# Patient Record
Sex: FEMALE | Race: WHITE | Employment: OTHER | URBAN - METROPOLITAN AREA
[De-identification: names, ages, dates, MRNs, and addresses within clinical notes are randomized per-mention and may not be internally consistent; named-entity substitution may affect disease eponyms.]

---

## 2017-01-04 ENCOUNTER — OFFICE VISIT (OUTPATIENT)
Dept: FAMILY MEDICINE CLINIC | Age: 62
End: 2017-01-04

## 2017-01-04 VITALS
SYSTOLIC BLOOD PRESSURE: 122 MMHG | HEIGHT: 61 IN | HEART RATE: 82 BPM | TEMPERATURE: 98 F | WEIGHT: 242 LBS | DIASTOLIC BLOOD PRESSURE: 82 MMHG | OXYGEN SATURATION: 97 % | BODY MASS INDEX: 45.69 KG/M2

## 2017-01-04 DIAGNOSIS — J20.8 ACUTE BRONCHITIS DUE TO OTHER SPECIFIED ORGANISMS: Primary | ICD-10-CM

## 2017-01-04 DIAGNOSIS — M89.8X6 PAIN IN TIBIA: ICD-10-CM

## 2017-01-04 DIAGNOSIS — T30.0 BURN: ICD-10-CM

## 2017-01-04 DIAGNOSIS — H92.02 EAR PAIN, LEFT: ICD-10-CM

## 2017-01-04 PROCEDURE — 69210 REMOVE IMPACTED EAR WAX UNI: CPT | Performed by: FAMILY MEDICINE

## 2017-01-04 PROCEDURE — 99214 OFFICE O/P EST MOD 30 MIN: CPT | Performed by: FAMILY MEDICINE

## 2017-01-04 RX ORDER — METHYLPREDNISOLONE 4 MG/1
TABLET ORAL
Qty: 1 KIT | Refills: 0 | Status: SHIPPED | OUTPATIENT
Start: 2017-01-04 | End: 2017-01-10

## 2017-01-04 RX ORDER — AZITHROMYCIN 250 MG/1
TABLET, FILM COATED ORAL
Qty: 1 PACKET | Refills: 0 | Status: SHIPPED | OUTPATIENT
Start: 2017-01-04 | End: 2017-01-14

## 2017-01-04 RX ORDER — BENZONATATE 100 MG/1
100 CAPSULE ORAL 3 TIMES DAILY PRN
Qty: 20 CAPSULE | Refills: 0 | Status: SHIPPED | OUTPATIENT
Start: 2017-01-04 | End: 2017-01-11

## 2017-01-04 ASSESSMENT — ENCOUNTER SYMPTOMS
SORE THROAT: 0
BACK PAIN: 0
VOMITING: 0
SINUS PRESSURE: 0
BLOOD IN STOOL: 0
EYE DISCHARGE: 0
SHORTNESS OF BREATH: 0
NAUSEA: 0
DIARRHEA: 0
WHEEZING: 0
BURN: 1
ABDOMINAL PAIN: 0
COUGH: 1

## 2017-01-04 ASSESSMENT — PATIENT HEALTH QUESTIONNAIRE - PHQ9
2. FEELING DOWN, DEPRESSED OR HOPELESS: 0
SUM OF ALL RESPONSES TO PHQ9 QUESTIONS 1 & 2: 0
SUM OF ALL RESPONSES TO PHQ QUESTIONS 1-9: 0
1. LITTLE INTEREST OR PLEASURE IN DOING THINGS: 0

## 2017-04-25 ENCOUNTER — OFFICE VISIT (OUTPATIENT)
Dept: FAMILY MEDICINE CLINIC | Age: 62
End: 2017-04-25

## 2017-04-25 VITALS
HEART RATE: 74 BPM | OXYGEN SATURATION: 97 % | DIASTOLIC BLOOD PRESSURE: 72 MMHG | SYSTOLIC BLOOD PRESSURE: 118 MMHG | WEIGHT: 247.8 LBS | BODY MASS INDEX: 46.78 KG/M2 | HEIGHT: 61 IN

## 2017-04-25 DIAGNOSIS — R05.9 COUGH: ICD-10-CM

## 2017-04-25 DIAGNOSIS — F33.0 MAJOR DEPRESSIVE DISORDER, RECURRENT EPISODE, MILD (HCC): ICD-10-CM

## 2017-04-25 DIAGNOSIS — J44.9 COPD, MODERATE (HCC): Primary | Chronic | ICD-10-CM

## 2017-04-25 DIAGNOSIS — Z77.110 EXACERBATION OF COPD ASSOCIATED WITH VOLCANIC SMOG EXPOSURE (HCC): ICD-10-CM

## 2017-04-25 DIAGNOSIS — Z12.31 ENCOUNTER FOR SCREENING MAMMOGRAM FOR MALIGNANT NEOPLASM OF BREAST: ICD-10-CM

## 2017-04-25 DIAGNOSIS — Z91.09 ENVIRONMENTAL ALLERGIES: ICD-10-CM

## 2017-04-25 DIAGNOSIS — R60.0 PEDAL EDEMA: ICD-10-CM

## 2017-04-25 DIAGNOSIS — Z13.220 SCREENING CHOLESTEROL LEVEL: ICD-10-CM

## 2017-04-25 DIAGNOSIS — R91.1 LUNG NODULE: ICD-10-CM

## 2017-04-25 DIAGNOSIS — J45.40 MODERATE PERSISTENT ASTHMA WITHOUT COMPLICATION: ICD-10-CM

## 2017-04-25 DIAGNOSIS — J44.1 EXACERBATION OF COPD ASSOCIATED WITH VOLCANIC SMOG EXPOSURE (HCC): ICD-10-CM

## 2017-04-25 DIAGNOSIS — E03.9 HYPOTHYROIDISM, UNSPECIFIED TYPE: ICD-10-CM

## 2017-04-25 LAB
BUN BLDV-MCNC: 22 MG/DL (ref 7–20)
CHOLESTEROL, TOTAL: 187 MG/DL (ref 0–199)
CREAT SERPL-MCNC: 0.7 MG/DL (ref 0.6–1.2)
GFR AFRICAN AMERICAN: >60
GFR NON-AFRICAN AMERICAN: >60
HDLC SERPL-MCNC: 50 MG/DL (ref 40–60)
LDL CHOLESTEROL CALCULATED: 105 MG/DL
TRIGL SERPL-MCNC: 159 MG/DL (ref 0–150)
TSH SERPL DL<=0.05 MIU/L-ACNC: 2.43 UIU/ML (ref 0.27–4.2)
VLDLC SERPL CALC-MCNC: 32 MG/DL

## 2017-04-25 PROCEDURE — 99214 OFFICE O/P EST MOD 30 MIN: CPT | Performed by: FAMILY MEDICINE

## 2017-04-25 PROCEDURE — 36415 COLL VENOUS BLD VENIPUNCTURE: CPT | Performed by: FAMILY MEDICINE

## 2017-04-25 RX ORDER — BUPROPION HYDROCHLORIDE 300 MG/1
300 TABLET ORAL DAILY
Qty: 30 TABLET | Refills: 5 | Status: SHIPPED | OUTPATIENT
Start: 2017-04-25 | End: 2017-12-19 | Stop reason: SDUPTHER

## 2017-04-25 RX ORDER — MONTELUKAST SODIUM 10 MG/1
10 TABLET ORAL DAILY
Qty: 30 TABLET | Refills: 5 | Status: SHIPPED | OUTPATIENT
Start: 2017-04-25 | End: 2017-12-19 | Stop reason: SDUPTHER

## 2017-04-25 RX ORDER — BENZONATATE 100 MG/1
200 CAPSULE ORAL 3 TIMES DAILY PRN
Qty: 25 CAPSULE | Refills: 0 | Status: SHIPPED | OUTPATIENT
Start: 2017-04-25 | End: 2017-07-08

## 2017-04-25 RX ORDER — METHYLPREDNISOLONE 4 MG/1
TABLET ORAL
Qty: 1 KIT | Refills: 0 | Status: SHIPPED | OUTPATIENT
Start: 2017-04-25 | End: 2017-05-01

## 2017-04-25 RX ORDER — BUSPIRONE HYDROCHLORIDE 7.5 MG/1
7.5 TABLET ORAL 2 TIMES DAILY
Qty: 60 TABLET | Refills: 5 | Status: SHIPPED | OUTPATIENT
Start: 2017-04-25 | End: 2017-12-19 | Stop reason: SDUPTHER

## 2017-04-25 RX ORDER — FUROSEMIDE 20 MG/1
20 TABLET ORAL DAILY
Qty: 60 TABLET | Refills: 5 | Status: SHIPPED | OUTPATIENT
Start: 2017-04-25 | End: 2017-12-19 | Stop reason: SDUPTHER

## 2017-04-25 RX ORDER — LORATADINE 10 MG/1
10 TABLET ORAL DAILY
Qty: 30 TABLET | Refills: 5 | Status: SHIPPED | OUTPATIENT
Start: 2017-04-25 | End: 2017-12-19 | Stop reason: SDUPTHER

## 2017-04-25 RX ORDER — LEVOTHYROXINE SODIUM 0.12 MG/1
125 TABLET ORAL DAILY
Qty: 30 TABLET | Refills: 5 | Status: SHIPPED | OUTPATIENT
Start: 2017-04-25 | End: 2017-12-19 | Stop reason: SDUPTHER

## 2017-04-25 RX ORDER — ALBUTEROL SULFATE 90 UG/1
2 AEROSOL, METERED RESPIRATORY (INHALATION) EVERY 6 HOURS PRN
Qty: 1 INHALER | Refills: 5 | Status: SHIPPED | OUTPATIENT
Start: 2017-04-25 | End: 2017-12-19 | Stop reason: SDUPTHER

## 2017-04-25 RX ORDER — THEOPHYLLINE 300 MG/1
300 TABLET, EXTENDED RELEASE ORAL 2 TIMES DAILY
Qty: 60 TABLET | Refills: 5 | Status: SHIPPED | OUTPATIENT
Start: 2017-04-25 | End: 2017-12-19 | Stop reason: SDUPTHER

## 2017-04-25 RX ORDER — DIAZEPAM 5 MG/1
5 TABLET ORAL EVERY 8 HOURS PRN
Qty: 1 TABLET | Refills: 0 | Status: SHIPPED | OUTPATIENT
Start: 2017-04-25 | End: 2017-04-26

## 2017-04-25 ASSESSMENT — ENCOUNTER SYMPTOMS: COUGH: 1

## 2017-04-25 ASSESSMENT — COPD QUESTIONNAIRES: COPD: 1

## 2017-05-08 ENCOUNTER — HOSPITAL ENCOUNTER (OUTPATIENT)
Dept: WOMENS IMAGING | Age: 62
Discharge: HOME | End: 2017-05-08
Attending: FAMILY MEDICINE | Admitting: FAMILY MEDICINE

## 2017-05-08 DIAGNOSIS — Z12.31 ENCOUNTER FOR SCREENING MAMMOGRAM FOR MALIGNANT NEOPLASM OF BREAST: ICD-10-CM

## 2017-07-12 ENCOUNTER — OFFICE VISIT (OUTPATIENT)
Dept: FAMILY MEDICINE CLINIC | Age: 62
End: 2017-07-12

## 2017-07-12 VITALS
TEMPERATURE: 98.4 F | SYSTOLIC BLOOD PRESSURE: 124 MMHG | WEIGHT: 241.8 LBS | HEIGHT: 62 IN | DIASTOLIC BLOOD PRESSURE: 74 MMHG | BODY MASS INDEX: 44.5 KG/M2 | HEART RATE: 71 BPM

## 2017-07-12 DIAGNOSIS — H92.03 OTALGIA, BILATERAL: ICD-10-CM

## 2017-07-12 DIAGNOSIS — H61.23 IMPACTED CERUMEN OF BOTH EARS: Primary | ICD-10-CM

## 2017-07-12 DIAGNOSIS — R07.9 CHEST PAIN, UNSPECIFIED TYPE: ICD-10-CM

## 2017-07-12 DIAGNOSIS — J06.9 VIRAL URI: ICD-10-CM

## 2017-07-12 PROCEDURE — 69210 REMOVE IMPACTED EAR WAX UNI: CPT | Performed by: FAMILY MEDICINE

## 2017-07-12 PROCEDURE — 99214 OFFICE O/P EST MOD 30 MIN: CPT | Performed by: FAMILY MEDICINE

## 2017-07-12 PROCEDURE — 93000 ELECTROCARDIOGRAM COMPLETE: CPT | Performed by: FAMILY MEDICINE

## 2017-07-12 ASSESSMENT — ENCOUNTER SYMPTOMS: COUGH: 1

## 2017-07-20 ENCOUNTER — TELEPHONE (OUTPATIENT)
Dept: FAMILY MEDICINE CLINIC | Age: 62
End: 2017-07-20

## 2017-09-13 ENCOUNTER — OFFICE VISIT (OUTPATIENT)
Dept: FAMILY MEDICINE CLINIC | Age: 62
End: 2017-09-13

## 2017-09-13 VITALS
DIASTOLIC BLOOD PRESSURE: 64 MMHG | SYSTOLIC BLOOD PRESSURE: 124 MMHG | BODY MASS INDEX: 44.53 KG/M2 | TEMPERATURE: 97.9 F | HEIGHT: 62 IN | WEIGHT: 242 LBS | HEART RATE: 80 BPM

## 2017-09-13 DIAGNOSIS — T17.308A CHOKING, INITIAL ENCOUNTER: ICD-10-CM

## 2017-09-13 DIAGNOSIS — R11.2 NAUSEA AND VOMITING, INTRACTABILITY OF VOMITING NOT SPECIFIED, UNSPECIFIED VOMITING TYPE: Primary | ICD-10-CM

## 2017-09-13 DIAGNOSIS — M79.671 PAIN IN RIGHT FOOT: ICD-10-CM

## 2017-09-13 PROCEDURE — 99214 OFFICE O/P EST MOD 30 MIN: CPT | Performed by: FAMILY MEDICINE

## 2017-09-13 RX ORDER — RANITIDINE 150 MG/1
150 TABLET ORAL 2 TIMES DAILY
Qty: 60 TABLET | Refills: 0 | Status: SHIPPED | OUTPATIENT
Start: 2017-09-13 | End: 2017-10-13 | Stop reason: SDUPTHER

## 2017-09-13 RX ORDER — FLUTICASONE PROPIONATE 50 MCG
SPRAY, SUSPENSION (ML) NASAL
Refills: 0 | COMMUNITY
Start: 2017-07-21 | End: 2017-12-21

## 2017-09-14 ASSESSMENT — ENCOUNTER SYMPTOMS
NAUSEA: 1
DIARRHEA: 1
VOMITING: 1

## 2017-09-26 ENCOUNTER — OFFICE VISIT (OUTPATIENT)
Dept: FAMILY MEDICINE CLINIC | Age: 62
End: 2017-09-26

## 2017-09-26 VITALS
SYSTOLIC BLOOD PRESSURE: 118 MMHG | WEIGHT: 243.2 LBS | DIASTOLIC BLOOD PRESSURE: 70 MMHG | RESPIRATION RATE: 17 BRPM | HEART RATE: 71 BPM | BODY MASS INDEX: 45.21 KG/M2

## 2017-09-26 DIAGNOSIS — J44.9 COPD, MODERATE (HCC): Chronic | ICD-10-CM

## 2017-09-26 DIAGNOSIS — K21.9 GASTROESOPHAGEAL REFLUX DISEASE WITHOUT ESOPHAGITIS: Primary | ICD-10-CM

## 2017-09-26 DIAGNOSIS — R19.5 LOOSE STOOLS: ICD-10-CM

## 2017-09-26 DIAGNOSIS — R25.2 FOOT SPASMS: ICD-10-CM

## 2017-09-26 PROCEDURE — 99214 OFFICE O/P EST MOD 30 MIN: CPT | Performed by: FAMILY MEDICINE

## 2017-09-26 RX ORDER — DICYCLOMINE HCL 20 MG
20 TABLET ORAL
Qty: 60 TABLET | Refills: 0 | Status: SHIPPED | OUTPATIENT
Start: 2017-09-26 | End: 2017-12-19

## 2017-09-26 RX ORDER — ALBUTEROL SULFATE 2.5 MG/3ML
2.5 SOLUTION RESPIRATORY (INHALATION) 2 TIMES DAILY PRN
Qty: 60 EACH | Refills: 0 | Status: SHIPPED | OUTPATIENT
Start: 2017-09-26 | End: 2017-12-19 | Stop reason: SDUPTHER

## 2017-09-26 RX ORDER — OMEPRAZOLE 40 MG/1
40 CAPSULE, DELAYED RELEASE ORAL DAILY
Qty: 30 CAPSULE | Refills: 0 | Status: SHIPPED | OUTPATIENT
Start: 2017-09-26 | End: 2017-12-19

## 2017-09-26 ASSESSMENT — ENCOUNTER SYMPTOMS
NAUSEA: 1
HEARTBURN: 1
COUGH: 1
CHOKING: 1
GLOBUS SENSATION: 1
VOMITING: 1
DIARRHEA: 1

## 2017-09-27 ASSESSMENT — COPD QUESTIONNAIRES: COPD: 1

## 2017-12-19 ENCOUNTER — OFFICE VISIT (OUTPATIENT)
Dept: FAMILY MEDICINE CLINIC | Age: 62
End: 2017-12-19

## 2017-12-19 VITALS
WEIGHT: 241.2 LBS | HEIGHT: 62 IN | OXYGEN SATURATION: 94 % | TEMPERATURE: 97.9 F | HEART RATE: 104 BPM | SYSTOLIC BLOOD PRESSURE: 130 MMHG | BODY MASS INDEX: 44.39 KG/M2 | DIASTOLIC BLOOD PRESSURE: 62 MMHG | RESPIRATION RATE: 20 BRPM

## 2017-12-19 DIAGNOSIS — F33.0 MAJOR DEPRESSIVE DISORDER, RECURRENT EPISODE, MILD (HCC): ICD-10-CM

## 2017-12-19 DIAGNOSIS — J44.9 COPD, MODERATE (HCC): Chronic | ICD-10-CM

## 2017-12-19 DIAGNOSIS — R60.0 PEDAL EDEMA: ICD-10-CM

## 2017-12-19 DIAGNOSIS — Z91.09 ENVIRONMENTAL ALLERGIES: ICD-10-CM

## 2017-12-19 DIAGNOSIS — R07.9 CHEST PAIN, UNSPECIFIED TYPE: ICD-10-CM

## 2017-12-19 DIAGNOSIS — R73.02 IMPAIRED GLUCOSE TOLERANCE: ICD-10-CM

## 2017-12-19 DIAGNOSIS — E66.01 OBESITY, CLASS III, BMI 40-49.9 (MORBID OBESITY) (HCC): ICD-10-CM

## 2017-12-19 DIAGNOSIS — R05.9 COUGH: Primary | ICD-10-CM

## 2017-12-19 DIAGNOSIS — E03.9 HYPOTHYROIDISM, UNSPECIFIED TYPE: ICD-10-CM

## 2017-12-19 PROCEDURE — 3023F SPIROM DOC REV: CPT | Performed by: FAMILY MEDICINE

## 2017-12-19 PROCEDURE — 3014F SCREEN MAMMO DOC REV: CPT | Performed by: FAMILY MEDICINE

## 2017-12-19 PROCEDURE — G8484 FLU IMMUNIZE NO ADMIN: HCPCS | Performed by: FAMILY MEDICINE

## 2017-12-19 PROCEDURE — 93000 ELECTROCARDIOGRAM COMPLETE: CPT | Performed by: FAMILY MEDICINE

## 2017-12-19 PROCEDURE — G8417 CALC BMI ABV UP PARAM F/U: HCPCS | Performed by: FAMILY MEDICINE

## 2017-12-19 PROCEDURE — 99214 OFFICE O/P EST MOD 30 MIN: CPT | Performed by: FAMILY MEDICINE

## 2017-12-19 PROCEDURE — G8427 DOCREV CUR MEDS BY ELIG CLIN: HCPCS | Performed by: FAMILY MEDICINE

## 2017-12-19 PROCEDURE — G8926 SPIRO NO PERF OR DOC: HCPCS | Performed by: FAMILY MEDICINE

## 2017-12-19 PROCEDURE — G8599 NO ASA/ANTIPLAT THER USE RNG: HCPCS | Performed by: FAMILY MEDICINE

## 2017-12-19 PROCEDURE — 3017F COLORECTAL CA SCREEN DOC REV: CPT | Performed by: FAMILY MEDICINE

## 2017-12-19 PROCEDURE — 1036F TOBACCO NON-USER: CPT | Performed by: FAMILY MEDICINE

## 2017-12-19 RX ORDER — BUSPIRONE HYDROCHLORIDE 7.5 MG/1
7.5 TABLET ORAL 2 TIMES DAILY
Qty: 60 TABLET | Refills: 5 | Status: SHIPPED | OUTPATIENT
Start: 2017-12-19 | End: 2018-06-20 | Stop reason: SDUPTHER

## 2017-12-19 RX ORDER — THEOPHYLLINE 300 MG/1
300 TABLET, EXTENDED RELEASE ORAL 2 TIMES DAILY
Qty: 60 TABLET | Refills: 5 | Status: SHIPPED | OUTPATIENT
Start: 2017-12-19 | End: 2018-06-20 | Stop reason: SDUPTHER

## 2017-12-19 RX ORDER — FUROSEMIDE 20 MG/1
20 TABLET ORAL DAILY
Qty: 60 TABLET | Refills: 5 | Status: SHIPPED | OUTPATIENT
Start: 2017-12-19 | End: 2018-06-20 | Stop reason: SDUPTHER

## 2017-12-19 RX ORDER — ALBUTEROL SULFATE 2.5 MG/3ML
2.5 SOLUTION RESPIRATORY (INHALATION) 2 TIMES DAILY PRN
Qty: 60 EACH | Refills: 0 | Status: SHIPPED | OUTPATIENT
Start: 2017-12-19 | End: 2018-06-20 | Stop reason: SDUPTHER

## 2017-12-19 RX ORDER — METHYLPREDNISOLONE 4 MG/1
TABLET ORAL
Qty: 1 KIT | Refills: 0 | Status: SHIPPED | OUTPATIENT
Start: 2017-12-19 | End: 2017-12-25

## 2017-12-19 RX ORDER — LEVOTHYROXINE SODIUM 0.12 MG/1
125 TABLET ORAL DAILY
Qty: 30 TABLET | Refills: 5 | Status: SHIPPED | OUTPATIENT
Start: 2017-12-19 | End: 2018-06-20 | Stop reason: SDUPTHER

## 2017-12-19 RX ORDER — MONTELUKAST SODIUM 10 MG/1
10 TABLET ORAL DAILY
Qty: 30 TABLET | Refills: 5 | Status: SHIPPED | OUTPATIENT
Start: 2017-12-19 | End: 2018-06-20 | Stop reason: SDUPTHER

## 2017-12-19 RX ORDER — ALBUTEROL SULFATE 90 UG/1
2 AEROSOL, METERED RESPIRATORY (INHALATION) EVERY 6 HOURS PRN
Qty: 1 INHALER | Refills: 5 | Status: SHIPPED | OUTPATIENT
Start: 2017-12-19 | End: 2018-06-20 | Stop reason: SDUPTHER

## 2017-12-19 RX ORDER — BENZONATATE 200 MG/1
200 CAPSULE ORAL 3 TIMES DAILY PRN
Qty: 30 CAPSULE | Refills: 0 | Status: SHIPPED | OUTPATIENT
Start: 2017-12-19 | End: 2018-03-13 | Stop reason: ALTCHOICE

## 2017-12-19 RX ORDER — LORATADINE 10 MG/1
10 TABLET ORAL DAILY
Qty: 30 TABLET | Refills: 5 | Status: SHIPPED | OUTPATIENT
Start: 2017-12-19 | End: 2018-06-20 | Stop reason: SDUPTHER

## 2017-12-19 RX ORDER — BUPROPION HYDROCHLORIDE 300 MG/1
300 TABLET ORAL DAILY
Qty: 30 TABLET | Refills: 5 | Status: SHIPPED | OUTPATIENT
Start: 2017-12-19 | End: 2018-06-20 | Stop reason: SDUPTHER

## 2017-12-19 ASSESSMENT — ENCOUNTER SYMPTOMS: COUGH: 1

## 2017-12-19 ASSESSMENT — COPD QUESTIONNAIRES: COPD: 1

## 2017-12-19 NOTE — PROGRESS NOTES
no submandibular and no posterior auricular adenopathy present. Right cervical: No superficial cervical, no deep cervical and no posterior cervical adenopathy present. Left cervical: No superficial cervical, no deep cervical and no posterior cervical adenopathy present. Psychiatric: She has a normal mood and affect. Her behavior is normal.     Vitals:    12/19/17 1354   BP: 130/62   Site: Right Arm   Position: Sitting   Cuff Size: Large Adult   Pulse: 104   Resp: 20   Temp: 97.9 °F (36.6 °C)   TempSrc: Oral   SpO2: 94%   Weight: 241 lb 3.2 oz (109.4 kg)   Height: 5' 1.5\" (1.562 m)     Body mass index is 44.84 kg/m². Wt Readings from Last 3 Encounters:   12/19/17 241 lb 3.2 oz (109.4 kg)   09/26/17 243 lb 3.2 oz (110.3 kg)   09/13/17 242 lb (109.8 kg)     BP Readings from Last 3 Encounters:   12/19/17 130/62   09/26/17 118/70   09/13/17 124/64          No results found for this visit on 12/19/17.   The 10-year ASCVD risk score (Alexis Reagan, et al., 2013) is: 4.2%    Values used to calculate the score:      Age: 58 years      Sex: Female      Is Non- : No      Diabetic: No      Tobacco smoker: No      Systolic Blood Pressure: 933 mmHg      Is BP treated: No      HDL Cholesterol: 50 mg/dL      Total Cholesterol: 187 mg/dL  Lab Review   Admission on 09/12/2017, Discharged on 09/12/2017   Component Date Value    WBC 09/12/2017 15.8*    RBC 09/12/2017 4.87     Hemoglobin 09/12/2017 15.2     Hematocrit 09/12/2017 45.0     MCV 09/12/2017 92.4     MCH 09/12/2017 31.2*    MCHC 09/12/2017 33.8     RDW 09/12/2017 13.2     Platelets 39/43/2197 248     MPV 09/12/2017 10.0     Differential Type 09/12/2017 AUTOMATED DIFFERENTIAL     Segs Relative 09/12/2017 73.9*    Lymphocytes % 09/12/2017 17.6*    Monocytes % 09/12/2017 6.0*    Eosinophils % 09/12/2017 1.4     Basophils % 09/12/2017 0.6     Segs Absolute 09/12/2017 11.6     Lymphocytes # 09/12/2017 2.8     Monocytes # Calcium 07/08/2017 9.5     Alb 07/08/2017 3.7     Total Protein 07/08/2017 7.0     Total Bilirubin 07/08/2017 0.5     ALT 07/08/2017 21     AST 07/08/2017 23     Alkaline Phosphatase 07/08/2017 89     GFR Non- 07/08/2017 56*    GFR  07/08/2017 >60     Anion Gap 07/08/2017 14     Troponin T 07/08/2017 <0.010     Pro-BNP 07/08/2017 72.60     Protime 07/08/2017 11.1     INR 07/08/2017 0.97     Ventricular Rate 07/09/2017 80     Atrial Rate 07/09/2017 80     P-R Interval 07/09/2017 186     QRS Duration 07/09/2017 86     Q-T Interval 07/09/2017 388     QTc Calculation (Bazett) 07/09/2017 447     P Axis 07/09/2017 43     R Axis 07/09/2017 -14     T Axis 07/09/2017 10     Diagnosis 07/09/2017                      Value:Normal sinus rhythm  Possible Left atrial enlargement  Inferior infarct , age undetermined  Abnormal ECG  No previous ECGs available  Confirmed by TRISH Fuentes ([id number]) on 7/9/2017 10:47:02 AM         EKG: unchanged from previous tracings. Assessment:      1. Cough  benzonatate (TESSALON) 200 MG capsule    methylPREDNISolone (MEDROL, PRISCILLA,) 4 MG tablet   2. Obesity, Class III, BMI 40-49.9 (morbid obesity) ([address]     3. Impaired glucose tolerance     4. Hypothyroidism, unspecified type  levothyroxine (SYNTHROID) 125 MCG tablet   5. COPD, moderate ([address]  theophylline (THEODUR) 300 MG extended release tablet    montelukast (SINGULAIR) 10 MG tablet    albuterol sulfate HFA (VENTOLIN HFA) 108 (90 Base) MCG/ACT inhaler    fluticasone-salmeterol (ADVAIR DISKUS) 250-50 MCG/DOSE AEPB    albuterol (PROVENTIL) (2.5 MG/3ML) 0.083% nebulizer solution   6. Environmental allergies  loratadine (CLARITIN) 10 MG tablet   7. Major depressive disorder, recurrent episode, mild (HCC)  busPIRone (BUSPAR) 7.5 MG tablet    buPROPion (WELLBUTRIN XL) 300 MG extended release tablet   8. Pedal edema  furosemide (LASIX) 20 MG tablet   9.  Chest pain, unspecified type  EKG 12 lead           Plan:      See orders. I've encouraged her to get her stress test done    Edema stable continue current medication    Her cough could be a flare of her COPD. Restarting her theophylline. See orders    Patient is aware of being pre-diabetic. This can be considered a warning sign that they could develop diabetes in the future. Patient counseled on lifestyle changes such as 30 minutes of exercise daily or 150 minutes of exercise per week. Healthy food choices were discussed such as increasing the amount of nonstarchy vegetables and decreasing foods high in carbohydrates. Metformin or Precose is not   added to the medication regimen today.     Recheck in 2 days

## 2017-12-21 ENCOUNTER — OFFICE VISIT (OUTPATIENT)
Dept: FAMILY MEDICINE CLINIC | Age: 62
End: 2017-12-21

## 2017-12-21 VITALS
DIASTOLIC BLOOD PRESSURE: 78 MMHG | OXYGEN SATURATION: 93 % | SYSTOLIC BLOOD PRESSURE: 110 MMHG | RESPIRATION RATE: 19 BRPM | TEMPERATURE: 98 F | HEART RATE: 63 BPM | BODY MASS INDEX: 44.99 KG/M2 | WEIGHT: 242 LBS

## 2017-12-21 DIAGNOSIS — J20.9 ACUTE BRONCHITIS, UNSPECIFIED ORGANISM: Primary | ICD-10-CM

## 2017-12-21 PROCEDURE — 94640 AIRWAY INHALATION TREATMENT: CPT | Performed by: FAMILY MEDICINE

## 2017-12-21 PROCEDURE — 99213 OFFICE O/P EST LOW 20 MIN: CPT | Performed by: FAMILY MEDICINE

## 2017-12-21 PROCEDURE — G8484 FLU IMMUNIZE NO ADMIN: HCPCS | Performed by: FAMILY MEDICINE

## 2017-12-21 PROCEDURE — G8599 NO ASA/ANTIPLAT THER USE RNG: HCPCS | Performed by: FAMILY MEDICINE

## 2017-12-21 PROCEDURE — 3014F SCREEN MAMMO DOC REV: CPT | Performed by: FAMILY MEDICINE

## 2017-12-21 PROCEDURE — G8427 DOCREV CUR MEDS BY ELIG CLIN: HCPCS | Performed by: FAMILY MEDICINE

## 2017-12-21 PROCEDURE — 3017F COLORECTAL CA SCREEN DOC REV: CPT | Performed by: FAMILY MEDICINE

## 2017-12-21 PROCEDURE — G8417 CALC BMI ABV UP PARAM F/U: HCPCS | Performed by: FAMILY MEDICINE

## 2017-12-21 PROCEDURE — 1036F TOBACCO NON-USER: CPT | Performed by: FAMILY MEDICINE

## 2017-12-21 RX ORDER — ALBUTEROL SULFATE 2.5 MG/3ML
2.5 SOLUTION RESPIRATORY (INHALATION) ONCE
Status: COMPLETED | OUTPATIENT
Start: 2017-12-21 | End: 2017-12-21

## 2017-12-21 RX ORDER — DOXYCYCLINE HYCLATE 100 MG
100 TABLET ORAL 2 TIMES DAILY
Qty: 20 TABLET | Refills: 0 | Status: SHIPPED | OUTPATIENT
Start: 2017-12-21 | End: 2017-12-31

## 2017-12-21 RX ADMIN — Medication 0.5 MG: at 11:46

## 2017-12-21 RX ADMIN — ALBUTEROL SULFATE 2.5 MG: 2.5 SOLUTION RESPIRATORY (INHALATION) at 11:45

## 2017-12-21 NOTE — PROGRESS NOTES
(RA BLOOD PRESSURE CUFF MONITOR) MISC Use as directed  0       ALLERGIES    Allergies   Allergen Reactions    Latex Itching    Iodine Anaphylaxis    Levofloxacin Anaphylaxis    Pcn [Penicillins] Anaphylaxis    Sulfa Antibiotics Anaphylaxis    Morphine Itching    Zofran Itching    Caffeine Palpitations     Doesn't sleep       Patient Active Problem List   Diagnosis    Angina pectoris (HCC)    LVH (left ventricular hypertrophy) due to hypertensive disease    COPD, moderate (HCC)    TR (tricuspid regurgitation)    Venous (peripheral) insufficiency    Mitral insufficiency    Asthma, persistent    Hypothyroid    Hepatic steatosis    Obesity, Class III, BMI 40-49.9 (morbid obesity) (HCC)    Upper back pain on right side    Impaired glucose tolerance       Past Medical History:   Diagnosis Date    Arthritis     Asthma     CAD (coronary artery disease)     Hepatic steatosis 6/19/2015    Hyperlipidemia     Hypertension     Hypothyroid          PHYSICAL EXAM    Vital Signs:  /78 (Site: Right Arm, Position: Sitting, Cuff Size: Large Adult)   Pulse 63   Temp 98 °F (36.7 °C) (Oral)   Resp 19   Wt 242 lb (109.8 kg)   SpO2 93%   BMI 44.99 kg/m²   . Physical Exam   Constitutional: She appears well-developed and well-nourished. She appears ill. No distress. Coughing   Neck: Trachea normal.   Cardiovascular: Normal rate and regular rhythm. Pulmonary/Chest: She has no decreased breath sounds. She has no wheezes. She has rhonchi in the right lower field and the left lower field. Lymphadenopathy:     She has cervical adenopathy. Right cervical: No superficial cervical and no deep cervical adenopathy present. Left cervical: No superficial cervical and no deep cervical adenopathy present. Psychiatric: She has a normal mood and affect.  Her speech is normal and behavior is normal.       1. Acute bronchitis, unspecified organism  doxycycline hyclate (VIBRA-TABS) 100 MG tablet albuterol (PROVENTIL) nebulizer solution 2.5 mg    ipratropium (ATROVENT) 0.02 % nebulizer solution 0.5 mg    MA AEROSOL INHALATION TREATMENT       See orders          Recheck next week at the [place] [address]

## 2017-12-26 ENCOUNTER — OFFICE VISIT (OUTPATIENT)
Dept: FAMILY MEDICINE CLINIC | Age: 62
End: 2017-12-26

## 2017-12-26 VITALS
TEMPERATURE: 98.2 F | HEIGHT: 62 IN | WEIGHT: 236.2 LBS | OXYGEN SATURATION: 97 % | BODY MASS INDEX: 43.47 KG/M2 | HEART RATE: 65 BPM | SYSTOLIC BLOOD PRESSURE: 116 MMHG | DIASTOLIC BLOOD PRESSURE: 70 MMHG

## 2017-12-26 DIAGNOSIS — J40 BRONCHITIS: Primary | ICD-10-CM

## 2017-12-26 DIAGNOSIS — R04.0 NOSEBLEED, SYMPTOM: ICD-10-CM

## 2017-12-26 PROCEDURE — 99212 OFFICE O/P EST SF 10 MIN: CPT | Performed by: NURSE PRACTITIONER

## 2017-12-26 RX ORDER — AZITHROMYCIN 250 MG/1
250 TABLET, FILM COATED ORAL DAILY
Qty: 6 TABLET | Refills: 0 | Status: SHIPPED | OUTPATIENT
Start: 2017-12-26 | End: 2017-12-31

## 2017-12-26 RX ORDER — PREDNISONE 20 MG/1
TABLET ORAL
Qty: 18 TABLET | Refills: 0 | Status: SHIPPED | OUTPATIENT
Start: 2017-12-26 | End: 2018-06-19

## 2017-12-26 RX ORDER — ECHINACEA PURPUREA EXTRACT 125 MG
1 TABLET ORAL PRN
Qty: 1 BOTTLE | Refills: 3 | Status: SHIPPED | OUTPATIENT
Start: 2017-12-26 | End: 2019-01-16

## 2017-12-26 RX ORDER — GUAIFENESIN AND CODEINE PHOSPHATE 100; 10 MG/5ML; MG/5ML
5 SOLUTION ORAL NIGHTLY PRN
Qty: 118 ML | Refills: 0 | Status: SHIPPED | OUTPATIENT
Start: 2017-12-26 | End: 2018-01-02

## 2017-12-26 ASSESSMENT — ENCOUNTER SYMPTOMS
RHINORRHEA: 1
SORE THROAT: 0
DIARRHEA: 0
SHORTNESS OF BREATH: 1
TROUBLE SWALLOWING: 0
SINUS PAIN: 0
VOMITING: 0
COUGH: 1
CHEST TIGHTNESS: 0
WHEEZING: 0
SINUS PRESSURE: 0
NAUSEA: 0

## 2017-12-26 NOTE — PROGRESS NOTES
Aimee Constant  1955  58 y.o. SUBJECT SUMAN:    Chief Complaint   Patient presents with    Other     Recheck bronchitis    Epistaxis     happened at the grocery store on Friday 22nd. Thought it was from the medication that she was given so she stopped taken. States it has never happened to her before. 58year old female presents for bronchitis recheck. She was initially seen on 12/19/17 by PCP Dr Tamiko Ruiz and was given medrol dose pack and tessalon pearls. She followed up on 12/21/17 and was given doxy and atrovent. Today, pt states she is about the same. She states that she has only been using the tessalon pearls as she experienced a severe nose bleed shortly after starting doxy and presumed that it was a medication reaction. She has finished the medrol dose pack. Cough   This is a new problem. The current episode started 1 to 4 weeks ago. The problem has been unchanged. The problem occurs every few minutes. Cough characteristics: dry, barking. Associated symptoms include chest pain (intermittent), chills, headaches, nasal congestion, rhinorrhea and shortness of breath (nebulizer helps). Pertinent negatives include no fever, sore throat or wheezing. The symptoms are aggravated by cold air. She has tried a beta-agonist inhaler and steroid inhaler for the symptoms. The treatment provided mild relief. Her past medical history is significant for asthma and COPD.        Past Medical History:   Diagnosis Date    Arthritis     Asthma     CAD (coronary artery disease)     Hepatic steatosis 6/19/2015    Hyperlipidemia     Hypertension     Hypothyroid        Current Outpatient Prescriptions on File Prior to Visit   Medication Sig Dispense Refill    theophylline (THEODUR) 300 MG extended release tablet Take 1 tablet by mouth 2 times daily 60 tablet 5    loratadine (CLARITIN) 10 MG tablet Take 1 tablet by mouth daily 30 tablet 5    busPIRone (BUSPAR) 7.5 MG tablet Take 1 tablet by mouth 2 times exacerbation and when to seek further medical attention. Patient verbalized understanding of these signs and symptoms as well as the plan of care. Patient will be contacted in two to three days to check on progress.

## 2017-12-26 NOTE — PATIENT INSTRUCTIONS
follow all instructions on the label. · Breathe moist air from a humidifier, hot shower, or sink filled with hot water. The heat and moisture will thin mucus so you can cough it out. · Do not smoke. Smoking can make bronchitis worse. If you need help quitting, talk to your doctor about stop-smoking programs and medicines. These can increase your chances of quitting for good. When should you call for help? Call 911 anytime you think you may need emergency care. For example, call if:  ? · You have severe trouble breathing. ?Call your doctor now or seek immediate medical care if:  ? · You have new or worse trouble breathing. ? · You cough up dark brown or bloody mucus (sputum). ? · You have a new or higher fever. ? · You have a new rash. ? Watch closely for changes in your health, and be sure to contact your doctor if:  ? · You cough more deeply or more often, especially if you notice more mucus or a change in the color of your mucus. ? · You are not getting better as expected. Where can you learn more? Go to https://Fashfix.ReliantHeart. org and sign in to your Parallel Engines account. Enter H333 in the [facility] box to learn more about \"Bronchitis: Care Instructions. \"     If you do not have an account, please click on the \"Sign Up Now\" link. Current as of: May 12, 2017  Content Version: 11.4  © 6932-2220 Healthwise, Incorporated. Care instructions adapted under license by [facility] ([facility]). If you have questions about a medical condition or this instruction, always ask your healthcare professional. [place] [address] any warranty or liability for your use of this information.

## 2018-03-13 ENCOUNTER — OFFICE VISIT (OUTPATIENT)
Dept: FAMILY MEDICINE CLINIC | Age: 63
End: 2018-03-13

## 2018-03-13 VITALS
DIASTOLIC BLOOD PRESSURE: 82 MMHG | WEIGHT: 243.4 LBS | SYSTOLIC BLOOD PRESSURE: 118 MMHG | HEIGHT: 62 IN | HEART RATE: 76 BPM | BODY MASS INDEX: 44.79 KG/M2

## 2018-03-13 DIAGNOSIS — S46.911D STRAIN OF RIGHT SHOULDER, SUBSEQUENT ENCOUNTER: Primary | ICD-10-CM

## 2018-03-13 DIAGNOSIS — S16.1XXD STRAIN OF NECK MUSCLE, SUBSEQUENT ENCOUNTER: ICD-10-CM

## 2018-03-13 PROCEDURE — 3014F SCREEN MAMMO DOC REV: CPT | Performed by: FAMILY MEDICINE

## 2018-03-13 PROCEDURE — 20610 DRAIN/INJ JOINT/BURSA W/O US: CPT | Performed by: FAMILY MEDICINE

## 2018-03-13 PROCEDURE — 3017F COLORECTAL CA SCREEN DOC REV: CPT | Performed by: FAMILY MEDICINE

## 2018-03-13 PROCEDURE — G8599 NO ASA/ANTIPLAT THER USE RNG: HCPCS | Performed by: FAMILY MEDICINE

## 2018-03-13 PROCEDURE — G8484 FLU IMMUNIZE NO ADMIN: HCPCS | Performed by: FAMILY MEDICINE

## 2018-03-13 PROCEDURE — 99213 OFFICE O/P EST LOW 20 MIN: CPT | Performed by: FAMILY MEDICINE

## 2018-03-13 PROCEDURE — G8417 CALC BMI ABV UP PARAM F/U: HCPCS | Performed by: FAMILY MEDICINE

## 2018-03-13 PROCEDURE — G8427 DOCREV CUR MEDS BY ELIG CLIN: HCPCS | Performed by: FAMILY MEDICINE

## 2018-03-13 PROCEDURE — 1036F TOBACCO NON-USER: CPT | Performed by: FAMILY MEDICINE

## 2018-03-13 RX ORDER — TRIAMCINOLONE ACETONIDE 40 MG/ML
80 INJECTION, SUSPENSION INTRA-ARTICULAR; INTRAMUSCULAR ONCE
Status: COMPLETED | OUTPATIENT
Start: 2018-03-13 | End: 2018-03-13

## 2018-03-13 RX ORDER — NAPROXEN 500 MG/1
TABLET ORAL
Refills: 0 | COMMUNITY
Start: 2018-03-12 | End: 2018-07-25

## 2018-03-13 RX ORDER — FLUTICASONE PROPIONATE 50 MCG
SPRAY, SUSPENSION (ML) NASAL
Refills: 0 | COMMUNITY
Start: 2018-02-15 | End: 2021-01-01

## 2018-03-13 RX ORDER — TRAMADOL HYDROCHLORIDE 50 MG/1
50 TABLET ORAL EVERY 6 HOURS PRN
Qty: 28 TABLET | Refills: 0 | Status: SHIPPED | OUTPATIENT
Start: 2018-03-13 | End: 2018-03-20

## 2018-03-13 RX ORDER — CYCLOBENZAPRINE HCL 10 MG
10 TABLET ORAL 3 TIMES DAILY PRN
Qty: 30 TABLET | Refills: 0 | Status: SHIPPED | OUTPATIENT
Start: 2018-03-13 | End: 2018-06-19

## 2018-03-13 RX ADMIN — TRIAMCINOLONE ACETONIDE 80 MG: 40 INJECTION, SUSPENSION INTRA-ARTICULAR; INTRAMUSCULAR at 16:51

## 2018-03-13 NOTE — PROGRESS NOTES
Patient ID: Manuela De León 1955      Shoulder Pain    The pain is present in the neck and right shoulder. This is a new problem. The current episode started 1 to 4 weeks ago. There has been a history of trauma (thinks she hurt her shoulder while helping her daughter to move). The problem has been unchanged. The pain is severe. The symptoms are aggravated by activity. She has tried NSAIDS (went to [facility] and was told that had torn rotator cuff and needs MRI) for the symptoms. The treatment provided mild relief. Review of Systems   Musculoskeletal: Positive for arthralgias and neck pain. Patient Active Problem List   Diagnosis    Angina pectoris ([address]    LVH (left ventricular hypertrophy) due to hypertensive disease    COPD, moderate (HCC)    TR (tricuspid regurgitation)    Venous (peripheral) insufficiency    Mitral insufficiency    Asthma, persistent    Hypothyroid    Hepatic steatosis    Obesity, Class III, BMI 40-49.9 (morbid obesity) ([address]    Upper back pain on right side    Impaired glucose tolerance       Past Medical History:   Diagnosis Date    Arthritis     Asthma     CAD (coronary artery disease)     Hepatic steatosis 6/19/2015    Hyperlipidemia     Hypertension     Hypothyroid        Past Surgical History:   Procedure Laterality Date    CARPAL TUNNEL RELEASE Left     CHOLECYSTECTOMY      HYSTERECTOMY      due to bleeding.   complete    THYROIDECTOMY      TONSILLECTOMY      TUBAL LIGATION      WRIST SURGERY Left        Family History   Problem Relation Age of Onset    Diabetes Mother     Hypertension Mother     Depression Mother      suicide    Diabetes Father     Depression Father     Diabetes Brother     Birth Defects Maternal Grandmother     Hypertension Maternal Grandmother     Diabetes Sister     Depression Brother        Current Outpatient Prescriptions on File Prior to Visit   Medication Sig Dispense Refill    theophylline (THEODUR) 300 MG

## 2018-06-19 ENCOUNTER — OFFICE VISIT (OUTPATIENT)
Dept: FAMILY MEDICINE CLINIC | Age: 63
End: 2018-06-19

## 2018-06-19 VITALS
HEART RATE: 90 BPM | BODY MASS INDEX: 43.84 KG/M2 | WEIGHT: 232 LBS | DIASTOLIC BLOOD PRESSURE: 78 MMHG | SYSTOLIC BLOOD PRESSURE: 118 MMHG

## 2018-06-19 DIAGNOSIS — R06.83 SNORES: ICD-10-CM

## 2018-06-19 DIAGNOSIS — R73.02 IMPAIRED GLUCOSE TOLERANCE: ICD-10-CM

## 2018-06-19 DIAGNOSIS — J45.909 PERSISTENT ASTHMA WITHOUT COMPLICATION, UNSPECIFIED ASTHMA SEVERITY: ICD-10-CM

## 2018-06-19 DIAGNOSIS — J44.9 COPD, MODERATE (HCC): Primary | Chronic | ICD-10-CM

## 2018-06-19 DIAGNOSIS — F33.0 MAJOR DEPRESSIVE DISORDER, RECURRENT EPISODE, MILD (HCC): ICD-10-CM

## 2018-06-19 DIAGNOSIS — H61.21 IMPACTED CERUMEN, RIGHT EAR: ICD-10-CM

## 2018-06-19 DIAGNOSIS — K14.8 DRY TONGUE: ICD-10-CM

## 2018-06-19 DIAGNOSIS — E03.9 HYPOTHYROIDISM, UNSPECIFIED TYPE: ICD-10-CM

## 2018-06-19 DIAGNOSIS — Z91.09 ENVIRONMENTAL ALLERGIES: ICD-10-CM

## 2018-06-19 DIAGNOSIS — R60.0 PEDAL EDEMA: ICD-10-CM

## 2018-06-19 DIAGNOSIS — R42 DIZZINESS: ICD-10-CM

## 2018-06-19 LAB
A/G RATIO: 1.8 (ref 1.1–2.2)
ALBUMIN SERPL-MCNC: 4.2 G/DL (ref 3.4–5)
ALP BLD-CCNC: 85 U/L (ref 40–129)
ALT SERPL-CCNC: 22 U/L (ref 10–40)
ANION GAP SERPL CALCULATED.3IONS-SCNC: 16 MMOL/L (ref 3–16)
AST SERPL-CCNC: 18 U/L (ref 15–37)
BASOPHILS ABSOLUTE: 0.1 K/UL (ref 0–0.2)
BASOPHILS RELATIVE PERCENT: 0.7 %
BILIRUB SERPL-MCNC: 0.4 MG/DL (ref 0–1)
BUN BLDV-MCNC: 14 MG/DL (ref 7–20)
CALCIUM SERPL-MCNC: 10.4 MG/DL (ref 8.3–10.6)
CHLORIDE BLD-SCNC: 107 MMOL/L (ref 99–110)
CO2: 24 MMOL/L (ref 21–32)
CREAT SERPL-MCNC: 0.8 MG/DL (ref 0.6–1.2)
EOSINOPHILS ABSOLUTE: 0.1 K/UL (ref 0–0.6)
EOSINOPHILS RELATIVE PERCENT: 1.3 %
GFR AFRICAN AMERICAN: >60
GFR NON-AFRICAN AMERICAN: >60
GLOBULIN: 2.3 G/DL
GLUCOSE BLD-MCNC: 110 MG/DL (ref 70–99)
HCT VFR BLD CALC: 49.1 % (ref 36–48)
HEMOGLOBIN: 16.5 G/DL (ref 12–16)
LYMPHOCYTES ABSOLUTE: 2.8 K/UL (ref 1–5.1)
LYMPHOCYTES RELATIVE PERCENT: 34.8 %
MCH RBC QN AUTO: 31.7 PG (ref 26–34)
MCHC RBC AUTO-ENTMCNC: 33.7 G/DL (ref 31–36)
MCV RBC AUTO: 94.2 FL (ref 80–100)
MONOCYTES ABSOLUTE: 0.5 K/UL (ref 0–1.3)
MONOCYTES RELATIVE PERCENT: 6.1 %
NEUTROPHILS ABSOLUTE: 4.7 K/UL (ref 1.7–7.7)
NEUTROPHILS RELATIVE PERCENT: 57.1 %
PDW BLD-RTO: 14.6 % (ref 12.4–15.4)
PLATELET # BLD: 255 K/UL (ref 135–450)
PMV BLD AUTO: 8.3 FL (ref 5–10.5)
POTASSIUM SERPL-SCNC: 3.6 MMOL/L (ref 3.5–5.1)
RBC # BLD: 5.21 M/UL (ref 4–5.2)
SODIUM BLD-SCNC: 147 MMOL/L (ref 136–145)
TOTAL PROTEIN: 6.5 G/DL (ref 6.4–8.2)
TSH SERPL DL<=0.05 MIU/L-ACNC: 1.24 UIU/ML (ref 0.27–4.2)
WBC # BLD: 8.2 K/UL (ref 4–11)

## 2018-06-19 PROCEDURE — 36415 COLL VENOUS BLD VENIPUNCTURE: CPT | Performed by: FAMILY MEDICINE

## 2018-06-19 PROCEDURE — 99215 OFFICE O/P EST HI 40 MIN: CPT | Performed by: FAMILY MEDICINE

## 2018-06-19 PROCEDURE — 69210 REMOVE IMPACTED EAR WAX UNI: CPT | Performed by: FAMILY MEDICINE

## 2018-06-19 PROCEDURE — G8599 NO ASA/ANTIPLAT THER USE RNG: HCPCS | Performed by: FAMILY MEDICINE

## 2018-06-19 PROCEDURE — G8427 DOCREV CUR MEDS BY ELIG CLIN: HCPCS | Performed by: FAMILY MEDICINE

## 2018-06-19 PROCEDURE — 3023F SPIROM DOC REV: CPT | Performed by: FAMILY MEDICINE

## 2018-06-19 PROCEDURE — 3014F SCREEN MAMMO DOC REV: CPT | Performed by: FAMILY MEDICINE

## 2018-06-19 PROCEDURE — G8417 CALC BMI ABV UP PARAM F/U: HCPCS | Performed by: FAMILY MEDICINE

## 2018-06-19 PROCEDURE — 3017F COLORECTAL CA SCREEN DOC REV: CPT | Performed by: FAMILY MEDICINE

## 2018-06-19 PROCEDURE — G8926 SPIRO NO PERF OR DOC: HCPCS | Performed by: FAMILY MEDICINE

## 2018-06-19 PROCEDURE — 1036F TOBACCO NON-USER: CPT | Performed by: FAMILY MEDICINE

## 2018-06-20 ENCOUNTER — TELEPHONE (OUTPATIENT)
Dept: FAMILY MEDICINE CLINIC | Age: 63
End: 2018-06-20

## 2018-06-20 DIAGNOSIS — H91.91 HEARING DIFFICULTY OF RIGHT EAR: Primary | ICD-10-CM

## 2018-06-20 LAB
ESTIMATED AVERAGE GLUCOSE: 99.7 MG/DL
HBA1C MFR BLD: 5.1 %

## 2018-06-20 RX ORDER — LORATADINE 10 MG/1
10 TABLET ORAL DAILY
Qty: 30 TABLET | Refills: 5 | Status: SHIPPED | OUTPATIENT
Start: 2018-06-20 | End: 2018-12-11 | Stop reason: SDUPTHER

## 2018-06-20 RX ORDER — ALBUTEROL SULFATE 2.5 MG/3ML
2.5 SOLUTION RESPIRATORY (INHALATION) 2 TIMES DAILY PRN
Qty: 60 EACH | Refills: 0 | Status: SHIPPED | OUTPATIENT
Start: 2018-06-20 | End: 2018-12-11 | Stop reason: SDUPTHER

## 2018-06-20 RX ORDER — BUPROPION HYDROCHLORIDE 300 MG/1
300 TABLET ORAL DAILY
Qty: 30 TABLET | Refills: 5 | Status: SHIPPED | OUTPATIENT
Start: 2018-06-20 | End: 2018-12-11 | Stop reason: SDUPTHER

## 2018-06-20 RX ORDER — ALBUTEROL SULFATE 90 UG/1
2 AEROSOL, METERED RESPIRATORY (INHALATION) EVERY 6 HOURS PRN
Qty: 1 INHALER | Refills: 5 | Status: SHIPPED | OUTPATIENT
Start: 2018-06-20 | End: 2018-12-11 | Stop reason: SDUPTHER

## 2018-06-20 RX ORDER — FUROSEMIDE 20 MG/1
20 TABLET ORAL DAILY
Qty: 60 TABLET | Refills: 5 | Status: SHIPPED | OUTPATIENT
Start: 2018-06-20 | End: 2018-12-11 | Stop reason: SDUPTHER

## 2018-06-20 RX ORDER — LEVOTHYROXINE SODIUM 0.12 MG/1
125 TABLET ORAL DAILY
Qty: 30 TABLET | Refills: 5 | Status: SHIPPED | OUTPATIENT
Start: 2018-06-20 | End: 2018-12-11 | Stop reason: SDUPTHER

## 2018-06-20 RX ORDER — BUSPIRONE HYDROCHLORIDE 7.5 MG/1
7.5 TABLET ORAL 2 TIMES DAILY
Qty: 60 TABLET | Refills: 5 | Status: SHIPPED | OUTPATIENT
Start: 2018-06-20 | End: 2018-12-11 | Stop reason: SDUPTHER

## 2018-06-20 RX ORDER — MONTELUKAST SODIUM 10 MG/1
10 TABLET ORAL DAILY
Qty: 30 TABLET | Refills: 5 | Status: SHIPPED | OUTPATIENT
Start: 2018-06-20 | End: 2018-12-11 | Stop reason: SDUPTHER

## 2018-06-20 RX ORDER — THEOPHYLLINE 300 MG/1
300 TABLET, EXTENDED RELEASE ORAL 2 TIMES DAILY
Qty: 60 TABLET | Refills: 5 | Status: SHIPPED | OUTPATIENT
Start: 2018-06-20 | End: 2018-12-11 | Stop reason: SDUPTHER

## 2018-06-20 ASSESSMENT — COPD QUESTIONNAIRES: COPD: 1

## 2018-06-20 ASSESSMENT — ENCOUNTER SYMPTOMS
APNEA: 1
CHEST TIGHTNESS: 0
SHORTNESS OF BREATH: 1

## 2018-07-25 ENCOUNTER — OFFICE VISIT (OUTPATIENT)
Dept: FAMILY MEDICINE CLINIC | Age: 63
End: 2018-07-25

## 2018-07-25 VITALS
TEMPERATURE: 98.1 F | WEIGHT: 236 LBS | BODY MASS INDEX: 44.59 KG/M2 | DIASTOLIC BLOOD PRESSURE: 72 MMHG | HEART RATE: 74 BPM | SYSTOLIC BLOOD PRESSURE: 138 MMHG | OXYGEN SATURATION: 98 %

## 2018-07-25 DIAGNOSIS — R11.14 BILIOUS VOMITING WITH NAUSEA: Primary | ICD-10-CM

## 2018-07-25 DIAGNOSIS — R35.0 URINARY FREQUENCY: ICD-10-CM

## 2018-07-25 DIAGNOSIS — R07.89 CHEST DISCOMFORT: ICD-10-CM

## 2018-07-25 LAB
BILIRUBIN, POC: NEGATIVE
BLOOD URINE, POC: NORMAL
CLARITY, POC: CLEAR
COLOR, POC: NORMAL
GLUCOSE URINE, POC: NEGATIVE
KETONES, POC: NEGATIVE
LEUKOCYTE EST, POC: NEGATIVE
NITRITE, POC: NEGATIVE
PH, POC: 6
PROTEIN, POC: NEGATIVE
SPECIFIC GRAVITY, POC: 1.02
UROBILINOGEN, POC: NORMAL

## 2018-07-25 PROCEDURE — 93000 ELECTROCARDIOGRAM COMPLETE: CPT | Performed by: NURSE PRACTITIONER

## 2018-07-25 PROCEDURE — 99213 OFFICE O/P EST LOW 20 MIN: CPT | Performed by: NURSE PRACTITIONER

## 2018-07-25 PROCEDURE — 81002 URINALYSIS NONAUTO W/O SCOPE: CPT | Performed by: NURSE PRACTITIONER

## 2018-07-25 RX ORDER — PROMETHAZINE HYDROCHLORIDE 12.5 MG/1
TABLET ORAL
Refills: 0 | COMMUNITY
Start: 2018-06-15 | End: 2019-01-16

## 2018-07-25 RX ORDER — PROMETHAZINE HYDROCHLORIDE 25 MG/ML
12.5 INJECTION, SOLUTION INTRAMUSCULAR; INTRAVENOUS ONCE
Status: DISCONTINUED | OUTPATIENT
Start: 2018-07-25 | End: 2018-07-25

## 2018-07-25 ASSESSMENT — ENCOUNTER SYMPTOMS
COUGH: 0
ABDOMINAL DISTENTION: 1
WHEEZING: 0
SHORTNESS OF BREATH: 0
DIARRHEA: 0
CONSTIPATION: 0
ABDOMINAL PAIN: 1
CHANGE IN BOWEL HABIT: 0
NAUSEA: 1
VOMITING: 1

## 2018-07-25 NOTE — PATIENT INSTRUCTIONS
dessert, such as Marialuisa-O. When should you call for help? Call 911 anytime you think you may need emergency care. For example, call if:    · You passed out (lost consciousness).    Call your doctor now or seek immediate medical care if:    · You have symptoms of dehydration, such as:  ¨ Dry eyes and a dry mouth. ¨ Passing only a little dark urine. ¨ Feeling thirstier than usual.     · You have new or worsening belly pain.     · You have a new or higher fever.     · You vomit blood or what looks like coffee grounds.    Watch closely for changes in your health, and be sure to contact your doctor if:    · You have ongoing nausea and vomiting.     · Your vomiting is getting worse.     · Your vomiting lasts longer than 2 days.     · You are not getting better as expected. Where can you learn more? Go to https://VoxbonepeThree Squirrels E-commerceeb.Win the Planet. org and sign in to your Doblet account. Enter 47 [id number] in the [employer] box to learn more about \"Nausea and Vomiting: Care Instructions. \"     If you do not have an account, please click on the \"Sign Up Now\" link. Current as of: November 20, 2017  Content Version: 11.6  © 4220-3887 [employer], Incorporated. Care instructions adapted under license by [facility] ([facility]). If you have questions about a medical condition or this instruction, always ask your healthcare professional. [address]  any warranty or liability for your use of this information.

## 2018-07-25 NOTE — PROGRESS NOTES
Nadine Cotto  1955  61 y.o. SUBJECT SUMAN:    Chief Complaint   Patient presents with    Nausea & Vomiting     Patient states she ate a piece of beef yesterday that didn't taste right. She woke up vomiting today and also complains of being \"gassy\" and had a headache. She has promethazine at home, but coudn't take it due to vomiting. 61year old female here today for nausea and vomiting as well as excessive gas that began yesterday after eating foul tasting meat. Nausea & Vomiting   This is a new problem. The current episode started yesterday. The problem occurs constantly. The problem has been unchanged. Associated symptoms include abdominal pain (generalized), chest pain, fatigue, headaches, nausea and vomiting (no blood or mucous noted, 6x/today). Pertinent negatives include no change in bowel habit, chills, congestion, coughing, fever or urinary symptoms. The symptoms are aggravated by drinking and eating. She has tried rest and drinking (tried zofran but couldn't keep it down) for the symptoms. The treatment provided no relief. Chest Pain: Patient complains of chest pain. Onset was 1 day ago, with waxing and waning course since that time. The patient describes the pain as intermittent, pressure like in nature, does not radiate. Patient rates pain as a 5/10 in intensity. Associated symptoms are exertional chest pressure/discomfort and palpitations. Aggravating factors are none. Alleviating factors are: vomiting. Patient's cardiac risk factors are hypertension, obesity (BMI >= 30 kg/m2) and sedentary lifestyle. Patient's risk factors for DVT/PE: none.      Past Medical History:   Diagnosis Date    Arthritis     Asthma     CAD (coronary artery disease)     Hepatic steatosis 6/19/2015    Hyperlipidemia     Hypertension     Hypothyroid        Current Outpatient Prescriptions on File Prior to Visit   Medication Sig Dispense Refill    fluticasone-salmeterol (ADVAIR DISKUS) 250-50 MCG/DOSE AEPB Inhale 1 puff into the lungs every 12 hours 60 each 5    albuterol sulfate HFA (VENTOLIN HFA) 108 (90 Base) MCG/ACT inhaler Inhale 2 puffs into the lungs every 6 hours as needed for Shortness of Breath 1 Inhaler 5    montelukast (SINGULAIR) 10 MG tablet Take 1 tablet by mouth daily 30 tablet 5    buPROPion (WELLBUTRIN XL) 300 MG extended release tablet Take 1 tablet by mouth daily 30 tablet 5    furosemide (LASIX) 20 MG tablet Take 1 tablet by mouth daily 60 tablet 5    levothyroxine (SYNTHROID) 125 MCG tablet Take 1 tablet by mouth daily 30 tablet 5    busPIRone (BUSPAR) 7.5 MG tablet Take 1 tablet by mouth 2 times daily 60 tablet 5    loratadine (CLARITIN) 10 MG tablet Take 1 tablet by mouth daily 30 tablet 5    theophylline (THEODUR) 300 MG extended release tablet Take 1 tablet by mouth 2 times daily 60 tablet 5    albuterol (PROVENTIL) (2.5 MG/3ML) 0.083% nebulizer solution Take 3 mLs by nebulization 2 times daily as needed for Wheezing or Shortness of Breath 60 each 0    fluticasone (FLONASE) 50 MCG/ACT nasal spray instill 1 spray (NASAL) twice a day for 30 DAYS  0    sodium chloride (OCEAN) 0.65 % nasal spray 1 spray by Nasal route as needed for Congestion 1 Bottle 3    Blood Pressure Monitoring (RA BLOOD PRESSURE CUFF MONITOR) MISC Use as directed  0     No current facility-administered medications on file prior to visit. Past Medical, Family, and Social History have been reviewed today. Review of Systems   Constitutional: Positive for fatigue. Negative for chills and fever. HENT: Negative for congestion. Respiratory: Negative for cough, shortness of breath and wheezing. Cardiovascular: Positive for chest pain. Negative for palpitations. Gastrointestinal: Positive for abdominal distention, abdominal pain (generalized), nausea and vomiting (no blood or mucous noted, 6x/today). Negative for change in bowel habit, constipation and diarrhea.    Genitourinary: Positive ALT 22 06/19/2018    ALKPHOS 85 06/19/2018        Controlled Substances Monitoring:      Results for orders placed or performed in visit on 07/25/18   POCT Urinalysis no Micro   Result Value Ref Range    Color, UA light yellow     Clarity, UA clear     Glucose, UA POC negative     Bilirubin, UA negative     Ketones, UA negative     Spec Grav, UA 1.020     Blood, UA POC trace-lysed     pH, UA 6.0     Protein, UA POC negative     Urobilinogen, UA 0.2 EU/dL     Leukocytes, UA negative     Nitrite, UA negative        ASSESSMENT AND PLAN:     1. Bilious vomiting with nausea  - Recommended rehydration w/gatorade and or pedialyte. Discussed s/s dehydration and when to seek further medical attention  - Encouraged small, frequent bland meals. Discussed possible benefits of BRAT diet. - Discussed advantages and disadvantages of antiemetic and antimotility agents. Encouraged pt to use with caution. Pt verbalizes understanding.   - If no improvement in the next couple of days, pt agreeable to follow up for further testing including but not limited to cultures given concern for bad meat intake  - promethazine (PHENERGAN) injection 12.5 mg; Inject 0.5 mLs into the muscle once ordered but pt refused. She will try oral phenergan upon returning home. 2. Urinary frequency  - No evidence of UTI now  - POCT Urinalysis no Micro    3. Chest discomfort  - Old MI.   - EKG 12 Lead  - Advised to go to ER for new or worsening symptoms. Pt agreeable to do so. Return if symptoms worsen or fail to improve. Care discussed with patient. Patient educated on signs and symptoms of exacerbation and when to seek further medical attention. Advised to call for any problems, questions, or concerns. Patient verbalizes understanding and agrees with plan. Medications reviewed and reconciled. Continue current medications. Appropriate prescriptions are ordered. Risks and benefits of meds are discussed. After visit summary provided.

## 2018-12-11 ENCOUNTER — OFFICE VISIT (OUTPATIENT)
Dept: FAMILY MEDICINE CLINIC | Age: 63
End: 2018-12-11
Payer: MEDICAID

## 2018-12-11 VITALS
RESPIRATION RATE: 28 BRPM | WEIGHT: 230.6 LBS | SYSTOLIC BLOOD PRESSURE: 110 MMHG | HEIGHT: 62 IN | DIASTOLIC BLOOD PRESSURE: 80 MMHG | OXYGEN SATURATION: 95 % | BODY MASS INDEX: 42.44 KG/M2 | HEART RATE: 73 BPM | TEMPERATURE: 97.5 F

## 2018-12-11 DIAGNOSIS — J44.9 COPD, MODERATE (HCC): Chronic | ICD-10-CM

## 2018-12-11 DIAGNOSIS — F33.0 MAJOR DEPRESSIVE DISORDER, RECURRENT EPISODE, MILD (HCC): ICD-10-CM

## 2018-12-11 DIAGNOSIS — R05.9 COUGH: ICD-10-CM

## 2018-12-11 DIAGNOSIS — K21.9 GASTROESOPHAGEAL REFLUX DISEASE WITHOUT ESOPHAGITIS: Primary | ICD-10-CM

## 2018-12-11 DIAGNOSIS — Z91.09 ENVIRONMENTAL ALLERGIES: ICD-10-CM

## 2018-12-11 DIAGNOSIS — L67.9 HAIR ABNORMALITY: ICD-10-CM

## 2018-12-11 DIAGNOSIS — E03.9 HYPOTHYROIDISM, UNSPECIFIED TYPE: ICD-10-CM

## 2018-12-11 DIAGNOSIS — R60.0 PEDAL EDEMA: ICD-10-CM

## 2018-12-11 PROCEDURE — G8484 FLU IMMUNIZE NO ADMIN: HCPCS | Performed by: FAMILY MEDICINE

## 2018-12-11 PROCEDURE — G8427 DOCREV CUR MEDS BY ELIG CLIN: HCPCS | Performed by: FAMILY MEDICINE

## 2018-12-11 PROCEDURE — G8926 SPIRO NO PERF OR DOC: HCPCS | Performed by: FAMILY MEDICINE

## 2018-12-11 PROCEDURE — G8599 NO ASA/ANTIPLAT THER USE RNG: HCPCS | Performed by: FAMILY MEDICINE

## 2018-12-11 PROCEDURE — 99214 OFFICE O/P EST MOD 30 MIN: CPT | Performed by: FAMILY MEDICINE

## 2018-12-11 PROCEDURE — G8417 CALC BMI ABV UP PARAM F/U: HCPCS | Performed by: FAMILY MEDICINE

## 2018-12-11 PROCEDURE — 1036F TOBACCO NON-USER: CPT | Performed by: FAMILY MEDICINE

## 2018-12-11 PROCEDURE — 3014F SCREEN MAMMO DOC REV: CPT | Performed by: FAMILY MEDICINE

## 2018-12-11 PROCEDURE — 3017F COLORECTAL CA SCREEN DOC REV: CPT | Performed by: FAMILY MEDICINE

## 2018-12-11 PROCEDURE — 3023F SPIROM DOC REV: CPT | Performed by: FAMILY MEDICINE

## 2018-12-11 RX ORDER — ALBUTEROL SULFATE 90 UG/1
2 AEROSOL, METERED RESPIRATORY (INHALATION) EVERY 6 HOURS PRN
Qty: 1 INHALER | Refills: 5 | Status: SHIPPED | OUTPATIENT
Start: 2018-12-11 | End: 2021-01-01 | Stop reason: SDUPTHER

## 2018-12-11 RX ORDER — LEVOTHYROXINE SODIUM 0.12 MG/1
125 TABLET ORAL DAILY
Qty: 30 TABLET | Refills: 5 | Status: SHIPPED | OUTPATIENT
Start: 2018-12-11 | End: 2019-07-15 | Stop reason: SDUPTHER

## 2018-12-11 RX ORDER — PROMETHAZINE HYDROCHLORIDE AND CODEINE PHOSPHATE 6.25; 1 MG/5ML; MG/5ML
5 SYRUP ORAL 4 TIMES DAILY PRN
Qty: 100 ML | Refills: 0 | Status: SHIPPED | OUTPATIENT
Start: 2018-12-11 | End: 2018-12-18

## 2018-12-11 RX ORDER — THEOPHYLLINE 300 MG/1
300 TABLET, EXTENDED RELEASE ORAL 2 TIMES DAILY
Qty: 60 TABLET | Refills: 5 | Status: SHIPPED | OUTPATIENT
Start: 2018-12-11 | End: 2019-07-15 | Stop reason: SDUPTHER

## 2018-12-11 RX ORDER — MONTELUKAST SODIUM 10 MG/1
10 TABLET ORAL DAILY
Qty: 30 TABLET | Refills: 5 | Status: SHIPPED | OUTPATIENT
Start: 2018-12-11 | End: 2019-01-16

## 2018-12-11 RX ORDER — AZITHROMYCIN 250 MG/1
250 TABLET, FILM COATED ORAL DAILY
Qty: 1 PACKET | Refills: 0 | Status: SHIPPED | OUTPATIENT
Start: 2018-12-11 | End: 2019-01-16

## 2018-12-11 RX ORDER — RANITIDINE 150 MG/1
150 TABLET ORAL 2 TIMES DAILY
Qty: 60 TABLET | Refills: 5 | Status: SHIPPED | OUTPATIENT
Start: 2018-12-11 | End: 2019-01-16

## 2018-12-11 RX ORDER — ALBUTEROL SULFATE 2.5 MG/3ML
2.5 SOLUTION RESPIRATORY (INHALATION) 2 TIMES DAILY PRN
Qty: 60 EACH | Refills: 0 | Status: SHIPPED | OUTPATIENT
Start: 2018-12-11 | End: 2020-08-11 | Stop reason: SDUPTHER

## 2018-12-11 RX ORDER — LORATADINE 10 MG/1
10 TABLET ORAL DAILY
Qty: 30 TABLET | Refills: 5 | Status: SHIPPED | OUTPATIENT
Start: 2018-12-11 | End: 2019-07-15 | Stop reason: SDUPTHER

## 2018-12-11 RX ORDER — FUROSEMIDE 20 MG/1
20 TABLET ORAL DAILY
Qty: 60 TABLET | Refills: 5 | Status: SHIPPED | OUTPATIENT
Start: 2018-12-11 | End: 2020-01-16 | Stop reason: SDUPTHER

## 2018-12-11 RX ORDER — BUSPIRONE HYDROCHLORIDE 7.5 MG/1
7.5 TABLET ORAL 2 TIMES DAILY
Qty: 60 TABLET | Refills: 5 | Status: SHIPPED | OUTPATIENT
Start: 2018-12-11 | End: 2019-08-21 | Stop reason: SDUPTHER

## 2018-12-11 RX ORDER — BUPROPION HYDROCHLORIDE 300 MG/1
300 TABLET ORAL DAILY
Qty: 30 TABLET | Refills: 5 | Status: SHIPPED | OUTPATIENT
Start: 2018-12-11 | End: 2019-11-15 | Stop reason: SDUPTHER

## 2018-12-11 NOTE — PROGRESS NOTES
nasal spray 1 spray by Nasal route as needed for Congestion 1 Bottle 3     No current facility-administered medications on file prior to visit. Objective:   Physical Exam   Constitutional: She appears ill. No distress. Obese   HENT:   Head: Normocephalic and atraumatic. Right Ear: Hearing, tympanic membrane and external ear normal.   Left Ear: Hearing, tympanic membrane and external ear normal.   Nose: Nose normal. No mucosal edema, rhinorrhea, nose lacerations, sinus tenderness or nasal deformity. Right sinus exhibits no maxillary sinus tenderness and no frontal sinus tenderness. Left sinus exhibits no maxillary sinus tenderness and no frontal sinus tenderness. Mouth/Throat: Oropharynx is clear and moist and mucous membranes are normal. No oropharyngeal exudate, posterior oropharyngeal edema or posterior oropharyngeal erythema. Eyes: Conjunctivae are normal.   Neck: No tracheal deviation present. No thyromegaly present. Cardiovascular: Normal rate, regular rhythm, S1 normal, S2 normal and normal heart sounds. Exam reveals no gallop and no friction rub. Pulmonary/Chest: No respiratory distress. She has no wheezes. She has no rales. Coughing   Lymphadenopathy:        Head (right side): No submental, no submandibular and no posterior auricular adenopathy present. Head (left side): No submental, no submandibular and no posterior auricular adenopathy present. Right cervical: No superficial cervical, no deep cervical and no posterior cervical adenopathy present. Left cervical: No superficial cervical, no deep cervical and no posterior cervical adenopathy present. Skin: Skin is warm, dry and intact. Psychiatric: She has a normal mood and affect. Her behavior is normal.   Nursing note and vitals reviewed.     Vitals:    12/11/18 1502   BP: 110/80   Site: Right Upper Arm   Position: Sitting   Cuff Size: Large Adult   Pulse: 73   Resp: 28   Temp: 97.5 °F (36.4 °C) [facility]   SpO2: 95%   Weight: 230 lb 9.6 oz (104.6 kg)   Height: 5' 1.5\" (1.562 m)     Body mass index is 42.87 kg/m². Wt Readings from Last 3 Encounters:   12/11/18 230 lb 9.6 oz (104.6 kg)   07/25/18 236 lb (107 kg)   06/19/18 232 lb (105.2 kg)     BP Readings from Last 3 Encounters:   12/11/18 110/80   07/25/18 138/72   06/19/18 118/78          No results found for this visit on 12/11/18. The 10-year ASCVD risk score (Jyoti Garcia, et al., 2013) is: 3.4%    Values used to calculate the score:      Age: 61 years      Sex: Female      Is Non- : No      Diabetic: No      Tobacco smoker: No      Systolic Blood Pressure: 585 mmHg      Is BP treated: No      HDL Cholesterol: 50 mg/dL      Total Cholesterol: 187 mg/dL  Lab Review   No visits with results within 2 Month(s) from this visit. Latest known visit with results is:   Office Visit on 07/25/2018   Component Date Value    Color, UA 07/25/2018 light yellow     Clarity, UA 07/25/2018 clear     Glucose, UA POC 07/25/2018 negative     Bilirubin, UA 07/25/2018 negative     Ketones, UA 07/25/2018 negative     Spec Grav, UA 07/25/2018 1.020     Blood, UA POC 07/25/2018 trace-lysed     pH, UA 07/25/2018 6.0     Protein, UA POC 07/25/2018 negative     Urobilinogen, UA 07/25/2018 0.2 EU/dL     Leukocytes, UA 07/25/2018 negative     Nitrite, UA 07/25/2018 negative            Assessment:       Diagnosis Orders   1. Gastroesophageal reflux disease without esophagitis  ranitidine (ZANTAC) 150 MG tablet   2. COPD, moderate (HCC)  fluticasone-salmeterol (ADVAIR DISKUS) 250-50 MCG/DOSE AEPB    albuterol sulfate HFA (VENTOLIN HFA) 108 (90 Base) MCG/ACT inhaler    montelukast (SINGULAIR) 10 MG tablet    theophylline (THEODUR) 300 MG extended release tablet    albuterol (PROVENTIL) (2.5 MG/3ML) 0.083% nebulizer solution    [facility]- Cher Trevino MD   3.  Major depressive disorder, recurrent episode, mild (HCC)  buPROPion

## 2018-12-12 ASSESSMENT — ENCOUNTER SYMPTOMS
SHORTNESS OF BREATH: 1
COUGH: 1

## 2018-12-12 ASSESSMENT — COPD QUESTIONNAIRES: COPD: 1

## 2019-01-16 ENCOUNTER — HOSPITAL ENCOUNTER (OUTPATIENT)
Age: 64
Discharge: HOME | End: 2019-01-16
Payer: MEDICAID

## 2019-01-16 ENCOUNTER — OFFICE VISIT (OUTPATIENT)
Dept: GASTROENTEROLOGY | Age: 64
End: 2019-01-16
Payer: MEDICAID

## 2019-01-16 VITALS
HEART RATE: 80 BPM | BODY MASS INDEX: 41.59 KG/M2 | SYSTOLIC BLOOD PRESSURE: 120 MMHG | DIASTOLIC BLOOD PRESSURE: 76 MMHG | OXYGEN SATURATION: 95 % | HEIGHT: 62 IN | WEIGHT: 226 LBS

## 2019-01-16 DIAGNOSIS — R14.2 FLATULENCE, ERUCTATION AND GAS PAIN: ICD-10-CM

## 2019-01-16 DIAGNOSIS — R19.7 DIARRHEA, UNSPECIFIED TYPE: ICD-10-CM

## 2019-01-16 DIAGNOSIS — R14.1 FLATULENCE, ERUCTATION AND GAS PAIN: ICD-10-CM

## 2019-01-16 DIAGNOSIS — R14.3 FLATULENCE, ERUCTATION AND GAS PAIN: ICD-10-CM

## 2019-01-16 DIAGNOSIS — R19.7 DIARRHEA, UNSPECIFIED TYPE: Primary | ICD-10-CM

## 2019-01-16 DIAGNOSIS — R19.4 ALTERED BOWEL HABITS: ICD-10-CM

## 2019-01-16 LAB
ALBUMIN SERPL-MCNC: 4.2 GM/DL (ref 3.4–5)
ALP BLD-CCNC: 126 IU/L (ref 40–129)
ALT SERPL-CCNC: 21 U/L (ref 10–40)
AMYLASE: 53 U/L (ref 25–115)
AST SERPL-CCNC: 22 IU/L (ref 15–37)
BASOPHILS ABSOLUTE: 0.1 K/CU MM
BASOPHILS RELATIVE PERCENT: 1.1 % (ref 0–1)
BILIRUB SERPL-MCNC: 0.3 MG/DL (ref 0–1)
BILIRUBIN DIRECT: 0.2 MG/DL (ref 0–0.3)
BILIRUBIN, INDIRECT: 0.1 MG/DL (ref 0–0.7)
DIFFERENTIAL TYPE: ABNORMAL
EOSINOPHILS ABSOLUTE: 0.2 K/CU MM
EOSINOPHILS RELATIVE PERCENT: 2.1 % (ref 0–3)
HCT VFR BLD CALC: 47.4 % (ref 37–47)
HEMOGLOBIN: 15.4 GM/DL (ref 12.5–16)
HIGH SENSITIVE C-REACTIVE PROTEIN: 8.2 MG/L
IGA: 102 MG/DL (ref 69–382)
IGG,SERUM: 715 MG/DL (ref 723–1685)
IMMATURE NEUTROPHIL %: 0.2 % (ref 0–0.43)
LIPASE: 14 IU/L (ref 13–60)
LYMPHOCYTES ABSOLUTE: 3.7 K/CU MM
LYMPHOCYTES RELATIVE PERCENT: 42.9 % (ref 24–44)
MCH RBC QN AUTO: 30.6 PG (ref 27–31)
MCHC RBC AUTO-ENTMCNC: 32.5 % (ref 32–36)
MCV RBC AUTO: 94 FL (ref 78–100)
MONOCYTES ABSOLUTE: 0.6 K/CU MM
MONOCYTES RELATIVE PERCENT: 7.4 % (ref 0–4)
NUCLEATED RBC %: 0 %
PDW BLD-RTO: 12.8 % (ref 11.7–14.9)
PLATELET # BLD: 279 K/CU MM (ref 140–440)
PMV BLD AUTO: 10.4 FL (ref 7.5–11.1)
RBC # BLD: 5.04 M/CU MM (ref 4.2–5.4)
SEGMENTED NEUTROPHILS ABSOLUTE COUNT: 4 K/CU MM
SEGMENTED NEUTROPHILS RELATIVE PERCENT: 46.3 % (ref 36–66)
TOTAL IMMATURE NEUTOROPHIL: 0.02 K/CU MM
TOTAL NUCLEATED RBC: 0 K/CU MM
TOTAL PROTEIN: 6.5 GM/DL (ref 6.4–8.2)
VITAMIN D 25-HYDROXY: 17.86 NG/ML
WBC # BLD: 8.6 K/CU MM (ref 4–10.5)

## 2019-01-16 PROCEDURE — 82306 VITAMIN D 25 HYDROXY: CPT

## 2019-01-16 PROCEDURE — 99214 OFFICE O/P EST MOD 30 MIN: CPT | Performed by: NURSE PRACTITIONER

## 2019-01-16 PROCEDURE — 82784 ASSAY IGA/IGD/IGG/IGM EACH: CPT

## 2019-01-16 PROCEDURE — 82150 ASSAY OF AMYLASE: CPT

## 2019-01-16 PROCEDURE — 1036F TOBACCO NON-USER: CPT | Performed by: NURSE PRACTITIONER

## 2019-01-16 PROCEDURE — 36415 COLL VENOUS BLD VENIPUNCTURE: CPT

## 2019-01-16 PROCEDURE — 85025 COMPLETE CBC W/AUTO DIFF WBC: CPT

## 2019-01-16 PROCEDURE — G8417 CALC BMI ABV UP PARAM F/U: HCPCS | Performed by: NURSE PRACTITIONER

## 2019-01-16 PROCEDURE — 3017F COLORECTAL CA SCREEN DOC REV: CPT | Performed by: NURSE PRACTITIONER

## 2019-01-16 PROCEDURE — 83516 IMMUNOASSAY NONANTIBODY: CPT

## 2019-01-16 PROCEDURE — 80076 HEPATIC FUNCTION PANEL: CPT

## 2019-01-16 PROCEDURE — G8484 FLU IMMUNIZE NO ADMIN: HCPCS | Performed by: NURSE PRACTITIONER

## 2019-01-16 PROCEDURE — 86141 C-REACTIVE PROTEIN HS: CPT

## 2019-01-16 PROCEDURE — 87385 HISTOPLASMA CAPSUL AG IA: CPT

## 2019-01-16 PROCEDURE — G8427 DOCREV CUR MEDS BY ELIG CLIN: HCPCS | Performed by: NURSE PRACTITIONER

## 2019-01-16 PROCEDURE — 80053 COMPREHEN METABOLIC PANEL: CPT

## 2019-01-16 PROCEDURE — 83690 ASSAY OF LIPASE: CPT

## 2019-01-16 PROCEDURE — G8599 NO ASA/ANTIPLAT THER USE RNG: HCPCS | Performed by: NURSE PRACTITIONER

## 2019-01-16 RX ORDER — DICYCLOMINE HYDROCHLORIDE 10 MG/1
10 CAPSULE ORAL 4 TIMES DAILY
Qty: 120 CAPSULE | Refills: 0 | Status: SHIPPED | OUTPATIENT
Start: 2019-01-16 | End: 2019-01-25

## 2019-01-16 ASSESSMENT — ENCOUNTER SYMPTOMS
CONSTIPATION: 0
SHORTNESS OF BREATH: 1
COUGH: 1
PHOTOPHOBIA: 0
BLOOD IN STOOL: 0
NAUSEA: 0
DIARRHEA: 1
COLOR CHANGE: 0
EYE PAIN: 0
BACK PAIN: 1
WHEEZING: 1
VOMITING: 0
ABDOMINAL PAIN: 0

## 2019-01-17 ENCOUNTER — HOSPITAL ENCOUNTER (OUTPATIENT)
Age: 64
Setting detail: SPECIMEN
Discharge: HOME | End: 2019-01-17
Payer: MEDICAID

## 2019-01-17 LAB
ALBUMIN SERPL-MCNC: 4.3 GM/DL (ref 3.4–5)
ALP BLD-CCNC: 126 IU/L (ref 40–128)
ALT SERPL-CCNC: 21 U/L (ref 10–40)
ANION GAP SERPL CALCULATED.3IONS-SCNC: 16 MMOL/L (ref 4–16)
AST SERPL-CCNC: 22 IU/L (ref 15–37)
BILIRUB SERPL-MCNC: 0.3 MG/DL (ref 0–1)
BUN BLDV-MCNC: 13 MG/DL (ref 6–23)
CALCIUM SERPL-MCNC: 10.4 MG/DL (ref 8.3–10.6)
CHLORIDE BLD-SCNC: 104 MMOL/L (ref 99–110)
CO2: 21 MMOL/L (ref 21–32)
CREAT SERPL-MCNC: 0.9 MG/DL (ref 0.6–1.1)
GFR AFRICAN AMERICAN: >60 ML/MIN/1.73M2
GFR NON-AFRICAN AMERICAN: >60 ML/MIN/1.73M2
GLUCOSE BLD-MCNC: 76 MG/DL (ref 70–99)
POTASSIUM SERPL-SCNC: 3.8 MMOL/L (ref 3.5–5.1)
SODIUM BLD-SCNC: 141 MMOL/L (ref 135–145)
TOTAL PROTEIN: 6.5 GM/DL (ref 6.4–8.2)

## 2019-01-17 PROCEDURE — 87329 GIARDIA AG IA: CPT

## 2019-01-17 PROCEDURE — 87338 HPYLORI STOOL AG IA: CPT

## 2019-01-17 PROCEDURE — 83993 ASSAY FOR CALPROTECTIN FECAL: CPT

## 2019-01-17 PROCEDURE — 87507 IADNA-DNA/RNA PROBE TQ 12-25: CPT

## 2019-01-18 LAB
ADENOVIRUS F 40 41 PCR: NOT DETECTED
ASTROVIRUS PCR: NOT DETECTED
CAMPYLOBACTER PCR: NOT DETECTED
CRYPTOSPORIDIUM PCR: NOT DETECTED
CYCLOSPORA CAYETANENSIS PCR: NOT DETECTED
E COLI 0157 PCR: NOT DETECTED
E COLI ENTEROAGGREGATIVE PCR: NOT DETECTED
E COLI ENTEROPATHOGENIC PCR: NOT DETECTED
E COLI ENTEROTOXIGENIC PCR: NOT DETECTED
E COLI SHIGA LIKE TOXIN PCR: NOT DETECTED
E COLI SHIGELLA/ENTEROINVASIVE PCR: NOT DETECTED
ENTAMOEBA HISTOLYTICA PCR: NOT DETECTED
GIARDIA LAMBLIA PCR: NOT DETECTED
NOROVIRUS GI GII PCR: NOT DETECTED
PLESIOMONAS SHIGELLOIDES PCR: NOT DETECTED
ROTAVIRUS A PCR: NOT DETECTED
SALMONELLA PCR: NOT DETECTED
SAPOVIRUS PCR: NOT DETECTED
VIBRIO CHOLERAE PCR: NOT DETECTED
VIBRIO PCR: NOT DETECTED
YERSINIA ENTEROCOLITICA PCR: NOT DETECTED

## 2019-01-19 LAB
HISTOPLASMA ANTIGEN URINE INTERP: NOT DETECTED
HISTOPLASMA ANTIGEN URINE: NOT DETECTED
TRANSGLUTAMINASE IGA: 0

## 2019-01-21 LAB
CALPROTECTIN, FECAL: 18
H PYLORI ANTIGEN STOOL: NEGATIVE

## 2019-01-25 ENCOUNTER — TELEPHONE (OUTPATIENT)
Dept: GASTROENTEROLOGY | Age: 64
End: 2019-01-25

## 2019-01-27 ENCOUNTER — ANESTHESIA EVENT (OUTPATIENT)
Dept: OPERATING ROOM | Age: 64
End: 2019-01-27
Payer: MEDICAID

## 2019-01-28 ENCOUNTER — TELEPHONE (OUTPATIENT)
Dept: GASTROENTEROLOGY | Age: 64
End: 2019-01-28

## 2019-01-28 ENCOUNTER — HOSPITAL ENCOUNTER (OUTPATIENT)
Age: 64
Setting detail: OUTPATIENT SURGERY
Discharge: HOME | End: 2019-01-28
Attending: INTERNAL MEDICINE | Admitting: INTERNAL MEDICINE
Payer: MEDICAID

## 2019-01-28 ENCOUNTER — ANESTHESIA (OUTPATIENT)
Dept: OPERATING ROOM | Age: 64
End: 2019-01-28
Payer: MEDICAID

## 2019-01-28 VITALS
TEMPERATURE: 97.6 F | SYSTOLIC BLOOD PRESSURE: 128 MMHG | HEIGHT: 62 IN | HEART RATE: 63 BPM | WEIGHT: 223 LBS | OXYGEN SATURATION: 99 % | RESPIRATION RATE: 16 BRPM | BODY MASS INDEX: 41.04 KG/M2 | DIASTOLIC BLOOD PRESSURE: 85 MMHG

## 2019-01-28 VITALS — DIASTOLIC BLOOD PRESSURE: 72 MMHG | OXYGEN SATURATION: 98 % | SYSTOLIC BLOOD PRESSURE: 110 MMHG

## 2019-01-28 DIAGNOSIS — R19.7 DIARRHEA, UNSPECIFIED TYPE: Primary | ICD-10-CM

## 2019-01-28 PROBLEM — Z86.59 HISTORY OF CLAUSTROPHOBIA: Status: ACTIVE | Noted: 2019-01-28

## 2019-01-28 PROBLEM — F41.9 ANXIETY: Chronic | Status: ACTIVE | Noted: 2019-01-28

## 2019-01-28 PROBLEM — F41.0 PANIC ATTACKS: Status: ACTIVE | Noted: 2019-01-28

## 2019-01-28 PROCEDURE — 45385 COLONOSCOPY W/LESION REMOVAL: CPT | Performed by: INTERNAL MEDICINE

## 2019-01-28 PROCEDURE — 7100000010 HC PHASE II RECOVERY - FIRST 15 MIN: Performed by: INTERNAL MEDICINE

## 2019-01-28 PROCEDURE — 3700000001 HC ADD 15 MINUTES (ANESTHESIA): Performed by: INTERNAL MEDICINE

## 2019-01-28 PROCEDURE — 45380 COLONOSCOPY AND BIOPSY: CPT | Performed by: INTERNAL MEDICINE

## 2019-01-28 PROCEDURE — 3700000000 HC ANESTHESIA ATTENDED CARE: Performed by: INTERNAL MEDICINE

## 2019-01-28 PROCEDURE — 3609010600 HC COLONOSCOPY POLYPECTOMY SNARE/COLD BIOPSY: Performed by: INTERNAL MEDICINE

## 2019-01-28 PROCEDURE — 7100000011 HC PHASE II RECOVERY - ADDTL 15 MIN: Performed by: INTERNAL MEDICINE

## 2019-01-28 PROCEDURE — 88305 TISSUE EXAM BY PATHOLOGIST: CPT

## 2019-01-28 PROCEDURE — 2500000003 HC RX 250 WO HCPCS: Performed by: NURSE ANESTHETIST, CERTIFIED REGISTERED

## 2019-01-28 PROCEDURE — 2709999900 HC NON-CHARGEABLE SUPPLY: Performed by: INTERNAL MEDICINE

## 2019-01-28 PROCEDURE — 6360000002 HC RX W HCPCS: Performed by: NURSE ANESTHETIST, CERTIFIED REGISTERED

## 2019-01-28 PROCEDURE — 2580000003 HC RX 258: Performed by: INTERNAL MEDICINE

## 2019-01-28 RX ORDER — PROPOFOL 10 MG/ML
INJECTION, EMULSION INTRAVENOUS PRN
Status: DISCONTINUED | OUTPATIENT
Start: 2019-01-28 | End: 2019-01-28 | Stop reason: SDUPTHER

## 2019-01-28 RX ORDER — SODIUM CHLORIDE, SODIUM LACTATE, POTASSIUM CHLORIDE, CALCIUM CHLORIDE 600; 310; 30; 20 MG/100ML; MG/100ML; MG/100ML; MG/100ML
INJECTION, SOLUTION INTRAVENOUS CONTINUOUS
Status: DISCONTINUED | OUTPATIENT
Start: 2019-01-28 | End: 2019-01-28 | Stop reason: HOSPADM

## 2019-01-28 RX ORDER — LIDOCAINE HYDROCHLORIDE 20 MG/ML
INJECTION, SOLUTION EPIDURAL; INFILTRATION; INTRACAUDAL; PERINEURAL PRN
Status: DISCONTINUED | OUTPATIENT
Start: 2019-01-28 | End: 2019-01-28 | Stop reason: SDUPTHER

## 2019-01-28 RX ORDER — MIDAZOLAM HYDROCHLORIDE 1 MG/ML
INJECTION INTRAMUSCULAR; INTRAVENOUS PRN
Status: DISCONTINUED | OUTPATIENT
Start: 2019-01-28 | End: 2019-01-28 | Stop reason: SDUPTHER

## 2019-01-28 RX ADMIN — LIDOCAINE HYDROCHLORIDE 200 MG: 20 INJECTION, SOLUTION EPIDURAL; INFILTRATION; INTRACAUDAL; PERINEURAL at 13:24

## 2019-01-28 RX ADMIN — MIDAZOLAM HYDROCHLORIDE 4 MG: 1 INJECTION, SOLUTION INTRAMUSCULAR; INTRAVENOUS at 12:12

## 2019-01-28 RX ADMIN — PROPOFOL 50 MG: 10 INJECTION, EMULSION INTRAVENOUS at 13:28

## 2019-01-28 RX ADMIN — PROPOFOL 10 MG: 10 INJECTION, EMULSION INTRAVENOUS at 13:40

## 2019-01-28 RX ADMIN — PROPOFOL 50 MG: 10 INJECTION, EMULSION INTRAVENOUS at 13:32

## 2019-01-28 RX ADMIN — PROPOFOL 100 MG: 10 INJECTION, EMULSION INTRAVENOUS at 13:24

## 2019-01-28 RX ADMIN — PROPOFOL 10 MG: 10 INJECTION, EMULSION INTRAVENOUS at 13:36

## 2019-01-28 RX ADMIN — SODIUM CHLORIDE, POTASSIUM CHLORIDE, SODIUM LACTATE AND CALCIUM CHLORIDE: 600; 310; 30; 20 INJECTION, SOLUTION INTRAVENOUS at 13:00

## 2019-01-28 RX ADMIN — PROPOFOL 10 MG: 10 INJECTION, EMULSION INTRAVENOUS at 13:44

## 2019-01-28 ASSESSMENT — PAIN SCALES - GENERAL
PAINLEVEL_OUTOF10: 0
PAINLEVEL_OUTOF10: 0

## 2019-01-28 ASSESSMENT — PAIN DESCRIPTION - DESCRIPTORS: DESCRIPTORS: ACHING

## 2019-01-28 ASSESSMENT — PAIN - FUNCTIONAL ASSESSMENT: PAIN_FUNCTIONAL_ASSESSMENT: 0-10

## 2019-01-29 ENCOUNTER — TELEPHONE (OUTPATIENT)
Dept: GASTROENTEROLOGY | Age: 64
End: 2019-01-29

## 2019-02-04 ENCOUNTER — TELEPHONE (OUTPATIENT)
Dept: GASTROENTEROLOGY | Age: 64
End: 2019-02-04

## 2019-02-06 ENCOUNTER — TELEPHONE (OUTPATIENT)
Dept: GASTROENTEROLOGY | Age: 64
End: 2019-02-06

## 2019-02-12 ENCOUNTER — OFFICE VISIT (OUTPATIENT)
Dept: GASTROENTEROLOGY | Age: 64
End: 2019-02-12
Payer: MEDICAID

## 2019-02-12 VITALS
SYSTOLIC BLOOD PRESSURE: 124 MMHG | DIASTOLIC BLOOD PRESSURE: 82 MMHG | HEART RATE: 73 BPM | WEIGHT: 226 LBS | HEIGHT: 61 IN | OXYGEN SATURATION: 96 % | BODY MASS INDEX: 42.67 KG/M2

## 2019-02-12 DIAGNOSIS — R14.0 ABDOMINAL BLOATING: ICD-10-CM

## 2019-02-12 DIAGNOSIS — R19.7 DIARRHEA, UNSPECIFIED TYPE: Primary | ICD-10-CM

## 2019-02-12 DIAGNOSIS — D12.2 ADENOMATOUS POLYP OF ASCENDING COLON: ICD-10-CM

## 2019-02-12 PROCEDURE — 99215 OFFICE O/P EST HI 40 MIN: CPT | Performed by: NURSE PRACTITIONER

## 2019-02-12 PROCEDURE — G8484 FLU IMMUNIZE NO ADMIN: HCPCS | Performed by: NURSE PRACTITIONER

## 2019-02-12 PROCEDURE — 3017F COLORECTAL CA SCREEN DOC REV: CPT | Performed by: NURSE PRACTITIONER

## 2019-02-12 PROCEDURE — G8427 DOCREV CUR MEDS BY ELIG CLIN: HCPCS | Performed by: NURSE PRACTITIONER

## 2019-02-12 PROCEDURE — G8599 NO ASA/ANTIPLAT THER USE RNG: HCPCS | Performed by: NURSE PRACTITIONER

## 2019-02-12 PROCEDURE — G8417 CALC BMI ABV UP PARAM F/U: HCPCS | Performed by: NURSE PRACTITIONER

## 2019-02-12 PROCEDURE — 1036F TOBACCO NON-USER: CPT | Performed by: NURSE PRACTITIONER

## 2019-02-12 RX ORDER — OCTISALATE, AVOBENZONE, HOMOSALATE, AND OCTOCRYLENE 29.4; 29.4; 49; 25.48 MG/ML; MG/ML; MG/ML; MG/ML
1 LOTION TOPICAL DAILY
Qty: 30 CAPSULE | Refills: 3 | Status: SHIPPED | OUTPATIENT
Start: 2019-02-12 | End: 2019-05-28

## 2019-02-12 RX ORDER — CHOLESTYRAMINE 4 G/9G
1 POWDER, FOR SUSPENSION ORAL 2 TIMES DAILY
Qty: 90 PACKET | Refills: 3 | Status: SHIPPED | OUTPATIENT
Start: 2019-02-12 | End: 2019-05-28

## 2019-02-12 ASSESSMENT — ENCOUNTER SYMPTOMS
EYE PAIN: 0
VOMITING: 0
COUGH: 1
BLOOD IN STOOL: 0
DIARRHEA: 1
BACK PAIN: 1
SHORTNESS OF BREATH: 1
RECTAL PAIN: 0
PHOTOPHOBIA: 0
NAUSEA: 0
WHEEZING: 1
CONSTIPATION: 0
COLOR CHANGE: 0
ABDOMINAL PAIN: 0

## 2019-02-26 ENCOUNTER — TELEPHONE (OUTPATIENT)
Dept: GASTROENTEROLOGY | Age: 64
End: 2019-02-26

## 2019-03-01 PROBLEM — Z86.010 HISTORY OF COLON POLYPS: Status: ACTIVE | Noted: 2019-03-01

## 2019-03-11 ENCOUNTER — HOSPITAL ENCOUNTER (OUTPATIENT)
Age: 64
Setting detail: SPECIMEN
Discharge: HOME | End: 2019-03-11
Payer: MEDICAID

## 2019-03-11 ENCOUNTER — HOSPITAL ENCOUNTER (OUTPATIENT)
Age: 64
Discharge: HOME | End: 2019-03-11
Payer: MEDICAID

## 2019-03-11 ENCOUNTER — TELEPHONE (OUTPATIENT)
Dept: GASTROENTEROLOGY | Age: 64
End: 2019-03-11

## 2019-03-11 PROCEDURE — 36415 COLL VENOUS BLD VENIPUNCTURE: CPT

## 2019-03-11 PROCEDURE — 82705 FATS/LIPIDS FECES QUAL: CPT

## 2019-03-11 PROCEDURE — 83516 IMMUNOASSAY NONANTIBODY: CPT

## 2019-03-11 PROCEDURE — 87324 CLOSTRIDIUM AG IA: CPT

## 2019-03-11 PROCEDURE — 84302 ASSAY OF SWEAT SODIUM: CPT

## 2019-03-12 ENCOUNTER — TELEPHONE (OUTPATIENT)
Dept: GASTROENTEROLOGY | Age: 64
End: 2019-03-12

## 2019-03-12 LAB
REASON FOR REJECTION: NORMAL
REJECTED TEST: NORMAL

## 2019-03-13 ENCOUNTER — TELEPHONE (OUTPATIENT)
Dept: GASTROENTEROLOGY | Age: 64
End: 2019-03-13

## 2019-03-14 LAB
FAT QUALITATIVE NEUTRAL STOOL: NORMAL
FAT QUALITATIVE SPLIT STOOL: NORMAL
FAT QUALITATIVE SPLIT STOOL: NORMAL
Lab: NORMAL
TEST INFORMATION: NORMAL
TEST NAME: NORMAL

## 2019-03-15 LAB
IMMUNOGLOBULIN A, SERUM: 6 UNITS (ref 0–19)
IMMUNOGLOBULIN A, SERUM: NORMAL UNITS (ref 0–19)

## 2019-03-18 ENCOUNTER — TELEPHONE (OUTPATIENT)
Dept: GASTROENTEROLOGY | Age: 64
End: 2019-03-18

## 2019-03-20 ENCOUNTER — TELEPHONE (OUTPATIENT)
Dept: GASTROENTEROLOGY | Age: 64
End: 2019-03-20

## 2019-03-20 DIAGNOSIS — R19.7 DIARRHEA, UNSPECIFIED TYPE: Primary | ICD-10-CM

## 2019-03-20 RX ORDER — DIAZEPAM 2 MG/1
2 TABLET ORAL ONCE
Qty: 1 TABLET | Refills: 0 | Status: SHIPPED | OUTPATIENT
Start: 2019-03-20 | End: 2019-03-20

## 2019-03-29 ENCOUNTER — HOSPITAL ENCOUNTER (OUTPATIENT)
Dept: CT IMAGING | Age: 64
Discharge: HOME | End: 2019-03-29
Payer: MEDICAID

## 2019-03-29 DIAGNOSIS — R14.0 ABDOMINAL BLOATING: ICD-10-CM

## 2019-03-29 DIAGNOSIS — R19.7 DIARRHEA, UNSPECIFIED TYPE: ICD-10-CM

## 2019-03-29 PROCEDURE — 74176 CT ABD & PELVIS W/O CONTRAST: CPT

## 2019-04-01 ENCOUNTER — TELEPHONE (OUTPATIENT)
Dept: GASTROENTEROLOGY | Age: 64
End: 2019-04-01

## 2019-04-01 NOTE — TELEPHONE ENCOUNTER
Pt called after hours and LM on perfect serve VM stating she went to have CT done and the contrast made her vomit and get really itchy. It does appear that the CT was completed and resulted.

## 2019-04-02 ENCOUNTER — OFFICE VISIT (OUTPATIENT)
Dept: FAMILY MEDICINE CLINIC | Age: 64
End: 2019-04-02
Payer: MEDICAID

## 2019-04-02 VITALS
SYSTOLIC BLOOD PRESSURE: 118 MMHG | HEART RATE: 70 BPM | BODY MASS INDEX: 41.7 KG/M2 | DIASTOLIC BLOOD PRESSURE: 80 MMHG | HEIGHT: 62 IN | WEIGHT: 226.6 LBS

## 2019-04-02 DIAGNOSIS — R30.0 DYSURIA: ICD-10-CM

## 2019-04-02 DIAGNOSIS — N20.0 RIGHT KIDNEY STONE: Primary | ICD-10-CM

## 2019-04-02 PROCEDURE — 3017F COLORECTAL CA SCREEN DOC REV: CPT | Performed by: FAMILY MEDICINE

## 2019-04-02 PROCEDURE — 1036F TOBACCO NON-USER: CPT | Performed by: FAMILY MEDICINE

## 2019-04-02 PROCEDURE — G8599 NO ASA/ANTIPLAT THER USE RNG: HCPCS | Performed by: FAMILY MEDICINE

## 2019-04-02 PROCEDURE — G8417 CALC BMI ABV UP PARAM F/U: HCPCS | Performed by: FAMILY MEDICINE

## 2019-04-02 PROCEDURE — 99213 OFFICE O/P EST LOW 20 MIN: CPT | Performed by: FAMILY MEDICINE

## 2019-04-02 PROCEDURE — 81003 URINALYSIS AUTO W/O SCOPE: CPT | Performed by: FAMILY MEDICINE

## 2019-04-02 PROCEDURE — G8427 DOCREV CUR MEDS BY ELIG CLIN: HCPCS | Performed by: FAMILY MEDICINE

## 2019-04-02 ASSESSMENT — PATIENT HEALTH QUESTIONNAIRE - PHQ9
SUM OF ALL RESPONSES TO PHQ QUESTIONS 1-9: 2
SUM OF ALL RESPONSES TO PHQ9 QUESTIONS 1 & 2: 2
SUM OF ALL RESPONSES TO PHQ QUESTIONS 1-9: 2
2. FEELING DOWN, DEPRESSED OR HOPELESS: 1
1. LITTLE INTEREST OR PLEASURE IN DOING THINGS: 1

## 2019-04-03 LAB
BILIRUBIN, POC: NORMAL
BLOOD URINE, POC: NORMAL
CLARITY, POC: NORMAL
COLOR, POC: YELLOW
GLUCOSE URINE, POC: NORMAL
KETONES, POC: NORMAL
LEUKOCYTE EST, POC: NORMAL
NITRITE, POC: NORMAL
PH, POC: 7
PROTEIN, POC: NORMAL
SPECIFIC GRAVITY, POC: 1.02
UROBILINOGEN, POC: 0.2

## 2019-04-03 ASSESSMENT — ENCOUNTER SYMPTOMS: BACK PAIN: 0

## 2019-04-03 NOTE — PROGRESS NOTES
Age of Onset    Diabetes Mother     Hypertension Mother     Depression Mother         suicide    Diabetes Father     Depression Father     Diabetes Brother     Birth Defects Maternal Grandmother     Hypertension Maternal Grandmother     Diabetes Sister     Depression Brother        Current Outpatient Medications on File Prior to Visit   Medication Sig Dispense Refill    fluticasone-salmeterol (ADVAIR DISKUS) 250-50 MCG/DOSE AEPB Inhale 1 puff into the lungs every 12 hours 60 each 5    albuterol sulfate HFA (VENTOLIN HFA) 108 (90 Base) MCG/ACT inhaler Inhale 2 puffs into the lungs every 6 hours as needed for Shortness of Breath 1 Inhaler 5    buPROPion (WELLBUTRIN XL) 300 MG extended release tablet Take 1 tablet by mouth daily (Patient taking differently: Take 300 mg by mouth every morning ) 30 tablet 5    furosemide (LASIX) 20 MG tablet Take 1 tablet by mouth daily (Patient taking differently: Take 20 mg by mouth every morning ) 60 tablet 5    levothyroxine (SYNTHROID) 125 MCG tablet Take 1 tablet by mouth daily (Patient taking differently: Take 125 mcg by mouth every morning ) 30 tablet 5    theophylline (THEODUR) 300 MG extended release tablet Take 1 tablet by mouth 2 times daily 60 tablet 5    loratadine (CLARITIN) 10 MG tablet Take 1 tablet by mouth daily (Patient taking differently: Take 10 mg by mouth every morning ) 30 tablet 5    busPIRone (BUSPAR) 7.5 MG tablet Take 1 tablet by mouth 2 times daily 60 tablet 5    albuterol (PROVENTIL) (2.5 MG/3ML) 0.083% nebulizer solution Take 3 mLs by nebulization 2 times daily as needed for Wheezing or Shortness of Breath 60 each 0    fluticasone (FLONASE) 50 MCG/ACT nasal spray instill 1 spray (NASAL) twice a day  prn  0    Blood Pressure Monitoring (RA BLOOD PRESSURE CUFF MONITOR) MISC Use as directed  0    cholestyramine (QUESTRAN) 4 g packet Take 1 packet by mouth 2 times daily 90 packet 3    Probiotic Product (ALIGN) 4 MG CAPS Take 1 capsule by mouth daily 30 capsule 3     No current facility-administered medications on file prior to visit. Objective:     Physical Exam   Constitutional: She appears well-developed and well-nourished. HENT:   Head: Normocephalic and atraumatic. Neck: Neck supple. No tracheal deviation present. No thyromegaly present. Cardiovascular: Normal rate, regular rhythm and normal heart sounds. Pulmonary/Chest: Effort normal and breath sounds normal. No respiratory distress. She has no wheezes. Abdominal: Soft. She exhibits no distension and no mass. There is no tenderness. There is no CVA tenderness. Musculoskeletal: She exhibits no edema. Neurological: She is alert. Skin: Skin is warm, dry and intact. Psychiatric: She has a normal mood and affect. Nursing note and vitals reviewed. Vitals:    04/02/19 1507   BP: 118/80   Pulse: 70   Weight: 226 lb 9.6 oz (102.8 kg)   Height: 5' 1.5\" (1.562 m)     Body mass index is 42.12 kg/m². Wt Readings from Last 3 Encounters:   04/02/19 226 lb 9.6 oz (102.8 kg)   02/12/19 226 lb (102.5 kg)   01/28/19 223 lb (101.2 kg)     BP Readings from Last 3 Encounters:   04/02/19 118/80   02/12/19 124/82   01/28/19 110/72          Results for orders placed or performed in visit on 04/02/19   POCT Urinalysis No Micro (Auto)   Result Value Ref Range    Color, UA yellow     Clarity, UA cloudy     Glucose, UA POC neg     Bilirubin, UA neg     Ketones, UA neg     Spec Grav, UA 1.020     Blood, UA POC trace     pH, UA 7.0     Protein, UA POC neg     Urobilinogen, UA 0.2     Leukocytes, UA neg     Nitrite, UA neg            Assessment:       Diagnosis Orders   1. Right kidney stone  URINE CULTURE   2.  Dysuria  POCT Urinalysis No Micro (Auto)    URINE CULTURE           Plan:      See orders    Explained that does not need to have procedures done if the kidney stone is not problematic

## 2019-04-05 LAB — URINE CULTURE, ROUTINE: NORMAL

## 2019-04-11 ENCOUNTER — TELEPHONE (OUTPATIENT)
Dept: GASTROENTEROLOGY | Age: 64
End: 2019-04-11

## 2019-05-08 ENCOUNTER — TELEPHONE (OUTPATIENT)
Dept: GASTROENTEROLOGY | Age: 64
End: 2019-05-08

## 2019-05-08 NOTE — TELEPHONE ENCOUNTER
Patient is refusing the hydrogen breath test. She continues to have diarrhea after eating but no abdominal pain. She is scheduled to see Dr. Maryanne Sharp on 5/28/19 for follow up.

## 2019-05-08 NOTE — TELEPHONE ENCOUNTER
I was reviewing patient's chart and did not see hydrogen breath test results. Did she complete the test?  Also how has she been feeling? Any diarrhea or abdominal pain?

## 2019-05-28 ENCOUNTER — OFFICE VISIT (OUTPATIENT)
Dept: GASTROENTEROLOGY | Age: 64
End: 2019-05-28
Payer: MEDICAID

## 2019-05-28 ENCOUNTER — TELEPHONE (OUTPATIENT)
Dept: GASTROENTEROLOGY | Age: 64
End: 2019-05-28

## 2019-05-28 VITALS
HEART RATE: 71 BPM | SYSTOLIC BLOOD PRESSURE: 104 MMHG | OXYGEN SATURATION: 94 % | WEIGHT: 225.8 LBS | RESPIRATION RATE: 16 BRPM | BODY MASS INDEX: 41.97 KG/M2 | DIASTOLIC BLOOD PRESSURE: 62 MMHG

## 2019-05-28 DIAGNOSIS — K21.9 GASTROESOPHAGEAL REFLUX DISEASE WITHOUT ESOPHAGITIS: Primary | ICD-10-CM

## 2019-05-28 DIAGNOSIS — K59.1 FUNCTIONAL DIARRHEA: ICD-10-CM

## 2019-05-28 PROCEDURE — G8427 DOCREV CUR MEDS BY ELIG CLIN: HCPCS | Performed by: INTERNAL MEDICINE

## 2019-05-28 PROCEDURE — G8417 CALC BMI ABV UP PARAM F/U: HCPCS | Performed by: INTERNAL MEDICINE

## 2019-05-28 PROCEDURE — G8599 NO ASA/ANTIPLAT THER USE RNG: HCPCS | Performed by: INTERNAL MEDICINE

## 2019-05-28 PROCEDURE — 99213 OFFICE O/P EST LOW 20 MIN: CPT | Performed by: INTERNAL MEDICINE

## 2019-05-28 PROCEDURE — 1036F TOBACCO NON-USER: CPT | Performed by: INTERNAL MEDICINE

## 2019-05-28 PROCEDURE — 3017F COLORECTAL CA SCREEN DOC REV: CPT | Performed by: INTERNAL MEDICINE

## 2019-05-28 RX ORDER — CHOLESTYRAMINE 4 G/9G
1 POWDER, FOR SUSPENSION ORAL 2 TIMES DAILY
Qty: 60 PACKET | Refills: 2 | Status: SHIPPED | OUTPATIENT
Start: 2019-05-28 | End: 2019-08-29

## 2019-05-28 RX ORDER — OMEPRAZOLE 20 MG/1
20 CAPSULE, DELAYED RELEASE ORAL DAILY
Qty: 30 CAPSULE | Refills: 3 | Status: SHIPPED | OUTPATIENT
Start: 2019-05-28 | End: 2019-08-28 | Stop reason: SDUPTHER

## 2019-05-28 NOTE — PROGRESS NOTES
Reason for Visit: irregular bowel movement    HPI: A 27-year-old the  female patient who had a past medical history of COPD, asthma, arthritis, hypothyroidism, hypertension, hypercholesteremia, adenoma in the colon had come to my office to follow-up irregular bowel movement. The patient reported that she did have diarrhea over past few years. However every time what would ask her to submit a stool for stool test, she submitted a formed stool. Today she reported that she have 6-8 bowels bowel movement per day. During the bowel movement she could have a formed stool or loose stool. She denied hematochezia or melena and she did not have abdominal pain or cramps. Her weight has been stable. I had performed a colonoscopy in the past and she did have a couple adenomas that had been removed. The biopsies of colon do not show microscopic colitis. The patient had been status post cholecystectomy. The patient had been giving cholestyramine in February 2019. However patient has not filled the prescription. She denied nausea vomiting, dysphagia, odynophagia, hematemesis, abdominal girth increase. She did have acid reflux for 3 years for which patient had not been taking any medications. Patient had a mild abdominal bloating. Her celiac disease was negative based on blood works. Her stool tests did not show any infectious organism and there was no inflammatory marker. The C. diff of a stool test was rejected because the patient is a bit of formed stool. The patient had been advised to have a breathing test to rule out small bowel bacterial overgrowth. Patient refused. Patient Vit D  level was lower and she had discussed with her primary care doctor. Patient had been advised to see a dietitian and the patient had not made an appointment yet.        PMH:    Past Medical History:   Diagnosis Date    Anxiety 1/28/2019    Arthritis     Asthma     CAD (coronary artery disease)     [facility]     Hepatic steatosis 6/19/2015    Hyperlipidemia     Hypertension     Hypothyroid        PSH:    Past Surgical History:   Procedure Laterality Date    CARPAL TUNNEL RELEASE Left     CHOLECYSTECTOMY      COLONOSCOPY  01/28/2019    Polyps x4, Internal grade 1 hemorrhoids    COLONOSCOPY N/A 1/28/2019    COLONOSCOPY POLYPECTOMY SNARE/COLD BIOPSY performed by Lakeshia Dean MD at [address] [place] [address]      due to bleeding.   complete    THYROIDECTOMY      TONSILLECTOMY      TUBAL LIGATION      WRIST SURGERY Left        Medications:    Current Outpatient Medications on File Prior to Visit   Medication Sig Dispense Refill    fluticasone-salmeterol (ADVAIR DISKUS) 250-50 MCG/DOSE AEPB Inhale 1 puff into the lungs every 12 hours 60 each 5    albuterol sulfate HFA (VENTOLIN HFA) 108 (90 Base) MCG/ACT inhaler Inhale 2 puffs into the lungs every 6 hours as needed for Shortness of Breath 1 Inhaler 5    buPROPion (WELLBUTRIN XL) 300 MG extended release tablet Take 1 tablet by mouth daily (Patient taking differently: Take 300 mg by mouth every morning ) 30 tablet 5    furosemide (LASIX) 20 MG tablet Take 1 tablet by mouth daily (Patient taking differently: Take 20 mg by mouth every morning ) 60 tablet 5    levothyroxine (SYNTHROID) 125 MCG tablet Take 1 tablet by mouth daily (Patient taking differently: Take 125 mcg by mouth every morning ) 30 tablet 5    theophylline (THEODUR) 300 MG extended release tablet Take 1 tablet by mouth 2 times daily 60 tablet 5    loratadine (CLARITIN) 10 MG tablet Take 1 tablet by mouth daily (Patient taking differently: Take 10 mg by mouth every morning ) 30 tablet 5    busPIRone (BUSPAR) 7.5 MG tablet Take 1 tablet by mouth 2 times daily 60 tablet 5    albuterol (PROVENTIL) (2.5 MG/3ML) 0.083% nebulizer solution Take 3 mLs by nebulization 2 times daily as needed for Wheezing or Shortness of Breath 60 each 0    fluticasone (FLONASE) 50 Depression Mother         suicide    Diabetes Father     Depression Father     Diabetes Brother     Birth Defects Maternal Grandmother     Hypertension Maternal Grandmother     Diabetes Sister     Depression Brother          Review of Systems:  Constitutional: No weight loss, no fevers. Eyes: No problems with vision. ENT: No nose or sinus problems, no oral problems, no throat problems or hoarseness. Cardiovascular: No chest pain, no leg pain with walking, no palpitations, no ankle swelling. Respiratory: No shortness of breath, no persistent cough, no wheezing. Endocrine: No increased thirst, no increased urination. Gastrointestinal: No heartburn, no dysphagia, no abdominal pain, no loss of appetite, no nausea or vomiting, no diarrhea, no constipation, no melena, no hematochezia, no sceleral icterus or jaundice. Skin: No rashes. Musculoskeletal: No trouble walking or standing, no joint pain, no muscle pain. Allergy/Immune System: No allergies, no frequent infections. Neurological: No memory difficulties, no temporary blindness, no difficulty speaking, no headaches, no numbness. Psychiatric: No depression, no suicidal ideation, no auditory hallucinations. Hematological/Lymphatic: No lymphadenopathy, no frequent nose bleeds, no easy bruising. Genitourinary: No penile/vaginal discharge, no pain with urination, no trouble starting urinary stream, no hematuria. Physical Examination  Vital Signs: /62 (Site: Left Upper Arm, Position: Sitting, Cuff Size: Large Adult)   Pulse 71   Resp 16   Wt 225 lb 12.8 oz (102.4 kg)   LMP  (LMP Unknown)   SpO2 94%   BMI 41.97 kg/m²  Body mass index is 41.97 kg/m². General: The patient is a 59 y.o. female in No acute distress. EYE: EOMI, Gross visual field was normal. Pupils reactive, The conjunctive was normal, with no erythema. ENT: no lymphadenopathy, oropharynx is without erythema, edema, or exudates, and moist mucus membranes.  The nasal mucosa, Kraig Kurtz, OCCBLD1, [address] [facility], OCCBLD3, OCCULTBLOOD  No results found for: IRON, FERRITIN  No results found for: HAV    Assessment     Assessment and Plan:    A 71-year-old the  female patient who had a past medical history of COPD, asthma, arthritis, hypothyroidism, hypertension, hypercholesteremia, adenoma in the colon had come to my office to follow-up irregular bowel movement. 1.  Irregular bowel movement after eating might be due to post vasectomy, IBS, diet related irregular bowel movement. I recommend probiotics, fiber supplements and consulting a dietitian. I also give patient cholecystectomy this time. Actually, I do not think the patient had chronic diarrhea because patient sometime had a formed stools sometimes had loose to and every time when she submit stool , the stool was formed. I feel that it was most likely irregular bowel movements due to IBS or diet. 2.  Mild abdominal bloating might be due to IBS or small bowel bacterial overgrowth. I again recommend a breathing test to rule out small bowel bacterial overgrowth. Patient still refused. I had already told her without a breathing test we may not be able to diagnosis the small bowel bacteria overgrowth. She understood. 3.  Adenomas in the colon. The patient had been advised to repeat a colonoscopy 3 years    4. Acid reflux for 3 years without dysphagia, odynophagia, nausea, vomiting. I recommend a Prilosec 20 daily. I also scheduled her for an upper endoscopy to rule out Valladares esophagus because patient has central obesity. 5.  Obesity, I recommend that consulting a dietitian and also I Recommend doing excise every day and eating a healthy diet. 6.  Vitamin D deficiency, the patient told me she had already spoke to her primary care doctor about the low vitamin D level. The primary care doctor has not given her any supplements.   I asked her to call her primary physician again to discuss the

## 2019-05-28 NOTE — LETTER
Dasia Lombardo 1    Your procedure with Dr. Jaz Santizo has been scheduled at the     [facility] located at     [address] [place] [address]. -                                           /      -__________________________    Procedure Date       Arrival Time (Arrive 1 hour early)      -_____________   Procedure Time                                      If you have any questions regarding the prep instructions, please call [phone] and our staff can help you. If an emergency arises and you are unable to keep your procedure appointment, you must call [facility] at [phone] and our office at [phone] within 24 hours to let us know. Not giving 24 hour notice or not showing for your procedure appointment may result in immediate dismissal from Dr. Alexis lerma. EGD (Esophagogastroduodenoscopy)    You will need a . Your  will need to stay at the facility during the entire procedure. You will be given medicine to help you relax during the procedure that will affect your judgment and reflexes much of the day. For your safety, you will need a responsible adult to drive you home after the procedure. Your  needs to check in with you when you arrive at the facility to inform the staff who they are. If you do not bring a , your procedure will be rescheduled to another date. ? If your  leaves the facility during the procedure, he or she needs to give the staff a phone number where they can be reached and stay within 30 minutes of the endoscopy facility. ? If you take a cab, bus, or medical transportation service home after the procedure, an adult or someone other than the  needs to ride with you for your safety. ? You should have an adult with you to help you and check on you after the procedure for at least 6 hours. You may need to make changes to your medicines. If you take aspirin or NSAIDS, such as ibuprofen, naproxen, or Celebrex for pain, you do NOT need to stop taking these medicines before the procedure. If you take medicines for diabetes, ask the doctor who ordered your diabetes medication how to adjust these medicines for this procedure. If you take any of the blood thinner medications listed below:  ? Ask the doctor who ordered this medication if it is safe for you to stop taking this medicine before the procedure. If you have a stent or certain other health problems, do NOT stop taking these medicines unless otherwise directed by the doctor. ? If your doctor agrees you should stop taking any of the medications listed below, stop for the listed number of days or as your doctor recommends:    o Josr Cast (Ticagretor)  5 days     o Coumadin (Warfarin)  5 days  o Effient (Prasugrel)  7 days  o Eliquis (Apixaban)  2 days  o Lovenox (Enoxaparin)  1 day  o Plavix (Clopidogrel)  5 days  o Pletal (Cilostazol)  5 days  o Pradaxa (Dabigatran)  2 days  o Savaysa (Edoxaban)  2 days  o Xarelto (Rivaroxaban)  1 day  o Aggrenox - 5 days                    About the procedure    An EGD is also called an upper endoscopy. It is done with a narrow, flexible tube that has a camera and lights that goes in through your mouth into the upper digestive tract. It allows your doctor to look into your esophagus, stomach, and part of the small intestine called the duodenum. This procedure helps your doctor diagnose illnesses and make plans for treatment if needed. Evening before the procedure    You may eat your regular meals until 8 hours before the procedure. 8 hours before the procedure, begin drinking clear liquids only, but avoid all red and purple colored liquids.  Clear liquids allowed include water, fruit juices that you can see through, such as apple, white cranberry, or white the procedure before you sign the form  ? You will be given medicines in your IV to help you relax. You will likely rest on your left side  ? Your throat may be sprayed with a numbing medicine  ? The doctor will pass another tube through your mouth into your esophagus, stomach, and duodenum  ? The test will take approximately 15 minutes      After the procedure      ? You will be taken to a recovery area for 30 to 45 minutes  ? You will then change back into your clothes  ? The doctor will speak with you and your family member before you leave  ? Plan to take the day off work and rest at home after the procedure  ? For your safety, it is best to have an adult with you for at least 6 hours at home after the procedure  ? The medicine given during the procedure can cause you to have memory changes and impair your judgment. Do not drive a motorized vehicle or operate heavy machinery. Do not sign any papers or make any legal decisions  ?  Please call our office at [phone] if you have any questions or concerns

## 2019-06-05 ENCOUNTER — OFFICE VISIT (OUTPATIENT)
Dept: FAMILY MEDICINE CLINIC | Age: 64
End: 2019-06-05
Payer: MEDICAID

## 2019-06-05 VITALS
OXYGEN SATURATION: 98 % | HEIGHT: 62 IN | WEIGHT: 225.6 LBS | DIASTOLIC BLOOD PRESSURE: 64 MMHG | SYSTOLIC BLOOD PRESSURE: 116 MMHG | HEART RATE: 82 BPM | RESPIRATION RATE: 16 BRPM | BODY MASS INDEX: 41.51 KG/M2

## 2019-06-05 DIAGNOSIS — J44.9 COPD, MODERATE (HCC): Primary | ICD-10-CM

## 2019-06-05 DIAGNOSIS — Z23 IMMUNIZATION DUE: ICD-10-CM

## 2019-06-05 DIAGNOSIS — Z86.39 HISTORY OF HYPERGLYCEMIA: ICD-10-CM

## 2019-06-05 DIAGNOSIS — M25.562 CHRONIC PAIN OF LEFT KNEE: ICD-10-CM

## 2019-06-05 DIAGNOSIS — G89.29 CHRONIC PAIN OF LEFT KNEE: ICD-10-CM

## 2019-06-05 DIAGNOSIS — Z12.39 SCREENING FOR BREAST CANCER: ICD-10-CM

## 2019-06-05 LAB — HBA1C MFR BLD: 5.5 %

## 2019-06-05 PROCEDURE — 3017F COLORECTAL CA SCREEN DOC REV: CPT | Performed by: NURSE PRACTITIONER

## 2019-06-05 PROCEDURE — 90471 IMMUNIZATION ADMIN: CPT | Performed by: NURSE PRACTITIONER

## 2019-06-05 PROCEDURE — G8417 CALC BMI ABV UP PARAM F/U: HCPCS | Performed by: NURSE PRACTITIONER

## 2019-06-05 PROCEDURE — G8926 SPIRO NO PERF OR DOC: HCPCS | Performed by: NURSE PRACTITIONER

## 2019-06-05 PROCEDURE — G8427 DOCREV CUR MEDS BY ELIG CLIN: HCPCS | Performed by: NURSE PRACTITIONER

## 2019-06-05 PROCEDURE — 90715 TDAP VACCINE 7 YRS/> IM: CPT | Performed by: NURSE PRACTITIONER

## 2019-06-05 PROCEDURE — G8599 NO ASA/ANTIPLAT THER USE RNG: HCPCS | Performed by: NURSE PRACTITIONER

## 2019-06-05 PROCEDURE — 1036F TOBACCO NON-USER: CPT | Performed by: NURSE PRACTITIONER

## 2019-06-05 PROCEDURE — 99214 OFFICE O/P EST MOD 30 MIN: CPT | Performed by: NURSE PRACTITIONER

## 2019-06-05 PROCEDURE — 3023F SPIROM DOC REV: CPT | Performed by: NURSE PRACTITIONER

## 2019-06-05 PROCEDURE — 83036 HEMOGLOBIN GLYCOSYLATED A1C: CPT | Performed by: NURSE PRACTITIONER

## 2019-06-05 RX ORDER — NAPROXEN 375 MG/1
375 TABLET ORAL 2 TIMES DAILY PRN
Qty: 60 TABLET | Refills: 1 | Status: SHIPPED | OUTPATIENT
Start: 2019-06-05 | End: 2020-05-28

## 2019-06-05 ASSESSMENT — ENCOUNTER SYMPTOMS
NAUSEA: 0
BACK PAIN: 0
SHORTNESS OF BREATH: 1
VOMITING: 0
ABDOMINAL DISTENTION: 0

## 2019-06-05 NOTE — LETTER
[address]  [address] [address] [place]   Phone: [phone]  Fax: [phone]    DELORES Davila CNP        June 5, 2019     Patient: Rodger Cast   YOB: 1955   Date of Visit: 6/5/2019       To Whom It May Concern: It is my medical opinion that Slime Lainez requires a disability parking placard for the following reasons:  osteoarthritis  Duration of need: 5 years    If you have any questions or concerns, please don't hesitate to call.     Sincerely,        DELORES Davila CNP

## 2019-06-05 NOTE — PROGRESS NOTES
SUBJECTIVE:  Rodger Cast   1955   female   Allergies   Allergen Reactions    Latex Itching    Iodine Anaphylaxis    Levofloxacin Anaphylaxis    Pcn [Penicillins] Anaphylaxis    Sulfa Antibiotics Anaphylaxis    Morphine Itching    Zofran Itching    Caffeine Palpitations     Doesn't sleep    Volumen [Barium Sulfate] Itching, Nausea And Vomiting and Rash       Chief Complaint   Patient presents with    Establish Care    Knee Pain     left knee    COPD    Obesity        HPI   Here to establish PCP  Formerly seen by Adilia Moise  Chronic left knee pain, uses a cane sometimes.   Sees Juana Judd for GI issues      Past Medical History:   Diagnosis Date    Anxiety 1/28/2019    Arthritis     Asthma     CAD (coronary artery disease)     COPD (chronic obstructive pulmonary disease) ([facility] [address]     Hepatic steatosis 6/19/2015    Hyperlipidemia     Hypertension     Hypothyroid      Social History     Socioeconomic History    Marital status:      Spouse name: Not on file    Number of children: 2    Years of education: 15    Highest education level: GED or equivalent   Occupational History    Occupation: unemployed   Social Needs    Financial resource strain: Not on file    Food insecurity:     Worry: Not on file     Inability: Not on file   [employer] needs:     Medical: Not on file     Non-medical: Not on file   Tobacco Use    Smoking status: Never Smoker    Smokeless tobacco: Never Used   Substance and Sexual Activity    Alcohol use: No    Drug use: No    Sexual activity: Not Currently     Partners: Male     Birth control/protection: Post-menopausal   Lifestyle    Physical activity:     Days per week: Not on file     Minutes per session: Not on file    Stress: Not on file   Relationships    Social connections:     Talks on phone: Not on file     Gets together: Not on file     Attends [employer] service: Not on file     Active member of club or organization: Not on file     Attends meetings of clubs or organizations: Not on file     Relationship status: Not on file    Intimate partner violence:     Fear of current or ex partner: Not on file     Emotionally abused: Not on file     Physically abused: Not on file     Forced sexual activity: Not on file   Other Topics Concern    Not on file   Social History Narrative    Not on file     Family History   Problem Relation Age of Onset    Diabetes Mother     Hypertension Mother     Depression Mother         suicide    Diabetes Father     Depression Father     Diabetes Brother     Birth Defects Maternal Grandmother     Hypertension Maternal Grandmother     Diabetes Sister     Depression Brother     Diabetes Sister     Diabetes Brother     Other Brother         black mold    Diabetes Brother     Other Daughter         GI issues    Asthma Daughter     Seizures Daughter      Past Surgical History:   Procedure Laterality Date    CARPAL TUNNEL RELEASE Left     CHOLECYSTECTOMY      COLONOSCOPY  01/28/2019    Polyps x4, Internal grade 1 hemorrhoids    COLONOSCOPY N/A 1/28/2019    COLONOSCOPY POLYPECTOMY SNARE/COLD BIOPSY performed by Lakeshia Dean MD at [address] [place] [address]      due to bleeding. complete    THYROIDECTOMY  2009    TONSILLECTOMY      TUBAL LIGATION      WRIST SURGERY Left         Review of Systems   Constitutional: Negative for fatigue and unexpected weight change. HENT: Negative for congestion. Respiratory: Positive for shortness of breath. Cardiovascular: Negative for chest pain, palpitations and leg swelling. Gastrointestinal: Negative for abdominal distention, nausea and vomiting. Musculoskeletal: Positive for arthralgias and gait problem. Negative for back pain. Knee pain   Neurological: Negative for dizziness, weakness and headaches. Psychiatric/Behavioral: Negative for agitation and sleep disturbance. The patient is not nervous/anxious.         OBJECTIVE:  /64 (Site: Right Upper Arm, Position: Sitting, Cuff Size: Large Adult)   Pulse 82   Resp 16   Ht 5' 1.5\" (1.562 m)   Wt 225 lb 9.6 oz (102.3 kg)   LMP  (LMP Unknown)   SpO2 98%   BMI 41.94 kg/m²   BP Readings from Last 3 Encounters:   06/05/19 116/64   05/28/19 104/62   04/02/19 118/80     Wt Readings from Last 3 Encounters:   06/05/19 225 lb 9.6 oz (102.3 kg)   05/28/19 225 lb 12.8 oz (102.4 kg)   04/02/19 226 lb 9.6 oz (102.8 kg)     Body mass index is 41.94 kg/m². Physical Exam   Constitutional: She is oriented to person, place, and time. She appears well-developed. Morbidly obese with a BMI of 41.94   HENT:   Head: Normocephalic and atraumatic. Right Ear: External ear normal.   Left Ear: External ear normal.   Nose: Nose normal.   Mouth/Throat: Oropharynx is clear and moist.   edentulous   Eyes: Pupils are equal, round, and reactive to light. Conjunctivae are normal.   Neck: Normal range of motion. Neck supple. Cardiovascular: Normal rate, regular rhythm, normal heart sounds and intact distal pulses. Pulmonary/Chest: Effort normal.   Diminished breath sounds bilaterally in bases   Abdominal: Soft. Bowel sounds are normal.   Musculoskeletal: Normal range of motion. Neurological: She is alert and oriented to person, place, and time. She has normal reflexes. Skin: Skin is warm and dry. Psychiatric: She has a normal mood and affect. Her behavior is normal. Judgment and thought content normal.   Nursing note and vitals reviewed. ASSESSMENT/PLAN:    1. Screening for breast cancer  - [facility] Screening Bilateral; Future    2. COPD, moderate ([address]  Inhalers as directed  Activity as tolerated  Recommend pneumonia vaccine; patient refuses    3. Immunization due  - Tdap (age 6y and older) IM ([address])    4. Chronic pain of left knee  Activity as tolerated  Referral to:  - [facility] - Braxton Orellana DO, Orthopedic Surgery, [place]  - naproxen (NAPROSYN) 375 MG tablet;  Take 1 tablet by mouth 2 times daily prn for pain. Dispense: 60 tablet; Refill: 1    5.  History of hyperglycemia  Patient reports she was previously treated for high blood sugar but that it resoved  - POCT glycosylated hemoglobin (Hb A1C)      Orders Placed This Encounter   Procedures    [facility] Screening Bilateral     Standing Status:   Future     Standing Expiration Date:   12/5/2020     Scheduling Instructions:      Screening bilateral mammogram with additional diagnostic mammogram and/or ultrasound if recommended by the radiologist     Order Specific Question:   Reason for exam:     Answer:   screening for breast cancer    Tdap (age 6y and older)  ([address] [place] [address])   [address] [place] [address] DO Destini, Orthopedic Surgery, [place]     Referral Priority:   Routine     Referral Type:   Eval and Treat     Referral Reason:   Specialty Services Required     Referred to Provider:   Palma Gallagher DO     Requested Specialty:   Orthopedic Surgery     Number of Visits Requested:   1    POCT glycosylated hemoglobin (Hb A1C)     Current Outpatient Medications   Medication Sig Dispense Refill    naproxen (NAPROSYN) 375 MG tablet Take 1 tablet by mouth 2 times daily as needed for Pain 60 tablet 1    WIXELA INHUB 250-50 MCG/DOSE AEPB inhale 1 puff by mouth every 12 hours 1 Inhaler 0    omeprazole (PRILOSEC) 20 MG delayed release capsule Take 1 capsule by mouth daily 30 capsule 3    cholestyramine (QUESTRAN) 4 g packet Take 1 packet by mouth 2 times daily 60 packet 2    albuterol sulfate HFA (VENTOLIN HFA) 108 (90 Base) MCG/ACT inhaler Inhale 2 puffs into the lungs every 6 hours as needed for Shortness of Breath 1 Inhaler 5    buPROPion (WELLBUTRIN XL) 300 MG extended release tablet Take 1 tablet by mouth daily (Patient taking differently: Take 300 mg by mouth every morning ) 30 tablet 5    furosemide (LASIX) 20 MG tablet Take 1 tablet by mouth daily (Patient taking differently: Take 20 mg by mouth every morning ) 60 tablet 5    levothyroxine (SYNTHROID) 125 MCG tablet Take 1 tablet by mouth daily (Patient taking differently: Take 125 mcg by mouth every morning ) 30 tablet 5    theophylline (THEODUR) 300 MG extended release tablet Take 1 tablet by mouth 2 times daily 60 tablet 5    loratadine (CLARITIN) 10 MG tablet Take 1 tablet by mouth daily (Patient taking differently: Take 10 mg by mouth every morning ) 30 tablet 5    busPIRone (BUSPAR) 7.5 MG tablet Take 1 tablet by mouth 2 times daily 60 tablet 5    albuterol (PROVENTIL) (2.5 MG/3ML) 0.083% nebulizer solution Take 3 mLs by nebulization 2 times daily as needed for Wheezing or Shortness of Breath 60 each 0    fluticasone (FLONASE) 50 MCG/ACT nasal spray instill 1 spray (NASAL) twice a day  prn  0     No current facility-administered medications for this visit. Return in about 3 months (around 9/5/2019). Sukhwinder Zamudio DNP, FNP-C    Return for new or worsening symptoms or any concerns as needed.

## 2019-06-11 ENCOUNTER — HOSPITAL ENCOUNTER (OUTPATIENT)
Dept: DIABETES SERVICES | Age: 64
Setting detail: THERAPIES SERIES
Discharge: HOME | End: 2019-06-11
Payer: MEDICAID

## 2019-06-11 PROCEDURE — 97802 MEDICAL NUTRITION INDIV IN: CPT

## 2019-06-11 NOTE — PROGRESS NOTES
Willing to keep records.      Primary learner: attended session alone   Education materials provided: low FODMAP plan with food lists from Nutrition Care Manual   Method of education:   Explanation       Handouts   Teach back     Response to education:    Verbalized understanding, may need reinforcement        Rec/plan: Patient to continue follow up with PCP and GI   Additional follow up as needed

## 2019-06-20 ENCOUNTER — OFFICE VISIT (OUTPATIENT)
Dept: ORTHOPEDIC SURGERY | Age: 64
End: 2019-06-20
Payer: MEDICAID

## 2019-06-20 VITALS — HEIGHT: 61 IN | RESPIRATION RATE: 16 BRPM | WEIGHT: 226 LBS | BODY MASS INDEX: 42.67 KG/M2

## 2019-06-20 DIAGNOSIS — M17.12 PRIMARY OSTEOARTHRITIS OF LEFT KNEE: Primary | ICD-10-CM

## 2019-06-20 PROCEDURE — G8427 DOCREV CUR MEDS BY ELIG CLIN: HCPCS | Performed by: PHYSICIAN ASSISTANT

## 2019-06-20 PROCEDURE — G8417 CALC BMI ABV UP PARAM F/U: HCPCS | Performed by: PHYSICIAN ASSISTANT

## 2019-06-20 PROCEDURE — 99242 OFF/OP CONSLTJ NEW/EST SF 20: CPT | Performed by: PHYSICIAN ASSISTANT

## 2019-06-20 PROCEDURE — 20610 DRAIN/INJ JOINT/BURSA W/O US: CPT | Performed by: PHYSICIAN ASSISTANT

## 2019-06-20 RX ORDER — MONTELUKAST SODIUM 10 MG/1
TABLET ORAL
Refills: 0 | COMMUNITY
Start: 2019-06-10 | End: 2019-08-18 | Stop reason: SDUPTHER

## 2019-06-20 RX ORDER — TRAMADOL HYDROCHLORIDE 50 MG/1
50 TABLET ORAL EVERY 6 HOURS PRN
COMMUNITY
End: 2020-02-13

## 2019-06-20 ASSESSMENT — ENCOUNTER SYMPTOMS
RESPIRATORY NEGATIVE: 1
EYES NEGATIVE: 1
GASTROINTESTINAL NEGATIVE: 1

## 2019-06-20 NOTE — PROGRESS NOTES
I reviewed and agree with the portions of the HPI, review of systems, vital documentation and plan performed by my staff and have added/addended where appropriate. Review of Systems   Constitutional: Negative. HENT: Negative. Eyes: Negative. Respiratory: Negative. Cardiovascular: Negative. Gastrointestinal: Negative. Genitourinary: Negative. Musculoskeletal: Positive for arthralgias, gait problem, joint swelling and myalgias. Skin: Negative. Negative for rash and wound. Hematological: Bruises/bleeds easily. Psychiatric/Behavioral: Negative. HPI:  Juice Nichole is a 59y.o. year old female who complains of Left knee pain and stiffness and swelling. She states onset has been for a few years but more persistent for the last few months. No prior surgeries or fractures to this leg. She states that the knee hurts mostly over the kneecap and it grinds and pops. She rates her pain at a 7/10 and she has tried Tylenol, ibuprofen and topical rubs. Pain is worse with weightbearing, going up and down stairs, getting up and down out of chairs. The patient was referred by DELORES Bundy CNP for Left knee pain. Past Medical History:   Diagnosis Date    Anxiety 1/28/2019    Arthritis     Asthma     CAD (coronary artery disease)     COPD (chronic obstructive pulmonary disease) ([facility])     Hepatic steatosis 6/19/2015    Hyperlipidemia     Hypertension     Hypothyroid        Past Surgical History:   Procedure Laterality Date    CARPAL TUNNEL RELEASE Left     CHOLECYSTECTOMY      COLONOSCOPY  01/28/2019    Polyps x4, Internal grade 1 hemorrhoids    COLONOSCOPY N/A 1/28/2019    COLONOSCOPY POLYPECTOMY SNARE/COLD BIOPSY performed by Reinaldo Brandt MD at [address] [place] [address]      due to bleeding.   complete    THYROIDECTOMY  2009    TONSILLECTOMY      TUBAL LIGATION      WRIST SURGERY Left        Family History   Problem Relation Age of Onset    Diabetes Mother     Hypertension Mother     Depression Mother         suicide    Diabetes Father     Depression Father     Diabetes Brother     Birth Defects Maternal Grandmother     Hypertension Maternal Grandmother     Diabetes Sister     Depression Brother     Diabetes Sister     Diabetes Brother     Other Brother         black mold    Diabetes Brother     Other Daughter         GI issues    Asthma Daughter     Seizures Daughter        Social History     Socioeconomic History    Marital status:      Spouse name: None    Number of children: 2    Years of education: 15    Highest education level: GED or equivalent   Occupational History    Occupation: unemployed   Social Needs    Financial resource strain: None    Food insecurity:     Worry: None     Inability: None    Transportation needs:     Medical: None     Non-medical: None   Tobacco Use    Smoking status: Never Smoker    Smokeless tobacco: Never Used   Substance and Sexual Activity    Alcohol use: No    Drug use: No    Sexual activity: Not Currently     Partners: Male     Birth control/protection: Post-menopausal   Lifestyle    Physical activity:     Days per week: None     Minutes per session: None    Stress: None   Relationships    Social connections:     Talks on phone: None     Gets together: None     Attends Denominational service: None     Active member of club or organization: None     Attends meetings of clubs or organizations: None     Relationship status: None    Intimate partner violence:     Fear of current or ex partner: None     Emotionally abused: None     Physically abused: None     Forced sexual activity: None   Other Topics Concern    None   Social History Narrative    None       Current Outpatient Medications   Medication Sig Dispense Refill    montelukast (SINGULAIR) 10 MG tablet   0    traMADol (ULTRAM) 50 MG tablet Take 50 mg by mouth every 6 hours as needed for Pain.       naproxen (NAPROSYN) 375 MG tablet Take 1 tablet by mouth 2 times daily as needed for Pain 60 tablet 1    WIXELA INHUB 250-50 MCG/DOSE AEPB inhale 1 puff by mouth every 12 hours 1 Inhaler 0    omeprazole (PRILOSEC) 20 MG delayed release capsule Take 1 capsule by mouth daily 30 capsule 3    cholestyramine (QUESTRAN) 4 g packet Take 1 packet by mouth 2 times daily 60 packet 2    albuterol sulfate HFA (VENTOLIN HFA) 108 (90 Base) MCG/ACT inhaler Inhale 2 puffs into the lungs every 6 hours as needed for Shortness of Breath 1 Inhaler 5    buPROPion (WELLBUTRIN XL) 300 MG extended release tablet Take 1 tablet by mouth daily (Patient taking differently: Take 300 mg by mouth every morning ) 30 tablet 5    furosemide (LASIX) 20 MG tablet Take 1 tablet by mouth daily (Patient taking differently: Take 20 mg by mouth every morning ) 60 tablet 5    levothyroxine (SYNTHROID) 125 MCG tablet Take 1 tablet by mouth daily (Patient taking differently: Take 125 mcg by mouth every morning ) 30 tablet 5    theophylline (THEODUR) 300 MG extended release tablet Take 1 tablet by mouth 2 times daily 60 tablet 5    loratadine (CLARITIN) 10 MG tablet Take 1 tablet by mouth daily (Patient taking differently: Take 10 mg by mouth every morning ) 30 tablet 5    busPIRone (BUSPAR) 7.5 MG tablet Take 1 tablet by mouth 2 times daily 60 tablet 5    albuterol (PROVENTIL) (2.5 MG/3ML) 0.083% nebulizer solution Take 3 mLs by nebulization 2 times daily as needed for Wheezing or Shortness of Breath 60 each 0    fluticasone (FLONASE) 50 MCG/ACT nasal spray instill 1 spray (NASAL) twice a day  prn  0     No current facility-administered medications for this visit.         Allergies   Allergen Reactions    Latex Itching    Iodine Anaphylaxis    Levofloxacin Anaphylaxis    Pcn [Penicillins] Anaphylaxis    Sulfa Antibiotics Anaphylaxis    Morphine Itching    Zofran Itching    Caffeine Palpitations     Doesn't sleep    Volumen [Barium Sulfate] Itching, Nausea And Vomiting and Rash       Review of Systems:  See above      Physical Exam:   Resp 16   Ht 5' 1\" (1.549 m)   Wt 226 lb (102.5 kg)   LMP  (LMP Unknown)   BMI 42.70 kg/m²        Gait is Antalgic, with use of cane. The patient can bear weight on the injured extremity. Gen/Psych:Examination reveals a pleasant individual in no acute distress. The patient is oriented to time, place and person. The patient's mood and affect are appropriate. Patient appears well nourished. Body habitus is obese      Lymph:  No lymphedema in bilateral lower extremities     Skin intact in bilateral lower extremities with no ulcerations, lesions, rash, erythema. Vascular: There are moderate varicosities in bilateral lower extremities, sensation is intact to light touch over bilateral lower extremities. left leg/knee exam:  Leg alignment:     neutral  Quadriceps/hamstring atrophy:   no  Knee effusion:    no   Knee erythema:   no  ROM:     Full, 0-120 degrees  Varus laxity at 0 and 30 deg's: no  Valguslaxity at 0 and 30 deg's: no  Recurvatum:    no  Tenderness at:   Patellofemoral, lateral joint line    Bilateral Knee strength is 5/5 flexion and extension  There is + L2-S1 motor and sensory function in bilateral lower extremities    Outside record review: historical medical records and ED notes     left knee x-rays, four views,were obtained and reviewed and show mild narrowing of the medial compartment of the left knee and mild to moderate narrowing within the patellofemoral compartment with some small osteophyte seen of the patella. No fractures or dislocations. The official read and interpretation of these x-rays will be done by the the [facility]       Impression:  left knee DJD      Plan:  Natural history and expected course discussed. Questions answered.   Injection given left knee today  Rest knee today   Tylenol as needed  Follow-up as needed    Add Tylenol (also known as acetaminophen) as needed   Take 2 extra strength (500 mg) or 3 regular strength (325 mg) up to three times a day  It is OK to take Tylenol along  with NSAID's (Advil, Aleve, Naprosyn, etc) as tolerated   If taking other medications that have acetaminophen ensure total acetaminophen dose for any 24 period is less than 3,000 mg    We greatly appreciate thereferral from DELORES Rivera CNP, and will send a copy of this note to her office. left knee injection procedure note    Pre-procedure diagnosis:  left knee DJD    Post-procedure diagnosis:  same    Procedure: The planned procedure/risks/benefits/alternatives were discussed with the patient. Risks include, but are not limited to, increased pain, drug reaction, infection, bleeding, lack of improvement, neurovascular injury, and increased blood sugar levels. The patient understood and all of their questions were answered. The knee was prepped with alcohol then anesthetized with Ethyl Chloride spray. A 22 gauge needle was advanced into the inferior-lateral joint without difficulty. The joint was then injected with 3 ml 1% lidocaine and 2 ml of kenalog (40mg/ml). The injection site was cleansed with isopropyl alcohol and a band-aid was placed. Complications:  None, the patient   the procedure well. Instructions: The patient was advised to rest the knee and decrease activity for the next 24 to 48 hours. May use prescription or OTC pain relievers as needed for any post-injection pain as well as ice.

## 2019-06-20 NOTE — PATIENT INSTRUCTIONS
Injection given left knee today  Rest knee today   Tylenol as needed  Follow-up as needed    Add Tylenol (also known as acetaminophen) as needed   Take 2 extra strength (500 mg) or 3 regular strength (325 mg) up to three times a day  It is OK to take Tylenol along  with NSAID's (Advil, Aleve, Naprosyn, etc) as tolerated   If taking other medications that have acetaminophen ensure total acetaminophen dose for any 24 period is less than 3,000 mg

## 2019-07-02 ENCOUNTER — OFFICE VISIT (OUTPATIENT)
Dept: FAMILY MEDICINE CLINIC | Age: 64
End: 2019-07-02
Payer: MEDICAID

## 2019-07-02 VITALS
WEIGHT: 222.4 LBS | SYSTOLIC BLOOD PRESSURE: 116 MMHG | TEMPERATURE: 97.7 F | BODY MASS INDEX: 42.02 KG/M2 | HEART RATE: 65 BPM | DIASTOLIC BLOOD PRESSURE: 74 MMHG | OXYGEN SATURATION: 93 %

## 2019-07-02 DIAGNOSIS — R51.9 OCCIPITAL HEADACHE: Primary | ICD-10-CM

## 2019-07-02 PROCEDURE — G8599 NO ASA/ANTIPLAT THER USE RNG: HCPCS | Performed by: NURSE PRACTITIONER

## 2019-07-02 PROCEDURE — 99213 OFFICE O/P EST LOW 20 MIN: CPT | Performed by: NURSE PRACTITIONER

## 2019-07-02 PROCEDURE — G8417 CALC BMI ABV UP PARAM F/U: HCPCS | Performed by: NURSE PRACTITIONER

## 2019-07-02 PROCEDURE — 3017F COLORECTAL CA SCREEN DOC REV: CPT | Performed by: NURSE PRACTITIONER

## 2019-07-02 PROCEDURE — 1036F TOBACCO NON-USER: CPT | Performed by: NURSE PRACTITIONER

## 2019-07-02 PROCEDURE — G8427 DOCREV CUR MEDS BY ELIG CLIN: HCPCS | Performed by: NURSE PRACTITIONER

## 2019-07-02 ASSESSMENT — ENCOUNTER SYMPTOMS
BACK PAIN: 0
SWOLLEN GLANDS: 0
PHOTOPHOBIA: 0
EYE WATERING: 0
VOMITING: 0
SINUS PRESSURE: 0
EYE PAIN: 0
SORE THROAT: 0
VISUAL CHANGE: 0
NAUSEA: 0
FACIAL SWEATING: 0
BLURRED VISION: 0
RESPIRATORY NEGATIVE: 1
DIARRHEA: 0
SCALP TENDERNESS: 0
COUGH: 0
EYE REDNESS: 0
ABDOMINAL PAIN: 0
RHINORRHEA: 0

## 2019-07-02 ASSESSMENT — PATIENT HEALTH QUESTIONNAIRE - PHQ9
SUM OF ALL RESPONSES TO PHQ9 QUESTIONS 1 & 2: 1
SUM OF ALL RESPONSES TO PHQ QUESTIONS 1-9: 1
1. LITTLE INTEREST OR PLEASURE IN DOING THINGS: 0
2. FEELING DOWN, DEPRESSED OR HOPELESS: 1
SUM OF ALL RESPONSES TO PHQ QUESTIONS 1-9: 1

## 2019-07-08 ENCOUNTER — OFFICE VISIT (OUTPATIENT)
Dept: ORTHOPEDIC SURGERY | Age: 64
End: 2019-07-08
Payer: MEDICAID

## 2019-07-08 VITALS
RESPIRATION RATE: 16 BRPM | WEIGHT: 228 LBS | HEART RATE: 66 BPM | OXYGEN SATURATION: 97 % | HEIGHT: 61 IN | BODY MASS INDEX: 43.05 KG/M2

## 2019-07-08 DIAGNOSIS — M75.111 NONTRAUMATIC INCOMPLETE TEAR OF RIGHT ROTATOR CUFF: Primary | ICD-10-CM

## 2019-07-08 PROCEDURE — G8427 DOCREV CUR MEDS BY ELIG CLIN: HCPCS | Performed by: PHYSICIAN ASSISTANT

## 2019-07-08 PROCEDURE — G8599 NO ASA/ANTIPLAT THER USE RNG: HCPCS | Performed by: PHYSICIAN ASSISTANT

## 2019-07-08 PROCEDURE — 3017F COLORECTAL CA SCREEN DOC REV: CPT | Performed by: PHYSICIAN ASSISTANT

## 2019-07-08 PROCEDURE — 1036F TOBACCO NON-USER: CPT | Performed by: PHYSICIAN ASSISTANT

## 2019-07-08 PROCEDURE — G8417 CALC BMI ABV UP PARAM F/U: HCPCS | Performed by: PHYSICIAN ASSISTANT

## 2019-07-08 PROCEDURE — 99213 OFFICE O/P EST LOW 20 MIN: CPT | Performed by: PHYSICIAN ASSISTANT

## 2019-07-08 ASSESSMENT — ENCOUNTER SYMPTOMS
RESPIRATORY NEGATIVE: 1
COUGH: 0
EYES NEGATIVE: 1
GASTROINTESTINAL NEGATIVE: 1
SHORTNESS OF BREATH: 0

## 2019-07-08 NOTE — PROGRESS NOTES
hemorrhoids    COLONOSCOPY N/A 1/28/2019    COLONOSCOPY POLYPECTOMY SNARE/COLD BIOPSY performed by Parish Kapoor MD at [facility]      due to bleeding.   complete    THYROIDECTOMY  2009    TONSILLECTOMY      TUBAL LIGATION      WRIST SURGERY Left        Family History   Problem Relation Age of Onset    Diabetes Mother     Hypertension Mother     Depression Mother         suicide    Diabetes Father     Depression Father     Diabetes Brother     Birth Defects Maternal Grandmother     Hypertension Maternal Grandmother     Diabetes Sister     Depression Brother     Diabetes Sister     Diabetes Brother     Other Brother         black mold    Diabetes Brother     Other Daughter         GI issues    Asthma Daughter     Seizures Daughter        Social History     Socioeconomic History    Marital status:      Spouse name: None    Number of children: 2    Years of education: 15    Highest education level: GED or equivalent   Occupational History    Occupation: unemployed   Social Needs    Financial resource strain: None    Food insecurity:     Worry: None     Inability: None    Transportation needs:     Medical: None     Non-medical: None   Tobacco Use    Smoking status: Never Smoker    Smokeless tobacco: Never Used   Substance and Sexual Activity    Alcohol use: No    Drug use: No    Sexual activity: Not Currently     Partners: Male     Birth control/protection: Post-menopausal   Lifestyle    Physical activity:     Days per week: None     Minutes per session: None    Stress: None   Relationships    Social connections:     Talks on phone: None     Gets together: None     Attends Sabianist service: None     Active member of club or organization: None     Attends meetings of clubs or organizations: None     Relationship status: None    Intimate partner violence:     Fear of current or ex partner: None     Emotionally abused: None     Physically abused: None Forced sexual activity: None   Other Topics Concern    None   Social History Narrative    None       Current Outpatient Medications   Medication Sig Dispense Refill    montelukast (SINGULAIR) 10 MG tablet   0    traMADol (ULTRAM) 50 MG tablet Take 50 mg by mouth every 6 hours as needed for Pain.       naproxen (NAPROSYN) 375 MG tablet Take 1 tablet by mouth 2 times daily as needed for Pain 60 tablet 1    WIXELA INHUB 250-50 MCG/DOSE AEPB inhale 1 puff by mouth every 12 hours 1 Inhaler 0    omeprazole (PRILOSEC) 20 MG delayed release capsule Take 1 capsule by mouth daily 30 capsule 3    cholestyramine (QUESTRAN) 4 g packet Take 1 packet by mouth 2 times daily 60 packet 2    albuterol sulfate HFA (VENTOLIN HFA) 108 (90 Base) MCG/ACT inhaler Inhale 2 puffs into the lungs every 6 hours as needed for Shortness of Breath 1 Inhaler 5    buPROPion (WELLBUTRIN XL) 300 MG extended release tablet Take 1 tablet by mouth daily (Patient taking differently: Take 300 mg by mouth every morning ) 30 tablet 5    furosemide (LASIX) 20 MG tablet Take 1 tablet by mouth daily (Patient taking differently: Take 20 mg by mouth every morning ) 60 tablet 5    levothyroxine (SYNTHROID) 125 MCG tablet Take 1 tablet by mouth daily (Patient taking differently: Take 125 mcg by mouth every morning ) 30 tablet 5    theophylline (THEODUR) 300 MG extended release tablet Take 1 tablet by mouth 2 times daily 60 tablet 5    loratadine (CLARITIN) 10 MG tablet Take 1 tablet by mouth daily (Patient taking differently: Take 10 mg by mouth every morning ) 30 tablet 5    busPIRone (BUSPAR) 7.5 MG tablet Take 1 tablet by mouth 2 times daily 60 tablet 5    albuterol (PROVENTIL) (2.5 MG/3ML) 0.083% nebulizer solution Take 3 mLs by nebulization 2 times daily as needed for Wheezing or Shortness of Breath 60 each 0    fluticasone (FLONASE) 50 MCG/ACT nasal spray instill 1 spray (NASAL) twice a day  prn  0     No current facility-administered changes at the glenohumeral joint. Small calcification adjacent to the greater tuberosity of the right shoulder. No fracture, no dislocations. Assessment: Right shoulder rotator cuff tear (likely incomplete tear based on patient's physical exam)    Plan:  MRI Right Shoulder  Call office when completed for follow up visit.

## 2019-07-15 DIAGNOSIS — J44.9 COPD, MODERATE (HCC): Chronic | ICD-10-CM

## 2019-07-15 DIAGNOSIS — Z79.899 MEDICATION MANAGEMENT: ICD-10-CM

## 2019-07-15 DIAGNOSIS — E03.9 ACQUIRED HYPOTHYROIDISM: Primary | ICD-10-CM

## 2019-07-15 DIAGNOSIS — Z91.09 ENVIRONMENTAL ALLERGIES: ICD-10-CM

## 2019-07-15 DIAGNOSIS — Z11.59 ENCOUNTER FOR HEPATITIS C SCREENING TEST FOR LOW RISK PATIENT: ICD-10-CM

## 2019-07-15 DIAGNOSIS — E03.9 HYPOTHYROIDISM, UNSPECIFIED TYPE: ICD-10-CM

## 2019-07-15 RX ORDER — LORATADINE 10 MG/1
10 TABLET ORAL DAILY
Qty: 30 TABLET | Refills: 5 | Status: SHIPPED | OUTPATIENT
Start: 2019-07-15 | End: 2019-11-07 | Stop reason: SDUPTHER

## 2019-07-15 RX ORDER — THEOPHYLLINE 300 MG/1
300 TABLET, EXTENDED RELEASE ORAL 2 TIMES DAILY
Qty: 60 TABLET | Refills: 0 | Status: SHIPPED | OUTPATIENT
Start: 2019-07-15 | End: 2019-09-03 | Stop reason: SDUPTHER

## 2019-07-15 RX ORDER — LEVOTHYROXINE SODIUM 0.12 MG/1
125 TABLET ORAL EVERY MORNING
Qty: 30 TABLET | Refills: 0 | Status: SHIPPED | OUTPATIENT
Start: 2019-07-15 | End: 2019-09-03 | Stop reason: SDUPTHER

## 2019-07-18 ENCOUNTER — HOSPITAL ENCOUNTER (OUTPATIENT)
Dept: MRI IMAGING | Age: 64
Discharge: HOME | End: 2019-07-18
Payer: MEDICAID

## 2019-07-18 ENCOUNTER — HOSPITAL ENCOUNTER (OUTPATIENT)
Age: 64
Discharge: HOME | End: 2019-07-18
Payer: MEDICAID

## 2019-07-18 DIAGNOSIS — M75.111 NONTRAUMATIC INCOMPLETE TEAR OF RIGHT ROTATOR CUFF: ICD-10-CM

## 2019-07-18 DIAGNOSIS — Z11.59 ENCOUNTER FOR HEPATITIS C SCREENING TEST FOR LOW RISK PATIENT: ICD-10-CM

## 2019-07-18 DIAGNOSIS — E03.9 ACQUIRED HYPOTHYROIDISM: ICD-10-CM

## 2019-07-18 DIAGNOSIS — Z79.899 MEDICATION MANAGEMENT: ICD-10-CM

## 2019-07-18 LAB
DOSE AMOUNT: ABNORMAL
DOSE TIME: ABNORMAL
HEPATITIS C ANTIBODY: NON REACTIVE
T4 FREE: 1.05 NG/DL (ref 0.9–1.8)
THEOPHYLLINE LEVEL: 7.7 UG/ML (ref 10–20)
TSH HIGH SENSITIVITY: 4.45 UIU/ML (ref 0.27–4.2)

## 2019-07-18 PROCEDURE — 80198 ASSAY OF THEOPHYLLINE: CPT

## 2019-07-18 PROCEDURE — 73221 MRI JOINT UPR EXTREM W/O DYE: CPT

## 2019-07-18 PROCEDURE — 84439 ASSAY OF FREE THYROXINE: CPT

## 2019-07-18 PROCEDURE — 84443 ASSAY THYROID STIM HORMONE: CPT

## 2019-07-18 PROCEDURE — 86803 HEPATITIS C AB TEST: CPT

## 2019-07-18 PROCEDURE — 36415 COLL VENOUS BLD VENIPUNCTURE: CPT

## 2019-07-30 ENCOUNTER — APPOINTMENT (OUTPATIENT)
Dept: GENERAL RADIOLOGY | Age: 64
End: 2019-07-30
Payer: MEDICAID

## 2019-07-30 ENCOUNTER — HOSPITAL ENCOUNTER (EMERGENCY)
Age: 64
Discharge: ANOTHER HOSPITAL | End: 2019-07-30
Attending: EMERGENCY MEDICINE
Payer: MEDICAID

## 2019-07-30 VITALS
SYSTOLIC BLOOD PRESSURE: 118 MMHG | DIASTOLIC BLOOD PRESSURE: 70 MMHG | RESPIRATION RATE: 19 BRPM | OXYGEN SATURATION: 95 % | HEIGHT: 61 IN | HEART RATE: 67 BPM | TEMPERATURE: 98.4 F | BODY MASS INDEX: 43.43 KG/M2 | WEIGHT: 230 LBS

## 2019-07-30 DIAGNOSIS — R07.9 CHEST PAIN, UNSPECIFIED TYPE: ICD-10-CM

## 2019-07-30 DIAGNOSIS — R05.9 COUGH: Primary | ICD-10-CM

## 2019-07-30 PROCEDURE — 93005 ELECTROCARDIOGRAM TRACING: CPT | Performed by: EMERGENCY MEDICINE

## 2019-07-30 PROCEDURE — 99285 EMERGENCY DEPT VISIT HI MDM: CPT

## 2019-07-30 PROCEDURE — 71045 X-RAY EXAM CHEST 1 VIEW: CPT

## 2019-07-30 PROCEDURE — 6370000000 HC RX 637 (ALT 250 FOR IP): Performed by: EMERGENCY MEDICINE

## 2019-07-30 RX ORDER — PREDNISONE 50 MG/1
50 TABLET ORAL DAILY
Qty: 5 TABLET | Refills: 0 | Status: SHIPPED | OUTPATIENT
Start: 2019-07-31 | End: 2019-08-05

## 2019-07-30 RX ADMIN — PREDNISONE 50 MG: 10 TABLET ORAL at 19:32

## 2019-07-30 ASSESSMENT — PAIN SCALES - GENERAL: PAINLEVEL_OUTOF10: 8

## 2019-07-30 ASSESSMENT — PAIN DESCRIPTION - DESCRIPTORS: DESCRIPTORS: PRESSURE

## 2019-07-30 ASSESSMENT — HEART SCORE: ECG: 0

## 2019-07-30 ASSESSMENT — PAIN DESCRIPTION - LOCATION: LOCATION: CHEST

## 2019-07-30 ASSESSMENT — PAIN DESCRIPTION - PAIN TYPE: TYPE: ACUTE PAIN

## 2019-07-30 NOTE — ED PROVIDER NOTES
Denies rash  Neurologic:  Denies lightheadedness, dizziness, headache, focal weakness or sensory changes   Endocrine:  Denies polyuria or polydypsia   Lymphatic:  Denies swollen glands     All other review of systems are negative  See HPI and nursing notes for additional information         PAST MEDICAL & SURGICAL HISTORY    Past Medical History:   Diagnosis Date    Anxiety 1/28/2019    Arthritis     Asthma     CAD (coronary artery disease)     COPD [facility]chronic obstructive [facility] disease) ([facility]     Hepatic steatosis 6/19/2015    Hyperlipidemia     Hypertension     Hypothyroid      Past Surgical History:   Procedure Laterality Date    CARPAL TUNNEL RELEASE Left     CHOLECYSTECTOMY      COLONOSCOPY  01/28/2019    Polyps x4, Internal grade 1 hemorrhoids    COLONOSCOPY N/A 1/28/2019    COLONOSCOPY POLYPECTOMY SNARE/COLD BIOPSY performed by Noah Gan MD at [address] [place] [address]      due to bleeding. complete    THYROIDECTOMY  2009    TONSILLECTOMY      TUBAL LIGATION      WRIST SURGERY Left        CURRENT MEDICATIONS    Current Outpatient Rx   Medication Sig Dispense Refill    [START ON 7/31/2019] predniSONE (DELTASONE) 50 MG tablet Take 1 tablet by mouth daily for 5 days 5 tablet 0    theophylline (THEODUR) 300 MG extended release tablet Take 1 tablet by mouth 2 times daily 60 tablet 0    levothyroxine (SYNTHROID) 125 MCG tablet Take 1 tablet by mouth every morning 30 tablet 0    loratadine (CLARITIN) 10 MG tablet Take 1 tablet by mouth daily 30 tablet 5    montelukast (SINGULAIR) 10 MG tablet   0    traMADol (ULTRAM) 50 MG tablet Take 50 mg by mouth every 6 hours as needed for Pain.       naproxen (NAPROSYN) 375 MG tablet Take 1 tablet by mouth 2 times daily as needed for Pain 60 tablet 1    WIXELA INHUB 250-50 MCG/DOSE AEPB inhale 1 puff by mouth every 12 hours 1 Inhaler 0    omeprazole (PRILOSEC) 20 MG delayed release capsule Take 1 capsule by mouth daily 30 capsule 3    and radiographic results reviewed with patient at bedside. The patient and/or the family were informed of the results of any tests/labs/imaging, the treatment plan, and time was allotted to answer questions. HEART score is at least 5- therefore patient and I discussed patient's risk of major adverse cardiac event within the next 6 wks is at least approximately 12% to 20% (if not higher as HEART score cannot be fully applied without troponin) and patient understands that myself and supervising physician both recommend further work-up today with labs as well as admission for serial troponins, serial EKGs, and evaluation by cardiology with concern for ACS. The patient has decided to leave against medical advice because she does not want to workup her chest pain further. She prefers to see a cardiologist in the outpatient setting. She has normal mental status and adequate capacity to make medical decisions. The patient refuses hospital admission and wants to be discharged. The risks have been explained to the patient, including worsening illness, chronic pain, permanent disability, and death. The benefits of admission have also been explained, including the availability and proximity of nurses, physicians, monitoring, diagnostic testing, and treatment. The patient was able to understand and state the risks and benefits of hospital admission. This was witnessed by nurse and me. She had the opportunity to ask questions about her medical condition. The patient was treated to the extent that she would allow, and knows that she may return for care at any time. Patient understands and agrees to follow up with cardiologist for recheck as soon as possible. Patient understands and agrees to return to ED for new or worsening symptoms- strict return precautions given. Patient treated with prednisone in the ED and provided prescription for prednisone for home-going for cough. Recommended OTC cough medication.   Patient

## 2019-07-31 NOTE — ED PROVIDER NOTES
I independently examined and evaluated Shila Paulino. ED Course as of Jul 31 0321   Tue Jul 30, 2019   1818 PMH of COPD, HTN, HLD, obesity, CAD with last heart cath 2014 showing 40% stenosis. [NT]   7780 EKG shows NSR, HR 66, QRSd 88, Qtc 459, LAD, Q waves in lead III and aVF that is similar to EKG done 7/25/19    [NT]   1922 She was seen and examined. She has a cough that is noted for the past few days. She is more concerned about the cough and reports that she thinks her pressure sensation in her chest is related to bronchitis. Her daughters at bedside at this time as well. Patient is alert and oriented x3. No slurred speech. She reports that she would like to follow-up with her cardiologist instead of being admitted for work-up for heart this time. She appears future oriented. I discussed at length regarding my recommendation to obtain labs and admit the patient as she has a large amount of risk factors and have a concerning story for cardiac related etiology. She continues to endorse that she would like to be discharged. She requests prednisone for her cough and concerned she has a history of COPD this may benefit her. Discussed at length regarding return precautions and that she is welcome back in the ED at any time. She will be signed out AMA. [NT]      ED Course User Index  [NT] Buddy Guillen MD         All diagnostic, treatment, and disposition decisions were made by myself in conjunction with the advanced practice provider. For all further details of the patient's emergency department visit, please see the advanced practice provider's documentation. Comment: Please note this report has been produced using speech recognition software and may contain errors related to that system including errors in grammar, punctuation, and spelling, as well as words and phrases that may be inappropriate.  If there are any questions or concerns please feel free to contact the dictating provider

## 2019-08-01 ENCOUNTER — INITIAL CONSULT (OUTPATIENT)
Dept: CARDIOLOGY CLINIC | Age: 64
End: 2019-08-01
Payer: MEDICAID

## 2019-08-01 ENCOUNTER — NURSE ONLY (OUTPATIENT)
Dept: CARDIOLOGY CLINIC | Age: 64
End: 2019-08-01
Payer: MEDICAID

## 2019-08-01 VITALS
DIASTOLIC BLOOD PRESSURE: 78 MMHG | SYSTOLIC BLOOD PRESSURE: 122 MMHG | BODY MASS INDEX: 41.59 KG/M2 | HEIGHT: 62 IN | HEART RATE: 76 BPM | WEIGHT: 226 LBS

## 2019-08-01 DIAGNOSIS — M79.89 LEG SWELLING: ICD-10-CM

## 2019-08-01 DIAGNOSIS — R06.02 SHORTNESS OF BREATH: ICD-10-CM

## 2019-08-01 DIAGNOSIS — R42 DIZZINESS: ICD-10-CM

## 2019-08-01 DIAGNOSIS — R07.9 CHEST PAIN, UNSPECIFIED TYPE: Primary | ICD-10-CM

## 2019-08-01 DIAGNOSIS — R00.0 TACHYCARDIA: Primary | ICD-10-CM

## 2019-08-01 PROCEDURE — G8427 DOCREV CUR MEDS BY ELIG CLIN: HCPCS | Performed by: INTERNAL MEDICINE

## 2019-08-01 PROCEDURE — 99244 OFF/OP CNSLTJ NEW/EST MOD 40: CPT | Performed by: INTERNAL MEDICINE

## 2019-08-01 PROCEDURE — 93225 XTRNL ECG REC<48 HRS REC: CPT | Performed by: INTERNAL MEDICINE

## 2019-08-01 PROCEDURE — G8417 CALC BMI ABV UP PARAM F/U: HCPCS | Performed by: INTERNAL MEDICINE

## 2019-08-01 NOTE — PROGRESS NOTES
CARDIOLOGY CONSULT NOTE   Reason for consultation:  Chest tightness and shortness of breath    Referring physician: Gladys Henson    Primary care physician: Rosa Garcia, APRN - CNP      Dear Ronald Seymour  Thanks for the consult. History of present illness:Slime is a 59 y. o.year old who  presents with Chest pain is at left side, radiated to shoulder, 6/10, pressure like, associated with shortness of breath, sweating, nausea, relieved with rest and NTG, aggravated with exertion, she also has for shortness of breath which is mild, for many years, intermittent, self limiting, not associated with cough or fever, gets worse with activity and better with rest,  She also has Patient feels dizziness which is getting worse, its intermittent,lasts for few seconds, for last 2-3 weeks, every other day, its associated with palpitations,it gets better with sitting with sudden movement,pateint is not sure if its from head movement or arm movement  She has passed out once. Blood pressure, cholesterol, blood glucose and weight are well controlled. Past medical history:    has a past medical history of Anxiety, Arthritis, Asthma, CAD (coronary artery disease), COPD (chronic obstructive pulmonary disease) ([facility] [address] Hepatic steatosis, Hyperlipidemia, Hypertension, and Hypothyroid. Past surgical history:   has a past surgical history that includes Hysterectomy; Carpal tunnel release (Left); Tonsillectomy; Wrist surgery (Left); Thyroidectomy (2009); Cholecystectomy; Tubal ligation; Colonoscopy (01/28/2019); and Colonoscopy (N/A, 1/28/2019). Social History:   reports that she has never smoked. She has never used smokeless tobacco. She reports that she does not drink alcohol or use drugs.   Family history:   no family history of CAD, STROKE of DM    Allergies   Allergen Reactions    Latex Itching    Iodine Anaphylaxis    Levofloxacin Anaphylaxis    Pcn [Penicillins] Anaphylaxis    Sulfa Antibiotics Anaphylaxis    Morphine Itching   

## 2019-08-02 ENCOUNTER — OFFICE VISIT (OUTPATIENT)
Dept: ORTHOPEDIC SURGERY | Age: 64
End: 2019-08-02
Payer: MEDICAID

## 2019-08-02 VITALS
RESPIRATION RATE: 14 BRPM | HEIGHT: 61 IN | HEART RATE: 67 BPM | OXYGEN SATURATION: 95 % | WEIGHT: 225.6 LBS | BODY MASS INDEX: 42.59 KG/M2

## 2019-08-02 DIAGNOSIS — M75.111 NONTRAUMATIC INCOMPLETE TEAR OF RIGHT ROTATOR CUFF: Primary | ICD-10-CM

## 2019-08-02 PROCEDURE — 20610 DRAIN/INJ JOINT/BURSA W/O US: CPT | Performed by: PHYSICIAN ASSISTANT

## 2019-08-02 PROCEDURE — 99213 OFFICE O/P EST LOW 20 MIN: CPT | Performed by: PHYSICIAN ASSISTANT

## 2019-08-02 PROCEDURE — G8417 CALC BMI ABV UP PARAM F/U: HCPCS | Performed by: PHYSICIAN ASSISTANT

## 2019-08-02 PROCEDURE — G8599 NO ASA/ANTIPLAT THER USE RNG: HCPCS | Performed by: PHYSICIAN ASSISTANT

## 2019-08-02 PROCEDURE — 3017F COLORECTAL CA SCREEN DOC REV: CPT | Performed by: PHYSICIAN ASSISTANT

## 2019-08-02 PROCEDURE — 1036F TOBACCO NON-USER: CPT | Performed by: PHYSICIAN ASSISTANT

## 2019-08-02 PROCEDURE — G8427 DOCREV CUR MEDS BY ELIG CLIN: HCPCS | Performed by: PHYSICIAN ASSISTANT

## 2019-08-02 ASSESSMENT — ENCOUNTER SYMPTOMS
GASTROINTESTINAL NEGATIVE: 1
SHORTNESS OF BREATH: 0
COUGH: 0
RESPIRATORY NEGATIVE: 1
EYES NEGATIVE: 1

## 2019-08-06 LAB
EKG ATRIAL RATE: 66 BPM
EKG DIAGNOSIS: NORMAL
EKG P AXIS: 39 DEGREES
EKG P-R INTERVAL: 188 MS
EKG Q-T INTERVAL: 438 MS
EKG QRS DURATION: 88 MS
EKG QTC CALCULATION (BAZETT): 459 MS
EKG R AXIS: -23 DEGREES
EKG T AXIS: 13 DEGREES
EKG VENTRICULAR RATE: 66 BPM

## 2019-08-07 ENCOUNTER — PROCEDURE VISIT (OUTPATIENT)
Dept: CARDIOLOGY CLINIC | Age: 64
End: 2019-08-07
Payer: MEDICAID

## 2019-08-07 DIAGNOSIS — R06.02 SHORTNESS OF BREATH: Primary | ICD-10-CM

## 2019-08-07 DIAGNOSIS — R42 DIZZINESS: ICD-10-CM

## 2019-08-07 DIAGNOSIS — M79.89 LEG SWELLING: Primary | ICD-10-CM

## 2019-08-07 DIAGNOSIS — R07.9 CHEST PAIN, UNSPECIFIED TYPE: ICD-10-CM

## 2019-08-07 DIAGNOSIS — M79.89 LEG SWELLING: ICD-10-CM

## 2019-08-07 DIAGNOSIS — R42 DIZZINESS: Primary | ICD-10-CM

## 2019-08-07 LAB
LV EF: 58 %
LVEF MODALITY: NORMAL

## 2019-08-07 PROCEDURE — 93306 TTE W/DOPPLER COMPLETE: CPT | Performed by: INTERNAL MEDICINE

## 2019-08-07 PROCEDURE — 93880 EXTRACRANIAL BILAT STUDY: CPT | Performed by: INTERNAL MEDICINE

## 2019-08-07 PROCEDURE — 93970 EXTREMITY STUDY: CPT | Performed by: INTERNAL MEDICINE

## 2019-08-09 PROCEDURE — 93227 XTRNL ECG REC<48 HR R&I: CPT | Performed by: INTERNAL MEDICINE

## 2019-08-12 ENCOUNTER — TELEPHONE (OUTPATIENT)
Dept: CARDIOLOGY CLINIC | Age: 64
End: 2019-08-12

## 2019-08-14 ENCOUNTER — TELEPHONE (OUTPATIENT)
Dept: CARDIOLOGY CLINIC | Age: 64
End: 2019-08-14

## 2019-08-18 RX ORDER — MONTELUKAST SODIUM 10 MG/1
10 TABLET ORAL NIGHTLY
Qty: 30 TABLET | Refills: 1 | Status: SHIPPED | OUTPATIENT
Start: 2019-08-18 | End: 2019-10-07 | Stop reason: SDUPTHER

## 2019-08-21 DIAGNOSIS — F33.0 MAJOR DEPRESSIVE DISORDER, RECURRENT EPISODE, MILD (HCC): ICD-10-CM

## 2019-08-21 RX ORDER — BUSPIRONE HYDROCHLORIDE 7.5 MG/1
7.5 TABLET ORAL 2 TIMES DAILY
Qty: 60 TABLET | Refills: 0 | Status: SHIPPED | OUTPATIENT
Start: 2019-08-21 | End: 2019-10-07 | Stop reason: SDUPTHER

## 2019-08-28 ENCOUNTER — OFFICE VISIT (OUTPATIENT)
Dept: GASTROENTEROLOGY | Age: 64
End: 2019-08-28
Payer: MEDICAID

## 2019-08-28 VITALS
OXYGEN SATURATION: 96 % | WEIGHT: 224 LBS | DIASTOLIC BLOOD PRESSURE: 82 MMHG | BODY MASS INDEX: 42.29 KG/M2 | HEIGHT: 61 IN | HEART RATE: 70 BPM | SYSTOLIC BLOOD PRESSURE: 130 MMHG

## 2019-08-28 DIAGNOSIS — R14.0 ABDOMINAL BLOATING: ICD-10-CM

## 2019-08-28 DIAGNOSIS — K76.0 FATTY LIVER: ICD-10-CM

## 2019-08-28 DIAGNOSIS — Z86.010 HISTORY OF COLON POLYPS: ICD-10-CM

## 2019-08-28 DIAGNOSIS — K21.9 GASTROESOPHAGEAL REFLUX DISEASE WITHOUT ESOPHAGITIS: Primary | ICD-10-CM

## 2019-08-28 DIAGNOSIS — K59.1 FUNCTIONAL DIARRHEA: ICD-10-CM

## 2019-08-28 PROCEDURE — G8417 CALC BMI ABV UP PARAM F/U: HCPCS | Performed by: NURSE PRACTITIONER

## 2019-08-28 PROCEDURE — 3017F COLORECTAL CA SCREEN DOC REV: CPT | Performed by: NURSE PRACTITIONER

## 2019-08-28 PROCEDURE — 99215 OFFICE O/P EST HI 40 MIN: CPT | Performed by: NURSE PRACTITIONER

## 2019-08-28 PROCEDURE — 1036F TOBACCO NON-USER: CPT | Performed by: NURSE PRACTITIONER

## 2019-08-28 PROCEDURE — G8427 DOCREV CUR MEDS BY ELIG CLIN: HCPCS | Performed by: NURSE PRACTITIONER

## 2019-08-28 PROCEDURE — G8599 NO ASA/ANTIPLAT THER USE RNG: HCPCS | Performed by: NURSE PRACTITIONER

## 2019-08-28 RX ORDER — OMEPRAZOLE 20 MG/1
20 CAPSULE, DELAYED RELEASE ORAL DAILY
Qty: 30 CAPSULE | Refills: 3 | Status: SHIPPED | OUTPATIENT
Start: 2019-08-28 | End: 2020-01-09 | Stop reason: SDUPTHER

## 2019-08-28 RX ORDER — ERGOCALCIFEROL 1.25 MG/1
50000 CAPSULE ORAL WEEKLY
Qty: 12 CAPSULE | Refills: 1 | Status: SHIPPED | OUTPATIENT
Start: 2019-08-28 | End: 2019-11-07 | Stop reason: SDUPTHER

## 2019-08-28 ASSESSMENT — ENCOUNTER SYMPTOMS
CONSTIPATION: 0
VOMITING: 0
NAUSEA: 0
BLOOD IN STOOL: 0
EYE PAIN: 0
ABDOMINAL PAIN: 0

## 2019-08-28 NOTE — PROGRESS NOTES
R14.0    4. Fatty liver K76.0    5. History of colon polyps Z86.010              Plan  1. Will plan for a EGD with MAC anesthesia. The patient was informed of the risks and benefits of the procedure. 2.  GERD for over three years without dysphagia or odynophagia. Recommend upper endoscopy to rule out Valladares esophagus because patient has central obesity. The patient was encouraged to continue taking Prilosec daily for treatment of acid reflux. The patient was provided with information on acid reflux diet and triggers. 3.  Irregular bowel movement after eating might be due to post cholecystectomy, IBS, or diet related. Her stool studies were negative for infection or inflammation. Recommend probiotics and fiber supplements. The patient had her dietician appointment and is working on improving her diet. Her bowel movements are fluctuating between loose an formed stools. 4.  Mild abdominal bloating that is non-worsening might be due to IBS or small bowel bacterial overgrowth. She has declined the hydrogen breath test.  5.  Fatty liver most likely due to obesity from excess calories. The patient was provided with information on fatty liver and instructed to lose weight at least 10% of her body weight. 6.  History of adenomatous colon polyps removed from the colon. Recommend repeat colonoscopy in 3 years. 7.  Vitamin D deficiency noted on recent lab work. Will order vitamin D (Ergocalciferol) 64487 U take once weekly.   Recommend further follow-up with her primary care provider to discuss the vitamin D deficiency.   8.   Further recommendations for follow-up will be determined after the EGD has been completed.

## 2019-08-28 NOTE — LETTER
Jell-O, and coffee or tea without milk or cream added. Morning of the procedure    ? You can drink clear liquids up to 4 hours prior to the procedure. You can take a small sip of water to take blood pressure, heart, seizure, or pain medicine within 2 hours of your test. Your test will be rescheduled if you drink anything other than a sip of water with medications. ? Please leave all valuables and jewelry including piercings at home. ? Female patients should come prepared to give a urine sample in case a pregnancy test is needed. ? Bring these items with you to the endoscopy facility:  o A list of all medications, including prescription and over the counter medicines and any vitamins or herbal products or supplements you are currently taking  o A list of all allergies  o List of medical conditions and previous surgeries  o A copy of advanced directive, such as living will or health care power of   o Your photo identification, insurance card, and co-payment if needed      Plan to arrive at the arrival time on your letter, so the staff can get you ready. Expect to be at the endoscopy facility for 2 to 4 hours for the procedure and recovery time. Having the procedure    ? After you have registered, you will change into a hospital gown  ? A nurse will review your medicine list and medical history  ? An intravenous (IV) line will be placed to give you medicine during the procedure. Let the nurse know right away if you have had problems having a IV placed in the past  ? When the nurse has you ready, you will be taken to a private room where your procedure will be completed  ? You will be given a consent form to read over. The doctor will talk to you and go over the consent form. Ask any questions that you have about the procedure before you sign the form  ? You will be given medicines in your IV to help you relax.  You will likely rest on your left side

## 2019-08-29 ENCOUNTER — OFFICE VISIT (OUTPATIENT)
Dept: CARDIOLOGY CLINIC | Age: 64
End: 2019-08-29
Payer: MEDICAID

## 2019-08-29 ENCOUNTER — PREP FOR PROCEDURE (OUTPATIENT)
Dept: GASTROENTEROLOGY | Age: 64
End: 2019-08-29

## 2019-08-29 VITALS
SYSTOLIC BLOOD PRESSURE: 132 MMHG | HEIGHT: 62 IN | WEIGHT: 225.2 LBS | BODY MASS INDEX: 41.44 KG/M2 | DIASTOLIC BLOOD PRESSURE: 80 MMHG | HEART RATE: 70 BPM

## 2019-08-29 DIAGNOSIS — R00.2 PALPITATIONS: Primary | ICD-10-CM

## 2019-08-29 PROCEDURE — 1036F TOBACCO NON-USER: CPT | Performed by: INTERNAL MEDICINE

## 2019-08-29 PROCEDURE — G8417 CALC BMI ABV UP PARAM F/U: HCPCS | Performed by: INTERNAL MEDICINE

## 2019-08-29 PROCEDURE — G8427 DOCREV CUR MEDS BY ELIG CLIN: HCPCS | Performed by: INTERNAL MEDICINE

## 2019-08-29 PROCEDURE — 3017F COLORECTAL CA SCREEN DOC REV: CPT | Performed by: INTERNAL MEDICINE

## 2019-08-29 PROCEDURE — G8599 NO ASA/ANTIPLAT THER USE RNG: HCPCS | Performed by: INTERNAL MEDICINE

## 2019-08-29 PROCEDURE — 99214 OFFICE O/P EST MOD 30 MIN: CPT | Performed by: INTERNAL MEDICINE

## 2019-08-29 RX ORDER — SODIUM CHLORIDE, SODIUM LACTATE, POTASSIUM CHLORIDE, CALCIUM CHLORIDE 600; 310; 30; 20 MG/100ML; MG/100ML; MG/100ML; MG/100ML
INJECTION, SOLUTION INTRAVENOUS CONTINUOUS
Status: CANCELLED | OUTPATIENT
Start: 2019-09-04

## 2019-08-29 ASSESSMENT — ENCOUNTER SYMPTOMS
BACK PAIN: 1
COUGH: 1
SHORTNESS OF BREATH: 1
COLOR CHANGE: 0
WHEEZING: 1
DIARRHEA: 1

## 2019-08-29 NOTE — PROGRESS NOTES
Inhale 2 puffs into the lungs every 6 hours as needed for Shortness of Breath 1 Inhaler 5    buPROPion (WELLBUTRIN XL) 300 MG extended release tablet Take 1 tablet by mouth daily (Patient taking differently: Take 300 mg by mouth every morning ) 30 tablet 5    furosemide (LASIX) 20 MG tablet Take 1 tablet by mouth daily (Patient taking differently: Take 20 mg by mouth every morning ) 60 tablet 5    albuterol (PROVENTIL) (2.5 MG/3ML) 0.083% nebulizer solution Take 3 mLs by nebulization 2 times daily as needed for Wheezing or Shortness of Breath 60 each 0    fluticasone (FLONASE) 50 MCG/ACT nasal spray instill 1 spray (NASAL) twice a day  prn  0     No current facility-administered medications for this visit. Allergies: Latex; Iodine; Levofloxacin; Pcn [penicillins]; Sulfa antibiotics; Morphine; Zofran; Caffeine; and Volumen [barium sulfate]  Past Medical History:   Diagnosis Date    Anxiety 1/28/2019    Arthritis     Asthma     CAD (coronary artery disease)     COPD (chronic obstructive pulmonary disease) ([facility] [address]     H/O cardiovascular stress test     H/O Doppler carotid ultrasound 08/07/2019    Mild (0-49%) disease of the Bilateral Internal carotid artery.  H/O Doppler lower venous ultrasound 08/07/2019    Significant reflux noted of the Right GSV. Right GSV too small currently for ablation procedure. Significant reflux noted in the Left GSV at mid thigh . Left GSV difficult to follow between mid thigh and mid calf due to multiple  splits/branches.  GSV knee tributary is very superficial.    H/O echocardiogram 08/07/2019    EF 55-60%, Mild MR & TR.    Hepatic steatosis 6/19/2015    Hyperlipidemia     Hypertension     Hypothyroid      Past Surgical History:   Procedure Laterality Date    CARPAL TUNNEL RELEASE Left     CHOLECYSTECTOMY      COLONOSCOPY  01/28/2019    Polyps x4, Internal grade 1 hemorrhoids    COLONOSCOPY N/A 1/28/2019    COLONOSCOPY POLYPECTOMY SNARE/COLD BIOPSY performed by

## 2019-09-03 ENCOUNTER — TELEPHONE (OUTPATIENT)
Dept: GASTROENTEROLOGY | Age: 64
End: 2019-09-03

## 2019-09-03 ENCOUNTER — ANESTHESIA EVENT (OUTPATIENT)
Dept: OPERATING ROOM | Age: 64
End: 2019-09-03
Payer: MEDICAID

## 2019-09-03 DIAGNOSIS — J44.9 COPD, MODERATE (HCC): Chronic | ICD-10-CM

## 2019-09-03 DIAGNOSIS — E03.9 HYPOTHYROIDISM, UNSPECIFIED TYPE: ICD-10-CM

## 2019-09-03 ASSESSMENT — COPD QUESTIONNAIRES: CAT_SEVERITY: MODERATE

## 2019-09-03 NOTE — ANESTHESIA PRE PROCEDURE
Shortness of Breath 1 Inhaler 5    buPROPion (WELLBUTRIN XL) 300 MG extended release tablet Take 1 tablet by mouth daily (Patient taking differently: Take 300 mg by mouth every morning ) 30 tablet 5    furosemide (LASIX) 20 MG tablet Take 1 tablet by mouth daily (Patient taking differently: Take 20 mg by mouth every morning ) 60 tablet 5    albuterol (PROVENTIL) (2.5 MG/3ML) 0.083% nebulizer solution Take 3 mLs by nebulization 2 times daily as needed for Wheezing or Shortness of Breath 60 each 0    fluticasone (FLONASE) 50 MCG/ACT nasal spray instill 1 spray (NASAL) twice a day  prn  0       Allergies:     Allergies   Allergen Reactions    Latex Itching    Iodine Anaphylaxis    Levofloxacin Anaphylaxis    Pcn [Penicillins] Anaphylaxis    Sulfa Antibiotics Anaphylaxis    Morphine Itching    Zofran Itching    Caffeine Palpitations     Doesn't sleep    Volumen [Barium Sulfate] Itching, Nausea And Vomiting and Rash       Problem List:    Patient Active Problem List   Diagnosis Code    Angina pectoris ([facility]) I20.9    LVH (left ventricular hypertrophy) due to hypertensive disease I11.9    COPD, moderate ([facility]) J44.9    TR (tricuspid regurgitation) I07.1    Venous (peripheral) insufficiency I87.2    Mitral insufficiency I34.0    Asthma, persistent TIM4651    Hypothyroid E03.9    Hepatic steatosis K76.0    Obesity, Class III, BMI 40-49.9 (morbid obesity) ([facility]) E66.01    Upper back pain on right side M54.9    Impaired glucose tolerance R73.02    Snores R06.83    Diarrhea of presumed infectious origin R19.7    Anxiety F41.9    Panic attacks F41.0    History of claustrophobia Z86.59    History of colon polyps Z86.010       Past Medical History:        Diagnosis Date    Anxiety 1/28/2019    Arthritis     Asthma     CAD (coronary artery disease)     [facility]     H/O cardiovascular stress test     H/O Doppler carotid ultrasound 08/07/2019    Mild (0-49%) tricuspid   regurgitation.   No evidence of pericardial effusion. Neuro/Psych:   (+) depression/anxiety             GI/Hepatic/Renal:   (+) GERD: poorly controlled, liver disease:, morbid obesity          Endo/Other:    (+) hypothyroidism: arthritis: OA., .                 Abdominal:           Vascular: negative vascular ROS. Anesthesia Plan      MAC     ASA 3       Induction: intravenous. Anesthetic plan and risks discussed with patient. Plan discussed with CRNA and attending.                 DELORES Linda - REMBERTO   9/3/2019

## 2019-09-04 ENCOUNTER — ANESTHESIA (OUTPATIENT)
Dept: OPERATING ROOM | Age: 64
End: 2019-09-04
Payer: MEDICAID

## 2019-09-04 ENCOUNTER — HOSPITAL ENCOUNTER (OUTPATIENT)
Age: 64
Setting detail: OUTPATIENT SURGERY
Discharge: HOME | End: 2019-09-04
Attending: SURGERY | Admitting: SURGERY
Payer: MEDICAID

## 2019-09-04 VITALS
HEART RATE: 53 BPM | HEIGHT: 62 IN | OXYGEN SATURATION: 95 % | TEMPERATURE: 97 F | SYSTOLIC BLOOD PRESSURE: 123 MMHG | BODY MASS INDEX: 41.41 KG/M2 | DIASTOLIC BLOOD PRESSURE: 81 MMHG | WEIGHT: 225 LBS | RESPIRATION RATE: 16 BRPM

## 2019-09-04 VITALS — OXYGEN SATURATION: 95 % | DIASTOLIC BLOOD PRESSURE: 98 MMHG | SYSTOLIC BLOOD PRESSURE: 119 MMHG

## 2019-09-04 PROCEDURE — 2500000003 HC RX 250 WO HCPCS: Performed by: NURSE ANESTHETIST, CERTIFIED REGISTERED

## 2019-09-04 PROCEDURE — 3700000000 HC ANESTHESIA ATTENDED CARE: Performed by: SURGERY

## 2019-09-04 PROCEDURE — 7100000010 HC PHASE II RECOVERY - FIRST 15 MIN: Performed by: SURGERY

## 2019-09-04 PROCEDURE — 88305 TISSUE EXAM BY PATHOLOGIST: CPT

## 2019-09-04 PROCEDURE — 3700000001 HC ADD 15 MINUTES (ANESTHESIA): Performed by: SURGERY

## 2019-09-04 PROCEDURE — 88342 IMHCHEM/IMCYTCHM 1ST ANTB: CPT

## 2019-09-04 PROCEDURE — 3609012400 HC EGD TRANSORAL BIOPSY SINGLE/MULTIPLE: Performed by: SURGERY

## 2019-09-04 PROCEDURE — 2709999900 HC NON-CHARGEABLE SUPPLY: Performed by: SURGERY

## 2019-09-04 PROCEDURE — 2580000003 HC RX 258: Performed by: NURSE PRACTITIONER

## 2019-09-04 PROCEDURE — 6360000002 HC RX W HCPCS: Performed by: NURSE ANESTHETIST, CERTIFIED REGISTERED

## 2019-09-04 PROCEDURE — 43239 EGD BIOPSY SINGLE/MULTIPLE: CPT | Performed by: SURGERY

## 2019-09-04 PROCEDURE — 7100000011 HC PHASE II RECOVERY - ADDTL 15 MIN: Performed by: SURGERY

## 2019-09-04 RX ORDER — MIDAZOLAM HYDROCHLORIDE 1 MG/ML
INJECTION INTRAMUSCULAR; INTRAVENOUS PRN
Status: DISCONTINUED | OUTPATIENT
Start: 2019-09-04 | End: 2019-09-04 | Stop reason: SDUPTHER

## 2019-09-04 RX ORDER — LIDOCAINE HYDROCHLORIDE 20 MG/ML
INJECTION, SOLUTION EPIDURAL; INFILTRATION; INTRACAUDAL; PERINEURAL PRN
Status: DISCONTINUED | OUTPATIENT
Start: 2019-09-04 | End: 2019-09-04 | Stop reason: SDUPTHER

## 2019-09-04 RX ORDER — PROPOFOL 10 MG/ML
INJECTION, EMULSION INTRAVENOUS PRN
Status: DISCONTINUED | OUTPATIENT
Start: 2019-09-04 | End: 2019-09-04 | Stop reason: SDUPTHER

## 2019-09-04 RX ORDER — SODIUM CHLORIDE, SODIUM LACTATE, POTASSIUM CHLORIDE, CALCIUM CHLORIDE 600; 310; 30; 20 MG/100ML; MG/100ML; MG/100ML; MG/100ML
INJECTION, SOLUTION INTRAVENOUS CONTINUOUS
Status: DISCONTINUED | OUTPATIENT
Start: 2019-09-04 | End: 2019-09-04 | Stop reason: HOSPADM

## 2019-09-04 RX ADMIN — SODIUM CHLORIDE, POTASSIUM CHLORIDE, SODIUM LACTATE AND CALCIUM CHLORIDE: 600; 310; 30; 20 INJECTION, SOLUTION INTRAVENOUS at 07:35

## 2019-09-04 RX ADMIN — PROPOFOL 20 MG: 10 INJECTION, EMULSION INTRAVENOUS at 08:20

## 2019-09-04 RX ADMIN — PROPOFOL 50 MG: 10 INJECTION, EMULSION INTRAVENOUS at 08:11

## 2019-09-04 RX ADMIN — PROPOFOL 30 MG: 10 INJECTION, EMULSION INTRAVENOUS at 08:16

## 2019-09-04 RX ADMIN — LIDOCAINE HYDROCHLORIDE 100 MG: 20 INJECTION, SOLUTION EPIDURAL; INFILTRATION; INTRACAUDAL; PERINEURAL at 08:11

## 2019-09-04 RX ADMIN — SODIUM CHLORIDE, POTASSIUM CHLORIDE, SODIUM LACTATE AND CALCIUM CHLORIDE: 600; 310; 30; 20 INJECTION, SOLUTION INTRAVENOUS at 08:06

## 2019-09-04 RX ADMIN — MIDAZOLAM HYDROCHLORIDE 2 MG: 1 INJECTION, SOLUTION INTRAMUSCULAR; INTRAVENOUS at 08:05

## 2019-09-04 ASSESSMENT — PAIN - FUNCTIONAL ASSESSMENT: PAIN_FUNCTIONAL_ASSESSMENT: 0-10

## 2019-09-04 ASSESSMENT — ENCOUNTER SYMPTOMS
NAUSEA: 0
BLOOD IN STOOL: 0
COUGH: 1
WHEEZING: 1
DIARRHEA: 1
SHORTNESS OF BREATH: 1
VOMITING: 0
CONSTIPATION: 0
ABDOMINAL PAIN: 0
EYE PAIN: 0
BACK PAIN: 1
COLOR CHANGE: 0

## 2019-09-04 ASSESSMENT — PAIN SCALES - GENERAL
PAINLEVEL_OUTOF10: 0
PAINLEVEL_OUTOF10: 0

## 2019-09-04 NOTE — H&P
GENERAL SURGERY OUTPATIENT HISTORY & PHYSICAL NOTE - ENDOSCOPY  [facility]    PATIENT: Marcella Adorno 1955, 59 y.o., female  MRN: [id number]    Physician: Richard Alfaro MD    Date: 9/4/19    Reason for Evaluation:  Diagnostic EGD     Requesting Physician:  DELORES Posadas-ANGELA     CHIEF COMPLAINT:  Need for Diagnostic EGD     History Obtained From:  patient, electronic medical record    HISTORY OF PRESENT ILLNESS:    Igor Chavez is a 59 y.o. female presenting for Diagnostic EGD. She has been having reflux and abdominal bloating. Workup Includes:    Previous EGD: No    Of Note:    Family history of colon cancer: No   Taking anticoagulants: No. Held for the procedure today N/A    Past Medical History:    Past Medical History:   Diagnosis Date    Anxiety 1/28/2019    Arthritis     Asthma     CAD (coronary artery disease)     COPD (chronic obstructive pulmonary disease) ([address]     H/O cardiovascular stress test     H/O Doppler carotid ultrasound 08/07/2019    Mild (0-49%) disease of the Bilateral Internal carotid artery.  H/O Doppler lower venous ultrasound 08/07/2019    Significant reflux noted of the Right GSV. Right GSV too small currently for ablation procedure. Significant reflux noted in the Left GSV at mid thigh . Left GSV difficult to follow between mid thigh and mid calf due to multiple  splits/branches. GSV knee tributary is very superficial.    H/O echocardiogram 08/07/2019    EF 55-60%, Mild MR & TR.    Hepatic steatosis 6/19/2015    Hyperlipidemia     Hypertension     Hypothyroid        Past Surgical History:    Past Surgical History:   Procedure Laterality Date    CARPAL TUNNEL RELEASE Left     CHOLECYSTECTOMY      COLONOSCOPY  01/28/2019    Polyps x4, Internal grade 1 hemorrhoids    COLONOSCOPY N/A 1/28/2019    COLONOSCOPY POLYPECTOMY SNARE/COLD BIOPSY performed by Paolo Santos MD at [address] [place] [address]      due to bleeding. Depression Father     Diabetes Brother     Birth Defects Maternal Grandmother     Hypertension Maternal Grandmother     Diabetes Sister     Depression Brother     Diabetes Sister     Diabetes Brother     Other Brother         black mold    Diabetes Brother     Other Daughter         GI issues    Asthma Daughter     Seizures Daughter        REVIEW OF SYSTEMS:    Review of Systems   Constitutional: Negative for activity change, appetite change, chills, fatigue, fever and unexpected weight change. HENT: Negative for ear pain and tinnitus. Hearing loss: right ear hearing aid. Eyes: Positive for visual disturbance. Negative for pain. Respiratory: Positive for cough, shortness of breath and wheezing. COPD   Cardiovascular: Negative for chest pain, palpitations and leg swelling. Gastrointestinal: Positive for diarrhea. Negative for abdominal pain, blood in stool, constipation, nausea and vomiting. Endocrine: Negative for cold intolerance and heat intolerance. Genitourinary: Positive for frequency. Negative for dysuria and urgency. Musculoskeletal: Positive for back pain. Negative for myalgias and neck pain. Skin: Negative for color change, pallor and rash. Allergic/Immunologic: Negative for environmental allergies and food allergies. Neurological: Negative for dizziness, seizures and headaches. Hematological: Does not bruise/bleed easily. Psychiatric/Behavioral: Positive for dysphoric mood and sleep disturbance. Negative for suicidal ideas. The patient is nervous/anxious. I have reviewed the patient's information pertinent to this visit, including medical history, family history, social history and review of systems.     PHYSICAL EXAM:    Vitals:    08/29/19 0949 09/04/19 0723   BP:  (!) 150/93   Pulse:  73   Resp:  16   Temp:  97 °F (36.1 °C)   TempSrc:  Temporal   SpO2:  96%   Weight: 226 lb (102.5 kg) 225 lb (102.1 kg)   Height: 5' 1.5\" (1.562 m) 5' 1.5\" (1.562 m)

## 2019-09-05 ENCOUNTER — PROCEDURE VISIT (OUTPATIENT)
Dept: CARDIOLOGY CLINIC | Age: 64
End: 2019-09-05
Payer: MEDICAID

## 2019-09-05 DIAGNOSIS — R06.02 SHORTNESS OF BREATH: ICD-10-CM

## 2019-09-05 DIAGNOSIS — R07.9 CHEST PAIN, UNSPECIFIED TYPE: ICD-10-CM

## 2019-09-05 LAB
LV EF: 70 %
LVEF MODALITY: NORMAL

## 2019-09-05 PROCEDURE — A9500 TC99M SESTAMIBI: HCPCS | Performed by: INTERNAL MEDICINE

## 2019-09-05 PROCEDURE — 78452 HT MUSCLE IMAGE SPECT MULT: CPT | Performed by: INTERNAL MEDICINE

## 2019-09-05 PROCEDURE — 93015 CV STRESS TEST SUPVJ I&R: CPT | Performed by: INTERNAL MEDICINE

## 2019-09-05 RX ORDER — THEOPHYLLINE 300 MG/1
300 TABLET, EXTENDED RELEASE ORAL 2 TIMES DAILY
Qty: 60 TABLET | Refills: 0 | Status: SHIPPED | OUTPATIENT
Start: 2019-09-05 | End: 2019-10-07 | Stop reason: SDUPTHER

## 2019-09-05 RX ORDER — LEVOTHYROXINE SODIUM 0.12 MG/1
125 TABLET ORAL EVERY MORNING
Qty: 30 TABLET | Refills: 0 | Status: SHIPPED | OUTPATIENT
Start: 2019-09-05 | End: 2019-10-07 | Stop reason: SDUPTHER

## 2019-09-09 ENCOUNTER — TELEPHONE (OUTPATIENT)
Dept: CARDIOLOGY CLINIC | Age: 64
End: 2019-09-09

## 2019-09-10 ENCOUNTER — OFFICE VISIT (OUTPATIENT)
Dept: CARDIOLOGY CLINIC | Age: 64
End: 2019-09-10
Payer: MEDICAID

## 2019-09-10 VITALS
WEIGHT: 223.6 LBS | HEART RATE: 64 BPM | BODY MASS INDEX: 41.15 KG/M2 | DIASTOLIC BLOOD PRESSURE: 80 MMHG | SYSTOLIC BLOOD PRESSURE: 132 MMHG | HEIGHT: 62 IN

## 2019-09-10 DIAGNOSIS — R07.9 CHEST PAIN, UNSPECIFIED TYPE: Primary | ICD-10-CM

## 2019-09-10 PROCEDURE — 99214 OFFICE O/P EST MOD 30 MIN: CPT | Performed by: INTERNAL MEDICINE

## 2019-09-10 PROCEDURE — 1036F TOBACCO NON-USER: CPT | Performed by: INTERNAL MEDICINE

## 2019-09-10 PROCEDURE — G8599 NO ASA/ANTIPLAT THER USE RNG: HCPCS | Performed by: INTERNAL MEDICINE

## 2019-09-10 PROCEDURE — G8417 CALC BMI ABV UP PARAM F/U: HCPCS | Performed by: INTERNAL MEDICINE

## 2019-09-10 PROCEDURE — 3017F COLORECTAL CA SCREEN DOC REV: CPT | Performed by: INTERNAL MEDICINE

## 2019-09-10 PROCEDURE — G8428 CUR MEDS NOT DOCUMENT: HCPCS | Performed by: INTERNAL MEDICINE

## 2019-09-10 NOTE — LETTER
[facility]      [address] WITH POSSIBLE PERCUTANEOUS CORONARY INTERVENTION     Patient Name: Shai Loaiza   : 1955   MRN# [id number]    Date of Procedure: 19 Time: 1 PM Arrival Time: 11 AM    The catheterization and angiogram are usually outpatient procedures, however if stenting is needed you will stay overnight. You will need to be at the hospital two hours before the procedure. You will need to arrange for someone to drive you home. You will go to registration in the main lobby. HOSPITAL:  [facility])  Call to Pre-[place] at: [phone] 1-2 days before your procedure. Please have blood work and chest-x-ray done 1 to 2 days before procedure at    [facility]. X Please do not have anything by mouth after midnight prior to or 8 hours before the procedure. X You may take your medications with a sip of water in the morning before your procedure or  take them with you. X If you are taking Lasix (furosemide) please do not take it the morning  before your procedure. Patient Signature:  _________________________     taff Signature: Nasir Noyola   Staff Given Instructions:_______________________________                                [facility]    Dr. Lucia Mckinley     Patient Name: Shai Loaiza   : 1955   MRN# [id number]    Date of Procedure: 19 Time: 1 PM     DIAGNOSIS:   Z01.810    LEFT HEART CATHETERIZATION WITH POSSIBLE PERCUTANEOUS CORONARY INTERVENTION          X Type & Screen     X CBC  X BMP  X PT  X PTT            ? PLEASE CALL ABNORMAL RESULTS TO THE  PHYSICIAN? ATTENTION PATIENT: Pretesting is to be done before the cath. You do not have to fast for the lab work. You must go to the [facility] behind [facility] [address] [facility] to have this lab work done.

## 2019-09-16 ENCOUNTER — TELEPHONE (OUTPATIENT)
Dept: CARDIOLOGY CLINIC | Age: 64
End: 2019-09-16

## 2019-09-16 ENCOUNTER — OFFICE VISIT (OUTPATIENT)
Dept: GASTROENTEROLOGY | Age: 64
End: 2019-09-16
Payer: MEDICAID

## 2019-09-16 ENCOUNTER — HOSPITAL ENCOUNTER (OUTPATIENT)
Age: 64
Discharge: HOME | End: 2019-09-16
Payer: MEDICAID

## 2019-09-16 VITALS
DIASTOLIC BLOOD PRESSURE: 80 MMHG | SYSTOLIC BLOOD PRESSURE: 124 MMHG | HEART RATE: 71 BPM | BODY MASS INDEX: 42.29 KG/M2 | HEIGHT: 61 IN | WEIGHT: 224 LBS | OXYGEN SATURATION: 96 %

## 2019-09-16 DIAGNOSIS — R14.0 ABDOMINAL BLOATING: ICD-10-CM

## 2019-09-16 DIAGNOSIS — K76.0 FATTY LIVER: ICD-10-CM

## 2019-09-16 DIAGNOSIS — Z86.010 HISTORY OF COLON POLYPS: ICD-10-CM

## 2019-09-16 DIAGNOSIS — K21.9 GASTROESOPHAGEAL REFLUX DISEASE WITHOUT ESOPHAGITIS: ICD-10-CM

## 2019-09-16 DIAGNOSIS — K29.50 CHRONIC GASTRITIS WITHOUT BLEEDING, UNSPECIFIED GASTRITIS TYPE: Primary | ICD-10-CM

## 2019-09-16 DIAGNOSIS — R19.4 ALTERED BOWEL HABITS: ICD-10-CM

## 2019-09-16 LAB
ABO/RH: NORMAL
ANION GAP SERPL CALCULATED.3IONS-SCNC: 10 MMOL/L (ref 4–16)
ANTIBODY SCREEN: NEGATIVE
APTT: 25.2 SECONDS (ref 21.2–33)
BASOPHILS ABSOLUTE: 0.1 K/CU MM
BASOPHILS RELATIVE PERCENT: 0.7 % (ref 0–1)
BUN BLDV-MCNC: 19 MG/DL (ref 6–23)
CALCIUM SERPL-MCNC: 10.4 MG/DL (ref 8.3–10.6)
CHLORIDE BLD-SCNC: 105 MMOL/L (ref 99–110)
CO2: 28 MMOL/L (ref 21–32)
CREAT SERPL-MCNC: 0.9 MG/DL (ref 0.6–1.1)
DIFFERENTIAL TYPE: ABNORMAL
EOSINOPHILS ABSOLUTE: 0.2 K/CU MM
EOSINOPHILS RELATIVE PERCENT: 2.1 % (ref 0–3)
GFR AFRICAN AMERICAN: >60 ML/MIN/1.73M2
GFR NON-AFRICAN AMERICAN: >60 ML/MIN/1.73M2
GLUCOSE BLD-MCNC: 95 MG/DL (ref 70–99)
HCT VFR BLD CALC: 45.7 % (ref 37–47)
HEMOGLOBIN: 14.7 GM/DL (ref 12.5–16)
IMMATURE NEUTROPHIL %: 0.6 % (ref 0–0.43)
INR BLD: 0.88 INDEX
LYMPHOCYTES ABSOLUTE: 3.3 K/CU MM
LYMPHOCYTES RELATIVE PERCENT: 36.4 % (ref 24–44)
MCH RBC QN AUTO: 31.2 PG (ref 27–31)
MCHC RBC AUTO-ENTMCNC: 32.2 % (ref 32–36)
MCV RBC AUTO: 97 FL (ref 78–100)
MONOCYTES ABSOLUTE: 0.7 K/CU MM
MONOCYTES RELATIVE PERCENT: 7.6 % (ref 0–4)
NUCLEATED RBC %: 0 %
PDW BLD-RTO: 13.2 % (ref 11.7–14.9)
PLATELET # BLD: 235 K/CU MM (ref 140–440)
PMV BLD AUTO: 10 FL (ref 7.5–11.1)
POTASSIUM SERPL-SCNC: 3.6 MMOL/L (ref 3.5–5.1)
PROTHROMBIN TIME: 10 SECONDS (ref 9.12–12.5)
RBC # BLD: 4.71 M/CU MM (ref 4.2–5.4)
SEGMENTED NEUTROPHILS ABSOLUTE COUNT: 4.8 K/CU MM
SEGMENTED NEUTROPHILS RELATIVE PERCENT: 52.6 % (ref 36–66)
SODIUM BLD-SCNC: 143 MMOL/L (ref 135–145)
TOTAL IMMATURE NEUTOROPHIL: 0.05 K/CU MM
TOTAL NUCLEATED RBC: 0 K/CU MM
WBC # BLD: 9.1 K/CU MM (ref 4–10.5)

## 2019-09-16 PROCEDURE — G8417 CALC BMI ABV UP PARAM F/U: HCPCS | Performed by: NURSE PRACTITIONER

## 2019-09-16 PROCEDURE — 1036F TOBACCO NON-USER: CPT | Performed by: NURSE PRACTITIONER

## 2019-09-16 PROCEDURE — 86901 BLOOD TYPING SEROLOGIC RH(D): CPT

## 2019-09-16 PROCEDURE — G8599 NO ASA/ANTIPLAT THER USE RNG: HCPCS | Performed by: NURSE PRACTITIONER

## 2019-09-16 PROCEDURE — 86900 BLOOD TYPING SEROLOGIC ABO: CPT

## 2019-09-16 PROCEDURE — 36415 COLL VENOUS BLD VENIPUNCTURE: CPT

## 2019-09-16 PROCEDURE — 99215 OFFICE O/P EST HI 40 MIN: CPT | Performed by: NURSE PRACTITIONER

## 2019-09-16 PROCEDURE — 85025 COMPLETE CBC W/AUTO DIFF WBC: CPT

## 2019-09-16 PROCEDURE — 85730 THROMBOPLASTIN TIME PARTIAL: CPT

## 2019-09-16 PROCEDURE — G8427 DOCREV CUR MEDS BY ELIG CLIN: HCPCS | Performed by: NURSE PRACTITIONER

## 2019-09-16 PROCEDURE — 85610 PROTHROMBIN TIME: CPT

## 2019-09-16 PROCEDURE — 3017F COLORECTAL CA SCREEN DOC REV: CPT | Performed by: NURSE PRACTITIONER

## 2019-09-16 PROCEDURE — 80048 BASIC METABOLIC PNL TOTAL CA: CPT

## 2019-09-16 PROCEDURE — 86850 RBC ANTIBODY SCREEN: CPT

## 2019-09-16 RX ORDER — CHOLESTYRAMINE 4 G/9G
1 POWDER, FOR SUSPENSION ORAL 2 TIMES DAILY
Qty: 90 PACKET | Refills: 3 | Status: SHIPPED | OUTPATIENT
Start: 2019-09-16 | End: 2020-04-09 | Stop reason: SDUPTHER

## 2019-09-16 ASSESSMENT — ENCOUNTER SYMPTOMS
CONSTIPATION: 0
ABDOMINAL PAIN: 0
EYE PAIN: 0
BLOOD IN STOOL: 0
NAUSEA: 0
VOMITING: 0
COLOR CHANGE: 0

## 2019-09-16 NOTE — PROGRESS NOTES
abdominal bloating.  No family history of stomach or colon cancer.       ROS  Review of Systems   Constitutional: Negative for appetite change, chills, fatigue, fever and unexpected weight change. HENT: Positive for hearing loss (R ear hearing aid). Negative for ear pain and tinnitus. Eyes: Positive for visual disturbance. Negative for pain. Respiratory: Positive for cough, shortness of breath and wheezing. COPD   Cardiovascular: Positive for chest pain (intermittent; none at present) and palpitations. Negative for leg swelling. Gastrointestinal: Positive for diarrhea. Negative for abdominal pain, blood in stool, constipation, nausea and vomiting. Endocrine: Negative for cold intolerance and heat intolerance. Genitourinary: Positive for frequency. Negative for dysuria and urgency. Musculoskeletal: Positive for back pain. Negative for myalgias and neck pain. Skin: Negative for color change, pallor and rash. Allergic/Immunologic: Negative for environmental allergies and food allergies. Neurological: Negative for dizziness, seizures and headaches. Hematological: Does not bruise/bleed easily. Psychiatric/Behavioral: Positive for dysphoric mood and sleep disturbance. Negative for suicidal ideas. The patient is nervous/anxious.         Allergies  Allergies   Allergen Reactions    Latex Itching    Iodine Anaphylaxis    Levofloxacin Anaphylaxis    Pcn [Penicillins] Anaphylaxis    Sulfa Antibiotics Anaphylaxis    Morphine Itching    Zofran Itching    Caffeine Palpitations     Doesn't sleep    Volumen [Barium Sulfate] Itching, Nausea And Vomiting and Rash       Medications  Current Outpatient Medications   Medication Sig Dispense Refill    cholestyramine (QUESTRAN) 4 g packet Take 1 packet by mouth 2 times daily 90 packet 3    levothyroxine (SYNTHROID) 125 MCG tablet Take 1 tablet by mouth every morning 30 tablet 0    theophylline (THEODUR) 300 MG extended release tablet Take 1 guarding. Morbid obese   Musculoskeletal: Normal range of motion. Lymphadenopathy:     She has no cervical adenopathy. Neurological: She is alert and oriented to person, place, and time. Skin: Skin is warm and dry. Psychiatric: She has a normal mood and affect. Vitals reviewed.       Hospital Outpatient Visit on 09/16/2019   Component Date Value Ref Range Status    WBC 09/16/2019 9.1  4.0 - 10.5 K/CU MM Final    RBC 09/16/2019 4.71  4.2 - 5.4 M/CU MM Final    Hemoglobin 09/16/2019 14.7  12.5 - 16.0 GM/DL Final    Hematocrit 09/16/2019 45.7  37 - 47 % Final    MCV 09/16/2019 97.0  78 - 100 FL Final    MCH 09/16/2019 31.2* 27 - 31 PG Final    MCHC 09/16/2019 32.2  32.0 - 36.0 % Final    RDW 09/16/2019 13.2  11.7 - 14.9 % Final    Platelets 02/69/5061 235  140 - 440 K/CU MM Final    MPV 09/16/2019 10.0  7.5 - 11.1 FL Final    Differential Type 09/16/2019 AUTOMATED DIFFERENTIAL   Final    Segs Relative 09/16/2019 52.6  36 - 66 % Final    Lymphocytes % 09/16/2019 36.4  24 - 44 % Final    Monocytes % 09/16/2019 7.6* 0 - 4 % Final    Eosinophils % 09/16/2019 2.1  0 - 3 % Final    Basophils % 09/16/2019 0.7  0 - 1 % Final    Segs Absolute 09/16/2019 4.8  K/CU MM Final    Lymphocytes Absolute 09/16/2019 3.3  K/CU MM Final    Monocytes Absolute 09/16/2019 0.7  K/CU MM Final    Eosinophils Absolute 09/16/2019 0.2  K/CU MM Final    Basophils Absolute 09/16/2019 0.1  K/CU MM Final    Nucleated RBC % 09/16/2019 0.0  % Final    Total Nucleated RBC 09/16/2019 0.0  K/CU MM Final    Total Immature Neutrophil 09/16/2019 0.05  K/CU MM Final    Immature Neutrophil % 09/16/2019 0.6* 0 - 0.43 % Final    Sodium 09/16/2019 143  135 - 145 MMOL/L Final    Potassium 09/16/2019 3.6  3.5 - 5.1 MMOL/L Final    Chloride 09/16/2019 105  99 - 110 mMol/L Final    CO2 09/16/2019 28  21 - 32 MMOL/L Final    Anion Gap 09/16/2019 10  4 - 16 Final    BUN 09/16/2019 19  6 - 23 MG/DL Final    CREATININE 09/16/2019

## 2019-09-17 ENCOUNTER — HOSPITAL ENCOUNTER (OUTPATIENT)
Dept: CARDIAC CATH/INVASIVE PROCEDURES | Age: 64
Discharge: HOME | End: 2019-09-17
Attending: INTERNAL MEDICINE | Admitting: INTERNAL MEDICINE
Payer: MEDICAID

## 2019-09-17 ENCOUNTER — TELEPHONE (OUTPATIENT)
Dept: GASTROENTEROLOGY | Age: 64
End: 2019-09-17

## 2019-09-17 VITALS
RESPIRATION RATE: 15 BRPM | DIASTOLIC BLOOD PRESSURE: 95 MMHG | HEIGHT: 61 IN | HEART RATE: 75 BPM | SYSTOLIC BLOOD PRESSURE: 169 MMHG | BODY MASS INDEX: 42.48 KG/M2 | OXYGEN SATURATION: 96 % | WEIGHT: 225 LBS

## 2019-09-17 PROCEDURE — 6370000000 HC RX 637 (ALT 250 FOR IP): Performed by: INTERNAL MEDICINE

## 2019-09-17 PROCEDURE — 2500000003 HC RX 250 WO HCPCS

## 2019-09-17 PROCEDURE — 6360000004 HC RX CONTRAST MEDICATION

## 2019-09-17 PROCEDURE — C1894 INTRO/SHEATH, NON-LASER: HCPCS

## 2019-09-17 PROCEDURE — 6360000002 HC RX W HCPCS

## 2019-09-17 PROCEDURE — 93454 CORONARY ARTERY ANGIO S&I: CPT

## 2019-09-17 PROCEDURE — C1769 GUIDE WIRE: HCPCS

## 2019-09-17 PROCEDURE — 93454 CORONARY ARTERY ANGIO S&I: CPT | Performed by: INTERNAL MEDICINE

## 2019-09-17 PROCEDURE — 2709999900 HC NON-CHARGEABLE SUPPLY

## 2019-09-17 PROCEDURE — 2580000003 HC RX 258: Performed by: INTERNAL MEDICINE

## 2019-09-17 RX ORDER — SODIUM CHLORIDE 9 MG/ML
INJECTION, SOLUTION INTRAVENOUS CONTINUOUS
Status: DISCONTINUED | OUTPATIENT
Start: 2019-09-17 | End: 2019-09-17 | Stop reason: HOSPADM

## 2019-09-17 RX ORDER — DIAZEPAM 5 MG/1
5 TABLET ORAL ONCE
Status: COMPLETED | OUTPATIENT
Start: 2019-09-17 | End: 2019-09-17

## 2019-09-17 RX ORDER — DIPHENHYDRAMINE HCL 25 MG
25 TABLET ORAL ONCE
Status: COMPLETED | OUTPATIENT
Start: 2019-09-17 | End: 2019-09-17

## 2019-09-17 RX ADMIN — DIAZEPAM 5 MG: 5 TABLET ORAL at 12:54

## 2019-09-17 RX ADMIN — SODIUM CHLORIDE: 9 INJECTION, SOLUTION INTRAVENOUS at 12:54

## 2019-09-17 RX ADMIN — DIPHENHYDRAMINE HYDROCHLORIDE 25 MG: 25 TABLET ORAL at 12:54

## 2019-09-17 RX ADMIN — HYDROCORTISONE SODIUM SUCCINATE 100 MG: 100 INJECTION, POWDER, FOR SOLUTION INTRAMUSCULAR; INTRAVENOUS at 13:14

## 2019-09-17 ASSESSMENT — ENCOUNTER SYMPTOMS
WHEEZING: 1
SHORTNESS OF BREATH: 1
COUGH: 1
DIARRHEA: 1
BACK PAIN: 1

## 2019-09-17 NOTE — H&P
no hepato-splenomegally, no bruits  Musculoskeletal - No significant edema, no kyphosis or scoliosis, no deformity in any extremity noted, muscle strength and tone are normal  Skin: no ulcer,no scar,no stasis dermatitis   Neurologic - alert oriented times 3,Cranial nerves II through XII are grossly intact. There were no gross focal neurologic abnormalities. All sensory and motor nerves examined and were normal  Psychiatric: normal mood and affect           Lab Results   Component Value Date     TROPONINI <0.006 03/16/2012      BNP:          Lab Results   Component Value Date     BNP 12 06/10/2013      PT/INR:  No results found for: PTINR        Lab Results   Component Value Date     LABA1C 5.5 06/05/2019     LABA1C 5.1 06/19/2018            Lab Results   Component Value Date     CHOL 187 04/25/2017     TRIG 159 (H) 04/25/2017     HDL 50 04/25/2017     LDLCALC 105 (H) 04/25/2017     LDLDIRECT 124 (H) 03/30/2015      Lab Results   Component Value Date     ALT 21 01/16/2019     ALT 21 01/16/2019     AST 22 01/16/2019     AST 22 01/16/2019      TSH:          Lab Results   Component Value Date     TSH 1.24 06/19/2018         Impression:  Yumiko Valadez is a 59 y. o.year old who  has a past medical history of Anxiety, Arthritis, Asthma, CAD (coronary artery disease), COPD (chronic obstructive pulmonary disease) ([facility] [address] H/O cardiovascular stress test, H/O Doppler carotid ultrasound, H/O Doppler lower venous ultrasound, H/O echocardiogram, Hepatic steatosis, History of nuclear stress test, Hyperlipidemia, Hypertension, and Hypothyroid. and presents with      Plan:  1. Palpitations: echo is normal, stress test is abnormal, will get  [facility] scheduled,risk and benefit explained and consent obtained  2. Dizziness: caroitid doppler showed 0-49% stenosis  3. Leg swelling showed severe venous reflux disease, compression socks ordered  4. Chest tightness: stress test ordered  5. HTN: stable, off medications  6.  Dyslipidemia: check

## 2019-09-18 ENCOUNTER — TELEPHONE (OUTPATIENT)
Dept: CARDIOLOGY CLINIC | Age: 64
End: 2019-09-18

## 2019-09-30 ENCOUNTER — OFFICE VISIT (OUTPATIENT)
Dept: CARDIOLOGY CLINIC | Age: 64
End: 2019-09-30
Payer: MEDICAID

## 2019-09-30 VITALS
SYSTOLIC BLOOD PRESSURE: 122 MMHG | HEIGHT: 61 IN | HEART RATE: 65 BPM | BODY MASS INDEX: 42.67 KG/M2 | DIASTOLIC BLOOD PRESSURE: 78 MMHG | WEIGHT: 226 LBS

## 2019-09-30 DIAGNOSIS — R07.9 CHEST PAIN, UNSPECIFIED TYPE: Primary | ICD-10-CM

## 2019-09-30 PROCEDURE — 1036F TOBACCO NON-USER: CPT | Performed by: INTERNAL MEDICINE

## 2019-09-30 PROCEDURE — 3017F COLORECTAL CA SCREEN DOC REV: CPT | Performed by: INTERNAL MEDICINE

## 2019-09-30 PROCEDURE — G8417 CALC BMI ABV UP PARAM F/U: HCPCS | Performed by: INTERNAL MEDICINE

## 2019-09-30 PROCEDURE — 99214 OFFICE O/P EST MOD 30 MIN: CPT | Performed by: INTERNAL MEDICINE

## 2019-09-30 PROCEDURE — G8599 NO ASA/ANTIPLAT THER USE RNG: HCPCS | Performed by: INTERNAL MEDICINE

## 2019-09-30 PROCEDURE — G8427 DOCREV CUR MEDS BY ELIG CLIN: HCPCS | Performed by: INTERNAL MEDICINE

## 2019-09-30 NOTE — PROGRESS NOTES
heartburn  · Genitourinary: No dysuria, trouble voiding, or hematuria  · Musculoskeletal:  No gait disturbance, weakness or joint complaints  · Integumentary: No rash or pruritis  · Neurological: No TIA or stroke symptoms  · Psychiatric: No anxiety or depression  · Endocrine: No malaise, fatigue or temperature intolerance  · Hematologic/Lymphatic: No bleeding problems, blood clots or swollen lymph nodes  · Allergic/Immunologic: No nasal congestion or hives  All systems negative except as marked. Objective:  /78   Pulse 65   Ht 5' 1\" (1.549 m)   Wt 226 lb (102.5 kg)   LMP  (LMP Unknown)   BMI 42.70 kg/m²   Wt Readings from Last 3 Encounters:   09/30/19 226 lb (102.5 kg)   09/17/19 225 lb (102.1 kg)   09/16/19 224 lb (101.6 kg)     Body mass index is 42.7 kg/m². GENERAL - Alert, oriented, pleasant, in no apparent distress,normal grooming  HEENT - pupils are reactive to light and accomodation, cornea intact, conjunctive normal color, ears are normal in exam,throat exam in normal, teeth, gum and palate are normal, oral mucosa is normal without any notation of pallor or cyanosis  Neck - Supple. No jugular venous distention noted. No carotid bruits, no apical -carotid delay  Respiratory:  Normal breath sounds, No respiratory distress, No wheezing, No chest tenderness. ,no use of accessory muscles, diaphragm movement is normal  Cardiovascular: (PMI) apex non displaced,no lifts no thrills, no s3,no s4, Normal heart rate, Normal rhythm, No murmurs, No rubs, No gallops.  Carotid arteries pulse and amplitude are normal no bruit, no abdominal bruit noted ( normal abdominal aorta ausculation), femoral arteries pulse and amplitude are normal no bruit, pedal pulses are normal  Femoral pulses have normal amplitude, no bruits   Extremities - No cyanosis, clubbing, or significant edema, no varicose veins    Abdomen - No masses, tenderness, or organomegaly, no hepato-splenomegally, no bruits  Musculoskeletal - No

## 2019-10-07 DIAGNOSIS — J44.9 COPD, MODERATE (HCC): Chronic | ICD-10-CM

## 2019-10-07 DIAGNOSIS — F33.0 MAJOR DEPRESSIVE DISORDER, RECURRENT EPISODE, MILD (HCC): ICD-10-CM

## 2019-10-07 DIAGNOSIS — E03.9 HYPOTHYROIDISM, UNSPECIFIED TYPE: ICD-10-CM

## 2019-10-08 RX ORDER — BUSPIRONE HYDROCHLORIDE 7.5 MG/1
7.5 TABLET ORAL 2 TIMES DAILY
Qty: 60 TABLET | Refills: 0 | Status: SHIPPED | OUTPATIENT
Start: 2019-10-08 | End: 2019-11-07 | Stop reason: SDUPTHER

## 2019-10-08 RX ORDER — THEOPHYLLINE 300 MG/1
300 TABLET, EXTENDED RELEASE ORAL 2 TIMES DAILY
Qty: 60 TABLET | Refills: 0 | Status: SHIPPED | OUTPATIENT
Start: 2019-10-08 | End: 2019-11-07 | Stop reason: SDUPTHER

## 2019-10-08 RX ORDER — LEVOTHYROXINE SODIUM 0.12 MG/1
125 TABLET ORAL EVERY MORNING
Qty: 30 TABLET | Refills: 0 | Status: SHIPPED | OUTPATIENT
Start: 2019-10-08 | End: 2019-11-07 | Stop reason: SDUPTHER

## 2019-10-08 RX ORDER — MONTELUKAST SODIUM 10 MG/1
10 TABLET ORAL NIGHTLY
Qty: 30 TABLET | Refills: 1 | Status: SHIPPED | OUTPATIENT
Start: 2019-10-08 | End: 2019-11-30 | Stop reason: SDUPTHER

## 2019-11-07 DIAGNOSIS — Z91.09 ENVIRONMENTAL ALLERGIES: ICD-10-CM

## 2019-11-07 DIAGNOSIS — J44.9 COPD, MODERATE (HCC): Chronic | ICD-10-CM

## 2019-11-07 DIAGNOSIS — E03.9 HYPOTHYROIDISM, UNSPECIFIED TYPE: ICD-10-CM

## 2019-11-07 DIAGNOSIS — F33.0 MAJOR DEPRESSIVE DISORDER, RECURRENT EPISODE, MILD (HCC): ICD-10-CM

## 2019-11-08 RX ORDER — BUSPIRONE HYDROCHLORIDE 7.5 MG/1
7.5 TABLET ORAL 2 TIMES DAILY
Qty: 60 TABLET | Refills: 0 | Status: SHIPPED | OUTPATIENT
Start: 2019-11-08 | End: 2019-11-14 | Stop reason: SDUPTHER

## 2019-11-08 RX ORDER — LORATADINE 10 MG/1
10 TABLET ORAL DAILY
Qty: 30 TABLET | Refills: 5 | Status: SHIPPED | OUTPATIENT
Start: 2019-11-08 | End: 2019-11-14 | Stop reason: SDUPTHER

## 2019-11-08 RX ORDER — LEVOTHYROXINE SODIUM 0.12 MG/1
125 TABLET ORAL EVERY MORNING
Qty: 30 TABLET | Refills: 0 | Status: SHIPPED | OUTPATIENT
Start: 2019-11-08 | End: 2019-11-14 | Stop reason: SDUPTHER

## 2019-11-08 RX ORDER — THEOPHYLLINE 300 MG/1
300 TABLET, EXTENDED RELEASE ORAL 2 TIMES DAILY
Qty: 60 TABLET | Refills: 0 | Status: SHIPPED | OUTPATIENT
Start: 2019-11-08 | End: 2019-11-14 | Stop reason: SDUPTHER

## 2019-11-08 RX ORDER — ERGOCALCIFEROL 1.25 MG/1
50000 CAPSULE ORAL WEEKLY
Qty: 12 CAPSULE | Refills: 1 | Status: SHIPPED | OUTPATIENT
Start: 2019-11-08 | End: 2020-05-28

## 2019-11-14 ENCOUNTER — OFFICE VISIT (OUTPATIENT)
Dept: FAMILY MEDICINE CLINIC | Age: 64
End: 2019-11-14
Payer: MEDICAID

## 2019-11-14 VITALS
WEIGHT: 236 LBS | RESPIRATION RATE: 16 BRPM | DIASTOLIC BLOOD PRESSURE: 76 MMHG | OXYGEN SATURATION: 97 % | SYSTOLIC BLOOD PRESSURE: 128 MMHG | HEART RATE: 67 BPM | HEIGHT: 61 IN | BODY MASS INDEX: 44.56 KG/M2

## 2019-11-14 DIAGNOSIS — J44.9 COPD, MODERATE (HCC): ICD-10-CM

## 2019-11-14 DIAGNOSIS — R05.9 COUGH: ICD-10-CM

## 2019-11-14 DIAGNOSIS — E03.9 ACQUIRED HYPOTHYROIDISM: Primary | ICD-10-CM

## 2019-11-14 DIAGNOSIS — Z28.21 IMMUNIZATION NOT CARRIED OUT BECAUSE OF PATIENT REFUSAL: ICD-10-CM

## 2019-11-14 LAB — TSH REFLEX FT4: 2.52 UIU/ML (ref 0.27–4.2)

## 2019-11-14 PROCEDURE — G8484 FLU IMMUNIZE NO ADMIN: HCPCS | Performed by: NURSE PRACTITIONER

## 2019-11-14 PROCEDURE — 36415 COLL VENOUS BLD VENIPUNCTURE: CPT | Performed by: NURSE PRACTITIONER

## 2019-11-14 PROCEDURE — G8417 CALC BMI ABV UP PARAM F/U: HCPCS | Performed by: NURSE PRACTITIONER

## 2019-11-14 PROCEDURE — G8599 NO ASA/ANTIPLAT THER USE RNG: HCPCS | Performed by: NURSE PRACTITIONER

## 2019-11-14 PROCEDURE — 1036F TOBACCO NON-USER: CPT | Performed by: NURSE PRACTITIONER

## 2019-11-14 PROCEDURE — 3023F SPIROM DOC REV: CPT | Performed by: NURSE PRACTITIONER

## 2019-11-14 PROCEDURE — G8926 SPIRO NO PERF OR DOC: HCPCS | Performed by: NURSE PRACTITIONER

## 2019-11-14 PROCEDURE — 99214 OFFICE O/P EST MOD 30 MIN: CPT | Performed by: NURSE PRACTITIONER

## 2019-11-14 PROCEDURE — 3017F COLORECTAL CA SCREEN DOC REV: CPT | Performed by: NURSE PRACTITIONER

## 2019-11-14 PROCEDURE — G8427 DOCREV CUR MEDS BY ELIG CLIN: HCPCS | Performed by: NURSE PRACTITIONER

## 2019-11-14 RX ORDER — BENZONATATE 100 MG/1
100-200 CAPSULE ORAL 3 TIMES DAILY PRN
Qty: 60 CAPSULE | Refills: 0 | Status: SHIPPED | OUTPATIENT
Start: 2019-11-14 | End: 2019-11-24

## 2019-11-14 ASSESSMENT — ENCOUNTER SYMPTOMS
COUGH: 1
BACK PAIN: 0
SHORTNESS OF BREATH: 0
NAUSEA: 0
ABDOMINAL DISTENTION: 0
VOMITING: 0

## 2019-11-15 DIAGNOSIS — F33.0 MAJOR DEPRESSIVE DISORDER, RECURRENT EPISODE, MILD (HCC): ICD-10-CM

## 2019-11-17 RX ORDER — BUPROPION HYDROCHLORIDE 300 MG/1
300 TABLET ORAL EVERY MORNING
Qty: 90 TABLET | Refills: 1 | Status: SHIPPED | OUTPATIENT
Start: 2019-11-17 | End: 2019-11-19 | Stop reason: SDUPTHER

## 2019-11-18 DIAGNOSIS — F33.0 MAJOR DEPRESSIVE DISORDER, RECURRENT EPISODE, MILD (HCC): ICD-10-CM

## 2019-11-19 RX ORDER — BUPROPION HYDROCHLORIDE 300 MG/1
300 TABLET ORAL EVERY MORNING
Qty: 90 TABLET | Refills: 1 | Status: SHIPPED | OUTPATIENT
Start: 2019-11-19 | End: 2020-08-17 | Stop reason: SDUPTHER

## 2019-12-02 RX ORDER — MONTELUKAST SODIUM 10 MG/1
TABLET ORAL
Qty: 30 TABLET | Refills: 1 | Status: SHIPPED | OUTPATIENT
Start: 2019-12-02 | End: 2020-01-27

## 2020-01-02 ENCOUNTER — HOSPITAL ENCOUNTER (OUTPATIENT)
Dept: GENERAL RADIOLOGY | Age: 65
Discharge: HOME | End: 2020-01-02
Payer: MEDICAID

## 2020-01-02 ENCOUNTER — HOSPITAL ENCOUNTER (OUTPATIENT)
Age: 65
Discharge: HOME | End: 2020-01-02
Payer: MEDICAID

## 2020-01-02 PROCEDURE — 73630 X-RAY EXAM OF FOOT: CPT

## 2020-01-09 ENCOUNTER — OFFICE VISIT (OUTPATIENT)
Dept: GASTROENTEROLOGY | Age: 65
End: 2020-01-09
Payer: MEDICAID

## 2020-01-09 ENCOUNTER — HOSPITAL ENCOUNTER (OUTPATIENT)
Age: 65
Discharge: HOME | End: 2020-01-09
Payer: MEDICAID

## 2020-01-09 VITALS
BODY MASS INDEX: 43.8 KG/M2 | OXYGEN SATURATION: 98 % | WEIGHT: 232 LBS | HEIGHT: 61 IN | RESPIRATION RATE: 15 BRPM | HEART RATE: 92 BPM | DIASTOLIC BLOOD PRESSURE: 82 MMHG | SYSTOLIC BLOOD PRESSURE: 128 MMHG

## 2020-01-09 LAB
ALBUMIN SERPL-MCNC: 4 GM/DL (ref 3.4–5)
ALP BLD-CCNC: 125 IU/L (ref 40–129)
ALT SERPL-CCNC: 16 U/L (ref 10–40)
ANION GAP SERPL CALCULATED.3IONS-SCNC: 13 MMOL/L (ref 4–16)
AST SERPL-CCNC: 18 IU/L (ref 15–37)
BASOPHILS ABSOLUTE: 0.1 K/CU MM
BASOPHILS RELATIVE PERCENT: 0.9 % (ref 0–1)
BILIRUB SERPL-MCNC: 0.4 MG/DL (ref 0–1)
BUN BLDV-MCNC: 13 MG/DL (ref 6–23)
CALCIUM SERPL-MCNC: 9.8 MG/DL (ref 8.3–10.6)
CHLORIDE BLD-SCNC: 103 MMOL/L (ref 99–110)
CO2: 26 MMOL/L (ref 21–32)
CREAT SERPL-MCNC: 0.8 MG/DL (ref 0.6–1.1)
DIFFERENTIAL TYPE: ABNORMAL
EOSINOPHILS ABSOLUTE: 0.2 K/CU MM
EOSINOPHILS RELATIVE PERCENT: 2.3 % (ref 0–3)
FOLATE: 10.3 NG/ML (ref 3.1–17.5)
GFR AFRICAN AMERICAN: >60 ML/MIN/1.73M2
GFR NON-AFRICAN AMERICAN: >60 ML/MIN/1.73M2
GLUCOSE BLD-MCNC: 88 MG/DL (ref 70–99)
HCT VFR BLD CALC: 48.1 % (ref 37–47)
HEMOGLOBIN: 15.4 GM/DL (ref 12.5–16)
IMMATURE NEUTROPHIL %: 0.4 % (ref 0–0.43)
LYMPHOCYTES ABSOLUTE: 3.5 K/CU MM
LYMPHOCYTES RELATIVE PERCENT: 34.9 % (ref 24–44)
MCH RBC QN AUTO: 30.5 PG (ref 27–31)
MCHC RBC AUTO-ENTMCNC: 32 % (ref 32–36)
MCV RBC AUTO: 95.2 FL (ref 78–100)
MONOCYTES ABSOLUTE: 0.7 K/CU MM
MONOCYTES RELATIVE PERCENT: 6.7 % (ref 0–4)
NUCLEATED RBC %: 0 %
PDW BLD-RTO: 12.9 % (ref 11.7–14.9)
PLATELET # BLD: 278 K/CU MM (ref 140–440)
PMV BLD AUTO: 10.3 FL (ref 7.5–11.1)
POTASSIUM SERPL-SCNC: 3.9 MMOL/L (ref 3.5–5.1)
RBC # BLD: 5.05 M/CU MM (ref 4.2–5.4)
SEGMENTED NEUTROPHILS ABSOLUTE COUNT: 5.6 K/CU MM
SEGMENTED NEUTROPHILS RELATIVE PERCENT: 54.8 % (ref 36–66)
SODIUM BLD-SCNC: 142 MMOL/L (ref 135–145)
TOTAL IMMATURE NEUTOROPHIL: 0.04 K/CU MM
TOTAL NUCLEATED RBC: 0 K/CU MM
TOTAL PROTEIN: 6.6 GM/DL (ref 6.4–8.2)
VITAMIN B-12: 311.1 PG/ML (ref 211–911)
WBC # BLD: 10.1 K/CU MM (ref 4–10.5)

## 2020-01-09 PROCEDURE — 99215 OFFICE O/P EST HI 40 MIN: CPT | Performed by: NURSE PRACTITIONER

## 2020-01-09 PROCEDURE — 82607 VITAMIN B-12: CPT

## 2020-01-09 PROCEDURE — 85025 COMPLETE CBC W/AUTO DIFF WBC: CPT

## 2020-01-09 PROCEDURE — G8417 CALC BMI ABV UP PARAM F/U: HCPCS | Performed by: NURSE PRACTITIONER

## 2020-01-09 PROCEDURE — G8484 FLU IMMUNIZE NO ADMIN: HCPCS | Performed by: NURSE PRACTITIONER

## 2020-01-09 PROCEDURE — G8427 DOCREV CUR MEDS BY ELIG CLIN: HCPCS | Performed by: NURSE PRACTITIONER

## 2020-01-09 PROCEDURE — 82746 ASSAY OF FOLIC ACID SERUM: CPT

## 2020-01-09 PROCEDURE — 36415 COLL VENOUS BLD VENIPUNCTURE: CPT

## 2020-01-09 PROCEDURE — 1036F TOBACCO NON-USER: CPT | Performed by: NURSE PRACTITIONER

## 2020-01-09 PROCEDURE — 80053 COMPREHEN METABOLIC PANEL: CPT

## 2020-01-09 PROCEDURE — 3017F COLORECTAL CA SCREEN DOC REV: CPT | Performed by: NURSE PRACTITIONER

## 2020-01-09 RX ORDER — OMEPRAZOLE 20 MG/1
20 CAPSULE, DELAYED RELEASE ORAL DAILY
Qty: 30 CAPSULE | Refills: 3 | Status: SHIPPED | OUTPATIENT
Start: 2020-01-09 | End: 2020-02-28

## 2020-01-09 ASSESSMENT — ENCOUNTER SYMPTOMS
ABDOMINAL PAIN: 0
EYE PAIN: 0
WHEEZING: 0
NAUSEA: 0
CONSTIPATION: 0
VOMITING: 0
BLOOD IN STOOL: 0

## 2020-01-09 NOTE — PROGRESS NOTES
Rodriguez Gamez 59 y.o. female was seen by LILY Shen on 1/09/2020    Wt Readings from Last 3 Encounters:   01/09/20 232 lb (105.2 kg)   11/14/19 236 lb (107 kg)   09/30/19 226 lb (102.5 kg)       JEREMY Gamez is a pleasant 59 y.o.  female who presents today for follow-up on acid reflux and diarrhea. Her last EGD/colonoscopy were done by Dr. Tha Caal on 1-. Her EGD revealed irregular Z line, mild erythema noted in stomach antrum and normal duodenum. Her gastric biopsy revealed small intestinal transformation compatible with small intestinal metaplasia, no dysplasia, malignancy. Her stomach biopsies were negative for H. Pylori. Her esophageal biopsies revealed no evidence of Valladares's esophagus, virus or dysplasia. Her celiac panel was normal. Her colonoscopy revealed four adenomatous colon polyps that were removed and internal grade 1 hemorrhoids.  Her random colon biopsies revealed normal tissue with no evidence of microscopic colitis.  She reports feeling good. Her typical bowel pattern is two to three times daily with alternating loose stools and soft brown formed stools.  Her loose stools typically occur soon after eating depending on what she eats. She has hard stools once or twice a month. No diarrhea for a few months. She was prescribed [place] has not started.  Pepto bismo constipates her. No current constipation.  No blood in her stools or black tarry stools.  No excess belching or flatulence.  Her appetite is good and weight is stable. No nausea or vomiting. Her heartburn and acid reflux is controlled with Prilosec.  No nocturnal awakenings with acid reflux.  No dysphagia or pain with swallowing.  No abdominal pain.  She has non-worsening mild abdominal bloating.  No family history of stomach or colon cancer.     ROS  Review of Systems   Constitutional: Negative for appetite change, chills, fatigue, fever and unexpected weight change.    HENT: Positive for hearing loss. Negative for ear pain and tinnitus. Eyes: Positive for visual disturbance. Negative for pain. Respiratory: Positive for cough and shortness of breath. Negative for wheezing. Cardiovascular: Negative for chest pain, palpitations and leg swelling. Gastrointestinal: Positive for diarrhea. Negative for abdominal pain, blood in stool, constipation, nausea and vomiting. Endocrine: Negative for cold intolerance and heat intolerance. Genitourinary: Positive for frequency. Negative for dysuria and urgency. Musculoskeletal: Positive for back pain. Negative for myalgias and neck pain. Skin: Negative for color change, pallor and rash. Allergic/Immunologic: Negative for environmental allergies and food allergies. Neurological: Negative for dizziness, seizures and headaches. Hematological: Does not bruise/bleed easily. Psychiatric/Behavioral: Positive for dysphoric mood and sleep disturbance. Negative for suicidal ideas. The patient is nervous/anxious.         Allergies  Allergies   Allergen Reactions    Latex Itching    Iodine Anaphylaxis    Levofloxacin Anaphylaxis    Pcn [Penicillins] Anaphylaxis    Sulfa Antibiotics Anaphylaxis    Morphine Itching    Zofran Itching    Caffeine Palpitations     Doesn't sleep    Volumen [Barium Sulfate] Itching, Nausea And Vomiting and Rash       Medications  Current Outpatient Medications   Medication Sig Dispense Refill    omeprazole (PRILOSEC) 20 MG delayed release capsule Take 1 capsule by mouth daily 30 capsule 3    montelukast (SINGULAIR) 10 MG tablet take 1 tablet by mouth at bedtime 30 tablet 1    buPROPion (WELLBUTRIN XL) 300 MG extended release tablet Take 1 tablet by mouth every morning 90 tablet 1    loratadine (CLARITIN) 10 MG tablet Take 1 tablet by mouth daily 30 tablet 5    busPIRone (BUSPAR) 7.5 MG tablet Take 1 tablet by mouth 2 times daily 60 tablet 2    levothyroxine (SYNTHROID) 125 MCG tablet Take 1 tablet by mouth every morning 30 tablet 2    theophylline (THEODUR) 300 MG extended release tablet Take 1 tablet by mouth 2 times daily 60 tablet 2    vitamin D (ERGOCALCIFEROL) 1.25 MG (56452 UT) CAPS capsule Take 1 capsule by mouth once a week 12 capsule 1    traMADol (ULTRAM) 50 MG tablet Take 50 mg by mouth every 6 hours as needed for Pain.  naproxen (NAPROSYN) 375 MG tablet Take 1 tablet by mouth 2 times daily as needed for Pain 60 tablet 1    WIXELA INHUB 250-50 MCG/DOSE AEPB inhale 1 puff by mouth every 12 hours 1 Inhaler 0    albuterol sulfate HFA (VENTOLIN HFA) 108 (90 Base) MCG/ACT inhaler Inhale 2 puffs into the lungs every 6 hours as needed for Shortness of Breath 1 Inhaler 5    furosemide (LASIX) 20 MG tablet Take 1 tablet by mouth daily (Patient taking differently: Take 20 mg by mouth every morning ) 60 tablet 5    albuterol (PROVENTIL) (2.5 MG/3ML) 0.083% nebulizer solution Take 3 mLs by nebulization 2 times daily as needed for Wheezing or Shortness of Breath 60 each 0    fluticasone (FLONASE) 50 MCG/ACT nasal spray instill 1 spray (NASAL) twice a day  prn  0    cholestyramine (QUESTRAN) 4 g packet Take 1 packet by mouth 2 times daily (Patient not taking: Reported on 1/9/2020) 90 packet 3    Compression Stockings MISC by Does not apply route 20 - 30 mmh Wear Daily Can Take Off At Night  Thigh High 1 each 0     No current facility-administered medications for this visit. Past medical history:   She has a past medical history of Anxiety, Arthritis, Asthma, CAD (coronary artery disease), COPD (chronic obstructive pulmonary disease) ([facility] [address] H/O cardiac catheterization, H/O cardiovascular stress test, H/O Doppler carotid ultrasound, H/O Doppler lower venous ultrasound, H/O echocardiogram, Hepatic steatosis, History of nuclear stress test, Hyperlipidemia, Hypertension, and Hypothyroid. Past surgical history:  She has a past surgical history that includes Hysterectomy;  Carpal tunnel release Hernia: No hernia is present. Musculoskeletal: Normal range of motion. Lymphadenopathy:      Cervical: No cervical adenopathy. Skin:     General: Skin is warm and dry. Neurological:      Mental Status: She is alert and oriented to person, place, and time. Psychiatric:         Mood and Affect: Mood normal.         Office Visit on 11/14/2019   Component Date Value Ref Range Status    TSH Reflex FT4 11/14/2019 2.52  0.27 - 4.20 uIU/mL Final       Assessment    ICD-10-CM    1. Gastroesophageal reflux disease without esophagitis K21.9 omeprazole (PRILOSEC) 20 MG delayed release capsule     CBC Auto Differential     COMPREHENSIVE METABOLIC PANEL     VITAMIN B12 & FOLATE   2. Chronic gastritis without bleeding, unspecified gastritis type K29.50    3. Loose stools R19.5    4. History of colon polyps Z86.010    5. Abdominal bloating R14.0    6. Fatty liver K76.0              Plan  1.  GERD without dysphagia or odynophagia that is controlled with Prilosec. Her EGD revealed no evidence of Valladares's esophagus or esophagitis.  The patient was encouraged to continue taking Prilosec daily for treatment of acid reflux.  The patient was provided with information on acid reflux diet and triggers.  Recommend weight loss and avoidance of foods that trigger. 2.  Chronic gastritis without bleeding. She denies epigastric pain. Her gastric biopsy revealed small intestinal transformation compatible with small intestinal metaplasia with no lesions, dysplasia or malignancy noted. She does not have a family history of gastric cancer and according to the guidelines would recommend repeat EGD in 3 years for surveillance (1-). 3.  Loose stools after eating might be due to post cholecystectomy, irritable bowel syndrome (IBS), or diet related.  She has not had diarrhea for a few months. Recommend probiotics and fiber supplements. The patient had her dietician appointment and is working on improving her diet. 4.  Mild abdominal bloating that is non-worsening might be due to IBS or small bowel bacterial overgrowth.  Recommend avoidance of sugar and inflammatory foods; follow dietician instructions. She has declined the hydrogen breath test.  5.  Fatty liver most likely due to obesity from excess calories.  Her liver enzymes noted on lab work 1-9-2020 are normal.  The patient was provided with information on fatty liver and instructed to lose weight at least 10% of her body weight. 6.  History of adenomatous colon polyps removed from the colon. The patient was provided with information on colon polyps.   7.  The patient was encouraged to follow-up in 3 months.  Recommend repeat EGD/colonoscopy in 3 years.

## 2020-01-10 ASSESSMENT — ENCOUNTER SYMPTOMS
SHORTNESS OF BREATH: 1
BACK PAIN: 1
DIARRHEA: 1
COUGH: 1
COLOR CHANGE: 0

## 2020-01-13 ENCOUNTER — CARE COORDINATION (OUTPATIENT)
Dept: CARE COORDINATION | Age: 65
End: 2020-01-13

## 2020-01-15 ENCOUNTER — PREP FOR PROCEDURE (OUTPATIENT)
Dept: GASTROENTEROLOGY | Age: 65
End: 2020-01-15

## 2020-01-17 RX ORDER — FUROSEMIDE 20 MG/1
20 TABLET ORAL DAILY
Qty: 60 TABLET | Refills: 5 | Status: SHIPPED | OUTPATIENT
Start: 2020-01-17 | End: 2020-03-06 | Stop reason: SDUPTHER

## 2020-01-17 RX ORDER — LORATADINE 10 MG/1
10 TABLET ORAL DAILY
Qty: 30 TABLET | Refills: 5 | Status: SHIPPED | OUTPATIENT
Start: 2020-01-17 | End: 2020-03-06 | Stop reason: SDUPTHER

## 2020-01-20 ENCOUNTER — CARE COORDINATION (OUTPATIENT)
Dept: CARE COORDINATION | Age: 65
End: 2020-01-20

## 2020-01-20 NOTE — CARE COORDINATION
Call to engage pt with Care Coordination. Pt states she is doing ok. She denies having any barriers to care or medications. Pt denies having any health concerns or issues at this time. Pt is declining to engage with ACM. Offered pt to call ACM if she changes her mind or has new concerns or issues. Damian Dumont RN  Ambulatory Care Manager  [phone] office/cell  [phone] fax  [email or web address]

## 2020-01-24 ENCOUNTER — CARE COORDINATION (OUTPATIENT)
Dept: CARE COORDINATION | Age: 65
End: 2020-01-24

## 2020-01-24 NOTE — CARE COORDINATION
Patient called in regards to a letter received from [employer], again states she is doing fine & declines Care Coordination support. Radha Wetzel RN  Ambulatory Care Manager  [phone] office/cell  [phone] fax  [email or web address]

## 2020-01-27 RX ORDER — MONTELUKAST SODIUM 10 MG/1
TABLET ORAL
Qty: 30 TABLET | Refills: 1 | Status: SHIPPED | OUTPATIENT
Start: 2020-01-27 | End: 2020-02-27

## 2020-02-13 ENCOUNTER — OFFICE VISIT (OUTPATIENT)
Dept: FAMILY MEDICINE CLINIC | Age: 65
End: 2020-02-13
Payer: MEDICAID

## 2020-02-13 PROCEDURE — 3017F COLORECTAL CA SCREEN DOC REV: CPT | Performed by: NURSE PRACTITIONER

## 2020-02-13 PROCEDURE — 4040F PNEUMOC VAC/ADMIN/RCVD: CPT | Performed by: NURSE PRACTITIONER

## 2020-02-13 PROCEDURE — 1036F TOBACCO NON-USER: CPT | Performed by: NURSE PRACTITIONER

## 2020-02-13 PROCEDURE — G8427 DOCREV CUR MEDS BY ELIG CLIN: HCPCS | Performed by: NURSE PRACTITIONER

## 2020-02-13 PROCEDURE — 1090F PRES/ABSN URINE INCON ASSESS: CPT | Performed by: NURSE PRACTITIONER

## 2020-02-13 PROCEDURE — 96160 PT-FOCUSED HLTH RISK ASSMT: CPT | Performed by: NURSE PRACTITIONER

## 2020-02-13 PROCEDURE — G8431 POS CLIN DEPRES SCRN F/U DOC: HCPCS | Performed by: NURSE PRACTITIONER

## 2020-02-13 PROCEDURE — 99214 OFFICE O/P EST MOD 30 MIN: CPT | Performed by: NURSE PRACTITIONER

## 2020-02-13 PROCEDURE — G8484 FLU IMMUNIZE NO ADMIN: HCPCS | Performed by: NURSE PRACTITIONER

## 2020-02-13 PROCEDURE — G8417 CALC BMI ABV UP PARAM F/U: HCPCS | Performed by: NURSE PRACTITIONER

## 2020-02-13 PROCEDURE — G8400 PT W/DXA NO RESULTS DOC: HCPCS | Performed by: NURSE PRACTITIONER

## 2020-02-13 PROCEDURE — 1123F ACP DISCUSS/DSCN MKR DOCD: CPT | Performed by: NURSE PRACTITIONER

## 2020-02-13 ASSESSMENT — PATIENT HEALTH QUESTIONNAIRE - PHQ9
1. LITTLE INTEREST OR PLEASURE IN DOING THINGS: 0
5. POOR APPETITE OR OVEREATING: 2
4. FEELING TIRED OR HAVING LITTLE ENERGY: 3
SUM OF ALL RESPONSES TO PHQ QUESTIONS 1-9: 14
SUM OF ALL RESPONSES TO PHQ QUESTIONS 1-9: 14
3. TROUBLE FALLING OR STAYING ASLEEP: 1
9. THOUGHTS THAT YOU WOULD BE BETTER OFF DEAD, OR OF HURTING YOURSELF: 0
6. FEELING BAD ABOUT YOURSELF - OR THAT YOU ARE A FAILURE OR HAVE LET YOURSELF OR YOUR FAMILY DOWN: 3
SUM OF ALL RESPONSES TO PHQ9 QUESTIONS 1 & 2: 3
2. FEELING DOWN, DEPRESSED OR HOPELESS: 3
7. TROUBLE CONCENTRATING ON THINGS, SUCH AS READING THE NEWSPAPER OR WATCHING TELEVISION: 2
8. MOVING OR SPEAKING SO SLOWLY THAT OTHER PEOPLE COULD HAVE NOTICED. OR THE OPPOSITE, BEING SO FIGETY OR RESTLESS THAT YOU HAVE BEEN MOVING AROUND A LOT MORE THAN USUAL: 0
10. IF YOU CHECKED OFF ANY PROBLEMS, HOW DIFFICULT HAVE THESE PROBLEMS MADE IT FOR YOU TO DO YOUR WORK, TAKE CARE OF THINGS AT HOME, OR GET ALONG WITH OTHER PEOPLE: 1

## 2020-02-13 ASSESSMENT — ENCOUNTER SYMPTOMS
NAUSEA: 0
BACK PAIN: 0
VOMITING: 0
ABDOMINAL DISTENTION: 0

## 2020-02-13 NOTE — PROGRESS NOTES
SUBJECTIVE:  Walt Najjar   1955   female   Allergies   Allergen Reactions    Latex Itching    Iodine Anaphylaxis    Levofloxacin Anaphylaxis    Pcn [Penicillins] Anaphylaxis    Sulfa Antibiotics Anaphylaxis    Morphine Itching    Zofran Itching    Caffeine Palpitations     Doesn't sleep    Volumen [Barium Sulfate] Itching, Nausea And Vomiting and Rash       Chief Complaint   Patient presents with    COPD        HPI   Here for recheck of COPD  Had to reschedule her appointment with pulmonology because her daughter had emergency retinal surgery. Refuses flu and pneumonia vaccine  Walks with a cane for support. Past Medical History:   Diagnosis Date    Anxiety 1/28/2019    Arthritis     Asthma     CAD (coronary artery disease)     COPD (chronic obstructive pulmonary disease) ([facility] [address]     H/O cardiac catheterization 09/17/2019     normal coronaries    H/O cardiovascular stress test     H/O Doppler carotid ultrasound 08/07/2019    Mild (0-49%) disease of the Bilateral Internal carotid artery.  H/O Doppler lower venous ultrasound 08/07/2019    Significant reflux noted of the Right GSV. Right GSV too small currently for ablation procedure. Significant reflux noted in the Left GSV at mid thigh . Left GSV difficult to follow between mid thigh and mid calf due to multiple  splits/branches. GSV knee tributary is very superficial.    H/O echocardiogram 08/07/2019    EF 55-60%, Mild MR & TR.    Hepatic steatosis 6/19/2015    History of nuclear stress test 09/05/2019     Mild anterior wall Ischemia of a medium sized territory. Possibility of breast artifact causing the abnormality cannot be excluded.     Hyperlipidemia     Hypertension     Hypothyroid      Social History     Socioeconomic History    Marital status:      Spouse name: Not on file    Number of children: 2    Years of education: 15    Highest education level: GED or equivalent   Occupational History    Occupation: unemployed   Social Needs    Financial resource strain: Not on file    Food insecurity:     Worry: Not on file     Inability: Not on file   [employer] needs:     Medical: Not on file     Non-medical: Not on file   Tobacco Use    Smoking status: Never Smoker    Smokeless tobacco: Never Used   Substance and Sexual Activity    Alcohol use: No    Drug use: No    Sexual activity: Not Currently     Partners: Male     Birth control/protection: Post-menopausal   Lifestyle    Physical activity:     Days per week: Not on file     Minutes per session: Not on file    Stress: Not on file   Relationships    Social connections:     Talks on phone: Not on file     Gets together: Not on file     Attends [employer] service: Not on file     Active member of club or organization: Not on file     Attends meetings of clubs or organizations: Not on file     Relationship status: Not on file    Intimate partner violence:     Fear of current or ex partner: Not on file     Emotionally abused: Not on file     Physically abused: Not on file     Forced sexual activity: Not on file   Other Topics Concern    Not on file   Social History Narrative    Not on file     Family History   Problem Relation Age of Onset    Diabetes Mother     Hypertension Mother     Depression Mother         suicide    Diabetes Father     Depression Father     Diabetes Brother     Birth Defects Maternal Grandmother     Hypertension Maternal Grandmother     Diabetes Sister     Depression Brother     Diabetes Sister     Diabetes Brother     Other Brother         black mold    Diabetes Brother     Other Daughter         GI issues    Asthma Daughter     Seizures Daughter      Past Surgical History:   Procedure Laterality Date    CARPAL TUNNEL RELEASE Left     CHOLECYSTECTOMY      COLONOSCOPY  01/28/2019    Polyps x4, Internal grade 1 hemorrhoids    COLONOSCOPY N/A 1/28/2019    COLONOSCOPY POLYPECTOMY SNARE/COLD BIOPSY performed by Brenna Bedoya Fran Donahue MD at [address], COLON, DIAGNOSTIC  09/03/2019    biopsy f/u with pcp    HYSTERECTOMY      due to bleeding. complete    THYROIDECTOMY  2009    TONSILLECTOMY      TUBAL LIGATION      UPPER GASTROINTESTINAL ENDOSCOPY N/A 9/4/2019    EGD BIOPSY performed by Karina Howard MD at [address] Left         Review of Systems   Constitutional: Negative for fatigue and unexpected weight change. HENT: Negative for congestion. Respiratory: Positive for shortness of breath. Exertional dyspnea   Cardiovascular: Negative for chest pain, palpitations and leg swelling. Gastrointestinal: Negative for abdominal distention, nausea and vomiting. Musculoskeletal: Positive for arthralgias and gait problem. Negative for back pain. Walks with a cane   Neurological: Negative for dizziness, weakness and headaches. Psychiatric/Behavioral: Positive for dysphoric mood. Negative for agitation, sleep disturbance and suicidal ideas. The patient is not nervous/anxious. OBJECTIVE:  /78 (Site: Left Upper Arm, Position: Sitting, Cuff Size: Large Adult)   Pulse 71   Ht 5' 1\" (1.549 m)   Wt 236 lb (107 kg)   LMP  (LMP Unknown)   SpO2 95%   BMI 44.59 kg/m²   BP Readings from Last 3 Encounters:   02/13/20 126/78   01/09/20 128/82   11/14/19 128/76     Wt Readings from Last 3 Encounters:   02/13/20 236 lb (107 kg)   01/09/20 232 lb (105.2 kg)   11/14/19 236 lb (107 kg)     Body mass index is 44.59 kg/m². Physical Exam  Vitals signs and nursing note reviewed. Constitutional:       Appearance: Normal appearance. She is well-developed. She is obese. Comments: Morbid obesity   HENT:      Head: Normocephalic and atraumatic.       Right Ear: External ear normal.      Left Ear: External ear normal.      Nose: Nose normal.      Mouth/Throat:      Mouth: Mucous membranes are moist.      Comments: edentulous  Eyes:      Conjunctiva/sclera: Conjunctivae normal.  buPROPion (WELLBUTRIN XL) 300 MG extended release tablet Take 1 tablet by mouth every morning 90 tablet 1    busPIRone (BUSPAR) 7.5 MG tablet Take 1 tablet by mouth 2 times daily 60 tablet 2    levothyroxine (SYNTHROID) 125 MCG tablet Take 1 tablet by mouth every morning 30 tablet 2    theophylline (THEODUR) 300 MG extended release tablet Take 1 tablet by mouth 2 times daily 60 tablet 2    vitamin D (ERGOCALCIFEROL) 1.25 MG (57952 UT) CAPS capsule Take 1 capsule by mouth once a week 12 capsule 1    cholestyramine (QUESTRAN) 4 g packet Take 1 packet by mouth 2 times daily 90 packet 3    Compression Stockings MISC by Does not apply route 20 - 30 mmh Wear Daily Can Take Off At Night  Thigh High 1 each 0    naproxen (NAPROSYN) 375 MG tablet Take 1 tablet by mouth 2 times daily as needed for Pain 60 tablet 1    WIXELA INHUB 250-50 MCG/DOSE AEPB inhale 1 puff by mouth every 12 hours 1 Inhaler 0    albuterol sulfate HFA (VENTOLIN HFA) 108 (90 Base) MCG/ACT inhaler Inhale 2 puffs into the lungs every 6 hours as needed for Shortness of Breath 1 Inhaler 5    albuterol (PROVENTIL) (2.5 MG/3ML) 0.083% nebulizer solution Take 3 mLs by nebulization 2 times daily as needed for Wheezing or Shortness of Breath 60 each 0    fluticasone (FLONASE) 50 MCG/ACT nasal spray instill 1 spray (NASAL) twice a day  prn  0     No current facility-administered medications for this visit. Return in about 3 months (around 5/13/2020). Bella Mcbride DNP, FNP-C    Return for new or worsening symptoms or any concerns as needed. On the basis of positive falls risk screening, assessment and plan is as follows: home safety tips provided. On the basis of positive PHQ-9 screening (PHQ-9 Total Score: 14), the following plan was implemented: exercise program recommended for stress management. Patient will follow-up in 3 month(s) with PCP.

## 2020-02-13 NOTE — PATIENT INSTRUCTIONS
I am committed to providing you the best care possible. If you receive a survey after visiting our office, please take time to share your experience concerning your office visit. These surveys are confidential and no health information about you is shared. I am eager to improve for you and I am counting on your feedback to help make that happen.       PLEASE BRING YOUR BOTTLES TO ALL OF YOUR APPOINTMENTS

## 2020-02-14 VITALS
SYSTOLIC BLOOD PRESSURE: 126 MMHG | WEIGHT: 236 LBS | RESPIRATION RATE: 16 BRPM | HEIGHT: 61 IN | OXYGEN SATURATION: 95 % | DIASTOLIC BLOOD PRESSURE: 78 MMHG | BODY MASS INDEX: 44.56 KG/M2 | HEART RATE: 71 BPM

## 2020-02-14 ASSESSMENT — ENCOUNTER SYMPTOMS: SHORTNESS OF BREATH: 1

## 2020-02-16 ENCOUNTER — APPOINTMENT (OUTPATIENT)
Dept: CT IMAGING | Age: 65
End: 2020-02-16
Payer: MEDICAID

## 2020-02-16 ENCOUNTER — HOSPITAL ENCOUNTER (EMERGENCY)
Age: 65
Discharge: HOME | End: 2020-02-16
Attending: EMERGENCY MEDICINE
Payer: MEDICAID

## 2020-02-16 VITALS
HEIGHT: 61 IN | BODY MASS INDEX: 43.8 KG/M2 | TEMPERATURE: 97.9 F | RESPIRATION RATE: 18 BRPM | DIASTOLIC BLOOD PRESSURE: 86 MMHG | HEART RATE: 66 BPM | SYSTOLIC BLOOD PRESSURE: 107 MMHG | OXYGEN SATURATION: 97 % | WEIGHT: 232 LBS

## 2020-02-16 LAB
ALBUMIN SERPL-MCNC: 4.1 GM/DL (ref 3.4–5)
ALP BLD-CCNC: 129 IU/L (ref 40–129)
ALT SERPL-CCNC: 17 U/L (ref 10–40)
ANION GAP SERPL CALCULATED.3IONS-SCNC: 14 MMOL/L (ref 4–16)
AST SERPL-CCNC: 19 IU/L (ref 15–37)
BACTERIA: NEGATIVE /HPF
BASOPHILS ABSOLUTE: 0.1 K/CU MM
BASOPHILS RELATIVE PERCENT: 0.9 % (ref 0–1)
BILIRUB SERPL-MCNC: 0.3 MG/DL (ref 0–1)
BILIRUBIN URINE: NEGATIVE MG/DL
BLOOD, URINE: NEGATIVE
BUN BLDV-MCNC: 14 MG/DL (ref 6–23)
CALCIUM SERPL-MCNC: 9.7 MG/DL (ref 8.3–10.6)
CHLORIDE BLD-SCNC: 104 MMOL/L (ref 99–110)
CLARITY: CLEAR
CO2: 24 MMOL/L (ref 21–32)
COLOR: ABNORMAL
CREAT SERPL-MCNC: 0.7 MG/DL (ref 0.6–1.1)
DIFFERENTIAL TYPE: ABNORMAL
EOSINOPHILS ABSOLUTE: 0.2 K/CU MM
EOSINOPHILS RELATIVE PERCENT: 2.1 % (ref 0–3)
GFR AFRICAN AMERICAN: >60 ML/MIN/1.73M2
GFR NON-AFRICAN AMERICAN: >60 ML/MIN/1.73M2
GLUCOSE BLD-MCNC: 97 MG/DL (ref 70–99)
GLUCOSE, URINE: NEGATIVE MG/DL
HCT VFR BLD CALC: 48.8 % (ref 37–47)
HEMOGLOBIN: 15.5 GM/DL (ref 12.5–16)
IMMATURE NEUTROPHIL %: 0.4 % (ref 0–0.43)
KETONES, URINE: NEGATIVE MG/DL
LEUKOCYTE ESTERASE, URINE: ABNORMAL
LIPASE: 13 IU/L (ref 13–60)
LYMPHOCYTES ABSOLUTE: 3 K/CU MM
LYMPHOCYTES RELATIVE PERCENT: 35 % (ref 24–44)
MCH RBC QN AUTO: 30.3 PG (ref 27–31)
MCHC RBC AUTO-ENTMCNC: 31.8 % (ref 32–36)
MCV RBC AUTO: 95.5 FL (ref 78–100)
MONOCYTES ABSOLUTE: 0.5 K/CU MM
MONOCYTES RELATIVE PERCENT: 5.9 % (ref 0–4)
NITRITE URINE, QUANTITATIVE: NEGATIVE
NUCLEATED RBC %: 0 %
PDW BLD-RTO: 13.2 % (ref 11.7–14.9)
PH, URINE: 8 (ref 5–8)
PLATELET # BLD: 251 K/CU MM (ref 140–440)
PMV BLD AUTO: 9.6 FL (ref 7.5–11.1)
POTASSIUM SERPL-SCNC: 3.8 MMOL/L (ref 3.5–5.1)
PROTEIN UA: NEGATIVE MG/DL
RBC # BLD: 5.11 M/CU MM (ref 4.2–5.4)
RBC URINE: 2 /HPF (ref 0–6)
SEGMENTED NEUTROPHILS ABSOLUTE COUNT: 4.8 K/CU MM
SEGMENTED NEUTROPHILS RELATIVE PERCENT: 55.7 % (ref 36–66)
SODIUM BLD-SCNC: 142 MMOL/L (ref 135–145)
SPECIFIC GRAVITY UA: 1.01 (ref 1–1.03)
SQUAMOUS EPITHELIAL: 1 /HPF
TOTAL IMMATURE NEUTOROPHIL: 0.03 K/CU MM
TOTAL NUCLEATED RBC: 0 K/CU MM
TOTAL PROTEIN: 7.2 GM/DL (ref 6.4–8.2)
TRICHOMONAS: ABNORMAL /HPF
UROBILINOGEN, URINE: NORMAL MG/DL (ref 0.2–1)
WBC # BLD: 8.5 K/CU MM (ref 4–10.5)
WBC UA: 2 /HPF (ref 0–5)

## 2020-02-16 PROCEDURE — 6360000002 HC RX W HCPCS: Performed by: EMERGENCY MEDICINE

## 2020-02-16 PROCEDURE — 96374 THER/PROPH/DIAG INJ IV PUSH: CPT

## 2020-02-16 PROCEDURE — 87086 URINE CULTURE/COLONY COUNT: CPT

## 2020-02-16 PROCEDURE — 85025 COMPLETE CBC W/AUTO DIFF WBC: CPT

## 2020-02-16 PROCEDURE — 74176 CT ABD & PELVIS W/O CONTRAST: CPT

## 2020-02-16 PROCEDURE — 83690 ASSAY OF LIPASE: CPT

## 2020-02-16 PROCEDURE — 80053 COMPREHEN METABOLIC PANEL: CPT

## 2020-02-16 PROCEDURE — 99284 EMERGENCY DEPT VISIT MOD MDM: CPT

## 2020-02-16 PROCEDURE — 81001 URINALYSIS AUTO W/SCOPE: CPT

## 2020-02-16 RX ORDER — LIDOCAINE 4 G/G
1 PATCH TOPICAL ONCE
Status: DISCONTINUED | OUTPATIENT
Start: 2020-02-16 | End: 2020-02-16

## 2020-02-16 RX ORDER — KETOROLAC TROMETHAMINE 30 MG/ML
15 INJECTION, SOLUTION INTRAMUSCULAR; INTRAVENOUS ONCE
Status: COMPLETED | OUTPATIENT
Start: 2020-02-16 | End: 2020-02-16

## 2020-02-16 RX ORDER — IBUPROFEN 600 MG/1
600 TABLET ORAL EVERY 6 HOURS PRN
Qty: 30 TABLET | Refills: 0 | Status: SHIPPED | OUTPATIENT
Start: 2020-02-16 | End: 2020-05-28

## 2020-02-16 RX ADMIN — KETOROLAC TROMETHAMINE 15 MG: 30 INJECTION, SOLUTION INTRAMUSCULAR; INTRAVENOUS at 12:30

## 2020-02-16 ASSESSMENT — ENCOUNTER SYMPTOMS
SHORTNESS OF BREATH: 0
EYE REDNESS: 0
SORE THROAT: 0
CONSTIPATION: 0
COUGH: 0
ABDOMINAL PAIN: 1
VOMITING: 0
BACK PAIN: 1
RHINORRHEA: 0
DIARRHEA: 0
NAUSEA: 1

## 2020-02-16 ASSESSMENT — PAIN SCALES - GENERAL
PAINLEVEL_OUTOF10: 10
PAINLEVEL_OUTOF10: 10

## 2020-02-16 ASSESSMENT — PAIN DESCRIPTION - LOCATION: LOCATION: ABDOMEN

## 2020-02-16 ASSESSMENT — PAIN DESCRIPTION - FREQUENCY: FREQUENCY: CONTINUOUS

## 2020-02-16 ASSESSMENT — PAIN DESCRIPTION - DESCRIPTORS: DESCRIPTORS: JABBING

## 2020-02-16 ASSESSMENT — PAIN DESCRIPTION - PAIN TYPE: TYPE: ACUTE PAIN

## 2020-02-16 ASSESSMENT — PAIN DESCRIPTION - ORIENTATION: ORIENTATION: RIGHT;LOWER

## 2020-02-16 ASSESSMENT — PAIN DESCRIPTION - ONSET: ONSET: SUDDEN

## 2020-02-16 NOTE — ED PROVIDER NOTES
Highest education level: GED or equivalent   Occupational History    Occupation: unemployed   Social Needs    Financial resource strain: Not on file    Food insecurity:     Worry: Not on file     Inability: Not on file   [employer] needs:     Medical: Not on file     Non-medical: Not on file   Tobacco Use    Smoking status: Never Smoker    Smokeless tobacco: Never Used   Substance and Sexual Activity    Alcohol use: No    Drug use: No    Sexual activity: Not Currently     Partners: Male     Birth control/protection: Post-menopausal   Lifestyle    Physical activity:     Days per week: Not on file     Minutes per session: Not on file    Stress: Not on file   Relationships    Social connections:     Talks on phone: Not on file     Gets together: Not on file     Attends [employer] service: Not on file     Active member of club or organization: Not on file     Attends meetings of clubs or organizations: Not on file     Relationship status: Not on file    Intimate partner violence:     Fear of current or ex partner: Not on file     Emotionally abused: Not on file     Physically abused: Not on file     Forced sexual activity: Not on file   Other Topics Concern    Not on file   Social History Narrative    Not on file     No current facility-administered medications for this encounter.       Current Outpatient Medications   Medication Sig Dispense Refill    ibuprofen (ADVIL;MOTRIN) 600 MG tablet Take 1 tablet by mouth every 6 hours as needed for Pain 30 tablet 0    montelukast (SINGULAIR) 10 MG tablet take 1 tablet by mouth at bedtime 30 tablet 1    loratadine (CLARITIN) 10 MG tablet Take 1 tablet by mouth daily 30 tablet 5    furosemide (LASIX) 20 MG tablet Take 1 tablet by mouth daily 60 tablet 5    omeprazole (PRILOSEC) 20 MG delayed release capsule Take 1 capsule by mouth daily 30 capsule 3    buPROPion (WELLBUTRIN XL) 300 MG extended release tablet Take 1 tablet by mouth every morning 90 tablet 1  busPIRone (BUSPAR) 7.5 MG tablet Take 1 tablet by mouth 2 times daily 60 tablet 2    levothyroxine (SYNTHROID) 125 MCG tablet Take 1 tablet by mouth every morning 30 tablet 2    theophylline (THEODUR) 300 MG extended release tablet Take 1 tablet by mouth 2 times daily 60 tablet 2    vitamin D (ERGOCALCIFEROL) 1.25 MG (07515 UT) CAPS capsule Take 1 capsule by mouth once a week 12 capsule 1    cholestyramine (QUESTRAN) 4 g packet Take 1 packet by mouth 2 times daily 90 packet 3    Compression Stockings MISC by Does not apply route 20 - 30 mmh Wear Daily Can Take Off At Night  Thigh High 1 each 0    naproxen (NAPROSYN) 375 MG tablet Take 1 tablet by mouth 2 times daily as needed for Pain 60 tablet 1    WIXELA INHUB 250-50 MCG/DOSE AEPB inhale 1 puff by mouth every 12 hours 1 Inhaler 0    albuterol sulfate HFA (VENTOLIN HFA) 108 (90 Base) MCG/ACT inhaler Inhale 2 puffs into the lungs every 6 hours as needed for Shortness of Breath 1 Inhaler 5    albuterol (PROVENTIL) (2.5 MG/3ML) 0.083% nebulizer solution Take 3 mLs by nebulization 2 times daily as needed for Wheezing or Shortness of Breath 60 each 0    fluticasone (FLONASE) 50 MCG/ACT nasal spray instill 1 spray (NASAL) twice a day  prn  0     Allergies   Allergen Reactions    Latex Itching    Iodine Anaphylaxis    Levofloxacin Anaphylaxis    Pcn [Penicillins] Anaphylaxis    Sulfa Antibiotics Anaphylaxis    Morphine Itching    Zofran Itching    Caffeine Palpitations     Doesn't sleep    Lidocaine Nausea And Vomiting    Volumen [Barium Sulfate] Itching, Nausea And Vomiting and Rash       Nursing Notes Reviewed     Physical Exam:   ED Triage Vitals [02/16/20 0946]   Enc Vitals Group      /86      Pulse 66      Resp 18      Temp 97.9 °F (36.6 °C)      Temp Source Oral      SpO2 97 %      Weight 232 lb (105.2 kg)      Height 5' 1\" (1.549 m)      Head Circumference       Peak Flow       Pain Score       Pain Loc       Pain Edu? Excl.  in DIFFERENTIAL     Segs Relative 55.7 36 - 66 %    Lymphocytes % 35.0 24 - 44 %    Monocytes % 5.9 (H) 0 - 4 %    Eosinophils % 2.1 0 - 3 %    Basophils % 0.9 0 - 1 %    Segs Absolute 4.8 K/CU MM    Lymphocytes Absolute 3.0 K/CU MM    Monocytes Absolute 0.5 K/CU MM    Eosinophils Absolute 0.2 K/CU MM    Basophils Absolute 0.1 K/CU MM    Nucleated RBC % 0.0 %    Total Nucleated RBC 0.0 K/CU MM    Total Immature Neutrophil 0.03 K/CU MM    Immature Neutrophil % 0.4 0 - 0.43 %   Comprehensive Metabolic Panel w/ Reflex to MG   Result Value Ref Range    Sodium 142 135 - 145 MMOL/L    Potassium 3.8 3.5 - 5.1 MMOL/L    Chloride 104 99 - 110 mMol/L    CO2 24 21 - 32 MMOL/L    BUN 14 6 - 23 MG/DL    CREATININE 0.7 0.6 - 1.1 MG/DL    Glucose 97 70 - 99 MG/DL    Calcium 9.7 8.3 - 10.6 MG/DL    Alb 4.1 3.4 - 5.0 GM/DL    Total Protein 7.2 6.4 - 8.2 GM/DL    Total Bilirubin 0.3 0.0 - 1.0 MG/DL    ALT 17 10 - 40 U/L    AST 19 15 - 37 IU/L    Alkaline Phosphatase 129 40 - 129 IU/L    GFR Non-African American >60 >60 mL/min/1.73m2    GFR African American >60 >60 mL/min/1.73m2    Anion Gap 14 4 - 16   Lipase   Result Value Ref Range    Lipase 13 13 - 60 IU/L   Urinalysis, reflex to microscopic   Result Value Ref Range    Color, UA STRAW (A) YELLOW    Clarity, UA CLEAR CLEAR    Glucose, Urine NEGATIVE NEGATIVE MG/DL    Bilirubin Urine NEGATIVE NEGATIVE MG/DL    Ketones, Urine NEGATIVE NEGATIVE MG/DL    Specific Gravity, UA 1.008 1.001 - 1.035    Blood, Urine NEGATIVE NEGATIVE    pH, Urine 8.0 5.0 - 8.0    Protein, UA NEGATIVE NEGATIVE MG/DL    Urobilinogen, Urine NORMAL 0.2 - 1.0 MG/DL    Nitrite Urine, Quantitative NEGATIVE NEGATIVE    Leukocyte Esterase, Urine TRACE (A) NEGATIVE    RBC, UA 2 0 - 6 /HPF    WBC, UA 2 0 - 5 /HPF    Bacteria, UA NEGATIVE NEGATIVE /HPF    Squam Epithel, UA 1 /HPF    Trichomonas, UA NONE SEEN NONE SEEN /HPF      Radiographs (if obtained):  [] The following radiograph was interpreted by myself in the absence of a radiologist:  [x]Radiologist's Report Reviewed:  CT ABDOMEN PELVIS WO CONTRAST Additional Contrast? None   Preliminary Result   1. No acute abdominal or pelvic abnormality on this unenhanced study. 2. Nonobstructing right renal calculi. 3. Normal appendix. EKG (if obtained): (All EKG's are interpreted by myself in the absence of a cardiologist)    MDM:  Differential diagnoses considered include muscle strain, muscle spasm, colitis, urinary tract infection, renal colic, appendicitis. Basic labs were obtained and are unremarkable. Lipase is within normal limits, do not suspect pancreatitis. Patient's urine has trace leukocytes but no bacteria. I do not suspect a urinary tract infection. We will send the urine for culture. CT scan of patient's abdomen shows a nonobstructing right renal calculi no acute intra-abdominal abnormalities. As the calculi is renal and not in the ureter, I do not suspect it is causing patient's current symptoms. No hydronephrosis. No signs of appendicitis. I suspect patient symptoms are likely due to a muscle strain as they are worse with any kind of movement. We will treat her with NSAIDs. She was given a dose of Toradol in the emergency department with moderate improvement in her symptoms. We will discharge her home in stable condition. Recommended close follow-up with her primary care physician. Plan of care explained to patient. Concerning signs and symptoms warranting a return visit to the Emergency Department were explained in detail. All questions and concerns were addressed to the patient's satisfaction. Patient understood and agreed with plan. The likelihood of other entities in the differential is insufficient to justify any further testing for them. This was explained to the patient. The patient was advised that persistent or worsening symptoms would requirefurther evaluation. Clinical Impression:  1.  Acute right-sided low back pain without sciatica    2. Dysuria          Gail Case MD       Please note that portions of this note may have been complete with a voice recognition program.  Effortswere made to edit the dictations, but occasional words are mis-transcribed.           Gail Case MD  02/16/20 4020

## 2020-02-16 NOTE — ED NOTES
Taken to the restroom per TRIXIE Jiang 23 per this nurse.  Complains of increased pain to the right groin when ambulating     Alexandria Rivera RN  02/16/20 2431

## 2020-02-17 LAB
CULTURE: NORMAL
Lab: NORMAL
SPECIMEN: NORMAL

## 2020-02-26 ENCOUNTER — TELEPHONE (OUTPATIENT)
Dept: FAMILY MEDICINE CLINIC | Age: 65
End: 2020-02-26

## 2020-02-26 NOTE — TELEPHONE ENCOUNTER
Pt called and is asking for results from CT at the hospital. She stating something about a hernia.  She would like to know what to do about it please advise

## 2020-02-27 RX ORDER — MONTELUKAST SODIUM 10 MG/1
TABLET ORAL
Qty: 30 TABLET | Refills: 1 | Status: SHIPPED | OUTPATIENT
Start: 2020-02-27 | End: 2020-03-23

## 2020-02-28 ENCOUNTER — OFFICE VISIT (OUTPATIENT)
Dept: SURGERY | Age: 65
End: 2020-02-28
Payer: MEDICAID

## 2020-02-28 ENCOUNTER — HOSPITAL ENCOUNTER (OUTPATIENT)
Age: 65
Setting detail: SPECIMEN
Discharge: HOME | End: 2020-02-28
Payer: MEDICAID

## 2020-02-28 VITALS
SYSTOLIC BLOOD PRESSURE: 108 MMHG | WEIGHT: 277.7 LBS | BODY MASS INDEX: 52.43 KG/M2 | DIASTOLIC BLOOD PRESSURE: 68 MMHG | OXYGEN SATURATION: 98 % | HEIGHT: 61 IN | HEART RATE: 100 BPM

## 2020-02-28 LAB
BACTERIA: NEGATIVE /HPF
BILIRUBIN URINE: NEGATIVE MG/DL
BLOOD, URINE: ABNORMAL
CLARITY: CLEAR
COLOR: YELLOW
GLUCOSE, URINE: NEGATIVE MG/DL
KETONES, URINE: NEGATIVE MG/DL
LEUKOCYTE ESTERASE, URINE: ABNORMAL
MUCUS: ABNORMAL HPF
NITRITE URINE, QUANTITATIVE: NEGATIVE
PH, URINE: 6 (ref 5–8)
PROTEIN UA: NEGATIVE MG/DL
RBC URINE: <1 /HPF (ref 0–6)
SPECIFIC GRAVITY UA: 1.01 (ref 1–1.03)
SQUAMOUS EPITHELIAL: 2 /HPF
TRICHOMONAS: ABNORMAL /HPF
UROBILINOGEN, URINE: NORMAL MG/DL (ref 0.2–1)
WBC UA: <1 /HPF (ref 0–5)

## 2020-02-28 PROCEDURE — 1036F TOBACCO NON-USER: CPT | Performed by: SURGERY

## 2020-02-28 PROCEDURE — 1123F ACP DISCUSS/DSCN MKR DOCD: CPT | Performed by: SURGERY

## 2020-02-28 PROCEDURE — 99214 OFFICE O/P EST MOD 30 MIN: CPT | Performed by: SURGERY

## 2020-02-28 PROCEDURE — G8484 FLU IMMUNIZE NO ADMIN: HCPCS | Performed by: SURGERY

## 2020-02-28 PROCEDURE — 1090F PRES/ABSN URINE INCON ASSESS: CPT | Performed by: SURGERY

## 2020-02-28 PROCEDURE — G8427 DOCREV CUR MEDS BY ELIG CLIN: HCPCS | Performed by: SURGERY

## 2020-02-28 PROCEDURE — 3017F COLORECTAL CA SCREEN DOC REV: CPT | Performed by: SURGERY

## 2020-02-28 PROCEDURE — 81001 URINALYSIS AUTO W/SCOPE: CPT

## 2020-02-28 PROCEDURE — G8417 CALC BMI ABV UP PARAM F/U: HCPCS | Performed by: SURGERY

## 2020-02-28 PROCEDURE — G8400 PT W/DXA NO RESULTS DOC: HCPCS | Performed by: SURGERY

## 2020-02-28 PROCEDURE — 4040F PNEUMOC VAC/ADMIN/RCVD: CPT | Performed by: SURGERY

## 2020-02-28 NOTE — PROGRESS NOTES
GENERAL SURGERY OUTPATIENT HISTORY & PHYSICAL NOTE  [facility]    PATIENT: Isabella Corado 1955, 72 y.o., female  MRN: [id number]    Physician: Adrián Jean MD    Date: 2/28/20    Reason for Evaluation:     Chief Complaint   Patient presents with    Follow-up     Previous EGD 9/1/19, complains of hiatal hernia     Requesting Physician:  Gabino Moffett APRN - CNP     CHIEF COMPLAINT:     Chief Complaint   Patient presents with    Follow-up     Previous EGD 9/1/19, complains of hiatal hernia       History Obtained From:  patient, electronic medical record    HISTORY OF PRESENT ILLNESS:    José Miguel Ray is a 72 y.o. female presenting with right lower quadrant pain and concern for a hernia. She is known to me from previous EGD for acid reflux which showed gastric intestinal metaplasia, and a small hiatal hernia. Biopsies were negative for Valladares's esophagus. She was recommended to repeat the EGD within 3 years for mapping of the intestinal metaplasia. Currently she has been having right lower quadrant pain.    Quality, Severity: moderate  · Pain is described as aching and sharp  Timing, Duration: about 2 weeks, constant  Context, Modifying Factors: denies modifying factors, denies any change with activity or eating  Associated Signs & Symptoms: lower back and right flank pain, thinks she has had kidney stones in the past. No blood in the urine or dysuria    Workup Includes: seen in the ED on 2/16/20, CT scan without acute findings, showed a small hiatal hernia and umbilical hernia, non-obstructing renal stone    Past Medical History:    Past Medical History:   Diagnosis Date    Anxiety 1/28/2019    Arthritis     Asthma     CAD (coronary artery disease)     COPD (chronic obstructive pulmonary disease) ([facility] [address]     H/O cardiac catheterization 09/17/2019     normal coronaries    H/O cardiovascular stress test     H/O Doppler carotid ultrasound 08/07/2019    Mild (0-49%) disease of the control/protection: Post-menopausal   Lifestyle    Physical activity:     Days per week: None     Minutes per session: None    Stress: None   Relationships    Social connections:     Talks on phone: None     Gets together: None     Attends Anabaptism service: None     Active member of club or organization: None     Attends meetings of clubs or organizations: None     Relationship status: None    Intimate partner violence:     Fear of current or ex partner: None     Emotionally abused: None     Physically abused: None     Forced sexual activity: None   Other Topics Concern    None   Social History Narrative    None       Family History:   Family History   Problem Relation Age of Onset    Diabetes Mother     Hypertension Mother     Depression Mother         suicide    Diabetes Father     Depression Father     Diabetes Brother     Birth Defects Maternal Grandmother     Hypertension Maternal Grandmother     Diabetes Sister     Depression Brother     Diabetes Sister     Diabetes Brother     Other Brother         black mold    Diabetes Brother     Other Daughter         GI issues    Asthma Daughter     Seizures Daughter        REVIEW OF SYSTEMS:    Review of Systems   Constitutional: Negative for chills and fever. HENT: Negative for congestion and sore throat. Eyes: Negative for discharge and redness. Respiratory: Positive for shortness of breath (COPD). Negative for chest tightness. Cardiovascular: Negative for chest pain and palpitations. Gastrointestinal: Positive for abdominal pain. Negative for abdominal distention, constipation, diarrhea, nausea and vomiting. Genitourinary: Positive for flank pain and frequency. Negative for dysuria and hematuria. Musculoskeletal: Positive for back pain. Negative for arthralgias and myalgias. Skin: Negative for color change and rash. Neurological: Negative for dizziness, weakness and numbness.    Psychiatric/Behavioral: Negative for confusion. The patient is not nervous/anxious. I have reviewed the patient's information pertinent to this visit, including medical history, family history, social history and review of systems. PHYSICAL EXAM:    Vitals:    02/28/20 1401   BP: 108/68   Site: Right Upper Arm   Position: Standing   Cuff Size: Large Adult   Pulse: 100   SpO2: 98%   Weight: 277 lb 11.2 oz (126 kg)   Height: 5' 1\" (1.549 m)     Physical Exam  Constitutional:       General: She is not in acute distress. Appearance: She is well-developed. She is obese. She is not diaphoretic. HENT:      Head: Normocephalic and atraumatic. Eyes:      General:         Right eye: No discharge. Left eye: No discharge. Pupils: Pupils are equal, round, and reactive to light. Neck:      Musculoskeletal: Neck supple. Trachea: No tracheal deviation. Cardiovascular:      Rate and Rhythm: Normal rate and regular rhythm. Pulmonary:      Effort: Pulmonary effort is normal. No respiratory distress. Breath sounds: No wheezing. Abdominal:      General: There is no distension. Palpations: Abdomen is soft. Tenderness: There is abdominal tenderness (right lower quadrant, beneath pannus fold). There is no guarding or rebound. Hernia: A hernia (small umbilical heria - difficult to appreciate on exam due to body habitus. Nontender.) is present. Comments: Morbidly obese   Musculoskeletal:         General: No tenderness or deformity. Skin:     General: Skin is warm and dry. Findings: No rash. Neurological:      Mental Status: She is alert and oriented to person, place, and time.    Psychiatric:         Behavior: Behavior normal.         DATA:    Lab Results   Component Value Date    WBC 8.5 02/16/2020    HGB 15.5 02/16/2020    HCT 48.8 (H) 02/16/2020     02/16/2020     02/16/2020    K 3.8 02/16/2020     02/16/2020    CO2 24 02/16/2020    BUN 14 02/16/2020    CREATININE 0.7 02/16/2020 GLUCOSE 97 02/16/2020    CALCIUM 9.7 02/16/2020    PROT 7.2 02/16/2020    BILITOT 0.3 02/16/2020    AST 19 02/16/2020    ALT 17 02/16/2020    ALKPHOS 129 02/16/2020    AMYLASE 53 01/16/2019    LIPASE 13 02/16/2020    INR 0.88 09/16/2019    GLUF 106 (H) 09/28/2016    LABA1C 5.5 06/05/2019       Imaging:   Ct Abdomen Pelvis Wo Contrast Additional Contrast? None    Result Date: 2/17/2020  EXAMINATION: CT OF THE ABDOMEN AND PELVIS WITHOUT CONTRAST 2/16/2020 10:27 am TECHNIQUE: CT of the abdomen and pelvis was performed without the administration of intravenous contrast. Multiplanar reformatted images are provided for review. Dose modulation, iterative reconstruction, and/or weight based adjustment of the mA/kV was utilized to reduce the radiation dose to as low as reasonably achievable. COMPARISON: 03/29/2019 HISTORY: ORDERING SYSTEM PROVIDED HISTORY: RLQ pain, lower back pain TECHNOLOGIST PROVIDED HISTORY: Reason for exam:->RLQ pain, lower back pain Additional Contrast?->None Reason for Exam: RLQ pain, lower back pain Acuity: Acute Type of Exam: Initial Additional signs and symptoms: NONE Relevant Medical/Surgical History: ALAN, HYSTER,TUBAL FINDINGS: Lower Chest: Lung bases are clear. Organs: Status post cholecystectomy. The unenhanced liver, spleen, pancreas and adrenal glands are without focal abnormality. No biliary duct dilation. Nonobstructing right renal calculi, the largest measuring up to 4 mm. 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  No hydronephrosis or perinephric stranding. GI/Bowel: No dilated loops of bowel or bowel wall thickening. Scattered colonic diverticula without acute diverticulitis. No free air. Small hiatal hernia. The appendix is normal. Pelvis: Status post hysterectomy. The bladder is not well distended. No pelvic adenopathy or free fluid. Peritoneum/Retroperitoneum: The aorta is normal in caliber.   No retroperitoneal or mesenteric her RLQ and back pain - she has a kidney stone on imaging but it doesn't appear to be obstructing. Will obtain a repeat UA since her UA in the ED showed trace leukocyte esterase but no other signs of infection. If this is negative, suspect the etiology of her symptoms may be musculoskeletal. Will notify her of UA results. Physical therapy referral was offered, but she declined at this time. Recommended follow up with her PCP for further evaluation of musculoskeletal/other etiologies of her pain if it continues. She expressed understanding and agreement with this plan. · She will need follow up for EGD within 2 years as discussed previously for follow up of her gastric metaplasia. She expressed understanding, will follow up for this at a later date - wishes to address her RLQ pain first.   Follow Up: Return if symptoms worsen or fail to improve. Orders Placed This Encounter   Procedures    URINALYSIS WITH MICROSCOPIC        No orders of the defined types were placed in this encounter. ·       E/M Time: 25 min. Greater than 50% of this time was spent in coordination of care for the patient and/or in necessary counseling of the patient or patient's representative for the purpose of medical decision making or education.     Electronically signed by Lulú Stanton MD, 3/1/2020, 9:20 PM

## 2020-03-01 ASSESSMENT — ENCOUNTER SYMPTOMS
EYE REDNESS: 0
CHEST TIGHTNESS: 0
VOMITING: 0
DIARRHEA: 0
NAUSEA: 0
SORE THROAT: 0
ABDOMINAL PAIN: 1
COLOR CHANGE: 0
ABDOMINAL DISTENTION: 0
EYE DISCHARGE: 0
BACK PAIN: 1
SHORTNESS OF BREATH: 1
CONSTIPATION: 0

## 2020-03-03 NOTE — RESULT ENCOUNTER NOTE
Please notify the patient her urinalysis was negative for infection. I recommend follow up with her primary care provider if her urinary symptoms persist. Thanks!

## 2020-03-09 RX ORDER — BUSPIRONE HYDROCHLORIDE 7.5 MG/1
7.5 TABLET ORAL 2 TIMES DAILY
Qty: 60 TABLET | Refills: 2 | Status: SHIPPED | OUTPATIENT
Start: 2020-03-09 | End: 2020-08-10

## 2020-03-09 RX ORDER — THEOPHYLLINE 300 MG/1
300 TABLET, EXTENDED RELEASE ORAL 2 TIMES DAILY
Qty: 60 TABLET | Refills: 2 | Status: SHIPPED | OUTPATIENT
Start: 2020-03-09 | End: 2020-08-10

## 2020-03-09 RX ORDER — FUROSEMIDE 20 MG/1
20 TABLET ORAL DAILY
Qty: 60 TABLET | Refills: 5 | Status: SHIPPED | OUTPATIENT
Start: 2020-03-09 | End: 2020-08-17 | Stop reason: SDUPTHER

## 2020-03-09 RX ORDER — LEVOTHYROXINE SODIUM 0.12 MG/1
125 TABLET ORAL EVERY MORNING
Qty: 30 TABLET | Refills: 2 | Status: SHIPPED | OUTPATIENT
Start: 2020-03-09 | End: 2020-08-10

## 2020-03-09 RX ORDER — LORATADINE 10 MG/1
10 TABLET ORAL DAILY
Qty: 30 TABLET | Refills: 5 | Status: SHIPPED | OUTPATIENT
Start: 2020-03-09 | End: 2020-08-17 | Stop reason: SDUPTHER

## 2020-03-13 ENCOUNTER — HOSPITAL ENCOUNTER (OUTPATIENT)
Dept: WOMENS IMAGING | Age: 65
Discharge: HOME | End: 2020-03-13
Payer: MEDICAID

## 2020-03-13 PROCEDURE — 77080 DXA BONE DENSITY AXIAL: CPT

## 2020-03-13 PROCEDURE — 77067 SCR MAMMO BI INCL CAD: CPT

## 2020-03-23 RX ORDER — MONTELUKAST SODIUM 10 MG/1
TABLET ORAL
Qty: 30 TABLET | Refills: 1 | Status: SHIPPED | OUTPATIENT
Start: 2020-03-23 | End: 2020-07-13

## 2020-03-25 ENCOUNTER — TELEPHONE (OUTPATIENT)
Dept: FAMILY MEDICINE CLINIC | Age: 65
End: 2020-03-25

## 2020-03-25 NOTE — TELEPHONE ENCOUNTER
Pt called informing me  she got a letter from her insurance company stating she needs a letter from you that states she has had her mammo for this year and the date it was done, so she can get a 25.00 gift card. Please advise. Have you ever heard of this ?

## 2020-03-25 NOTE — TELEPHONE ENCOUNTER
No never heard of this, but I will write a letter. Please remind me to print and sign it in the morning.   Thank you

## 2020-03-30 ENCOUNTER — OFFICE VISIT (OUTPATIENT)
Dept: CARDIOLOGY CLINIC | Age: 65
End: 2020-03-30
Payer: MEDICAID

## 2020-03-30 ENCOUNTER — NURSE ONLY (OUTPATIENT)
Dept: CARDIOLOGY CLINIC | Age: 65
End: 2020-03-30
Payer: MEDICAID

## 2020-03-30 VITALS
HEART RATE: 72 BPM | DIASTOLIC BLOOD PRESSURE: 84 MMHG | WEIGHT: 229.4 LBS | HEIGHT: 61 IN | SYSTOLIC BLOOD PRESSURE: 130 MMHG | BODY MASS INDEX: 43.31 KG/M2

## 2020-03-30 PROCEDURE — 1036F TOBACCO NON-USER: CPT | Performed by: INTERNAL MEDICINE

## 2020-03-30 PROCEDURE — 99214 OFFICE O/P EST MOD 30 MIN: CPT | Performed by: INTERNAL MEDICINE

## 2020-03-30 PROCEDURE — 1123F ACP DISCUSS/DSCN MKR DOCD: CPT | Performed by: INTERNAL MEDICINE

## 2020-03-30 PROCEDURE — 93227 XTRNL ECG REC<48 HR R&I: CPT | Performed by: INTERNAL MEDICINE

## 2020-03-30 PROCEDURE — 1090F PRES/ABSN URINE INCON ASSESS: CPT | Performed by: INTERNAL MEDICINE

## 2020-03-30 PROCEDURE — G8484 FLU IMMUNIZE NO ADMIN: HCPCS | Performed by: INTERNAL MEDICINE

## 2020-03-30 PROCEDURE — 3017F COLORECTAL CA SCREEN DOC REV: CPT | Performed by: INTERNAL MEDICINE

## 2020-03-30 PROCEDURE — G8399 PT W/DXA RESULTS DOCUMENT: HCPCS | Performed by: INTERNAL MEDICINE

## 2020-03-30 PROCEDURE — G8428 CUR MEDS NOT DOCUMENT: HCPCS | Performed by: INTERNAL MEDICINE

## 2020-03-30 PROCEDURE — 93225 XTRNL ECG REC<48 HRS REC: CPT | Performed by: INTERNAL MEDICINE

## 2020-03-30 PROCEDURE — 4040F PNEUMOC VAC/ADMIN/RCVD: CPT | Performed by: INTERNAL MEDICINE

## 2020-03-30 PROCEDURE — 93000 ELECTROCARDIOGRAM COMPLETE: CPT | Performed by: INTERNAL MEDICINE

## 2020-03-30 PROCEDURE — G8417 CALC BMI ABV UP PARAM F/U: HCPCS | Performed by: INTERNAL MEDICINE

## 2020-03-30 RX ORDER — ESOMEPRAZOLE MAGNESIUM 40 MG/1
40 CAPSULE, DELAYED RELEASE ORAL
Qty: 30 CAPSULE | Refills: 3 | Status: SHIPPED | OUTPATIENT
Start: 2020-03-30 | End: 2020-04-09 | Stop reason: SDUPTHER

## 2020-03-30 NOTE — PROGRESS NOTES
Shaquille Law MD        OFFICE  FOLLOWUP NOTE    Chief complaints: patient is here for management of chest pain, palpitations, obesity, leg swelling, shortness of breath, HTN, dyslpidemia,sleep apnea, copd    Subjective: + chest pain, no shortness of breath, no dizziness, + palpitations    HPI Eryn Valente is a 72 y. o.year old who  has a past medical history of Anxiety, Arthritis, Asthma, CAD (coronary artery disease), COPD (chronic obstructive pulmonary disease) ([facility] [address] H/O cardiac catheterization, H/O cardiovascular stress test, H/O Doppler carotid ultrasound, H/O Doppler lower venous ultrasound, H/O echocardiogram, Hepatic steatosis, History of nuclear stress test, Hyperlipidemia, Hypertension, and Hypothyroid.  and presents for management of chest pain, palpitations, obesity, leg swelling, shortness of breath, HTN, dyslpidemia,sleep apnea, copd which are well controlled  She wakes up every night and has wheezing and palpitations, it could be acid reflux    Current Outpatient Medications   Medication Sig Dispense Refill    montelukast (SINGULAIR) 10 MG tablet take 1 tablet by mouth at bedtime 30 tablet 1    busPIRone (BUSPAR) 7.5 MG tablet Take 1 tablet by mouth 2 times daily 60 tablet 2    levothyroxine (SYNTHROID) 125 MCG tablet Take 1 tablet by mouth every morning 30 tablet 2    theophylline (THEODUR) 300 MG extended release tablet Take 1 tablet by mouth 2 times daily 60 tablet 2    loratadine (CLARITIN) 10 MG tablet Take 1 tablet by mouth daily 30 tablet 5    furosemide (LASIX) 20 MG tablet Take 1 tablet by mouth daily 60 tablet 5    ibuprofen (ADVIL;MOTRIN) 600 MG tablet Take 1 tablet by mouth every 6 hours as needed for Pain 30 tablet 0    buPROPion (WELLBUTRIN XL) 300 MG extended release tablet Take 1 tablet by mouth every morning 90 tablet 1    vitamin D (ERGOCALCIFEROL) 1.25 MG (11504 UT) CAPS capsule Take 1 capsule by mouth once a week 12 capsule 1    cholestyramine (QUESTRAN) 4 g

## 2020-03-30 NOTE — PROGRESS NOTES
Applied @ 3:07 pm , 24 hr holter w/monitor# [id number] for Dx of Tachycardia. Educated pt on proper holter usage; how to keep sx diary; & when to bring monitor back to office. Pt voiced understanding. Holter order,including monitor & card#, & time started, to front nurse's station in 's in-box.

## 2020-04-09 ENCOUNTER — VIRTUAL VISIT (OUTPATIENT)
Dept: GASTROENTEROLOGY | Age: 65
End: 2020-04-09
Payer: MEDICAID

## 2020-04-09 ENCOUNTER — TELEPHONE (OUTPATIENT)
Dept: GASTROENTEROLOGY | Age: 65
End: 2020-04-09

## 2020-04-09 PROCEDURE — 4040F PNEUMOC VAC/ADMIN/RCVD: CPT | Performed by: NURSE PRACTITIONER

## 2020-04-09 PROCEDURE — 1123F ACP DISCUSS/DSCN MKR DOCD: CPT | Performed by: NURSE PRACTITIONER

## 2020-04-09 PROCEDURE — 99442 PR PHYS/QHP TELEPHONE EVALUATION 11-20 MIN: CPT | Performed by: NURSE PRACTITIONER

## 2020-04-09 PROCEDURE — 3017F COLORECTAL CA SCREEN DOC REV: CPT | Performed by: NURSE PRACTITIONER

## 2020-04-09 RX ORDER — CHOLESTYRAMINE 4 G/9G
1 POWDER, FOR SUSPENSION ORAL 2 TIMES DAILY
Qty: 90 PACKET | Refills: 3 | Status: SHIPPED | OUTPATIENT
Start: 2020-04-09 | End: 2020-05-28

## 2020-04-09 RX ORDER — ESOMEPRAZOLE MAGNESIUM 40 MG/1
40 CAPSULE, DELAYED RELEASE ORAL
Qty: 30 CAPSULE | Refills: 3 | Status: SHIPPED | OUTPATIENT
Start: 2020-04-09 | End: 2020-05-28

## 2020-04-09 NOTE — PROGRESS NOTES
unexpected weight change. HENT: Positive for hearing loss. Negative for ear pain and tinnitus. Eyes: Positive for visual disturbance. Negative for pain. Respiratory: Positive for cough and shortness of breath. Negative for wheezing. Cardiovascular: Negative for chest pain, palpitations and leg swelling. Gastrointestinal: Positive for diarrhea. Negative for abdominal pain, blood in stool, constipation, nausea and vomiting. Endocrine: Negative for cold intolerance and heat intolerance. Genitourinary: Positive for frequency. Negative for dysuria and urgency. Musculoskeletal: Positive for back pain. Negative for myalgias and neck pain. Skin: Negative for color change, pallor and rash. Allergic/Immunologic: Negative for environmental allergies and food allergies. Neurological: Negative for dizziness, seizures and headaches. Hematological: Does not bruise/bleed easily. Psychiatric/Behavioral: Positive for dysphoric mood and sleep disturbance. Negative for suicidal ideas. The patient is nervous/anxious.       Physical Exam  Unable to obtain vitals signs due to telephone visit. Constitutional:    General: She is not in acute distress.  HENT:      Head: Normocephalic and atraumatic.      Musculoskeletal: Normal range of motion.    Pulmonary:      Effort: Pulmonary effort is normal. No respiratory distress. Musculoskeletal: Normal range of motion.   Neurological:      Mental Status: She is alert and oriented to person, place, and time. Psychiatric: Suzy Go and Affect: Mood normal       I affirm this is a Patient Initiated Episode with an Established Patient who has not had a related appointment within my department in the past 7 days or scheduled within the next 24 hours.     Total Time: 15 minutes    Plan  1.  GERD without dysphagia or odynophagia that is controlled with Nexium.  Her EGD revealed no evidence of Valladares's esophagus or esophagitis.  The patient was encouraged to continue taking Nexium daily for treatment of acid reflux. Recommend weight loss, avoid eating late at night and avoidance of foods that trigger. 2.  Chronic gastritis without bleeding.  She denies epigastric pain. Her gastric biopsy revealed small intestinal transformation compatible with small intestinal metaplasia with no lesions, dysplasia or malignancy noted. Gilbert Dimas does not have a family history of gastric cancer and according to the guidelines would recommend repeat EGD in 2 years for surveillance (1-). 3.  Loose stools after eating might be due to post cholecystectomy, irritable bowel syndrome (IBS), or diet related.  Recommend starting Questran, probiotics and fiber supplements.  Recommend following dietician recommendations. 4.  Mild abdominal bloating that is non-worsening might be due to IBS or small bowel bacterial overgrowth.  Recommend avoidance of sugar and inflammatory foods; follow dietician instructions. She has declined the hydrogen breath test.  5.  Fatty liver most likely due to obesity from excess calories.  No evidence of diabetes, she does have BMI 43.3. Her liver enzymes noted on lab work 2- revealed normal liver enzymes. The patient was provided with information on fatty liver and instructed to lose weight at least 10% of her body weight.   6.  The patient was encouraged to follow-up in 3 months.  Recommend repeat colonoscopy in 3 years.                Thierry Reyes

## 2020-04-13 ENCOUNTER — VIRTUAL VISIT (OUTPATIENT)
Dept: CARDIOLOGY CLINIC | Age: 65
End: 2020-04-13
Payer: MEDICAID

## 2020-04-13 ENCOUNTER — TELEPHONE (OUTPATIENT)
Dept: CARDIOLOGY CLINIC | Age: 65
End: 2020-04-13

## 2020-04-13 PROCEDURE — 99441 PR PHYS/QHP TELEPHONE EVALUATION 5-10 MIN: CPT | Performed by: INTERNAL MEDICINE

## 2020-04-13 NOTE — PROGRESS NOTES
Terrance Copeland is a 72 y.o. female evaluated via telephone on 4/13/2020. Consent:  She and/or health care decision maker is aware that that she may receive a bill for this telephone service, depending on her insurance coverage, and has provided verbal consent to proceed: Yes      Documentation:  I communicated with the patient  Details of this discussion including any medical advice provided      I affirm this is a Patient Initiated Episode with an Established Patient who has not had a related appointment within my department in the past 7 days or scheduled within the next 24 hours. Total Time: minutes: 5-10 minutes    Note: not billable if this call serves to triage the patient into an appointment for the relevant concern      Boblorraine Shay Lalith[address]     . Chava Page MD    TELEHEALTH EVALUATION -- Audio (During BZBIW-13 public health emergency)      Chief complaints: patient is here for management of chest pain, palpitations, obesity, leg swelling, shortness of breath, HTN, dyslpidemia,sleep apnea, copd    holter monitor review with symptoms of palpitations    Subjective: + palpitations    JEREMY Rae is a 72 y. o.year old who  has a past medical history of Anxiety, Arthritis, Asthma, CAD (coronary artery disease), COPD (chronic obstructive pulmonary disease) ([address] H/O cardiac catheterization, H/O cardiovascular stress test, H/O Doppler carotid ultrasound, H/O Doppler lower venous ultrasound, H/O echocardiogram, Hepatic steatosis, History of nuclear stress test, Hyperlipidemia, Hypertension, and Hypothyroid. and presents for management of chest pain, palpitations, obesity, leg swelling, shortness of breath, HTN, dyslpidemia,sleep apnea, copd which are well controlled, she had holter monitor which showed sinus tachycardia with symptoms of palpitations.         Current Outpatient Medications   Medication Sig Dispense Refill    esomeprazole (NEXIUM) 40 MG delayed release capsule Take 1 capsule by mouth every morning (before breakfast) 30 capsule 3    cholestyramine (QUESTRAN) 4 g packet Take 1 packet by mouth 2 times daily 90 packet 3    montelukast (SINGULAIR) 10 MG tablet take 1 tablet by mouth at bedtime 30 tablet 1    busPIRone (BUSPAR) 7.5 MG tablet Take 1 tablet by mouth 2 times daily 60 tablet 2    levothyroxine (SYNTHROID) 125 MCG tablet Take 1 tablet by mouth every morning 30 tablet 2    theophylline (THEODUR) 300 MG extended release tablet Take 1 tablet by mouth 2 times daily 60 tablet 2    loratadine (CLARITIN) 10 MG tablet Take 1 tablet by mouth daily 30 tablet 5    furosemide (LASIX) 20 MG tablet Take 1 tablet by mouth daily 60 tablet 5    ibuprofen (ADVIL;MOTRIN) 600 MG tablet Take 1 tablet by mouth every 6 hours as needed for Pain 30 tablet 0    buPROPion (WELLBUTRIN XL) 300 MG extended release tablet Take 1 tablet by mouth every morning 90 tablet 1    vitamin D (ERGOCALCIFEROL) 1.25 MG (77960 UT) CAPS capsule Take 1 capsule by mouth once a week 12 capsule 1    naproxen (NAPROSYN) 375 MG tablet Take 1 tablet by mouth 2 times daily as needed for Pain 60 tablet 1    WIXELA INHUB 250-50 MCG/DOSE AEPB inhale 1 puff by mouth every 12 hours 1 Inhaler 0    albuterol sulfate HFA (VENTOLIN HFA) 108 (90 Base) MCG/ACT inhaler Inhale 2 puffs into the lungs every 6 hours as needed for Shortness of Breath 1 Inhaler 5    albuterol (PROVENTIL) (2.5 MG/3ML) 0.083% nebulizer solution Take 3 mLs by nebulization 2 times daily as needed for Wheezing or Shortness of Breath 60 each 0    fluticasone (FLONASE) 50 MCG/ACT nasal spray instill 1 spray (NASAL) twice a day  prn  0     No current facility-administered medications for this visit. Allergies: Latex; Iodine; Levofloxacin; Pcn [penicillins]; Sulfa antibiotics; Morphine; Zofran; Caffeine;  Lidocaine; and Volumen [barium sulfate]  Past Medical History:   Diagnosis Date    Anxiety 1/28/2019    Arthritis     Asthma     CAD (coronary

## 2020-04-13 NOTE — TELEPHONE ENCOUNTER
Called patient seeing if she could do a VV today for an appointment to go over her results. Patient stated she has tried doing the video visits and it did not help her and would just like to come in to make It easier on her. I confirmed the time of her appointment. Patient verbalized understanding.

## 2020-04-13 NOTE — TELEPHONE ENCOUNTER
PA approved for Nexium with Edson Reza. Called and informed Herbert Barnes that Edson Reza approved requested Nexium. Called and informed [facility] that PA was approved.

## 2020-04-13 NOTE — TELEPHONE ENCOUNTER
called patient back to change appointment to a televisit per francoise. Patient verbalized and understanding.

## 2020-04-13 NOTE — LETTER
Catia Olson  1955  [id number]    Have you had any Chest Pain - No not right at the moment     Have you had any Shortness of Breath - No    Have you had any dizziness - No    Have you had any palpitations - No      Do you have any edema - No    Do you have a surgery or procedure scheduled in the near future - No  If Yes- DO EKG

## 2020-04-28 ENCOUNTER — VIRTUAL VISIT (OUTPATIENT)
Dept: FAMILY MEDICINE CLINIC | Age: 65
End: 2020-04-28
Payer: MEDICAID

## 2020-04-28 PROCEDURE — 99212 OFFICE O/P EST SF 10 MIN: CPT | Performed by: NURSE PRACTITIONER

## 2020-04-28 ASSESSMENT — PATIENT HEALTH QUESTIONNAIRE - PHQ9
SUM OF ALL RESPONSES TO PHQ QUESTIONS 1-9: 0
SUM OF ALL RESPONSES TO PHQ QUESTIONS 1-9: 0
1. LITTLE INTEREST OR PLEASURE IN DOING THINGS: 0
2. FEELING DOWN, DEPRESSED OR HOPELESS: 0
SUM OF ALL RESPONSES TO PHQ9 QUESTIONS 1 & 2: 0

## 2020-05-14 ENCOUNTER — OFFICE VISIT (OUTPATIENT)
Dept: FAMILY MEDICINE CLINIC | Age: 65
End: 2020-05-14
Payer: MEDICAID

## 2020-05-14 VITALS
BODY MASS INDEX: 43.46 KG/M2 | HEART RATE: 79 BPM | OXYGEN SATURATION: 97 % | HEIGHT: 61 IN | SYSTOLIC BLOOD PRESSURE: 134 MMHG | DIASTOLIC BLOOD PRESSURE: 72 MMHG | WEIGHT: 230.2 LBS | TEMPERATURE: 97.3 F

## 2020-05-14 PROBLEM — R19.7 DIARRHEA OF PRESUMED INFECTIOUS ORIGIN: Status: RESOLVED | Noted: 2019-01-28 | Resolved: 2020-05-14

## 2020-05-14 PROCEDURE — 1090F PRES/ABSN URINE INCON ASSESS: CPT | Performed by: NURSE PRACTITIONER

## 2020-05-14 PROCEDURE — G8417 CALC BMI ABV UP PARAM F/U: HCPCS | Performed by: NURSE PRACTITIONER

## 2020-05-14 PROCEDURE — 99214 OFFICE O/P EST MOD 30 MIN: CPT | Performed by: NURSE PRACTITIONER

## 2020-05-14 PROCEDURE — G8926 SPIRO NO PERF OR DOC: HCPCS | Performed by: NURSE PRACTITIONER

## 2020-05-14 PROCEDURE — G8399 PT W/DXA RESULTS DOCUMENT: HCPCS | Performed by: NURSE PRACTITIONER

## 2020-05-14 PROCEDURE — G8427 DOCREV CUR MEDS BY ELIG CLIN: HCPCS | Performed by: NURSE PRACTITIONER

## 2020-05-14 PROCEDURE — 3023F SPIROM DOC REV: CPT | Performed by: NURSE PRACTITIONER

## 2020-05-14 PROCEDURE — 1036F TOBACCO NON-USER: CPT | Performed by: NURSE PRACTITIONER

## 2020-05-14 PROCEDURE — 3017F COLORECTAL CA SCREEN DOC REV: CPT | Performed by: NURSE PRACTITIONER

## 2020-05-14 PROCEDURE — 1123F ACP DISCUSS/DSCN MKR DOCD: CPT | Performed by: NURSE PRACTITIONER

## 2020-05-14 PROCEDURE — 4040F PNEUMOC VAC/ADMIN/RCVD: CPT | Performed by: NURSE PRACTITIONER

## 2020-05-14 ASSESSMENT — ENCOUNTER SYMPTOMS
ALLERGIC/IMMUNOLOGIC NEGATIVE: 1
EYES NEGATIVE: 1
NAUSEA: 0
BACK PAIN: 0
ABDOMINAL DISTENTION: 0
SHORTNESS OF BREATH: 0
VOMITING: 0

## 2020-05-14 NOTE — PROGRESS NOTES
Seizures Daughter      Past Surgical History:   Procedure Laterality Date    CARPAL TUNNEL RELEASE Left     CHOLECYSTECTOMY      COLONOSCOPY  01/28/2019    Polyps x4, Internal grade 1 hemorrhoids    COLONOSCOPY N/A 1/28/2019    COLONOSCOPY POLYPECTOMY SNARE/COLD BIOPSY performed by Reynaldo Recinos MD at [address], COLON, DIAGNOSTIC  09/03/2019    biopsy f/u with pcp    HYSTERECTOMY      due to bleeding. complete    THYROIDECTOMY  2009    TONSILLECTOMY      TUBAL LIGATION      UPPER GASTROINTESTINAL ENDOSCOPY N/A 9/4/2019    EGD BIOPSY performed by Shravan Mchugh MD at [address] Left         Review of Systems   Constitutional: Negative for fatigue and unexpected weight change. HENT: Negative for congestion. Eyes: Negative. Respiratory: Negative for shortness of breath. Cardiovascular: Negative for chest pain, palpitations and leg swelling. Gastrointestinal: Negative for abdominal distention, nausea and vomiting. Endocrine: Negative. Genitourinary: Negative. Musculoskeletal: Positive for arthralgias and gait problem. Negative for back pain. Walks with a cane   Skin: Negative. Allergic/Immunologic: Negative. Neurological: Negative for dizziness, weakness and headaches. Psychiatric/Behavioral: Negative for agitation and sleep disturbance. The patient is not nervous/anxious. OBJECTIVE:  /72 (Site: Left Upper Arm, Position: Sitting, Cuff Size: Large Adult)   Pulse 79   Temp 97.3 °F (36.3 °C)   Ht 5' 1\" (1.549 m)   Wt 230 lb 3.2 oz (104.4 kg)   LMP  (LMP Unknown)   SpO2 97%   BMI 43.50 kg/m²   BP Readings from Last 3 Encounters:   05/14/20 134/72   03/30/20 130/84   02/28/20 108/68     Wt Readings from Last 3 Encounters:   05/14/20 230 lb 3.2 oz (104.4 kg)   03/30/20 229 lb 6.4 oz (104.1 kg)   02/28/20 277 lb 11.2 oz (126 kg)     Body mass index is 43.5 kg/m². Physical Exam  Vitals signs and nursing note reviewed. 0.083% nebulizer solution Take 3 mLs by nebulization 2 times daily as needed for Wheezing or Shortness of Breath 60 each 0    fluticasone (FLONASE) 50 MCG/ACT nasal spray instill 1 spray (NASAL) twice a day  prn  0     No current facility-administered medications for this visit. Return in about 3 months (around 8/14/2020) for , COPD. Mora Fisher DNP, FNP-C    Return for new or worsening symptoms or any concerns as needed.

## 2020-05-28 ENCOUNTER — OFFICE VISIT (OUTPATIENT)
Dept: CARDIOLOGY CLINIC | Age: 65
End: 2020-05-28
Payer: MEDICAID

## 2020-05-28 VITALS
SYSTOLIC BLOOD PRESSURE: 118 MMHG | WEIGHT: 228.8 LBS | HEIGHT: 61 IN | DIASTOLIC BLOOD PRESSURE: 72 MMHG | HEART RATE: 76 BPM | BODY MASS INDEX: 43.2 KG/M2

## 2020-05-28 PROCEDURE — 99214 OFFICE O/P EST MOD 30 MIN: CPT | Performed by: INTERNAL MEDICINE

## 2020-05-28 PROCEDURE — G8427 DOCREV CUR MEDS BY ELIG CLIN: HCPCS | Performed by: INTERNAL MEDICINE

## 2020-05-28 PROCEDURE — 4040F PNEUMOC VAC/ADMIN/RCVD: CPT | Performed by: INTERNAL MEDICINE

## 2020-05-28 PROCEDURE — 3017F COLORECTAL CA SCREEN DOC REV: CPT | Performed by: INTERNAL MEDICINE

## 2020-05-28 PROCEDURE — 1036F TOBACCO NON-USER: CPT | Performed by: INTERNAL MEDICINE

## 2020-05-28 PROCEDURE — 1090F PRES/ABSN URINE INCON ASSESS: CPT | Performed by: INTERNAL MEDICINE

## 2020-05-28 PROCEDURE — 1123F ACP DISCUSS/DSCN MKR DOCD: CPT | Performed by: INTERNAL MEDICINE

## 2020-05-28 PROCEDURE — G8417 CALC BMI ABV UP PARAM F/U: HCPCS | Performed by: INTERNAL MEDICINE

## 2020-05-28 PROCEDURE — G8399 PT W/DXA RESULTS DOCUMENT: HCPCS | Performed by: INTERNAL MEDICINE

## 2020-05-28 RX ORDER — NAPROXEN 500 MG/1
500 TABLET ORAL 2 TIMES DAILY WITH MEALS
COMMUNITY
End: 2021-01-01

## 2020-05-28 RX ORDER — TRAMADOL HYDROCHLORIDE 50 MG/1
50 TABLET ORAL EVERY 6 HOURS PRN
COMMUNITY
End: 2021-01-01

## 2020-05-28 NOTE — PROGRESS NOTES
causing the abnormality cannot be excluded.  Hyperlipidemia     Hypertension     Hypothyroid      Past Surgical History:   Procedure Laterality Date    CARPAL TUNNEL RELEASE Left     CHOLECYSTECTOMY      COLONOSCOPY  01/28/2019    Polyps x4, Internal grade 1 hemorrhoids    COLONOSCOPY N/A 1/28/2019    COLONOSCOPY POLYPECTOMY SNARE/COLD BIOPSY performed by Manish Aburto MD at [address], COLON, DIAGNOSTIC  09/03/2019    biopsy f/u with pcp    HYSTERECTOMY      due to bleeding.   complete    THYROIDECTOMY  2009    TONSILLECTOMY      TUBAL LIGATION      UPPER GASTROINTESTINAL ENDOSCOPY N/A 9/4/2019    EGD BIOPSY performed by Colby Zheng MD at [address] Left      Family History   Problem Relation Age of Onset    Diabetes Mother     Hypertension Mother     Depression Mother         suicide    Diabetes Father     Depression Father     Diabetes Brother     Birth Defects Maternal Grandmother     Hypertension Maternal Grandmother     Diabetes Sister     Depression Brother     Diabetes Sister     Diabetes Brother     Other Brother         black mold    Diabetes Brother     Other Daughter         GI issues    Asthma Daughter     Seizures Daughter      Social History     Tobacco Use    Smoking status: Never Smoker    Smokeless tobacco: Never Used   Substance Use Topics    Alcohol use: No      [unfilled]  Review of Systems:   · Constitutional: No Fever or Weight Loss   · Eyes: No Decreased Vision  · ENT: No Headaches, Hearing Loss or Vertigo  · Cardiovascular: No chest pain, dyspnea on exertion,+ palpitations or loss of consciousness  · Respiratory: No cough or wheezing    · Gastrointestinal: No abdominal pain, appetite loss, blood in stools, constipation, diarrhea or heartburn  · Genitourinary: No dysuria, trouble voiding, or hematuria  · Musculoskeletal:  No gait disturbance, weakness or joint complaints  · Integumentary: No rash or

## 2020-06-29 ENCOUNTER — TELEPHONE (OUTPATIENT)
Dept: GASTROENTEROLOGY | Age: 65
End: 2020-06-29

## 2020-06-30 RX ORDER — OMEPRAZOLE 40 MG/1
40 CAPSULE, DELAYED RELEASE ORAL DAILY
Qty: 30 CAPSULE | Refills: 3 | Status: SHIPPED | OUTPATIENT
Start: 2020-06-30 | End: 2020-08-17 | Stop reason: SDUPTHER

## 2020-06-30 NOTE — TELEPHONE ENCOUNTER
Please call patient and notify her that I have ordered Prilosec 40 mg and sent this to her  pharmacy in place of her Nexium since insurance will not cover Nexium. Please take first thing in the morning half an hour before breakfast on an empty stomach for treatment of acid reflux. Thank you.

## 2020-07-13 RX ORDER — MONTELUKAST SODIUM 10 MG/1
TABLET ORAL
Qty: 30 TABLET | Refills: 1 | Status: SHIPPED | OUTPATIENT
Start: 2020-07-13 | End: 2020-08-17 | Stop reason: SDUPTHER

## 2020-07-14 ENCOUNTER — TELEPHONE (OUTPATIENT)
Dept: FAMILY MEDICINE CLINIC | Age: 65
End: 2020-07-14

## 2020-07-14 NOTE — TELEPHONE ENCOUNTER
Pt called and lm that she was having increased SOB. I called pcp and she advised pt to go to Flu clinic.  I called and sp to pt and she voiced understanding

## 2020-08-05 RX ORDER — ALBUTEROL SULFATE 2.5 MG/3ML
2.5 SOLUTION RESPIRATORY (INHALATION) 2 TIMES DAILY PRN
Qty: 60 EACH | Refills: 0 | OUTPATIENT
Start: 2020-08-05

## 2020-08-11 RX ORDER — ALBUTEROL SULFATE 2.5 MG/3ML
2.5 SOLUTION RESPIRATORY (INHALATION) 2 TIMES DAILY PRN
Qty: 60 EACH | Refills: 2 | Status: SHIPPED | OUTPATIENT
Start: 2020-08-11

## 2020-08-17 ENCOUNTER — OFFICE VISIT (OUTPATIENT)
Dept: FAMILY MEDICINE CLINIC | Age: 65
End: 2020-08-17
Payer: MEDICAID

## 2020-08-17 VITALS
HEIGHT: 61 IN | SYSTOLIC BLOOD PRESSURE: 130 MMHG | TEMPERATURE: 97 F | OXYGEN SATURATION: 97 % | DIASTOLIC BLOOD PRESSURE: 80 MMHG | RESPIRATION RATE: 16 BRPM | HEART RATE: 72 BPM | WEIGHT: 231 LBS | BODY MASS INDEX: 43.61 KG/M2

## 2020-08-17 PROCEDURE — G8427 DOCREV CUR MEDS BY ELIG CLIN: HCPCS | Performed by: NURSE PRACTITIONER

## 2020-08-17 PROCEDURE — 1090F PRES/ABSN URINE INCON ASSESS: CPT | Performed by: NURSE PRACTITIONER

## 2020-08-17 PROCEDURE — G8399 PT W/DXA RESULTS DOCUMENT: HCPCS | Performed by: NURSE PRACTITIONER

## 2020-08-17 PROCEDURE — 3017F COLORECTAL CA SCREEN DOC REV: CPT | Performed by: NURSE PRACTITIONER

## 2020-08-17 PROCEDURE — 1036F TOBACCO NON-USER: CPT | Performed by: NURSE PRACTITIONER

## 2020-08-17 PROCEDURE — 4040F PNEUMOC VAC/ADMIN/RCVD: CPT | Performed by: NURSE PRACTITIONER

## 2020-08-17 PROCEDURE — 1123F ACP DISCUSS/DSCN MKR DOCD: CPT | Performed by: NURSE PRACTITIONER

## 2020-08-17 PROCEDURE — 99214 OFFICE O/P EST MOD 30 MIN: CPT | Performed by: NURSE PRACTITIONER

## 2020-08-17 PROCEDURE — G8417 CALC BMI ABV UP PARAM F/U: HCPCS | Performed by: NURSE PRACTITIONER

## 2020-08-17 RX ORDER — LORATADINE 10 MG/1
10 TABLET ORAL DAILY
Qty: 30 TABLET | Refills: 5 | Status: SHIPPED | OUTPATIENT
Start: 2020-08-17 | End: 2021-01-01 | Stop reason: SDUPTHER

## 2020-08-17 RX ORDER — FUROSEMIDE 20 MG/1
20 TABLET ORAL DAILY
Qty: 60 TABLET | Refills: 5 | Status: SHIPPED | OUTPATIENT
Start: 2020-08-17 | End: 2020-11-18 | Stop reason: SDUPTHER

## 2020-08-17 RX ORDER — BUPROPION HYDROCHLORIDE 300 MG/1
300 TABLET ORAL EVERY MORNING
Qty: 90 TABLET | Refills: 1 | Status: SHIPPED | OUTPATIENT
Start: 2020-08-17 | End: 2021-01-01 | Stop reason: SDUPTHER

## 2020-08-17 RX ORDER — MONTELUKAST SODIUM 10 MG/1
TABLET ORAL
Qty: 30 TABLET | Refills: 1 | Status: SHIPPED | OUTPATIENT
Start: 2020-08-17 | End: 2020-11-03

## 2020-08-17 RX ORDER — OMEPRAZOLE 40 MG/1
40 CAPSULE, DELAYED RELEASE ORAL DAILY
Qty: 30 CAPSULE | Refills: 3 | Status: SHIPPED | OUTPATIENT
Start: 2020-08-17 | End: 2021-01-01

## 2020-08-17 ASSESSMENT — ENCOUNTER SYMPTOMS
NAUSEA: 0
ABDOMINAL DISTENTION: 0
VOMITING: 0
EYES NEGATIVE: 1
SHORTNESS OF BREATH: 0
BACK PAIN: 0

## 2020-08-17 NOTE — PATIENT INSTRUCTIONS
I am committed to providing you the best care possible. If you receive a survey after visiting our office, please take time to share your experience concerning your office visit. These surveys are confidential and no health information about you is shared. I am eager to improve for you and I am counting on your feedback to help make that happen. We recommend you get both the flu shot when available and a pneumonia vaccine to reduce your risk of illness including death.

## 2020-08-17 NOTE — PROGRESS NOTES
SUBJECTIVE:  Ro Senate   1955   female   Allergies   Allergen Reactions    Latex Itching    Iodine Anaphylaxis    Levofloxacin Anaphylaxis    Pcn [Penicillins] Anaphylaxis    Sulfa Antibiotics Anaphylaxis    Flexeril [Cyclobenzaprine]     Morphine Itching    Zofran Itching    Caffeine Palpitations     Doesn't sleep    Lidocaine Nausea And Vomiting    Volumen [Barium Sulfate] Itching, Nausea And Vomiting and Rash       Chief Complaint   Patient presents with    COPD    Gastroesophageal Reflux    Depression        HPI   Yadiel Martins is here today with her daughter for a recheck of her chronic diseases and medication refills. She denies any chest pain or increasing shortness of breath. Reports appetite as fair, no bowel changes. Past Medical History:   Diagnosis Date    Anxiety 1/28/2019    Arthritis     Asthma     CAD (coronary artery disease)     COPD (chronic obstructive pulmonary disease) ([facility] [address]     H/O cardiac catheterization 09/17/2019     normal coronaries    H/O cardiovascular stress test     H/O Doppler carotid ultrasound 08/07/2019    Mild (0-49%) disease of the Bilateral Internal carotid artery.  H/O Doppler lower venous ultrasound 08/07/2019    Significant reflux noted of the Right GSV. Right GSV too small currently for ablation procedure. Significant reflux noted in the Left GSV at mid thigh . Left GSV difficult to follow between mid thigh and mid calf due to multiple  splits/branches. GSV knee tributary is very superficial.    H/O echocardiogram 08/07/2019    EF 55-60%, Mild MR & TR.    Hepatic steatosis 6/19/2015    History of nuclear stress test 09/05/2019     Mild anterior wall Ischemia of a medium sized territory. Possibility of breast artifact causing the abnormality cannot be excluded.     Hyperlipidemia     Hypertension     Hypothyroid      Social History     Socioeconomic History    Marital status:      Spouse name: Not on file    Number of children: 2    Years of education: 15    Highest education level: GED or equivalent   Occupational History    Occupation: unemployed   Social Needs    Financial resource strain: Not on file    Food insecurity     Worry: Not on file     Inability: Not on file   [employer] needs     Medical: Not on file     Non-medical: Not on file   Tobacco Use    Smoking status: Never Smoker    Smokeless tobacco: Never Used   Substance and Sexual Activity    Alcohol use: No    Drug use: No    Sexual activity: Not Currently     Partners: Male     Birth control/protection: Post-menopausal   Lifestyle    Physical activity     Days per week: Not on file     Minutes per session: Not on file    Stress: Not on file   Relationships    Social connections     Talks on phone: Not on file     Gets together: Not on file     Attends Spiritism service: Not on file     Active member of club or organization: Not on file     Attends meetings of clubs or organizations: Not on file     Relationship status: Not on file    Intimate partner violence     Fear of current or ex partner: Not on file     Emotionally abused: Not on file     Physically abused: Not on file     Forced sexual activity: Not on file   Other Topics Concern    Not on file   Social History Narrative    Not on file     Family History   Problem Relation Age of Onset    Diabetes Mother     Hypertension Mother     Depression Mother         suicide    Diabetes Father     Depression Father     Diabetes Brother     Birth Defects Maternal Grandmother     Hypertension Maternal Grandmother     Diabetes Sister     Depression Brother     Diabetes Sister     Diabetes Brother     Other Brother         black mold    Diabetes Brother     Other Daughter         GI issues    Asthma Daughter     Seizures Daughter      Past Surgical History:   Procedure Laterality Date    CARPAL TUNNEL RELEASE Left     CHOLECYSTECTOMY      COLONOSCOPY  01/28/2019    Polyps x4, Internal grade 1 hemorrhoids    COLONOSCOPY N/A 1/28/2019    COLONOSCOPY POLYPECTOMY SNARE/COLD BIOPSY performed by Kati Akers MD at [address], COLON, DIAGNOSTIC  09/03/2019    biopsy f/u with pcp    HYSTERECTOMY      due to bleeding. complete    THYROIDECTOMY  2009    TONSILLECTOMY      TUBAL LIGATION      UPPER GASTROINTESTINAL ENDOSCOPY N/A 9/4/2019    EGD BIOPSY performed by Sage Carmichael MD at [address]         Review of Systems   Constitutional: Negative for fatigue and unexpected weight change. HENT: Positive for dental problem and hearing loss. Negative for congestion. Has dentures; doesn't wear them  States \"eats everything including corn on the cob\"   Eyes: Negative. Respiratory: Negative for shortness of breath. Cardiovascular: Negative for chest pain, palpitations and leg swelling. Gastrointestinal: Negative for abdominal distention, nausea and vomiting. Endocrine: Negative. Genitourinary: Negative. Musculoskeletal: Positive for gait problem. Negative for back pain. Walks with cane most all of the time   Neurological: Negative for dizziness, weakness and headaches. Psychiatric/Behavioral: Positive for dysphoric mood. Negative for agitation, sleep disturbance and suicidal ideas. The patient is not nervous/anxious. OBJECTIVE:  /80 (Site: Left Upper Arm, Position: Sitting, Cuff Size: Large Adult)   Pulse 72   Temp 97 °F (36.1 °C)   Resp 16   Ht 5' 1\" (1.549 m)   Wt 231 lb (104.8 kg)   LMP  (LMP Unknown)   SpO2 97%   BMI 43.65 kg/m²   BP Readings from Last 3 Encounters:   08/17/20 130/80   05/28/20 118/72   05/14/20 134/72     Wt Readings from Last 3 Encounters:   08/17/20 231 lb (104.8 kg)   05/28/20 228 lb 12.8 oz (103.8 kg)   05/14/20 230 lb 3.2 oz (104.4 kg)     Body mass index is 43.65 kg/m². Physical Exam  Vitals signs and nursing note reviewed.    Constitutional:       Appearance: Normal appearance. She is well-developed. She is obese. Comments: Morbidly obese   HENT:      Head: Normocephalic and atraumatic. Right Ear: External ear normal.      Left Ear: External ear normal.      Nose: Nose normal.      Mouth/Throat:      Mouth: Mucous membranes are moist.      Comments: edentulous  Eyes:      Conjunctiva/sclera: Conjunctivae normal.      Pupils: Pupils are equal, round, and reactive to light. Neck:      Musculoskeletal: Normal range of motion and neck supple. Cardiovascular:      Rate and Rhythm: Normal rate and regular rhythm. Heart sounds: Normal heart sounds. Pulmonary:      Effort: Pulmonary effort is normal.      Breath sounds: Normal breath sounds. Abdominal:      General: Bowel sounds are normal.      Palpations: Abdomen is soft. Musculoskeletal: Normal range of motion. Right lower leg: No edema. Left lower leg: No edema. Skin:     General: Skin is warm and dry. Capillary Refill: Capillary refill takes less than 2 seconds. Neurological:      Mental Status: She is alert and oriented to person, place, and time. Deep Tendon Reflexes: Reflexes are normal and symmetric. Psychiatric:         Mood and Affect: Mood normal.         Behavior: Behavior normal.         Thought Content: Thought content normal.         Judgment: Judgment normal.         ASSESSMENT/PLAN:    1. Major depressive disorder, recurrent episode, mild ([address]  Patient reports as stable with current medication  - buPROPion (WELLBUTRIN XL) 300 MG extended release tablet; Take 1 tablet by mouth every morning  Dispense: 90 tablet; Refill: 1    2. Pedal edema  No edema currently. Patient states if she holds the lasix it resumes  Refill:  - furosemide (LASIX) 20 MG tablet; Take 1 tab po daily. Dispense: 60 tablet; Refill: 5    3. Environmental allergies  Refill:  - loratadine (CLARITIN) 10 MG tablet; Take 1 tablet by mouth daily  Dispense: 30 tablet;  Refill: 5  - montelukast (SINGULAIR) 10 MG tablet; take 1 tablet by mouth at bedtime  Dispense: 30 tablet; Refill: 1    4. Gastroesophageal reflux disease, esophagitis presence not specified  Diet and lifestyle modification  Weight loss would reduce risk of symptoms  Refill:  - omeprazole (PRILOSEC) 40 MG delayed release capsule; Take 1 capsule by mouth daily  Dispense: 30 capsule; Refill: 3    5. Immunization not carried out because of patient refusal  Patient is due for shingles and pneumovax, which would reduce her risk of co-morbidities including COVID-19. She refuses any and all immunizations, and is aware of the health risks of illness including potential death. No orders of the defined types were placed in this encounter. Current Outpatient Medications   Medication Sig Dispense Refill    buPROPion (WELLBUTRIN XL) 300 MG extended release tablet Take 1 tablet by mouth every morning 90 tablet 1    furosemide (LASIX) 20 MG tablet Take 1 tablet by mouth daily 60 tablet 5    loratadine (CLARITIN) 10 MG tablet Take 1 tablet by mouth daily 30 tablet 5    omeprazole (PRILOSEC) 40 MG delayed release capsule Take 1 capsule by mouth daily 30 capsule 3    montelukast (SINGULAIR) 10 MG tablet take 1 tablet by mouth at bedtime 30 tablet 1    albuterol (PROVENTIL) (2.5 MG/3ML) 0.083% nebulizer solution Take 3 mLs by nebulization 2 times daily as needed for Wheezing or Shortness of Breath 60 each 2    busPIRone (BUSPAR) 7.5 MG tablet take 1 tablet by mouth twice a day 60 tablet 2    levothyroxine (SYNTHROID) 125 MCG tablet take 1 tablet by mouth every morning 30 tablet 2    theophylline (THEODUR) 300 MG extended release tablet take 1 tablet by mouth twice a day 60 tablet 2    naproxen (NAPROSYN) 500 MG tablet Take 500 mg by mouth 2 times daily (with meals)      traMADol (ULTRAM) 50 MG tablet Take 50 mg by mouth every 6 hours as needed for Pain.       WIXELA INHUB 250-50 MCG/DOSE AEPB inhale 1 puff by mouth every 12 hours 1 Inhaler 0    albuterol sulfate HFA (VENTOLIN HFA) 108 (90 Base) MCG/ACT inhaler Inhale 2 puffs into the lungs every 6 hours as needed for Shortness of Breath 1 Inhaler 5    fluticasone (FLONASE) 50 MCG/ACT nasal spray instill 1 spray (NASAL) twice a day  prn  0     No current facility-administered medications for this visit. Return in about 6 months (around 2/17/2021), or wants to see Dr. Jarrod Regalado, for depression, GERD, COPD. Daily Crisp DNP, FNP-C    Return for new or worsening symptoms or any concerns as needed.

## 2020-08-20 ENCOUNTER — OFFICE VISIT (OUTPATIENT)
Dept: CARDIOLOGY CLINIC | Age: 65
End: 2020-08-20
Payer: MEDICAID

## 2020-08-20 ENCOUNTER — NURSE ONLY (OUTPATIENT)
Dept: CARDIOLOGY CLINIC | Age: 65
End: 2020-08-20
Payer: MEDICAID

## 2020-08-20 VITALS
WEIGHT: 226 LBS | HEART RATE: 72 BPM | SYSTOLIC BLOOD PRESSURE: 100 MMHG | DIASTOLIC BLOOD PRESSURE: 80 MMHG | HEIGHT: 61 IN | BODY MASS INDEX: 42.67 KG/M2

## 2020-08-20 PROCEDURE — 3017F COLORECTAL CA SCREEN DOC REV: CPT | Performed by: INTERNAL MEDICINE

## 2020-08-20 PROCEDURE — 4040F PNEUMOC VAC/ADMIN/RCVD: CPT | Performed by: INTERNAL MEDICINE

## 2020-08-20 PROCEDURE — 1123F ACP DISCUSS/DSCN MKR DOCD: CPT | Performed by: INTERNAL MEDICINE

## 2020-08-20 PROCEDURE — 1036F TOBACCO NON-USER: CPT | Performed by: INTERNAL MEDICINE

## 2020-08-20 PROCEDURE — 1090F PRES/ABSN URINE INCON ASSESS: CPT | Performed by: INTERNAL MEDICINE

## 2020-08-20 PROCEDURE — 99214 OFFICE O/P EST MOD 30 MIN: CPT | Performed by: INTERNAL MEDICINE

## 2020-08-20 PROCEDURE — G8427 DOCREV CUR MEDS BY ELIG CLIN: HCPCS | Performed by: INTERNAL MEDICINE

## 2020-08-20 PROCEDURE — 93225 XTRNL ECG REC<48 HRS REC: CPT | Performed by: INTERNAL MEDICINE

## 2020-08-20 PROCEDURE — G8399 PT W/DXA RESULTS DOCUMENT: HCPCS | Performed by: INTERNAL MEDICINE

## 2020-08-20 PROCEDURE — G8417 CALC BMI ABV UP PARAM F/U: HCPCS | Performed by: INTERNAL MEDICINE

## 2020-08-20 NOTE — LETTER
Candido Pratt  1955  [id number]    Have you had any Chest Pain - Yes  If Yes DO EKG - How does it feel - Dull Ache   How long does the pain last - minutes   How long have you been having the pain - Pt has had this a couple of times   Did you take a Tylenol   And did it relieve the pain - Yes    Have you had any Shortness of Breath - Yes  If Yes - When on exertion    Have you had any dizziness - Yes near syncope due to certain sounds    Have you had any palpitations - Yes  If Yes DO EKG - Do you feel your heart racing  How long does it last - minutes     Is the patient on any of the following medications -     Do you have any edema - swelling in no        Check Venous \"LEG PROBLEM Questionnaire\"    Do you have a surgery or procedure scheduled in the near future - No    Ask patient if they want to sign up for MyChart if they are not already signed up    Check to see if we have an E-MAIL on file for the patient    Check medication list thoroughly!!!  BE SURE TO ASK PATIENT IF THEY NEED MEDICATION REFILLS

## 2020-08-20 NOTE — PROGRESS NOTES
RELEASE Left     CHOLECYSTECTOMY      COLONOSCOPY  01/28/2019    Polyps x4, Internal grade 1 hemorrhoids    COLONOSCOPY N/A 1/28/2019    COLONOSCOPY POLYPECTOMY SNARE/COLD BIOPSY performed by Jaylene Weaver MD at [address] [facility], COLON, DIAGNOSTIC  09/03/2019    biopsy f/u with pcp    HYSTERECTOMY      due to bleeding.   complete    THYROIDECTOMY  2009    TONSILLECTOMY      TUBAL LIGATION      UPPER GASTROINTESTINAL ENDOSCOPY N/A 9/4/2019    EGD BIOPSY performed by Denny Milton MD at [address]      Family History   Problem Relation Age of Onset    Diabetes Mother     Hypertension Mother     Depression Mother         suicide    Diabetes Father     Depression Father     Diabetes Brother     Birth Defects Maternal Grandmother     Hypertension Maternal Grandmother     Diabetes Sister     Depression Brother     Diabetes Sister     Diabetes Brother     Other Brother         black mold    Diabetes Brother     Other Daughter         GI issues    Asthma Daughter     Seizures Daughter      Social History     Tobacco Use    Smoking status: Never Smoker    Smokeless tobacco: Never Used   Substance Use Topics    Alcohol use: No      [unfilled]  Review of Systems:   · Constitutional: No Fever or Weight Loss   · Eyes: No Decreased Vision  · ENT: No Headaches, Hearing Loss or Vertigo  · Cardiovascular: No chest pain, dyspnea on exertion,+ palpitations or loss of consciousness  · Respiratory: No cough or wheezing    · Gastrointestinal: No abdominal pain, appetite loss, blood in stools, constipation, diarrhea or heartburn  · Genitourinary: No dysuria, trouble voiding, or hematuria  · Musculoskeletal:  No gait disturbance, weakness or joint complaints  · Integumentary: No rash or pruritis  · Neurological: No TIA or stroke symptoms  · Psychiatric: No anxiety or depression  · Endocrine: No malaise, fatigue or temperature intolerance  · Hematologic/Lymphatic: No bleeding and were normal  Psychiatric: normal mood and affect    Lab Results   Component Value Date    TROPONINI <0.006 03/16/2012     BNP:    Lab Results   Component Value Date    BNP 12 06/10/2013     PT/INR:  No results found for: [employer]  Lab Results   Component Value Date    LABA1C 5.5 06/05/2019    LABA1C 5.1 06/19/2018     Lab Results   Component Value Date    CHOL 187 04/25/2017    TRIG 159 (H) 04/25/2017    HDL 50 04/25/2017    LDLCALC 105 (H) 04/25/2017    LDLDIRECT 124 (H) 03/30/2015     Lab Results   Component Value Date    ALT 17 02/16/2020    AST 19 02/16/2020     TSH:    Lab Results   Component Value Date    TSH 1.24 06/19/2018       Impression:  Nahomy Garrett is a 72 y. o.year old who  has a past medical history of Anxiety, Arthritis, Asthma, CAD (coronary artery disease), COPD (chronic obstructive pulmonary disease) ([address] H/O cardiac catheterization, H/O cardiovascular stress test, H/O Doppler carotid ultrasound, H/O Doppler lower venous ultrasound, H/O echocardiogram, Hepatic steatosis, History of nuclear stress test, Hyperlipidemia, Hypertension, and Hypothyroid. and presents with     Plan:  1. Chest pain: resolved, most likley acid reflux as her LHC was normal, will not pursue it from cardiac standpoint,will continue protoxin  2. Palpitations: 24 hrs holter monitor ordered, and recommend to discuss with pulmonary for reducing the dose of theophylline and her symptoms of palpitations are manily at the time of sinus tachycardia  3. COPD: from 2nd hand smoking  4. Dizziness: caroitid doppler showed 0-49% stenosis  5. Leg swelling showed severe venous reflux disease, compression socks ordered  6. HTN: stable, off medications  7. Dyslipidemia: check lipids  8. Obesity:recommend to lose weight  9. Health maintenance: exerise and diet  All labs, medications and tests reviewed, continue all other medications of all above medical condition listed as is.     [unfilled]

## 2020-08-20 NOTE — PROGRESS NOTES
24 hour holter monitor [email or web address]  for Palpitations  Serial # [id number] . Instructed patient on monitor and proper use. Instructed on diary. When to remove and bring it back. Must leave the holter monitor on  without removing for the duration of time ordered. Answered all questions the patient had. Instructed patient to call [facility] at [phone] with any questions or concerns with the monitor.

## 2020-09-24 ENCOUNTER — OFFICE VISIT (OUTPATIENT)
Dept: CARDIOLOGY CLINIC | Age: 65
End: 2020-09-24
Payer: MEDICAID

## 2020-09-24 VITALS
HEART RATE: 64 BPM | DIASTOLIC BLOOD PRESSURE: 84 MMHG | WEIGHT: 227 LBS | HEIGHT: 61 IN | SYSTOLIC BLOOD PRESSURE: 122 MMHG | BODY MASS INDEX: 42.86 KG/M2

## 2020-09-24 PROCEDURE — G8417 CALC BMI ABV UP PARAM F/U: HCPCS | Performed by: NURSE PRACTITIONER

## 2020-09-24 PROCEDURE — G8399 PT W/DXA RESULTS DOCUMENT: HCPCS | Performed by: NURSE PRACTITIONER

## 2020-09-24 PROCEDURE — 1036F TOBACCO NON-USER: CPT | Performed by: NURSE PRACTITIONER

## 2020-09-24 PROCEDURE — 99213 OFFICE O/P EST LOW 20 MIN: CPT | Performed by: NURSE PRACTITIONER

## 2020-09-24 PROCEDURE — G8427 DOCREV CUR MEDS BY ELIG CLIN: HCPCS | Performed by: NURSE PRACTITIONER

## 2020-09-24 PROCEDURE — 1123F ACP DISCUSS/DSCN MKR DOCD: CPT | Performed by: NURSE PRACTITIONER

## 2020-09-24 PROCEDURE — 4040F PNEUMOC VAC/ADMIN/RCVD: CPT | Performed by: NURSE PRACTITIONER

## 2020-09-24 PROCEDURE — 3017F COLORECTAL CA SCREEN DOC REV: CPT | Performed by: NURSE PRACTITIONER

## 2020-09-24 PROCEDURE — 1090F PRES/ABSN URINE INCON ASSESS: CPT | Performed by: NURSE PRACTITIONER

## 2020-09-24 NOTE — LETTER
CLINICAL STAFF DOCUMENTATION    Rose Morgan  1955  [id number]    Have you had any Chest Pain - Yes  If Yes DO EKG - How does it feel - Heaviness   How long does the pain last - seconds   How long have you been having the pain - Months     Have you had any Shortness of Breath - Yes  If Yes - When on exertion    Have you had any dizziness - Yes  If Yes DO ORTHOSTATIC BP - when do you feel dizzy with position change   How long does it last? Seconds     Have you had any palpitations - Yes  If Yes DO EKG - Do you feel your heart racing  How long does it last - minutes     Do you have any edema - swelling - No     Check Venous \"LEG PROBLEM Questionnaire\"    Do you have a surgery or procedure scheduled in the near future - No     Ask patient [facility] if [facility] are not already [facility]    Check to see if we have an E-MAIL on file for the patient    Check medication list thoroughly!!!  BE SURE TO ASK PATIENT IF THEY NEED MEDICATION REFILLS

## 2020-09-24 NOTE — ASSESSMENT & PLAN NOTE
Chest pain associated from anxiety. Normal coronaries, normal echo, no abnormalities on Holter monitor.

## 2020-09-24 NOTE — PROGRESS NOTES
[facility] ([facility] PHYSICAL [facility]  Kenia Bradley  Phone: [phone]    Fax [phone]                  Chau Rodríguez MD, Ryan Sabillon MD, [address] MD, Mena Mealy, MD Tresia Bloch, MD Kris Hans, MD Gae Blanks, APRZENOBIA Milian, APRZENOBIA Hager, APRZENOBIA Paulino, APRZENOBIA    CARDIOLOGY  NOTE      9/24/2020    RE: Hunter Browning  (1955)                               TO:  Dr. Lion Romero MD  The primary cardiologist is Dr. Aiynaa Tello     CC:   1. Panic attacks    2. Nonrheumatic mitral valve regurgitation        Patient denies all of the following:  Chest Pain  Palpitations  Shortness of Breath  Edema  Dizziness  Syncope      HPI: Thank you for involving me in taking care of your patient Hunter Browning, who is a  72y.o. year old female with a history as listed above. Patient presents to the office for follow up on anxiety and VHD. Patient reports have SOB and palpitations which is unchanged. Patient is  an active female who does not walk regularly. Patient is compliant with she medications. Patient denies any cardiac complaints or needs.      Vitals:    09/24/20 1454   BP: 122/84   Pulse: 64       Current Outpatient Medications   Medication Sig Dispense Refill    buPROPion (WELLBUTRIN XL) 300 MG extended release tablet Take 1 tablet by mouth every morning 90 tablet 1    furosemide (LASIX) 20 MG tablet Take 1 tablet by mouth daily 60 tablet 5    loratadine (CLARITIN) 10 MG tablet Take 1 tablet by mouth daily 30 tablet 5    omeprazole (PRILOSEC) 40 MG delayed release capsule Take 1 capsule by mouth daily 30 capsule 3    montelukast (SINGULAIR) 10 MG tablet take 1 tablet by mouth at bedtime 30 tablet 1    albuterol (PROVENTIL) (2.5 MG/3ML) 0.083% nebulizer solution Take 3 mLs by nebulization 2 times daily as needed for Wheezing or Shortness of Breath 60 each 2    busPIRone (BUSPAR) 7.5 MG tablet take 1 tablet by mouth twice a day 60 tablet 2    levothyroxine (SYNTHROID) 125 MCG tablet take 1 tablet by mouth every morning 30 tablet 2    theophylline (THEODUR) 300 MG extended release tablet take 1 tablet by mouth twice a day 60 tablet 2    naproxen (NAPROSYN) 500 MG tablet Take 500 mg by mouth 2 times daily (with meals)      traMADol (ULTRAM) 50 MG tablet Take 50 mg by mouth every 6 hours as needed for Pain.  WIXELA INHUB 250-50 MCG/DOSE AEPB inhale 1 puff by mouth every 12 hours 1 Inhaler 0    albuterol sulfate HFA (VENTOLIN HFA) 108 (90 Base) MCG/ACT inhaler Inhale 2 puffs into the lungs every 6 hours as needed for Shortness of Breath 1 Inhaler 5    fluticasone (FLONASE) 50 MCG/ACT nasal spray instill 1 spray (NASAL) twice a day  prn  0     No current facility-administered medications for this visit. Allergies: Latex; Iodine; Levofloxacin; Pcn [penicillins]; Sulfa antibiotics; Flexeril [cyclobenzaprine]; Morphine; Zofran; Caffeine; Lidocaine; and Volumen [barium sulfate]  Past Medical History:   Diagnosis Date    Anxiety 1/28/2019    Arthritis     Asthma     CAD (coronary artery disease)     COPD [facility]chronic obstructive pulmonary disease) ([facility] [address]     H/O cardiac catheterization 09/17/2019     normal coronaries    H/O cardiovascular stress test     H/O Doppler carotid ultrasound 08/07/2019    Mild (0-49%) disease of the Bilateral Internal carotid artery.  H/O Doppler lower venous ultrasound 08/07/2019    Significant reflux noted of the Right GSV. Right GSV too small currently for ablation procedure. Significant reflux noted in the Left GSV at mid thigh . Left GSV difficult to follow between mid thigh and mid calf due to multiple  splits/branches. GSV knee tributary is very superficial.    H/O echocardiogram 08/07/2019    EF 55-60%, Mild MR & TR.    Hepatic steatosis 6/19/2015    History of nuclear stress test 09/05/2019     Mild anterior wall Ischemia of a medium sized territory. Possibility of breast artifact causing the abnormality cannot be excluded.  Hyperlipidemia     Hypertension     Hypothyroid      Past Surgical History:   Procedure Laterality Date    CARPAL TUNNEL RELEASE Left     CHOLECYSTECTOMY      COLONOSCOPY  01/28/2019    Polyps x4, Internal grade 1 hemorrhoids    COLONOSCOPY N/A 1/28/2019    COLONOSCOPY POLYPECTOMY SNARE/COLD BIOPSY performed by Justina Munoz MD at [address], COLON, DIAGNOSTIC  09/03/2019    biopsy f/u with pcp    HYSTERECTOMY      due to bleeding.   complete    THYROIDECTOMY  2009    TONSILLECTOMY      TUBAL LIGATION      UPPER GASTROINTESTINAL ENDOSCOPY N/A 9/4/2019    EGD BIOPSY performed by Chico Gamino MD at [address] Left      Family History   Problem Relation Age of Onset    Diabetes Mother     Hypertension Mother     Depression Mother         suicide    Diabetes Father     Depression Father     Diabetes Brother     Birth Defects Maternal Grandmother     Hypertension Maternal Grandmother     Diabetes Sister     Depression Brother     Diabetes Sister     Diabetes Brother     Other Brother         black mold    Diabetes Brother     Other Daughter         GI issues    Asthma Daughter     Seizures Daughter      Social History     Tobacco Use    Smoking status: Never Smoker    Smokeless tobacco: Never Used   Substance Use Topics    Alcohol use: No        Review of Systems - History obtained from the patient  General: negative for - fatigue, malaise, night sweats, weight gain  Psychological: negative for - anxiety, depression, sleep disturbances  Ophthalmic: negative for - blurry vision, loss of vision  ENT: negative for - headaches, vertigo, visual changes  Hematological and Lymphatic: negative for - bleeding problems, blood clots, bruising, fatigue or pallor  Endocrine: negative for - malaise/lethargy, palpitations, unexpected weight changes  Respiratory: negative for - cough, orthopnea, shortness of breath or tachypnea  Cardiovascular: negative for - chest pain, dyspnea on exertion, edema or palpitations  Gastrointestinal: no abdominal pain, change in bowel habits, or black or bloody stools  Genito-Urinary: no dysuria, trouble voiding, or hematuria  Musculoskeletal: negative for - gait disturbance, pain, swelling    Neurological: negative for - confusion, dizziness, impaired coordination/balance or numbness/tingling    Objective:      Physical Exam:  /84   Pulse 64   Ht 5' 1\" (1.549 m)   Wt 227 lb (103 kg)   LMP  (LMP Unknown)   BMI 42.89 kg/m²   Wt Readings from Last 3 Encounters:   09/24/20 227 lb (103 kg)   08/20/20 226 lb (102.5 kg)   08/17/20 231 lb (104.8 kg)     Body mass index is 42.89 kg/m². GENERAL - Alert, oriented, pleasant, in no apparent distress. Head unremarkable  Eyes - pupils equal and reactive to light - bilateral conjunctiva are pink: sclera are white   ENT - external ears intact, nose is intact:  tongue is midline pink and moist  Neck - Supple. No jugular venous distention noted. No carotid bruits appreciated. Cardiovascular - Normal S1 and S2:  murmur appreciated No, No gallop. Regular rate- Yes,  rhythm regular-Yes. Extremities - No cyanosis, clubbing, no edema to lower legs. Pulmonary - No respiratory distress. No wheezes or rales. Chest is clear  Pulses: Bilateral radial and pedal pulses normal  Abdomen -  Soft no tenderness, non distended   Musculoskeletal - Normal movement of all extremities   Neurologic - alert and oriented: There are no gross focal neurologic abnormalities.    Skin-  No rash: No echymosis   Affect- normal mood and pleasant       DATA:  Lab Results   Component Value Date    TROPONINI <0.006 03/16/2012     BNP:    Lab Results   Component Value Date    BNP 12 06/10/2013     PT/INR:  No results found for: [employer]  Lab Results   Component Value Date    LABA1C 5.5 06/05/2019    LABA1C 5.1 06/19/2018     Lab Results Component Value Date    CHOL 187 04/25/2017    TRIG 159 (H) 04/25/2017    HDL 50 04/25/2017    LDLCALC 105 (H) 04/25/2017    LDLDIRECT 124 (H) 03/30/2015     Lab Results   Component Value Date    ALT 17 02/16/2020    AST 19 02/16/2020     TSH:    Lab Results   Component Value Date    TSH 1.24 06/19/2018       Echo:8/7/19  Left ventricular systolic function is normal with an ejection fraction of   55-60%. Grade I diastolic dysfunction. Mild septal wall asymmetrical left ventricular hypertrophy. Doppler evaluation reveals trace aortic, mild mitral, and mild tricuspid   regurgitation. No evidence of pericardial effusion. Cardiac: 9/17/19  No CAD       The ASCVD Risk score (Amie Camacho., et al., 2013) failed to calculate for the following reasons:    Cannot find a previous HDL lab    Cannot find a previous total cholesterol lab    Assessment/ Plan:     Panic attacks  Chest pain associated from anxiety. Normal coronaries, normal echo, no abnormalities on Holter monitor. Mitral insufficiency  Noted to have trace regurgitations in all valves      Patient seen, interviewed and examined. Testing was reviewed. Patient is encouraged to exercise even a brisk walk for 30 minutes at least 3 to 4 times a week. Lifestyle and risk factor modificatons discussed. Various goals are discussed and questions answered. Continue current medications. Appropriate prescriptions are addressed. Questions answered and patient verbalizes understanding. Call for any problems, questions, or concerns. Pt is to follow up in 9 months for Cardiac management    Electronically signed by Esteban Cavanaugh.  DELORES Jennings CNP on 9/24/2020 at 3:27 PM

## 2020-09-30 ENCOUNTER — TELEPHONE (OUTPATIENT)
Dept: CARDIOLOGY CLINIC | Age: 65
End: 2020-09-30

## 2020-09-30 PROCEDURE — 93227 XTRNL ECG REC<48 HR R&I: CPT | Performed by: INTERNAL MEDICINE

## 2020-10-21 RX ORDER — LEVOTHYROXINE SODIUM 0.12 MG/1
125 TABLET ORAL EVERY MORNING
Qty: 30 TABLET | Refills: 2 | Status: SHIPPED | OUTPATIENT
Start: 2020-10-21 | End: 2021-01-01 | Stop reason: SDUPTHER

## 2020-11-03 RX ORDER — MONTELUKAST SODIUM 10 MG/1
TABLET ORAL
Qty: 30 TABLET | Refills: 1 | Status: SHIPPED | OUTPATIENT
Start: 2020-11-03 | End: 2020-12-28

## 2021-01-01 ENCOUNTER — OFFICE VISIT (OUTPATIENT)
Dept: ORTHOPEDIC SURGERY | Age: 66
End: 2021-01-01
Payer: MEDICAID

## 2021-01-01 ENCOUNTER — APPOINTMENT (OUTPATIENT)
Dept: GENERAL RADIOLOGY | Age: 66
End: 2021-01-01
Payer: MEDICAID

## 2021-01-01 ENCOUNTER — APPOINTMENT (OUTPATIENT)
Dept: GENERAL RADIOLOGY | Age: 66
DRG: 446 | End: 2021-01-01
Payer: MEDICAID

## 2021-01-01 ENCOUNTER — TELEPHONE (OUTPATIENT)
Dept: CARDIOLOGY CLINIC | Age: 66
End: 2021-01-01

## 2021-01-01 ENCOUNTER — PROCEDURE VISIT (OUTPATIENT)
Dept: CARDIOLOGY CLINIC | Age: 66
End: 2021-01-01
Payer: MEDICAID

## 2021-01-01 ENCOUNTER — HOSPITAL ENCOUNTER (INPATIENT)
Age: 66
LOS: 1 days | Discharge: HOME | DRG: 446 | End: 2021-06-22
Attending: EMERGENCY MEDICINE | Admitting: INTERNAL MEDICINE
Payer: MEDICAID

## 2021-01-01 ENCOUNTER — PROCEDURE VISIT (OUTPATIENT)
Dept: CARDIOLOGY CLINIC | Age: 66
End: 2021-01-01

## 2021-01-01 ENCOUNTER — TELEPHONE (OUTPATIENT)
Dept: FAMILY MEDICINE CLINIC | Age: 66
End: 2021-01-01

## 2021-01-01 ENCOUNTER — HOSPITAL ENCOUNTER (EMERGENCY)
Age: 66
Discharge: HOME | End: 2021-05-15
Payer: MEDICAID

## 2021-01-01 ENCOUNTER — APPOINTMENT (OUTPATIENT)
Dept: CT IMAGING | Age: 66
DRG: 446 | End: 2021-01-01
Payer: MEDICAID

## 2021-01-01 ENCOUNTER — CARE COORDINATION (OUTPATIENT)
Dept: CARE COORDINATION | Age: 66
End: 2021-01-01

## 2021-01-01 ENCOUNTER — HOSPITAL ENCOUNTER (EMERGENCY)
Age: 66
Discharge: HOME | End: 2021-09-18
Payer: MEDICAID

## 2021-01-01 ENCOUNTER — ANESTHESIA (OUTPATIENT)
Dept: OPERATING ROOM | Age: 66
DRG: 446 | End: 2021-01-01
Payer: MEDICAID

## 2021-01-01 ENCOUNTER — OFFICE VISIT (OUTPATIENT)
Dept: FAMILY MEDICINE CLINIC | Age: 66
End: 2021-01-01
Payer: MEDICAID

## 2021-01-01 ENCOUNTER — OFFICE VISIT (OUTPATIENT)
Dept: CARDIOLOGY CLINIC | Age: 66
End: 2021-01-01
Payer: MEDICAID

## 2021-01-01 ENCOUNTER — APPOINTMENT (OUTPATIENT)
Dept: ULTRASOUND IMAGING | Age: 66
End: 2021-01-01
Payer: MEDICAID

## 2021-01-01 ENCOUNTER — TELEPHONE (OUTPATIENT)
Dept: PULMONOLOGY | Age: 66
End: 2021-01-01

## 2021-01-01 ENCOUNTER — ANESTHESIA EVENT (OUTPATIENT)
Dept: OPERATING ROOM | Age: 66
DRG: 446 | End: 2021-01-01
Payer: MEDICAID

## 2021-01-01 ENCOUNTER — INITIAL CONSULT (OUTPATIENT)
Dept: PULMONOLOGY | Age: 66
End: 2021-01-01
Payer: MEDICAID

## 2021-01-01 ENCOUNTER — HOSPITAL ENCOUNTER (EMERGENCY)
Age: 66
Discharge: HOME | End: 2021-06-29
Payer: MEDICAID

## 2021-01-01 ENCOUNTER — HOSPITAL ENCOUNTER (OUTPATIENT)
Age: 66
Discharge: HOME | End: 2021-09-17
Payer: MEDICAID

## 2021-01-01 ENCOUNTER — HOSPITAL ENCOUNTER (EMERGENCY)
Age: 66
Discharge: HOME | End: 2021-07-20
Payer: MEDICAID

## 2021-01-01 ENCOUNTER — PATIENT MESSAGE (OUTPATIENT)
Dept: FAMILY MEDICINE CLINIC | Age: 66
End: 2021-01-01

## 2021-01-01 ENCOUNTER — VIRTUAL VISIT (OUTPATIENT)
Dept: FAMILY MEDICINE CLINIC | Age: 66
End: 2021-01-01
Payer: MEDICAID

## 2021-01-01 ENCOUNTER — APPOINTMENT (OUTPATIENT)
Dept: CT IMAGING | Age: 66
End: 2021-01-01
Payer: MEDICAID

## 2021-01-01 ENCOUNTER — INITIAL CONSULT (OUTPATIENT)
Dept: CARDIOLOGY CLINIC | Age: 66
End: 2021-01-01
Payer: MEDICAID

## 2021-01-01 ENCOUNTER — HOSPITAL ENCOUNTER (EMERGENCY)
Age: 66
Discharge: HOME | End: 2021-06-12
Attending: EMERGENCY MEDICINE
Payer: MEDICAID

## 2021-01-01 VITALS
HEART RATE: 72 BPM | SYSTOLIC BLOOD PRESSURE: 146 MMHG | DIASTOLIC BLOOD PRESSURE: 83 MMHG | RESPIRATION RATE: 15 BRPM | OXYGEN SATURATION: 95 % | TEMPERATURE: 98.2 F

## 2021-01-01 VITALS
SYSTOLIC BLOOD PRESSURE: 112 MMHG | RESPIRATION RATE: 16 BRPM | HEIGHT: 65 IN | DIASTOLIC BLOOD PRESSURE: 67 MMHG | OXYGEN SATURATION: 93 % | HEART RATE: 92 BPM | WEIGHT: 230 LBS | TEMPERATURE: 98.6 F | BODY MASS INDEX: 38.32 KG/M2

## 2021-01-01 VITALS
OXYGEN SATURATION: 98 % | RESPIRATION RATE: 16 BRPM | BODY MASS INDEX: 42.33 KG/M2 | WEIGHT: 230 LBS | HEART RATE: 77 BPM | HEIGHT: 62 IN

## 2021-01-01 VITALS
DIASTOLIC BLOOD PRESSURE: 68 MMHG | BODY MASS INDEX: 46.14 KG/M2 | HEART RATE: 72 BPM | HEIGHT: 61 IN | SYSTOLIC BLOOD PRESSURE: 116 MMHG | WEIGHT: 244.4 LBS

## 2021-01-01 VITALS
DIASTOLIC BLOOD PRESSURE: 80 MMHG | HEART RATE: 78 BPM | BODY MASS INDEX: 44.48 KG/M2 | SYSTOLIC BLOOD PRESSURE: 134 MMHG | HEIGHT: 61 IN | WEIGHT: 235.6 LBS

## 2021-01-01 VITALS
SYSTOLIC BLOOD PRESSURE: 118 MMHG | OXYGEN SATURATION: 94 % | HEART RATE: 70 BPM | DIASTOLIC BLOOD PRESSURE: 74 MMHG | HEIGHT: 61 IN | WEIGHT: 237 LBS | BODY MASS INDEX: 44.75 KG/M2

## 2021-01-01 VITALS
RESPIRATION RATE: 17 BRPM | TEMPERATURE: 97.5 F | HEIGHT: 61 IN | HEART RATE: 68 BPM | SYSTOLIC BLOOD PRESSURE: 132 MMHG | BODY MASS INDEX: 46.07 KG/M2 | WEIGHT: 244 LBS | DIASTOLIC BLOOD PRESSURE: 70 MMHG | OXYGEN SATURATION: 95 %

## 2021-01-01 VITALS
RESPIRATION RATE: 16 BRPM | TEMPERATURE: 98 F | WEIGHT: 244.1 LBS | DIASTOLIC BLOOD PRESSURE: 66 MMHG | HEIGHT: 61 IN | HEART RATE: 63 BPM | SYSTOLIC BLOOD PRESSURE: 123 MMHG | BODY MASS INDEX: 46.09 KG/M2 | OXYGEN SATURATION: 92 %

## 2021-01-01 VITALS
WEIGHT: 242.2 LBS | SYSTOLIC BLOOD PRESSURE: 124 MMHG | DIASTOLIC BLOOD PRESSURE: 80 MMHG | HEIGHT: 61 IN | BODY MASS INDEX: 45.73 KG/M2 | HEART RATE: 80 BPM

## 2021-01-01 VITALS
SYSTOLIC BLOOD PRESSURE: 130 MMHG | WEIGHT: 238.2 LBS | BODY MASS INDEX: 44.97 KG/M2 | DIASTOLIC BLOOD PRESSURE: 82 MMHG | HEIGHT: 61 IN | HEART RATE: 83 BPM

## 2021-01-01 VITALS
HEIGHT: 61 IN | HEART RATE: 74 BPM | SYSTOLIC BLOOD PRESSURE: 120 MMHG | WEIGHT: 233 LBS | BODY MASS INDEX: 43.99 KG/M2 | DIASTOLIC BLOOD PRESSURE: 70 MMHG

## 2021-01-01 VITALS — OXYGEN SATURATION: 95 % | HEART RATE: 76 BPM | BODY MASS INDEX: 42.33 KG/M2 | HEIGHT: 62 IN | WEIGHT: 230 LBS

## 2021-01-01 VITALS — OXYGEN SATURATION: 97 % | HEART RATE: 67 BPM | BODY MASS INDEX: 42.33 KG/M2 | HEIGHT: 62 IN | WEIGHT: 230 LBS

## 2021-01-01 VITALS
DIASTOLIC BLOOD PRESSURE: 66 MMHG | TEMPERATURE: 97 F | SYSTOLIC BLOOD PRESSURE: 96 MMHG | OXYGEN SATURATION: 92 % | RESPIRATION RATE: 3 BRPM

## 2021-01-01 VITALS
HEART RATE: 88 BPM | WEIGHT: 236 LBS | DIASTOLIC BLOOD PRESSURE: 80 MMHG | BODY MASS INDEX: 44.59 KG/M2 | OXYGEN SATURATION: 94 % | SYSTOLIC BLOOD PRESSURE: 122 MMHG

## 2021-01-01 VITALS
RESPIRATION RATE: 20 BRPM | WEIGHT: 240 LBS | BODY MASS INDEX: 45.31 KG/M2 | HEART RATE: 72 BPM | HEIGHT: 61 IN | TEMPERATURE: 97.8 F | SYSTOLIC BLOOD PRESSURE: 132 MMHG | OXYGEN SATURATION: 98 % | DIASTOLIC BLOOD PRESSURE: 75 MMHG

## 2021-01-01 VITALS
BODY MASS INDEX: 46.33 KG/M2 | HEART RATE: 74 BPM | WEIGHT: 245.2 LBS | DIASTOLIC BLOOD PRESSURE: 84 MMHG | SYSTOLIC BLOOD PRESSURE: 126 MMHG | OXYGEN SATURATION: 96 %

## 2021-01-01 VITALS
OXYGEN SATURATION: 96 % | SYSTOLIC BLOOD PRESSURE: 144 MMHG | HEIGHT: 61 IN | HEART RATE: 72 BPM | WEIGHT: 230 LBS | TEMPERATURE: 98.7 F | DIASTOLIC BLOOD PRESSURE: 97 MMHG | BODY MASS INDEX: 43.43 KG/M2

## 2021-01-01 DIAGNOSIS — R42 DIZZINESS: ICD-10-CM

## 2021-01-01 DIAGNOSIS — I83.893 VARICOSE VEINS OF BOTH LEGS WITH EDEMA: Primary | ICD-10-CM

## 2021-01-01 DIAGNOSIS — J44.9 COPD, MODERATE (HCC): Chronic | ICD-10-CM

## 2021-01-01 DIAGNOSIS — I11.9 HYPERTENSIVE LEFT VENTRICULAR HYPERTROPHY, WITHOUT HEART FAILURE: Chronic | ICD-10-CM

## 2021-01-01 DIAGNOSIS — N20.0 KIDNEY STONE: Primary | ICD-10-CM

## 2021-01-01 DIAGNOSIS — R06.83 SNORES: ICD-10-CM

## 2021-01-01 DIAGNOSIS — M25.471 RIGHT ANKLE SWELLING: Primary | ICD-10-CM

## 2021-01-01 DIAGNOSIS — I83.893 VARICOSE VEINS OF BOTH LEGS WITH EDEMA: ICD-10-CM

## 2021-01-01 DIAGNOSIS — I87.2 VENOUS (PERIPHERAL) INSUFFICIENCY: Primary | Chronic | ICD-10-CM

## 2021-01-01 DIAGNOSIS — M25.571 ACUTE RIGHT ANKLE PAIN: Primary | ICD-10-CM

## 2021-01-01 DIAGNOSIS — E03.9 HYPOTHYROIDISM, UNSPECIFIED TYPE: Primary | ICD-10-CM

## 2021-01-01 DIAGNOSIS — E66.01 OBESITY, CLASS III, BMI 40-49.9 (MORBID OBESITY) (HCC): ICD-10-CM

## 2021-01-01 DIAGNOSIS — Z91.09 ENVIRONMENTAL ALLERGIES: ICD-10-CM

## 2021-01-01 DIAGNOSIS — I87.2 VENOUS (PERIPHERAL) INSUFFICIENCY: Chronic | ICD-10-CM

## 2021-01-01 DIAGNOSIS — B96.89 ACUTE BACTERIAL SINUSITIS: Primary | ICD-10-CM

## 2021-01-01 DIAGNOSIS — G47.33 OBSTRUCTIVE SLEEP APNEA: ICD-10-CM

## 2021-01-01 DIAGNOSIS — R60.0 PEDAL EDEMA: ICD-10-CM

## 2021-01-01 DIAGNOSIS — J44.9 COPD, MODERATE (HCC): ICD-10-CM

## 2021-01-01 DIAGNOSIS — F33.0 MAJOR DEPRESSIVE DISORDER, RECURRENT EPISODE, MILD (HCC): ICD-10-CM

## 2021-01-01 DIAGNOSIS — Z48.89 ENCOUNTER FOR POSTOPERATIVE WOUND CHECK: Primary | ICD-10-CM

## 2021-01-01 DIAGNOSIS — I87.2 VENOUS INSUFFICIENCY: Primary | ICD-10-CM

## 2021-01-01 DIAGNOSIS — I36.1 NONRHEUMATIC TRICUSPID VALVE REGURGITATION: Chronic | ICD-10-CM

## 2021-01-01 DIAGNOSIS — R73.9 HYPERGLYCEMIA: ICD-10-CM

## 2021-01-01 DIAGNOSIS — I34.0 NONRHEUMATIC MITRAL VALVE REGURGITATION: Chronic | ICD-10-CM

## 2021-01-01 DIAGNOSIS — R11.2 NAUSEA AND VOMITING, INTRACTABILITY OF VOMITING NOT SPECIFIED, UNSPECIFIED VOMITING TYPE: ICD-10-CM

## 2021-01-01 DIAGNOSIS — E66.01 MORBID OBESITY (HCC): ICD-10-CM

## 2021-01-01 DIAGNOSIS — R91.1 INCIDENTAL LUNG NODULE, GREATER THAN OR EQUAL TO 8MM: ICD-10-CM

## 2021-01-01 DIAGNOSIS — M19.011 ARTHRITIS OF RIGHT ACROMIOCLAVICULAR JOINT: Primary | ICD-10-CM

## 2021-01-01 DIAGNOSIS — J01.90 ACUTE BACTERIAL SINUSITIS: Primary | ICD-10-CM

## 2021-01-01 DIAGNOSIS — N20.1 URETERIC STONE: Primary | ICD-10-CM

## 2021-01-01 DIAGNOSIS — M79.89 LEG SWELLING: ICD-10-CM

## 2021-01-01 DIAGNOSIS — Z00.00 HEALTHCARE MAINTENANCE: ICD-10-CM

## 2021-01-01 DIAGNOSIS — Z13.220 LIPID SCREENING: ICD-10-CM

## 2021-01-01 DIAGNOSIS — R10.9 ABDOMINAL PAIN, UNSPECIFIED ABDOMINAL LOCATION: ICD-10-CM

## 2021-01-01 DIAGNOSIS — E03.9 HYPOTHYROIDISM, UNSPECIFIED TYPE: ICD-10-CM

## 2021-01-01 DIAGNOSIS — K21.9 GASTROESOPHAGEAL REFLUX DISEASE WITHOUT ESOPHAGITIS: ICD-10-CM

## 2021-01-01 DIAGNOSIS — J44.9 COPD, MODERATE (HCC): Primary | Chronic | ICD-10-CM

## 2021-01-01 DIAGNOSIS — I25.10 CAD IN NATIVE ARTERY: Primary | ICD-10-CM

## 2021-01-01 DIAGNOSIS — I87.302 CHRONIC VENOUS HYPERTENSION INVOLVING LEFT SIDE: Primary | ICD-10-CM

## 2021-01-01 DIAGNOSIS — R05.9 COUGH: ICD-10-CM

## 2021-01-01 DIAGNOSIS — J44.9 CHRONIC OBSTRUCTIVE PULMONARY DISEASE, UNSPECIFIED COPD TYPE (HCC): Primary | ICD-10-CM

## 2021-01-01 DIAGNOSIS — J44.9 COPD, MODERATE (HCC): Primary | ICD-10-CM

## 2021-01-01 LAB
ALBUMIN SERPL-MCNC: 3.8 GM/DL (ref 3.4–5)
ALBUMIN SERPL-MCNC: 4 GM/DL (ref 3.4–5)
ALBUMIN SERPL-MCNC: 4.2 GM/DL (ref 3.4–5)
ALP BLD-CCNC: 79 IU/L (ref 40–129)
ALP BLD-CCNC: 81 IU/L (ref 40–129)
ALP BLD-CCNC: 88 IU/L (ref 40–129)
ALT SERPL-CCNC: 11 U/L (ref 10–40)
ALT SERPL-CCNC: 14 U/L (ref 10–40)
ALT SERPL-CCNC: 15 U/L (ref 10–40)
AMORPHOUS: ABNORMAL /HPF
ANION GAP SERPL CALCULATED.3IONS-SCNC: 11 MMOL/L (ref 4–16)
ANION GAP SERPL CALCULATED.3IONS-SCNC: 11 MMOL/L (ref 4–16)
ANION GAP SERPL CALCULATED.3IONS-SCNC: 12 MMOL/L (ref 4–16)
ANION GAP SERPL CALCULATED.3IONS-SCNC: 12 MMOL/L (ref 4–16)
ANION GAP SERPL CALCULATED.3IONS-SCNC: 13 MMOL/L (ref 4–16)
ANION GAP SERPL CALCULATED.3IONS-SCNC: 9 MMOL/L (ref 4–16)
AST SERPL-CCNC: 12 IU/L (ref 15–37)
AST SERPL-CCNC: 17 IU/L (ref 15–37)
AST SERPL-CCNC: 17 IU/L (ref 15–37)
BACTERIA: ABNORMAL /HPF
BACTERIA: NEGATIVE /HPF
BASOPHILS ABSOLUTE: 0.1 K/CU MM
BASOPHILS RELATIVE PERCENT: 0.6 % (ref 0–1)
BASOPHILS RELATIVE PERCENT: 0.8 % (ref 0–1)
BASOPHILS RELATIVE PERCENT: 0.9 % (ref 0–1)
BASOPHILS RELATIVE PERCENT: 1.1 % (ref 0–1)
BILIRUB SERPL-MCNC: 0.2 MG/DL (ref 0–1)
BILIRUB SERPL-MCNC: 0.2 MG/DL (ref 0–1)
BILIRUB SERPL-MCNC: 0.5 MG/DL (ref 0–1)
BILIRUBIN URINE: NEGATIVE MG/DL
BLOOD, URINE: ABNORMAL
BLOOD, URINE: NEGATIVE
BUN BLDV-MCNC: 13 MG/DL (ref 6–23)
BUN BLDV-MCNC: 16 MG/DL (ref 6–23)
BUN BLDV-MCNC: 17 MG/DL (ref 6–23)
BUN BLDV-MCNC: 17 MG/DL (ref 6–23)
BUN BLDV-MCNC: 20 MG/DL (ref 6–23)
BUN BLDV-MCNC: 25 MG/DL (ref 6–23)
CALCIUM OXALATE CRYSTALS: ABNORMAL /HPF
CALCIUM SERPL-MCNC: 10 MG/DL (ref 8.3–10.6)
CALCIUM SERPL-MCNC: 10.4 MG/DL (ref 8.3–10.6)
CALCIUM SERPL-MCNC: 8.9 MG/DL (ref 8.3–10.6)
CALCIUM SERPL-MCNC: 9.2 MG/DL (ref 8.3–10.6)
CALCIUM SERPL-MCNC: 9.3 MG/DL (ref 8.3–10.6)
CALCIUM SERPL-MCNC: 9.5 MG/DL (ref 8.3–10.6)
CHLORIDE BLD-SCNC: 102 MMOL/L (ref 99–110)
CHLORIDE BLD-SCNC: 105 MMOL/L (ref 99–110)
CHLORIDE BLD-SCNC: 106 MMOL/L (ref 99–110)
CHLORIDE BLD-SCNC: 106 MMOL/L (ref 99–110)
CHLORIDE BLD-SCNC: 108 MMOL/L (ref 99–110)
CHLORIDE BLD-SCNC: 110 MMOL/L (ref 99–110)
CHOLESTEROL: 182 MG/DL
CLARITY: ABNORMAL
CLARITY: CLEAR
CO2: 22 MMOL/L (ref 21–32)
CO2: 22 MMOL/L (ref 21–32)
CO2: 23 MMOL/L (ref 21–32)
CO2: 23 MMOL/L (ref 21–32)
CO2: 24 MMOL/L (ref 21–32)
CO2: 27 MMOL/L (ref 21–32)
COLOR: ABNORMAL
COLOR: ABNORMAL
COLOR: YELLOW
COLOR: YELLOW
CREAT SERPL-MCNC: 0.6 MG/DL (ref 0.6–1.1)
CREAT SERPL-MCNC: 0.7 MG/DL (ref 0.6–1.1)
CREAT SERPL-MCNC: 0.7 MG/DL (ref 0.6–1.1)
CREAT SERPL-MCNC: 0.8 MG/DL (ref 0.6–1.1)
CREAT SERPL-MCNC: 0.8 MG/DL (ref 0.6–1.1)
CREAT SERPL-MCNC: 0.9 MG/DL (ref 0.6–1.1)
CULTURE: ABNORMAL
CULTURE: ABNORMAL
CULTURE: NORMAL
CULTURE: NORMAL
DIFFERENTIAL TYPE: ABNORMAL
DOSE AMOUNT: ABNORMAL
DOSE TIME: ABNORMAL
EKG ATRIAL RATE: 70 BPM
EKG ATRIAL RATE: 72 BPM
EKG DIAGNOSIS: NORMAL
EKG DIAGNOSIS: NORMAL
EKG P AXIS: 39 DEGREES
EKG P AXIS: 64 DEGREES
EKG P-R INTERVAL: 184 MS
EKG P-R INTERVAL: 186 MS
EKG Q-T INTERVAL: 390 MS
EKG Q-T INTERVAL: 398 MS
EKG QRS DURATION: 82 MS
EKG QRS DURATION: 84 MS
EKG QTC CALCULATION (BAZETT): 421 MS
EKG QTC CALCULATION (BAZETT): 435 MS
EKG R AXIS: -27 DEGREES
EKG R AXIS: -9 DEGREES
EKG T AXIS: 28 DEGREES
EKG T AXIS: 6 DEGREES
EKG VENTRICULAR RATE: 70 BPM
EKG VENTRICULAR RATE: 72 BPM
EOSINOPHILS ABSOLUTE: 0.3 K/CU MM
EOSINOPHILS ABSOLUTE: 0.6 K/CU MM
EOSINOPHILS RELATIVE PERCENT: 2.4 % (ref 0–3)
EOSINOPHILS RELATIVE PERCENT: 2.7 % (ref 0–3)
EOSINOPHILS RELATIVE PERCENT: 2.9 % (ref 0–3)
EOSINOPHILS RELATIVE PERCENT: 5.9 % (ref 0–3)
EXPIRATORY TIME: NORMAL
FEF 25-75% %PRED-PRE: 85.1 L/SEC
FEF 25-75% PRED: 1.91 L/SEC
FEF 25-75%-PRE: 1.63 L/SEC
FEV1 %PRED-PRE: 61.4 %
FEV1 PRED: 2.11 L
FEV1/FVC %PRED-PRE: 111 %
FEV1/FVC PRED: 76.7 %
FEV1/FVC: 85.1 %
FEV1: 1.3 L
FVC %PRED-PRE: 54.9 %
FVC PRED: 2.77 L
FVC: 1.52 L
GFR AFRICAN AMERICAN: >60 ML/MIN/1.73M2
GFR NON-AFRICAN AMERICAN: >60 ML/MIN/1.73M2
GLUCOSE BLD-MCNC: 100 MG/DL (ref 70–99)
GLUCOSE BLD-MCNC: 112 MG/DL (ref 70–99)
GLUCOSE BLD-MCNC: 119 MG/DL (ref 70–99)
GLUCOSE BLD-MCNC: 124 MG/DL (ref 70–99)
GLUCOSE BLD-MCNC: 128 MG/DL (ref 70–99)
GLUCOSE BLD-MCNC: 141 MG/DL (ref 70–99)
GLUCOSE, URINE: NEGATIVE MG/DL
HBA1C MFR BLD: 5.4 %
HCT VFR BLD CALC: 44 % (ref 37–47)
HCT VFR BLD CALC: 44.4 % (ref 37–47)
HCT VFR BLD CALC: 44.6 % (ref 37–47)
HCT VFR BLD CALC: 45.4 % (ref 37–47)
HCT VFR BLD CALC: 46.6 % (ref 37–47)
HDLC SERPL-MCNC: 45 MG/DL
HEMOGLOBIN: 14.4 GM/DL (ref 12.5–16)
HEMOGLOBIN: 14.7 GM/DL (ref 12.5–16)
HEMOGLOBIN: 14.9 GM/DL (ref 12.5–16)
HEMOGLOBIN: 14.9 GM/DL (ref 12.5–16)
HEMOGLOBIN: 15.4 GM/DL (ref 12.5–16)
HYALINE CASTS: 0 /LPF
IMMATURE NEUTROPHIL %: 0.2 % (ref 0–0.43)
IMMATURE NEUTROPHIL %: 0.3 % (ref 0–0.43)
IMMATURE NEUTROPHIL %: 0.4 % (ref 0–0.43)
IMMATURE NEUTROPHIL %: 0.6 % (ref 0–0.43)
KETONES, URINE: ABNORMAL MG/DL
KETONES, URINE: NEGATIVE MG/DL
LACTATE: 1.4 MMOL/L (ref 0.4–2)
LACTATE: 1.8 MMOL/L (ref 0.4–2)
LDL CHOLESTEROL DIRECT: 115 MG/DL
LEUKOCYTE ESTERASE, URINE: ABNORMAL
LEUKOCYTE ESTERASE, URINE: NEGATIVE
LIPASE: 12 IU/L (ref 13–60)
LIPASE: 19 IU/L (ref 13–60)
LYMPHOCYTES ABSOLUTE: 2.3 K/CU MM
LYMPHOCYTES ABSOLUTE: 2.7 K/CU MM
LYMPHOCYTES ABSOLUTE: 3 K/CU MM
LYMPHOCYTES ABSOLUTE: 3.8 K/CU MM
LYMPHOCYTES RELATIVE PERCENT: 22.8 % (ref 24–44)
LYMPHOCYTES RELATIVE PERCENT: 23.5 % (ref 24–44)
LYMPHOCYTES RELATIVE PERCENT: 27.8 % (ref 24–44)
LYMPHOCYTES RELATIVE PERCENT: 35 % (ref 24–44)
Lab: ABNORMAL
Lab: NORMAL
Lab: NORMAL
MCH RBC QN AUTO: 30.6 PG (ref 27–31)
MCH RBC QN AUTO: 31.4 PG (ref 27–31)
MCH RBC QN AUTO: 31.5 PG (ref 27–31)
MCH RBC QN AUTO: 31.8 PG (ref 27–31)
MCH RBC QN AUTO: 32 PG (ref 27–31)
MCHC RBC AUTO-ENTMCNC: 32.4 % (ref 32–36)
MCHC RBC AUTO-ENTMCNC: 32.7 % (ref 32–36)
MCHC RBC AUTO-ENTMCNC: 33 % (ref 32–36)
MCHC RBC AUTO-ENTMCNC: 33.4 % (ref 32–36)
MCHC RBC AUTO-ENTMCNC: 33.6 % (ref 32–36)
MCV RBC AUTO: 93.9 FL (ref 78–100)
MCV RBC AUTO: 94.6 FL (ref 78–100)
MCV RBC AUTO: 95.3 FL (ref 78–100)
MCV RBC AUTO: 95.9 FL (ref 78–100)
MCV RBC AUTO: 96.7 FL (ref 78–100)
MONOCYTES ABSOLUTE: 0.8 K/CU MM
MONOCYTES ABSOLUTE: 0.8 K/CU MM
MONOCYTES ABSOLUTE: 0.9 K/CU MM
MONOCYTES ABSOLUTE: 1 K/CU MM
MONOCYTES RELATIVE PERCENT: 7.7 % (ref 0–4)
MONOCYTES RELATIVE PERCENT: 7.8 % (ref 0–4)
MONOCYTES RELATIVE PERCENT: 7.9 % (ref 0–4)
MONOCYTES RELATIVE PERCENT: 9.1 % (ref 0–4)
MUCUS: ABNORMAL HPF
MUCUS: ABNORMAL HPF
NITRITE URINE, QUANTITATIVE: NEGATIVE
NUCLEATED RBC %: 0 %
PDW BLD-RTO: 13.2 % (ref 11.7–14.9)
PDW BLD-RTO: 13.2 % (ref 11.7–14.9)
PDW BLD-RTO: 13.3 % (ref 11.7–14.9)
PDW BLD-RTO: 13.3 % (ref 11.7–14.9)
PDW BLD-RTO: 13.4 % (ref 11.7–14.9)
PEF %PRED-PRE: NORMAL
PEF PRED: NORMAL
PEF-PRE: NORMAL
PH, URINE: 6 (ref 5–8)
PH, URINE: 6 (ref 5–8)
PH, URINE: 8 (ref 5–8)
PH, URINE: 9 (ref 5–8)
PLATELET # BLD: 242 K/CU MM (ref 140–440)
PLATELET # BLD: 245 K/CU MM (ref 140–440)
PLATELET # BLD: 247 K/CU MM (ref 140–440)
PLATELET # BLD: 255 K/CU MM (ref 140–440)
PLATELET # BLD: 268 K/CU MM (ref 140–440)
PMV BLD AUTO: 10 FL (ref 7.5–11.1)
PMV BLD AUTO: 9.4 FL (ref 7.5–11.1)
PMV BLD AUTO: 9.6 FL (ref 7.5–11.1)
PMV BLD AUTO: 9.6 FL (ref 7.5–11.1)
PMV BLD AUTO: 9.9 FL (ref 7.5–11.1)
POTASSIUM SERPL-SCNC: 3.2 MMOL/L (ref 3.5–5.1)
POTASSIUM SERPL-SCNC: 3.6 MMOL/L (ref 3.5–5.1)
POTASSIUM SERPL-SCNC: 3.7 MMOL/L (ref 3.5–5.1)
POTASSIUM SERPL-SCNC: 3.8 MMOL/L (ref 3.5–5.1)
POTASSIUM SERPL-SCNC: 4.1 MMOL/L (ref 3.5–5.1)
POTASSIUM SERPL-SCNC: 4.3 MMOL/L (ref 3.5–5.1)
PROTEIN UA: 30 MG/DL
PROTEIN UA: NEGATIVE MG/DL
RBC # BLD: 4.59 M/CU MM (ref 4.2–5.4)
RBC # BLD: 4.68 M/CU MM (ref 4.2–5.4)
RBC # BLD: 4.73 M/CU MM (ref 4.2–5.4)
RBC # BLD: 4.8 M/CU MM (ref 4.2–5.4)
RBC # BLD: 4.82 M/CU MM (ref 4.2–5.4)
RBC URINE: 1704 /HPF (ref 0–6)
RBC URINE: 2136 /HPF (ref 0–6)
RBC URINE: 64 /HPF (ref 0–6)
RBC URINE: ABNORMAL /HPF (ref 0–6)
SARS-COV-2, NAAT: NOT DETECTED
SEGMENTED NEUTROPHILS ABSOLUTE COUNT: 5.6 K/CU MM
SEGMENTED NEUTROPHILS ABSOLUTE COUNT: 6.2 K/CU MM
SEGMENTED NEUTROPHILS ABSOLUTE COUNT: 6.6 K/CU MM
SEGMENTED NEUTROPHILS ABSOLUTE COUNT: 7.4 K/CU MM
SEGMENTED NEUTROPHILS RELATIVE PERCENT: 52 % (ref 36–66)
SEGMENTED NEUTROPHILS RELATIVE PERCENT: 60.9 % (ref 36–66)
SEGMENTED NEUTROPHILS RELATIVE PERCENT: 62.2 % (ref 36–66)
SEGMENTED NEUTROPHILS RELATIVE PERCENT: 64.5 % (ref 36–66)
SODIUM BLD-SCNC: 138 MMOL/L (ref 135–145)
SODIUM BLD-SCNC: 138 MMOL/L (ref 135–145)
SODIUM BLD-SCNC: 141 MMOL/L (ref 135–145)
SODIUM BLD-SCNC: 141 MMOL/L (ref 135–145)
SODIUM BLD-SCNC: 142 MMOL/L (ref 135–145)
SODIUM BLD-SCNC: 146 MMOL/L (ref 135–145)
SOURCE: NORMAL
SPECIFIC GRAVITY UA: 1.01 (ref 1–1.03)
SPECIFIC GRAVITY UA: 1.02 (ref 1–1.03)
SPECIMEN: ABNORMAL
SPECIMEN: NORMAL
SPECIMEN: NORMAL
SQUAMOUS EPITHELIAL: 1 /HPF
SQUAMOUS EPITHELIAL: 1 /HPF
SQUAMOUS EPITHELIAL: 2 /HPF
T4 FREE: 0.85 NG/DL (ref 0.9–1.8)
THEOPHYLLINE LEVEL: 5.9 UG/ML (ref 10–20)
TOTAL IMMATURE NEUTOROPHIL: 0.02 K/CU MM
TOTAL IMMATURE NEUTOROPHIL: 0.03 K/CU MM
TOTAL IMMATURE NEUTOROPHIL: 0.04 K/CU MM
TOTAL IMMATURE NEUTOROPHIL: 0.07 K/CU MM
TOTAL NUCLEATED RBC: 0 K/CU MM
TOTAL PROTEIN: 6 GM/DL (ref 6.4–8.2)
TOTAL PROTEIN: 6.2 GM/DL (ref 6.4–8.2)
TOTAL PROTEIN: 6.3 GM/DL (ref 6.4–8.2)
TRANSITIONAL EPITHELIAL: <1 /HPF
TRICHOMONAS: ABNORMAL /HPF
TRIGL SERPL-MCNC: 95 MG/DL
TSH HIGH SENSITIVITY: 3.42 UIU/ML (ref 0.27–4.2)
UROBILINOGEN, URINE: NEGATIVE MG/DL (ref 0.2–1)
UROBILINOGEN, URINE: NORMAL MG/DL (ref 0.2–1)
WBC # BLD: 10.9 K/CU MM (ref 4–10.5)
WBC # BLD: 10.9 K/CU MM (ref 4–10.5)
WBC # BLD: 11.5 K/CU MM (ref 4–10.5)
WBC # BLD: 13 K/CU MM (ref 4–10.5)
WBC # BLD: 9.9 K/CU MM (ref 4–10.5)
WBC UA: 33 /HPF (ref 0–5)
WBC UA: 4 /HPF (ref 0–5)
WBC UA: 8 /HPF (ref 0–5)
WBC UA: ABNORMAL /HPF (ref 0–5)

## 2021-01-01 PROCEDURE — 74018 RADEX ABDOMEN 1 VIEW: CPT

## 2021-01-01 PROCEDURE — 3017F COLORECTAL CA SCREEN DOC REV: CPT | Performed by: PHYSICIAN ASSISTANT

## 2021-01-01 PROCEDURE — 3600000013 HC SURGERY LEVEL 3 ADDTL 15MIN: Performed by: SPECIALIST

## 2021-01-01 PROCEDURE — G8427 DOCREV CUR MEDS BY ELIG CLIN: HCPCS | Performed by: INTERNAL MEDICINE

## 2021-01-01 PROCEDURE — 6370000000 HC RX 637 (ALT 250 FOR IP): Performed by: SPECIALIST

## 2021-01-01 PROCEDURE — 93000 ELECTROCARDIOGRAM COMPLETE: CPT | Performed by: INTERNAL MEDICINE

## 2021-01-01 PROCEDURE — G8417 CALC BMI ABV UP PARAM F/U: HCPCS | Performed by: INTERNAL MEDICINE

## 2021-01-01 PROCEDURE — 83605 ASSAY OF LACTIC ACID: CPT

## 2021-01-01 PROCEDURE — G8399 PT W/DXA RESULTS DOCUMENT: HCPCS | Performed by: INTERNAL MEDICINE

## 2021-01-01 PROCEDURE — 99203 OFFICE O/P NEW LOW 30 MIN: CPT | Performed by: INTERNAL MEDICINE

## 2021-01-01 PROCEDURE — 1036F TOBACCO NON-USER: CPT | Performed by: INTERNAL MEDICINE

## 2021-01-01 PROCEDURE — 83721 ASSAY OF BLOOD LIPOPROTEIN: CPT

## 2021-01-01 PROCEDURE — 99213 OFFICE O/P EST LOW 20 MIN: CPT | Performed by: INTERNAL MEDICINE

## 2021-01-01 PROCEDURE — 81001 URINALYSIS AUTO W/SCOPE: CPT

## 2021-01-01 PROCEDURE — G8926 SPIRO NO PERF OR DOC: HCPCS | Performed by: INTERNAL MEDICINE

## 2021-01-01 PROCEDURE — 3017F COLORECTAL CA SCREEN DOC REV: CPT | Performed by: INTERNAL MEDICINE

## 2021-01-01 PROCEDURE — 99244 OFF/OP CNSLTJ NEW/EST MOD 40: CPT | Performed by: NURSE PRACTITIONER

## 2021-01-01 PROCEDURE — 3017F COLORECTAL CA SCREEN DOC REV: CPT | Performed by: FAMILY MEDICINE

## 2021-01-01 PROCEDURE — 6370000000 HC RX 637 (ALT 250 FOR IP): Performed by: EMERGENCY MEDICINE

## 2021-01-01 PROCEDURE — 85025 COMPLETE CBC W/AUTO DIFF WBC: CPT

## 2021-01-01 PROCEDURE — 6360000002 HC RX W HCPCS: Performed by: SPECIALIST

## 2021-01-01 PROCEDURE — G8484 FLU IMMUNIZE NO ADMIN: HCPCS | Performed by: FAMILY MEDICINE

## 2021-01-01 PROCEDURE — G8926 SPIRO NO PERF OR DOC: HCPCS | Performed by: FAMILY MEDICINE

## 2021-01-01 PROCEDURE — 83690 ASSAY OF LIPASE: CPT

## 2021-01-01 PROCEDURE — 87186 SC STD MICRODIL/AGAR DIL: CPT

## 2021-01-01 PROCEDURE — 36415 COLL VENOUS BLD VENIPUNCTURE: CPT

## 2021-01-01 PROCEDURE — 99284 EMERGENCY DEPT VISIT MOD MDM: CPT

## 2021-01-01 PROCEDURE — 0TC68ZZ EXTIRPATION OF MATTER FROM RIGHT URETER, VIA NATURAL OR ARTIFICIAL OPENING ENDOSCOPIC: ICD-10-PCS | Performed by: SPECIALIST

## 2021-01-01 PROCEDURE — 2580000003 HC RX 258: Performed by: SPECIALIST

## 2021-01-01 PROCEDURE — 1090F PRES/ABSN URINE INCON ASSESS: CPT | Performed by: INTERNAL MEDICINE

## 2021-01-01 PROCEDURE — 1123F ACP DISCUSS/DSCN MKR DOCD: CPT | Performed by: INTERNAL MEDICINE

## 2021-01-01 PROCEDURE — 6360000002 HC RX W HCPCS: Performed by: PHYSICIAN ASSISTANT

## 2021-01-01 PROCEDURE — 94761 N-INVAS EAR/PLS OXIMETRY MLT: CPT

## 2021-01-01 PROCEDURE — 93010 ELECTROCARDIOGRAM REPORT: CPT | Performed by: INTERNAL MEDICINE

## 2021-01-01 PROCEDURE — G8427 DOCREV CUR MEDS BY ELIG CLIN: HCPCS | Performed by: FAMILY MEDICINE

## 2021-01-01 PROCEDURE — G8417 CALC BMI ABV UP PARAM F/U: HCPCS | Performed by: PHYSICIAN ASSISTANT

## 2021-01-01 PROCEDURE — G0378 HOSPITAL OBSERVATION PER HR: HCPCS

## 2021-01-01 PROCEDURE — 87086 URINE CULTURE/COLONY COUNT: CPT

## 2021-01-01 PROCEDURE — 87635 SARS-COV-2 COVID-19 AMP PRB: CPT

## 2021-01-01 PROCEDURE — 1036F TOBACCO NON-USER: CPT | Performed by: PHYSICIAN ASSISTANT

## 2021-01-01 PROCEDURE — 99283 EMERGENCY DEPT VISIT LOW MDM: CPT

## 2021-01-01 PROCEDURE — G8926 SPIRO NO PERF OR DOC: HCPCS | Performed by: NURSE PRACTITIONER

## 2021-01-01 PROCEDURE — 1090F PRES/ABSN URINE INCON ASSESS: CPT | Performed by: FAMILY MEDICINE

## 2021-01-01 PROCEDURE — 1123F ACP DISCUSS/DSCN MKR DOCD: CPT | Performed by: PHYSICIAN ASSISTANT

## 2021-01-01 PROCEDURE — 2580000003 HC RX 258: Performed by: PHYSICIAN ASSISTANT

## 2021-01-01 PROCEDURE — 80048 BASIC METABOLIC PNL TOTAL CA: CPT

## 2021-01-01 PROCEDURE — 93005 ELECTROCARDIOGRAM TRACING: CPT | Performed by: EMERGENCY MEDICINE

## 2021-01-01 PROCEDURE — 3023F SPIROM DOC REV: CPT | Performed by: FAMILY MEDICINE

## 2021-01-01 PROCEDURE — 83036 HEMOGLOBIN GLYCOSYLATED A1C: CPT | Performed by: FAMILY MEDICINE

## 2021-01-01 PROCEDURE — 2580000003 HC RX 258

## 2021-01-01 PROCEDURE — 3209999900 FL LESS THAN 1 HOUR

## 2021-01-01 PROCEDURE — 4040F PNEUMOC VAC/ADMIN/RCVD: CPT | Performed by: PHYSICIAN ASSISTANT

## 2021-01-01 PROCEDURE — 3700000001 HC ADD 15 MINUTES (ANESTHESIA): Performed by: SPECIALIST

## 2021-01-01 PROCEDURE — G8399 PT W/DXA RESULTS DOCUMENT: HCPCS | Performed by: PHYSICIAN ASSISTANT

## 2021-01-01 PROCEDURE — G8427 DOCREV CUR MEDS BY ELIG CLIN: HCPCS | Performed by: PHYSICIAN ASSISTANT

## 2021-01-01 PROCEDURE — 6360000002 HC RX W HCPCS: Performed by: NURSE PRACTITIONER

## 2021-01-01 PROCEDURE — BT1D1ZZ FLUOROSCOPY OF RIGHT KIDNEY, URETER AND BLADDER USING LOW OSMOLAR CONTRAST: ICD-10-PCS | Performed by: SPECIALIST

## 2021-01-01 PROCEDURE — 96374 THER/PROPH/DIAG INJ IV PUSH: CPT

## 2021-01-01 PROCEDURE — 1200000000 HC SEMI PRIVATE

## 2021-01-01 PROCEDURE — 96375 TX/PRO/DX INJ NEW DRUG ADDON: CPT

## 2021-01-01 PROCEDURE — 96372 THER/PROPH/DIAG INJ SC/IM: CPT

## 2021-01-01 PROCEDURE — 99212 OFFICE O/P EST SF 10 MIN: CPT | Performed by: INTERNAL MEDICINE

## 2021-01-01 PROCEDURE — 7100000001 HC PACU RECOVERY - ADDTL 15 MIN: Performed by: SPECIALIST

## 2021-01-01 PROCEDURE — 6370000000 HC RX 637 (ALT 250 FOR IP): Performed by: INTERNAL MEDICINE

## 2021-01-01 PROCEDURE — 4040F PNEUMOC VAC/ADMIN/RCVD: CPT | Performed by: FAMILY MEDICINE

## 2021-01-01 PROCEDURE — 4040F PNEUMOC VAC/ADMIN/RCVD: CPT | Performed by: INTERNAL MEDICINE

## 2021-01-01 PROCEDURE — 1090F PRES/ABSN URINE INCON ASSESS: CPT | Performed by: NURSE PRACTITIONER

## 2021-01-01 PROCEDURE — 99285 EMERGENCY DEPT VISIT HI MDM: CPT

## 2021-01-01 PROCEDURE — 6360000002 HC RX W HCPCS: Performed by: INTERNAL MEDICINE

## 2021-01-01 PROCEDURE — 93971 EXTREMITY STUDY: CPT | Performed by: INTERNAL MEDICINE

## 2021-01-01 PROCEDURE — 80053 COMPREHEN METABOLIC PANEL: CPT

## 2021-01-01 PROCEDURE — 99213 OFFICE O/P EST LOW 20 MIN: CPT | Performed by: PHYSICIAN ASSISTANT

## 2021-01-01 PROCEDURE — 1090F PRES/ABSN URINE INCON ASSESS: CPT | Performed by: PHYSICIAN ASSISTANT

## 2021-01-01 PROCEDURE — 1123F ACP DISCUSS/DSCN MKR DOCD: CPT | Performed by: FAMILY MEDICINE

## 2021-01-01 PROCEDURE — 96376 TX/PRO/DX INJ SAME DRUG ADON: CPT

## 2021-01-01 PROCEDURE — 36465 NJX NONCMPND SCLRSNT 1 VEIN: CPT | Performed by: INTERNAL MEDICINE

## 2021-01-01 PROCEDURE — 84439 ASSAY OF FREE THYROXINE: CPT

## 2021-01-01 PROCEDURE — 6360000002 HC RX W HCPCS

## 2021-01-01 PROCEDURE — G8484 FLU IMMUNIZE NO ADMIN: HCPCS | Performed by: INTERNAL MEDICINE

## 2021-01-01 PROCEDURE — 99214 OFFICE O/P EST MOD 30 MIN: CPT | Performed by: INTERNAL MEDICINE

## 2021-01-01 PROCEDURE — 3023F SPIROM DOC REV: CPT | Performed by: INTERNAL MEDICINE

## 2021-01-01 PROCEDURE — G8417 CALC BMI ABV UP PARAM F/U: HCPCS | Performed by: FAMILY MEDICINE

## 2021-01-01 PROCEDURE — 80061 LIPID PANEL: CPT

## 2021-01-01 PROCEDURE — 99214 OFFICE O/P EST MOD 30 MIN: CPT | Performed by: FAMILY MEDICINE

## 2021-01-01 PROCEDURE — 80198 ASSAY OF THEOPHYLLINE: CPT

## 2021-01-01 PROCEDURE — 6360000004 HC RX CONTRAST MEDICATION: Performed by: SPECIALIST

## 2021-01-01 PROCEDURE — 6370000000 HC RX 637 (ALT 250 FOR IP): Performed by: PHYSICIAN ASSISTANT

## 2021-01-01 PROCEDURE — 1036F TOBACCO NON-USER: CPT | Performed by: FAMILY MEDICINE

## 2021-01-01 PROCEDURE — 20610 DRAIN/INJ JOINT/BURSA W/O US: CPT | Performed by: PHYSICIAN ASSISTANT

## 2021-01-01 PROCEDURE — 6360000002 HC RX W HCPCS: Performed by: EMERGENCY MEDICINE

## 2021-01-01 PROCEDURE — 3600000003 HC SURGERY LEVEL 3 BASE: Performed by: SPECIALIST

## 2021-01-01 PROCEDURE — 99213 OFFICE O/P EST LOW 20 MIN: CPT | Performed by: FAMILY MEDICINE

## 2021-01-01 PROCEDURE — 2500000003 HC RX 250 WO HCPCS

## 2021-01-01 PROCEDURE — G8399 PT W/DXA RESULTS DOCUMENT: HCPCS | Performed by: FAMILY MEDICINE

## 2021-01-01 PROCEDURE — 74176 CT ABD & PELVIS W/O CONTRAST: CPT

## 2021-01-01 PROCEDURE — 7100000000 HC PACU RECOVERY - FIRST 15 MIN: Performed by: SPECIALIST

## 2021-01-01 PROCEDURE — 84443 ASSAY THYROID STIM HORMONE: CPT

## 2021-01-01 PROCEDURE — 93970 EXTREMITY STUDY: CPT | Performed by: INTERNAL MEDICINE

## 2021-01-01 PROCEDURE — 73610 X-RAY EXAM OF ANKLE: CPT

## 2021-01-01 PROCEDURE — 2500000003 HC RX 250 WO HCPCS: Performed by: EMERGENCY MEDICINE

## 2021-01-01 PROCEDURE — 93971 EXTREMITY STUDY: CPT

## 2021-01-01 PROCEDURE — 3023F SPIROM DOC REV: CPT | Performed by: NURSE PRACTITIONER

## 2021-01-01 PROCEDURE — 2580000003 HC RX 258: Performed by: EMERGENCY MEDICINE

## 2021-01-01 PROCEDURE — 87088 URINE BACTERIA CULTURE: CPT

## 2021-01-01 PROCEDURE — 0T768DZ DILATION OF RIGHT URETER WITH INTRALUMINAL DEVICE, VIA NATURAL OR ARTIFICIAL OPENING ENDOSCOPIC: ICD-10-PCS | Performed by: SPECIALIST

## 2021-01-01 PROCEDURE — C2617 STENT, NON-COR, TEM W/O DEL: HCPCS | Performed by: SPECIALIST

## 2021-01-01 PROCEDURE — 85027 COMPLETE CBC AUTOMATED: CPT

## 2021-01-01 PROCEDURE — G8427 DOCREV CUR MEDS BY ELIG CLIN: HCPCS | Performed by: NURSE PRACTITIONER

## 2021-01-01 PROCEDURE — 2709999900 HC NON-CHARGEABLE SUPPLY: Performed by: SPECIALIST

## 2021-01-01 PROCEDURE — 94060 EVALUATION OF WHEEZING: CPT | Performed by: NURSE PRACTITIONER

## 2021-01-01 PROCEDURE — G8417 CALC BMI ABV UP PARAM F/U: HCPCS | Performed by: NURSE PRACTITIONER

## 2021-01-01 PROCEDURE — 3700000000 HC ANESTHESIA ATTENDED CARE: Performed by: SPECIALIST

## 2021-01-01 DEVICE — VARIABLE LENGTH URETERAL STENT
Type: IMPLANTABLE DEVICE | Site: URETER | Status: FUNCTIONAL
Brand: CONTOUR VL™

## 2021-01-01 RX ORDER — BUDESONIDE AND FORMOTEROL FUMARATE DIHYDRATE 160; 4.5 UG/1; UG/1
2 AEROSOL RESPIRATORY (INHALATION) 2 TIMES DAILY
Status: DISCONTINUED | OUTPATIENT
Start: 2021-01-01 | End: 2021-01-01 | Stop reason: HOSPADM

## 2021-01-01 RX ORDER — BENZONATATE 100 MG/1
100 CAPSULE ORAL 2 TIMES DAILY PRN
Qty: 20 CAPSULE | Refills: 0 | Status: SHIPPED | OUTPATIENT
Start: 2021-01-01 | End: 2021-01-01

## 2021-01-01 RX ORDER — LORATADINE 10 MG/1
10 TABLET ORAL DAILY
Qty: 90 TABLET | Refills: 3 | Status: SHIPPED | OUTPATIENT
Start: 2021-01-01 | End: 2021-01-01 | Stop reason: SDUPTHER

## 2021-01-01 RX ORDER — 0.9 % SODIUM CHLORIDE 0.9 %
1000 INTRAVENOUS SOLUTION INTRAVENOUS ONCE
Status: COMPLETED | OUTPATIENT
Start: 2021-01-01 | End: 2021-01-01

## 2021-01-01 RX ORDER — ALBUTEROL SULFATE 2.5 MG/3ML
2.5 SOLUTION RESPIRATORY (INHALATION) 2 TIMES DAILY PRN
Status: DISCONTINUED | OUTPATIENT
Start: 2021-01-01 | End: 2021-01-01 | Stop reason: HOSPADM

## 2021-01-01 RX ORDER — KETOROLAC TROMETHAMINE 30 MG/ML
15 INJECTION, SOLUTION INTRAMUSCULAR; INTRAVENOUS ONCE
Status: COMPLETED | OUTPATIENT
Start: 2021-01-01 | End: 2021-01-01

## 2021-01-01 RX ORDER — POLYETHYLENE GLYCOL 3350 17 G/17G
17 POWDER, FOR SOLUTION ORAL DAILY PRN
Status: DISCONTINUED | OUTPATIENT
Start: 2021-01-01 | End: 2021-01-01 | Stop reason: HOSPADM

## 2021-01-01 RX ORDER — DICYCLOMINE HYDROCHLORIDE 10 MG/ML
20 INJECTION INTRAMUSCULAR ONCE
Status: COMPLETED | OUTPATIENT
Start: 2021-01-01 | End: 2021-01-01

## 2021-01-01 RX ORDER — LORATADINE 10 MG/1
10 TABLET ORAL DAILY
Qty: 90 TABLET | Refills: 3 | Status: SHIPPED | OUTPATIENT
Start: 2021-01-01

## 2021-01-01 RX ORDER — MONTELUKAST SODIUM 10 MG/1
10 TABLET ORAL NIGHTLY
Qty: 90 TABLET | Refills: 3 | Status: SHIPPED | OUTPATIENT
Start: 2021-01-01

## 2021-01-01 RX ORDER — FAMOTIDINE 20 MG/1
20 TABLET, FILM COATED ORAL 2 TIMES DAILY
Status: DISCONTINUED | OUTPATIENT
Start: 2021-01-01 | End: 2021-01-01 | Stop reason: HOSPADM

## 2021-01-01 RX ORDER — FENTANYL CITRATE 50 UG/ML
25 INJECTION, SOLUTION INTRAMUSCULAR; INTRAVENOUS EVERY 5 MIN PRN
Status: DISCONTINUED | OUTPATIENT
Start: 2021-01-01 | End: 2021-01-01

## 2021-01-01 RX ORDER — FENTANYL CITRATE 50 UG/ML
50 INJECTION, SOLUTION INTRAMUSCULAR; INTRAVENOUS
Status: DISCONTINUED | OUTPATIENT
Start: 2021-01-01 | End: 2021-01-01 | Stop reason: HOSPADM

## 2021-01-01 RX ORDER — ROCURONIUM BROMIDE 10 MG/ML
INJECTION, SOLUTION INTRAVENOUS PRN
Status: DISCONTINUED | OUTPATIENT
Start: 2021-01-01 | End: 2021-01-01 | Stop reason: SDUPTHER

## 2021-01-01 RX ORDER — NITROFURANTOIN 25; 75 MG/1; MG/1
100 CAPSULE ORAL 2 TIMES DAILY
Qty: 14 CAPSULE | Refills: 0 | Status: SHIPPED | OUTPATIENT
Start: 2021-01-01 | End: 2021-01-01

## 2021-01-01 RX ORDER — ALBUTEROL SULFATE 90 UG/1
2 AEROSOL, METERED RESPIRATORY (INHALATION) EVERY 6 HOURS PRN
Qty: 1 EACH | Refills: 5 | Status: SHIPPED | OUTPATIENT
Start: 2021-01-01

## 2021-01-01 RX ORDER — NAPROXEN 250 MG/1
500 TABLET ORAL ONCE
Status: COMPLETED | OUTPATIENT
Start: 2021-01-01 | End: 2021-01-01

## 2021-01-01 RX ORDER — ACETAMINOPHEN 325 MG/1
650 TABLET ORAL EVERY 4 HOURS PRN
Status: DISCONTINUED | OUTPATIENT
Start: 2021-01-01 | End: 2021-01-01 | Stop reason: HOSPADM

## 2021-01-01 RX ORDER — HYDROCODONE BITARTRATE AND ACETAMINOPHEN 5; 325 MG/1; MG/1
1 TABLET ORAL ONCE
Status: COMPLETED | OUTPATIENT
Start: 2021-01-01 | End: 2021-01-01

## 2021-01-01 RX ORDER — POLYETHYLENE GLYCOL 3350 17 G/17G
17 POWDER, FOR SOLUTION ORAL DAILY PRN
Qty: 30 EACH | Refills: 0 | Status: SHIPPED | OUTPATIENT
Start: 2021-01-01 | End: 2021-01-01

## 2021-01-01 RX ORDER — BUDESONIDE AND FORMOTEROL FUMARATE DIHYDRATE 160; 4.5 UG/1; UG/1
2 AEROSOL RESPIRATORY (INHALATION) 2 TIMES DAILY
Qty: 3 INHALER | Refills: 1 | Status: SHIPPED | OUTPATIENT
Start: 2021-01-01 | End: 2021-01-01

## 2021-01-01 RX ORDER — FUROSEMIDE 20 MG/1
20 TABLET ORAL DAILY
Qty: 60 TABLET | Refills: 5 | Status: SHIPPED | OUTPATIENT
Start: 2021-01-01 | End: 2021-01-01 | Stop reason: SDUPTHER

## 2021-01-01 RX ORDER — THEOPHYLLINE 300 MG/1
300 TABLET, EXTENDED RELEASE ORAL 2 TIMES DAILY
Qty: 60 TABLET | Refills: 5 | Status: SHIPPED | OUTPATIENT
Start: 2021-01-01 | End: 2021-01-01 | Stop reason: SDUPTHER

## 2021-01-01 RX ORDER — PROMETHAZINE HYDROCHLORIDE 25 MG/ML
6.25 INJECTION, SOLUTION INTRAMUSCULAR; INTRAVENOUS EVERY 6 HOURS PRN
Status: DISCONTINUED | OUTPATIENT
Start: 2021-01-01 | End: 2021-01-01 | Stop reason: HOSPADM

## 2021-01-01 RX ORDER — BUSPIRONE HYDROCHLORIDE 7.5 MG/1
7.5 TABLET ORAL 2 TIMES DAILY
Qty: 180 TABLET | Refills: 3 | Status: SHIPPED | OUTPATIENT
Start: 2021-01-01

## 2021-01-01 RX ORDER — OXYBUTYNIN CHLORIDE 5 MG/1
5 TABLET ORAL ONCE
Status: COMPLETED | OUTPATIENT
Start: 2021-01-01 | End: 2021-01-01

## 2021-01-01 RX ORDER — FUROSEMIDE 20 MG/1
20 TABLET ORAL DAILY
Status: DISCONTINUED | OUTPATIENT
Start: 2021-01-01 | End: 2021-01-01 | Stop reason: HOSPADM

## 2021-01-01 RX ORDER — PROMETHAZINE HYDROCHLORIDE 25 MG/ML
25 INJECTION, SOLUTION INTRAMUSCULAR; INTRAVENOUS ONCE
Status: DISCONTINUED | OUTPATIENT
Start: 2021-01-01 | End: 2021-01-01 | Stop reason: HOSPADM

## 2021-01-01 RX ORDER — POLYETHYLENE GLYCOL 3350 17 G/17G
17 POWDER, FOR SOLUTION ORAL DAILY PRN
Status: DISCONTINUED | OUTPATIENT
Start: 2021-01-01 | End: 2021-01-01 | Stop reason: SDUPTHER

## 2021-01-01 RX ORDER — PHENAZOPYRIDINE HYDROCHLORIDE 100 MG/1
100 TABLET, FILM COATED ORAL 3 TIMES DAILY PRN
Qty: 9 TABLET | Refills: 0 | Status: SHIPPED | OUTPATIENT
Start: 2021-01-01 | End: 2021-01-01

## 2021-01-01 RX ORDER — SODIUM CHLORIDE 9 MG/ML
25 INJECTION, SOLUTION INTRAVENOUS PRN
Status: DISCONTINUED | OUTPATIENT
Start: 2021-01-01 | End: 2021-01-01 | Stop reason: HOSPADM

## 2021-01-01 RX ORDER — FENTANYL CITRATE 50 UG/ML
50 INJECTION, SOLUTION INTRAMUSCULAR; INTRAVENOUS EVERY 5 MIN PRN
Status: DISCONTINUED | OUTPATIENT
Start: 2021-01-01 | End: 2021-01-01

## 2021-01-01 RX ORDER — SODIUM CHLORIDE, SODIUM LACTATE, POTASSIUM CHLORIDE, CALCIUM CHLORIDE 600; 310; 30; 20 MG/100ML; MG/100ML; MG/100ML; MG/100ML
INJECTION, SOLUTION INTRAVENOUS CONTINUOUS PRN
Status: DISCONTINUED | OUTPATIENT
Start: 2021-01-01 | End: 2021-01-01 | Stop reason: SDUPTHER

## 2021-01-01 RX ORDER — FUROSEMIDE 20 MG/1
20 TABLET ORAL DAILY
Qty: 60 TABLET | Refills: 5 | Status: SHIPPED | OUTPATIENT
Start: 2021-01-01

## 2021-01-01 RX ORDER — BUPROPION HYDROCHLORIDE 150 MG/1
300 TABLET ORAL EVERY MORNING
Status: DISCONTINUED | OUTPATIENT
Start: 2021-01-01 | End: 2021-01-01 | Stop reason: HOSPADM

## 2021-01-01 RX ORDER — DIPHENHYDRAMINE HYDROCHLORIDE 50 MG/ML
12.5 INJECTION INTRAMUSCULAR; INTRAVENOUS ONCE
Status: DISCONTINUED | OUTPATIENT
Start: 2021-01-01 | End: 2021-01-01 | Stop reason: HOSPADM

## 2021-01-01 RX ORDER — PROMETHAZINE HYDROCHLORIDE 25 MG/1
25 TABLET ORAL EVERY 6 HOURS PRN
Qty: 12 TABLET | Refills: 0 | Status: SHIPPED | OUTPATIENT
Start: 2021-01-01 | End: 2021-01-01

## 2021-01-01 RX ORDER — FAMOTIDINE 20 MG/1
20 TABLET, FILM COATED ORAL 2 TIMES DAILY
Qty: 14 TABLET | Refills: 0 | Status: SHIPPED | OUTPATIENT
Start: 2021-01-01 | End: 2021-01-01

## 2021-01-01 RX ORDER — SODIUM CHLORIDE 9 MG/ML
INJECTION, SOLUTION INTRAVENOUS CONTINUOUS
Status: DISCONTINUED | OUTPATIENT
Start: 2021-01-01 | End: 2021-01-01

## 2021-01-01 RX ORDER — ONDANSETRON 4 MG/1
4 TABLET, ORALLY DISINTEGRATING ORAL EVERY 8 HOURS PRN
Qty: 15 TABLET | Refills: 0 | Status: SHIPPED | OUTPATIENT
Start: 2021-01-01 | End: 2021-01-01

## 2021-01-01 RX ORDER — GENTAMICIN SULFATE 80 MG/100ML
80 INJECTION, SOLUTION INTRAVENOUS ONCE
Status: COMPLETED | OUTPATIENT
Start: 2021-01-01 | End: 2021-01-01

## 2021-01-01 RX ORDER — FENTANYL CITRATE 50 UG/ML
50 INJECTION, SOLUTION INTRAMUSCULAR; INTRAVENOUS ONCE
Status: COMPLETED | OUTPATIENT
Start: 2021-01-01 | End: 2021-01-01

## 2021-01-01 RX ORDER — HYDRALAZINE HYDROCHLORIDE 20 MG/ML
5 INJECTION INTRAMUSCULAR; INTRAVENOUS EVERY 10 MIN PRN
Status: DISCONTINUED | OUTPATIENT
Start: 2021-01-01 | End: 2021-01-01

## 2021-01-01 RX ORDER — FENTANYL CITRATE 50 UG/ML
INJECTION, SOLUTION INTRAMUSCULAR; INTRAVENOUS PRN
Status: DISCONTINUED | OUTPATIENT
Start: 2021-01-01 | End: 2021-01-01 | Stop reason: SDUPTHER

## 2021-01-01 RX ORDER — DEXAMETHASONE SODIUM PHOSPHATE 4 MG/ML
INJECTION, SOLUTION INTRA-ARTICULAR; INTRALESIONAL; INTRAMUSCULAR; INTRAVENOUS; SOFT TISSUE PRN
Status: DISCONTINUED | OUTPATIENT
Start: 2021-01-01 | End: 2021-01-01 | Stop reason: SDUPTHER

## 2021-01-01 RX ORDER — FLUTICASONE PROPIONATE 50 MCG
1 SPRAY, SUSPENSION (ML) NASAL DAILY
COMMUNITY
End: 2021-01-01

## 2021-01-01 RX ORDER — GLYCOPYRROLATE 1 MG/5 ML
SYRINGE (ML) INTRAVENOUS PRN
Status: DISCONTINUED | OUTPATIENT
Start: 2021-01-01 | End: 2021-01-01 | Stop reason: SDUPTHER

## 2021-01-01 RX ORDER — LABETALOL HYDROCHLORIDE 5 MG/ML
5 INJECTION, SOLUTION INTRAVENOUS EVERY 10 MIN PRN
Status: DISCONTINUED | OUTPATIENT
Start: 2021-01-01 | End: 2021-01-01

## 2021-01-01 RX ORDER — BUPROPION HYDROCHLORIDE 300 MG/1
300 TABLET ORAL EVERY MORNING
Qty: 30 TABLET | Refills: 0 | Status: SHIPPED | OUTPATIENT
Start: 2021-01-01

## 2021-01-01 RX ORDER — ALBUTEROL SULFATE 90 UG/1
2 AEROSOL, METERED RESPIRATORY (INHALATION) EVERY 6 HOURS PRN
Status: DISCONTINUED | OUTPATIENT
Start: 2021-01-01 | End: 2021-01-01 | Stop reason: HOSPADM

## 2021-01-01 RX ORDER — BUSPIRONE HYDROCHLORIDE 7.5 MG/1
7.5 TABLET ORAL 2 TIMES DAILY
Status: DISCONTINUED | OUTPATIENT
Start: 2021-01-01 | End: 2021-01-01 | Stop reason: HOSPADM

## 2021-01-01 RX ORDER — FAMOTIDINE 20 MG/1
20 TABLET, FILM COATED ORAL 2 TIMES DAILY
Qty: 28 TABLET | Refills: 0 | Status: SHIPPED | OUTPATIENT
Start: 2021-01-01 | End: 2021-01-01

## 2021-01-01 RX ORDER — PROMETHAZINE HYDROCHLORIDE 25 MG/1
25 TABLET ORAL EVERY 6 HOURS PRN
Qty: 28 TABLET | Refills: 0 | Status: SHIPPED | OUTPATIENT
Start: 2021-01-01 | End: 2021-01-01

## 2021-01-01 RX ORDER — HYDROCODONE BITARTRATE AND ACETAMINOPHEN 5; 325 MG/1; MG/1
1 TABLET ORAL EVERY 6 HOURS PRN
Status: ON HOLD | COMMUNITY
End: 2022-01-01 | Stop reason: HOSPADM

## 2021-01-01 RX ORDER — SODIUM CHLORIDE 0.9 % (FLUSH) 0.9 %
5-40 SYRINGE (ML) INJECTION EVERY 12 HOURS SCHEDULED
Status: DISCONTINUED | OUTPATIENT
Start: 2021-01-01 | End: 2021-01-01 | Stop reason: HOSPADM

## 2021-01-01 RX ORDER — BUPROPION HYDROCHLORIDE 300 MG/1
300 TABLET ORAL EVERY MORNING
Qty: 90 TABLET | Refills: 1 | Status: SHIPPED | OUTPATIENT
Start: 2021-01-01 | End: 2021-01-01

## 2021-01-01 RX ORDER — LORATADINE 10 MG/1
10 TABLET ORAL DAILY
Status: DISCONTINUED | OUTPATIENT
Start: 2021-01-01 | End: 2021-01-01 | Stop reason: CLARIF

## 2021-01-01 RX ORDER — PROMETHAZINE HYDROCHLORIDE 25 MG/ML
25 INJECTION, SOLUTION INTRAMUSCULAR; INTRAVENOUS ONCE
Status: COMPLETED | OUTPATIENT
Start: 2021-01-01 | End: 2021-01-01

## 2021-01-01 RX ORDER — HYDROCODONE BITARTRATE AND ACETAMINOPHEN 5; 325 MG/1; MG/1
1 TABLET ORAL EVERY 8 HOURS PRN
Qty: 15 TABLET | Refills: 0 | Status: SHIPPED | OUTPATIENT
Start: 2021-01-01 | End: 2021-01-01

## 2021-01-01 RX ORDER — SODIUM CHLORIDE 9 MG/ML
INJECTION, SOLUTION INTRAVENOUS CONTINUOUS
Status: DISCONTINUED | OUTPATIENT
Start: 2021-01-01 | End: 2021-01-01 | Stop reason: HOSPADM

## 2021-01-01 RX ORDER — CETIRIZINE HYDROCHLORIDE 10 MG/1
10 TABLET ORAL DAILY
Status: DISCONTINUED | OUTPATIENT
Start: 2021-01-01 | End: 2021-01-01 | Stop reason: HOSPADM

## 2021-01-01 RX ORDER — LEVOTHYROXINE SODIUM 0.12 MG/1
125 TABLET ORAL EVERY MORNING
Status: DISCONTINUED | OUTPATIENT
Start: 2021-01-01 | End: 2021-01-01 | Stop reason: HOSPADM

## 2021-01-01 RX ORDER — DIPHENHYDRAMINE HYDROCHLORIDE 50 MG/ML
INJECTION INTRAMUSCULAR; INTRAVENOUS PRN
Status: DISCONTINUED | OUTPATIENT
Start: 2021-01-01 | End: 2021-01-01 | Stop reason: SDUPTHER

## 2021-01-01 RX ORDER — NAPROXEN 500 MG/1
500 TABLET ORAL 2 TIMES DAILY PRN
Qty: 20 TABLET | Refills: 0 | Status: SHIPPED | OUTPATIENT
Start: 2021-01-01 | End: 2021-01-01

## 2021-01-01 RX ORDER — HYDROCODONE BITARTRATE AND ACETAMINOPHEN 5; 325 MG/1; MG/1
1 TABLET ORAL EVERY 6 HOURS PRN
Qty: 8 TABLET | Refills: 0 | Status: SHIPPED | OUTPATIENT
Start: 2021-01-01 | End: 2021-01-01

## 2021-01-01 RX ORDER — THEOPHYLLINE 300 MG/1
300 TABLET, EXTENDED RELEASE ORAL 2 TIMES DAILY
Qty: 60 TABLET | Refills: 5 | Status: SHIPPED | OUTPATIENT
Start: 2021-01-01

## 2021-01-01 RX ORDER — HYDROCODONE BITARTRATE AND ACETAMINOPHEN 5; 325 MG/1; MG/1
1-2 TABLET ORAL EVERY 6 HOURS PRN
Qty: 12 TABLET | Refills: 0 | Status: SHIPPED | OUTPATIENT
Start: 2021-01-01 | End: 2021-01-01

## 2021-01-01 RX ORDER — SODIUM CHLORIDE 0.9 % (FLUSH) 0.9 %
5-40 SYRINGE (ML) INJECTION PRN
Status: DISCONTINUED | OUTPATIENT
Start: 2021-01-01 | End: 2021-01-01 | Stop reason: HOSPADM

## 2021-01-01 RX ORDER — LEVOTHYROXINE SODIUM 0.12 MG/1
125 TABLET ORAL EVERY MORNING
Qty: 90 TABLET | Refills: 3 | Status: SHIPPED | OUTPATIENT
Start: 2021-01-01

## 2021-01-01 RX ORDER — MONTELUKAST SODIUM 10 MG/1
10 TABLET ORAL NIGHTLY
Status: DISCONTINUED | OUTPATIENT
Start: 2021-01-01 | End: 2021-01-01 | Stop reason: HOSPADM

## 2021-01-01 RX ORDER — POLYETHYLENE GLYCOL 3350 17 G/17G
17 POWDER, FOR SOLUTION ORAL DAILY PRN
Qty: 527 G | Refills: 1 | Status: SHIPPED | OUTPATIENT
Start: 2021-01-01 | End: 2021-01-01

## 2021-01-01 RX ORDER — DIAZEPAM 5 MG/1
5 TABLET ORAL PRN
Qty: 2 TABLET | Refills: 0 | OUTPATIENT
Start: 2021-01-01 | End: 2021-01-01

## 2021-01-01 RX ORDER — DOXYCYCLINE HYCLATE 100 MG
100 TABLET ORAL 2 TIMES DAILY
Qty: 14 TABLET | Refills: 0 | Status: SHIPPED | OUTPATIENT
Start: 2021-01-01 | End: 2021-01-01

## 2021-01-01 RX ORDER — POTASSIUM CHLORIDE 20 MEQ/1
40 TABLET, EXTENDED RELEASE ORAL ONCE
Status: COMPLETED | OUTPATIENT
Start: 2021-01-01 | End: 2021-01-01

## 2021-01-01 RX ADMIN — SODIUM CHLORIDE 1000 ML: 9 INJECTION, SOLUTION INTRAVENOUS at 00:19

## 2021-01-01 RX ADMIN — KETOROLAC TROMETHAMINE 15 MG: 30 INJECTION, SOLUTION INTRAMUSCULAR; INTRAVENOUS at 10:38

## 2021-01-01 RX ADMIN — THEOPHYLLINE ANHYDROUS 300 MG: 300 CAPSULE, EXTENDED RELEASE ORAL at 20:35

## 2021-01-01 RX ADMIN — DICYCLOMINE HYDROCHLORIDE 20 MG: 20 INJECTION, SOLUTION INTRAMUSCULAR at 00:18

## 2021-01-01 RX ADMIN — FAMOTIDINE 20 MG: 10 INJECTION, SOLUTION INTRAVENOUS at 00:18

## 2021-01-01 RX ADMIN — BUSPIRONE HYDROCHLORIDE 7.5 MG: 7.5 TABLET ORAL at 20:35

## 2021-01-01 RX ADMIN — PHENYLEPHRINE HYDROCHLORIDE 100 MCG: 10 INJECTION INTRAVENOUS at 16:05

## 2021-01-01 RX ADMIN — NAPROXEN 500 MG: 250 TABLET ORAL at 16:22

## 2021-01-01 RX ADMIN — MONTELUKAST 10 MG: 10 TABLET, FILM COATED ORAL at 20:35

## 2021-01-01 RX ADMIN — HYDROCODONE BITARTRATE AND ACETAMINOPHEN 1 TABLET: 5; 325 TABLET ORAL at 08:52

## 2021-01-01 RX ADMIN — Medication 10 ML: at 08:16

## 2021-01-01 RX ADMIN — PHENYLEPHRINE HYDROCHLORIDE 100 MCG: 10 INJECTION INTRAVENOUS at 15:51

## 2021-01-01 RX ADMIN — PHENYLEPHRINE HYDROCHLORIDE 50 MCG: 10 INJECTION INTRAVENOUS at 15:38

## 2021-01-01 RX ADMIN — Medication 0.2 MG: at 15:51

## 2021-01-01 RX ADMIN — ROCURONIUM BROMIDE 50 MG: 10 INJECTION INTRAVENOUS at 15:41

## 2021-01-01 RX ADMIN — DIPHENHYDRAMINE HYDROCHLORIDE 12.5 MG: 50 INJECTION INTRAMUSCULAR; INTRAVENOUS at 15:45

## 2021-01-01 RX ADMIN — PROMETHAZINE HYDROCHLORIDE 25 MG: 25 INJECTION INTRAMUSCULAR; INTRAVENOUS at 08:52

## 2021-01-01 RX ADMIN — KETOROLAC TROMETHAMINE 15 MG: 30 INJECTION, SOLUTION INTRAMUSCULAR; INTRAVENOUS at 08:53

## 2021-01-01 RX ADMIN — HYDROMORPHONE HYDROCHLORIDE 0.5 MG: 1 INJECTION, SOLUTION INTRAMUSCULAR; INTRAVENOUS; SUBCUTANEOUS at 14:05

## 2021-01-01 RX ADMIN — FUROSEMIDE 20 MG: 20 TABLET ORAL at 08:14

## 2021-01-01 RX ADMIN — ROCURONIUM BROMIDE 10 MG: 10 INJECTION INTRAVENOUS at 16:07

## 2021-01-01 RX ADMIN — FUROSEMIDE 20 MG: 20 TABLET ORAL at 20:35

## 2021-01-01 RX ADMIN — BUPROPION HYDROCHLORIDE 300 MG: 150 TABLET, FILM COATED, EXTENDED RELEASE ORAL at 08:15

## 2021-01-01 RX ADMIN — SODIUM CHLORIDE 1000 ML: 9 INJECTION, SOLUTION INTRAVENOUS at 06:03

## 2021-01-01 RX ADMIN — SUGAMMADEX 221 MG: 100 INJECTION, SOLUTION INTRAVENOUS at 16:28

## 2021-01-01 RX ADMIN — POTASSIUM CHLORIDE 40 MEQ: 1500 TABLET, EXTENDED RELEASE ORAL at 08:52

## 2021-01-01 RX ADMIN — CETIRIZINE HYDROCHLORIDE 10 MG: 10 TABLET, FILM COATED ORAL at 08:15

## 2021-01-01 RX ADMIN — FENTANYL CITRATE 50 MCG: 50 INJECTION INTRAMUSCULAR; INTRAVENOUS at 08:10

## 2021-01-01 RX ADMIN — SODIUM CHLORIDE: 9 INJECTION, SOLUTION INTRAVENOUS at 12:26

## 2021-01-01 RX ADMIN — SODIUM CHLORIDE, POTASSIUM CHLORIDE, SODIUM LACTATE AND CALCIUM CHLORIDE: 600; 310; 30; 20 INJECTION, SOLUTION INTRAVENOUS at 15:26

## 2021-01-01 RX ADMIN — OXYBUTYNIN CHLORIDE 5 MG: 5 TABLET ORAL at 10:07

## 2021-01-01 RX ADMIN — FENTANYL CITRATE 50 MCG: 50 INJECTION, SOLUTION INTRAMUSCULAR; INTRAVENOUS at 16:36

## 2021-01-01 RX ADMIN — BUSPIRONE HYDROCHLORIDE 7.5 MG: 7.5 TABLET ORAL at 08:15

## 2021-01-01 RX ADMIN — KETOROLAC TROMETHAMINE 15 MG: 30 INJECTION, SOLUTION INTRAMUSCULAR; INTRAVENOUS at 05:58

## 2021-01-01 RX ADMIN — PROMETHAZINE HYDROCHLORIDE 25 MG: 25 INJECTION INTRAMUSCULAR; INTRAVENOUS at 10:13

## 2021-01-01 RX ADMIN — THEOPHYLLINE ANHYDROUS 300 MG: 300 CAPSULE, EXTENDED RELEASE ORAL at 08:15

## 2021-01-01 RX ADMIN — LEVOTHYROXINE SODIUM 125 MCG: 125 TABLET ORAL at 08:14

## 2021-01-01 RX ADMIN — KETOROLAC TROMETHAMINE 15 MG: 30 INJECTION, SOLUTION INTRAMUSCULAR; INTRAVENOUS at 10:13

## 2021-01-01 RX ADMIN — PROMETHAZINE HYDROCHLORIDE 25 MG: 25 INJECTION INTRAMUSCULAR; INTRAVENOUS at 05:59

## 2021-01-01 RX ADMIN — SODIUM CHLORIDE 1000 ML: 9 INJECTION, SOLUTION INTRAVENOUS at 10:14

## 2021-01-01 RX ADMIN — FENTANYL CITRATE 50 MCG: 50 INJECTION, SOLUTION INTRAMUSCULAR; INTRAVENOUS at 15:39

## 2021-01-01 RX ADMIN — GENTAMICIN SULFATE 80 MG: 80 INJECTION, SOLUTION INTRAVENOUS at 15:49

## 2021-01-01 RX ADMIN — HYDROCODONE BITARTRATE AND ACETAMINOPHEN 1 TABLET: 5; 325 TABLET ORAL at 05:31

## 2021-01-01 RX ADMIN — DEXAMETHASONE SODIUM PHOSPHATE 8 MG: 4 INJECTION, SOLUTION INTRAMUSCULAR; INTRAVENOUS at 15:46

## 2021-01-01 RX ADMIN — PROMETHAZINE HYDROCHLORIDE 25 MG: 25 INJECTION INTRAMUSCULAR; INTRAVENOUS at 10:42

## 2021-01-01 ASSESSMENT — PULMONARY FUNCTION TESTS
PIF_VALUE: 24
PIF_VALUE: 24
PIF_VALUE: 23
PIF_VALUE: 23
PIF_VALUE: 24
PIF_VALUE: 24
PIF_VALUE: 23
PIF_VALUE: 24
PIF_VALUE: 24
PIF_VALUE: 23
PIF_VALUE: 23
PIF_VALUE: 1
FEV1/FVC_PREDICTED: 76.7
PIF_VALUE: 27
PIF_VALUE: 24
PIF_VALUE: 2
PIF_VALUE: 24
PIF_VALUE: 29
PIF_VALUE: 24
PIF_VALUE: 23
PIF_VALUE: 23
FEV1: 1.30
PIF_VALUE: 23
PIF_VALUE: 24
FVC_PERCENT_PREDICTED_PRE: 54.9
PIF_VALUE: 23
PIF_VALUE: 23
PIF_VALUE: 24
PIF_VALUE: 24
PIF_VALUE: 2
PIF_VALUE: 24
PIF_VALUE: 27
PIF_VALUE: 23
PIF_VALUE: 26
PIF_VALUE: 23
PIF_VALUE: 24
PIF_VALUE: 3
PIF_VALUE: 24
FVC_PREDICTED: 2.77
PIF_VALUE: 25
PIF_VALUE: 24
PIF_VALUE: 23
PIF_VALUE: 24
PIF_VALUE: 26
PIF_VALUE: 23
PIF_VALUE: 25
FEV1/FVC: 85.1
PIF_VALUE: 23
FEV1/FVC_PERCENT_PREDICTED_PRE: 111.0
PIF_VALUE: 24
PIF_VALUE: 1
FVC: 1.52
PIF_VALUE: 23
PIF_VALUE: 2
FEV1_PERCENT_PREDICTED_PRE: 61.4
PIF_VALUE: 25
PIF_VALUE: 24
PIF_VALUE: 23
PIF_VALUE: 1
PIF_VALUE: 24
FEV1_PREDICTED: 2.11
PIF_VALUE: 22

## 2021-01-01 ASSESSMENT — PAIN SCALES - GENERAL
PAINLEVEL_OUTOF10: 10
PAINLEVEL_OUTOF10: 8
PAINLEVEL_OUTOF10: 10
PAINLEVEL_OUTOF10: 8
PAINLEVEL_OUTOF10: 10
PAINLEVEL_OUTOF10: 8
PAINLEVEL_OUTOF10: 5
PAINLEVEL_OUTOF10: 7
PAINLEVEL_OUTOF10: 0
PAINLEVEL_OUTOF10: 5
PAINLEVEL_OUTOF10: 10
PAINLEVEL_OUTOF10: 4
PAINLEVEL_OUTOF10: 5
PAINLEVEL_OUTOF10: 0
PAINLEVEL_OUTOF10: 0
PAINLEVEL_OUTOF10: 10

## 2021-01-01 ASSESSMENT — ENCOUNTER SYMPTOMS
SINUS PRESSURE: 1
ALLERGIC/IMMUNOLOGIC NEGATIVE: 1
ALLERGIC/IMMUNOLOGIC NEGATIVE: 1
SHORTNESS OF BREATH: 0
SINUS PRESSURE: 0
RESPIRATORY NEGATIVE: 1
RESPIRATORY NEGATIVE: 1
BACK PAIN: 0
COUGH: 0
ALLERGIC/IMMUNOLOGIC NEGATIVE: 1
SORE THROAT: 0
NAUSEA: 1
GASTROINTESTINAL NEGATIVE: 1
EYES NEGATIVE: 1
DIARRHEA: 0
ABDOMINAL PAIN: 0
BACK PAIN: 0
CHEST TIGHTNESS: 0
WHEEZING: 0
CONSTIPATION: 0
RESPIRATORY NEGATIVE: 1
ABDOMINAL PAIN: 0
ALLERGIC/IMMUNOLOGIC NEGATIVE: 1
EYES NEGATIVE: 1
GASTROINTESTINAL NEGATIVE: 1
WHEEZING: 0
ABDOMINAL PAIN: 1
COUGH: 1
RESPIRATORY NEGATIVE: 1
CHEST TIGHTNESS: 0
VOMITING: 1
EYES NEGATIVE: 1
COUGH: 0
SINUS PAIN: 1
SHORTNESS OF BREATH: 0
WHEEZING: 0
SHORTNESS OF BREATH: 0
GASTROINTESTINAL NEGATIVE: 1
EYES NEGATIVE: 1

## 2021-01-01 ASSESSMENT — PAIN DESCRIPTION - ORIENTATION
ORIENTATION: RIGHT

## 2021-01-01 ASSESSMENT — PAIN DESCRIPTION - DESCRIPTORS
DESCRIPTORS: STABBING
DESCRIPTORS: ACHING
DESCRIPTORS: STABBING

## 2021-01-01 ASSESSMENT — SLEEP AND FATIGUE QUESTIONNAIRES
HOW LIKELY ARE YOU TO NOD OFF OR FALL ASLEEP WHILE SITTING AND TALKING TO SOMEONE: 0
HOW LIKELY ARE YOU TO NOD OFF OR FALL ASLEEP WHILE SITTING INACTIVE IN A PUBLIC PLACE: 1
HOW LIKELY ARE YOU TO NOD OFF OR FALL ASLEEP IN A CAR, WHILE STOPPED FOR A FEW MINUTES IN TRAFFIC: 0
HOW LIKELY ARE YOU TO NOD OFF OR FALL ASLEEP WHEN YOU ARE A PASSENGER IN A CAR FOR AN HOUR WITHOUT A BREAK: 3

## 2021-01-01 ASSESSMENT — PAIN DESCRIPTION - FREQUENCY
FREQUENCY: CONTINUOUS
FREQUENCY: INTERMITTENT
FREQUENCY: INTERMITTENT

## 2021-01-01 ASSESSMENT — PAIN DESCRIPTION - LOCATION
LOCATION: ABDOMEN
LOCATION: ABDOMEN;FLANK
LOCATION: ABDOMEN
LOCATION: FLANK;BACK

## 2021-01-01 ASSESSMENT — PAIN DESCRIPTION - ONSET
ONSET: ON-GOING
ONSET: ON-GOING
ONSET: SUDDEN

## 2021-01-01 ASSESSMENT — PAIN DESCRIPTION - PROGRESSION: CLINICAL_PROGRESSION: NOT CHANGED

## 2021-01-01 ASSESSMENT — PAIN DESCRIPTION - PAIN TYPE
TYPE: ACUTE PAIN

## 2021-01-01 ASSESSMENT — PATIENT HEALTH QUESTIONNAIRE - PHQ9
SUM OF ALL RESPONSES TO PHQ QUESTIONS 1-9: 0
1. LITTLE INTEREST OR PLEASURE IN DOING THINGS: 0

## 2021-01-01 ASSESSMENT — PAIN - FUNCTIONAL ASSESSMENT: PAIN_FUNCTIONAL_ASSESSMENT: ACTIVITIES ARE NOT PREVENTED

## 2021-02-23 PROBLEM — F33.0 MAJOR DEPRESSIVE DISORDER, RECURRENT EPISODE, MILD (HCC): Status: ACTIVE | Noted: 2021-01-01

## 2021-02-23 PROBLEM — R60.0 PEDAL EDEMA: Status: ACTIVE | Noted: 2021-01-01

## 2021-02-23 PROBLEM — Z91.09 ENVIRONMENTAL ALLERGIES: Status: ACTIVE | Noted: 2021-01-01

## 2021-02-23 PROBLEM — R42 DIZZINESS: Status: ACTIVE | Noted: 2021-01-01

## 2021-02-23 NOTE — TELEPHONE ENCOUNTER
Patient states that for her referral to neuro she wants that sent to Dr Rina Manning.  Will get this sent by Thursday due to being out of normal position

## 2021-02-23 NOTE — PROGRESS NOTES
Jeanine Nageotte  1955  02/23/21    Chief Complaint   Patient presents with   Sherren Furl Doctor     Patient here to establish care with PCP           77 yrs old lady with PMH hypothyroidism, COPD, depression, and leg swelling, came today to establish with us, patient also c/o dizziness, or stats abnormal voiced in her head, going on for 2-3 months, s/p fall, denies headache, no weakness, no seizure, no blurry vision, no chest pain or SOB, no fever, no abdominal pain no N/V. Past Medical History:   Diagnosis Date    Anxiety 1/28/2019    Arthritis     Asthma     CAD (coronary artery disease)     COPD (chronic obstructive pulmonary disease) ([facility] [address]     H/O cardiac catheterization 09/17/2019     normal coronaries    H/O cardiovascular stress test     H/O Doppler carotid ultrasound 08/07/2019    Mild (0-49%) disease of the Bilateral Internal carotid artery.  H/O Doppler lower venous ultrasound 08/07/2019    Significant reflux noted of the Right GSV. Right GSV too small currently for ablation procedure. Significant reflux noted in the Left GSV at mid thigh . Left GSV difficult to follow between mid thigh and mid calf due to multiple  splits/branches. GSV knee tributary is very superficial.    H/O echocardiogram 08/07/2019    EF 55-60%, Mild MR & TR.    Hepatic steatosis 6/19/2015    History of nuclear stress test 09/05/2019     Mild anterior wall Ischemia of a medium sized territory. Possibility of breast artifact causing the abnormality cannot be excluded.     Hyperlipidemia     Hypertension     Hypothyroid      Past Surgical History:   Procedure Laterality Date    CARPAL TUNNEL RELEASE Left     CHOLECYSTECTOMY      COLONOSCOPY  01/28/2019    Polyps x4, Internal grade 1 hemorrhoids    COLONOSCOPY N/A 1/28/2019    COLONOSCOPY POLYPECTOMY SNARE/COLD BIOPSY performed by Zainab Roche MD at [address], COLON, DIAGNOSTIC  09/03/2019    biopsy f/u with pcp    HYSTERECTOMY due to bleeding.   complete    THYROIDECTOMY  2009    TONSILLECTOMY      TUBAL LIGATION      UPPER GASTROINTESTINAL ENDOSCOPY N/A 9/4/2019    EGD BIOPSY performed by Nino Diaz MD at [address]  [address]      Family History   Problem Relation Age of Onset    Diabetes Mother     Hypertension Mother     Depression Mother         suicide    Diabetes Father     Depression Father     Diabetes Brother     Birth Defects Maternal Grandmother     Hypertension Maternal Grandmother     Diabetes Sister     Depression Brother     Diabetes Sister     Diabetes Brother     Other Brother         black mold    Diabetes Brother     Other Daughter         GI issues    Asthma Daughter     Seizures Daughter      Social History     Socioeconomic History    Marital status:      Spouse name: Not on file    Number of children: 2    Years of education: 15    Highest education level: GED or equivalent   Occupational History    Occupation: unemployed   Social Needs    Financial resource strain: Not on file    Food insecurity     Worry: Not on file     Inability: Not on file   [employer] needs     Medical: Not on file     Non-medical: Not on file   Tobacco Use    Smoking status: Never Smoker    Smokeless tobacco: Never Used   Substance and Sexual Activity    Alcohol use: No    Drug use: No    Sexual activity: Not Currently     Partners: Male     Birth control/protection: Post-menopausal   Lifestyle    Physical activity     Days per week: Not on file     Minutes per session: Not on file    Stress: Not on file   Relationships    Social connections     Talks on phone: Not on file     Gets together: Not on file     Attends [employer] service: Not on file     Active member of club or organization: Not on file     Attends meetings of clubs or organizations: Not on file     Relationship status: Not on file    Intimate partner violence     Fear of current or ex partner: Not on file     Emotionally abused: Not on file     Physically abused: Not on file     Forced sexual activity: Not on file   Other Topics Concern    Not on file   Social History Narrative    Not on file       Allergies   Allergen Reactions    Latex Itching    Iodine Anaphylaxis    Levofloxacin Anaphylaxis    Pcn [Penicillins] Anaphylaxis    Sulfa Antibiotics Anaphylaxis    Flexeril [Cyclobenzaprine]     Morphine Itching    Zofran Itching    Caffeine Palpitations     Doesn't sleep    Lidocaine Nausea And Vomiting    Volumen [Barium Sulfate] Itching, Nausea And Vomiting and Rash     Current Outpatient Medications   Medication Sig Dispense Refill    montelukast (SINGULAIR) 10 MG tablet Take 1 tablet by mouth nightly 90 tablet 3    busPIRone (BUSPAR) 7.5 MG tablet Take 1 tablet by mouth 2 times daily 180 tablet 3    levothyroxine (SYNTHROID) 125 MCG tablet Take 1 tablet by mouth every morning 90 tablet 3    loratadine (CLARITIN) 10 MG tablet Take 1 tablet by mouth daily 90 tablet 3    UNABLE TO FIND cane 1 Device 0    theophylline (THEODUR) 300 MG extended release tablet Take 1 tablet by mouth 2 times daily 60 tablet 5    furosemide (LASIX) 20 MG tablet Take 1 tablet by mouth daily 60 tablet 5    buPROPion (WELLBUTRIN XL) 300 MG extended release tablet Take 1 tablet by mouth every morning 90 tablet 1    albuterol (PROVENTIL) (2.5 MG/3ML) 0.083% nebulizer solution Take 3 mLs by nebulization 2 times daily as needed for Wheezing or Shortness of Breath 60 each 2    traMADol (ULTRAM) 50 MG tablet Take 50 mg by mouth every 6 hours as needed for Pain.  WIXELA INHUB 250-50 MCG/DOSE AEPB inhale 1 puff by mouth every 12 hours 1 Inhaler 0    albuterol sulfate HFA (VENTOLIN HFA) 108 (90 Base) MCG/ACT inhaler Inhale 2 puffs into the lungs every 6 hours as needed for Shortness of Breath 1 Inhaler 5     No current facility-administered medications for this visit.         Review of Systems   Constitutional: Negative for activity change, appetite change, chills, fatigue and fever. HENT: Negative for congestion, postnasal drip, sinus pressure and sore throat. Respiratory: Negative for cough, chest tightness, shortness of breath and wheezing. Cardiovascular: Positive for leg swelling. Negative for chest pain. Gastrointestinal: Negative for abdominal pain, constipation and diarrhea. Genitourinary: Negative for dysuria and frequency. Musculoskeletal: Negative for back pain. Skin: Negative for rash. Neurological: Positive for dizziness. Negative for tremors, seizures, syncope, speech difficulty, weakness, numbness and headaches. Psychiatric/Behavioral: Negative for agitation, behavioral problems, decreased concentration, hallucinations and sleep disturbance. The patient is not nervous/anxious.         Lab Results   Component Value Date    WBC 8.5 02/16/2020    HGB 15.5 02/16/2020    HCT 48.8 (H) 02/16/2020    MCV 95.5 02/16/2020     02/16/2020     Lab Results   Component Value Date     02/16/2020    K 3.8 02/16/2020     02/16/2020    CO2 24 02/16/2020    BUN 14 02/16/2020    CREATININE 0.7 02/16/2020    GLUCOSE 97 02/16/2020    CALCIUM 9.7 02/16/2020    PROT 7.2 02/16/2020    LABALBU 4.1 02/16/2020    BILITOT 0.3 02/16/2020    ALKPHOS 129 02/16/2020    AST 19 02/16/2020    ALT 17 02/16/2020    LABGLOM >60 02/16/2020    GFRAA >60 02/16/2020    AGRATIO 1.8 06/19/2018    GLOB 2.3 06/19/2018     Lab Results   Component Value Date    CHOL 187 04/25/2017    CHOL 196 03/30/2015    CHOL 174 06/10/2013     Lab Results   Component Value Date    TRIG 159 (H) 04/25/2017    TRIG 126 03/30/2015    TRIG 130 06/10/2013     Lab Results   Component Value Date    HDL 50 04/25/2017    HDL 56 03/30/2015    HDL 48 (L) 06/10/2013     Lab Results   Component Value Date    LDLCALC 105 (H) 04/25/2017    LDLCALC 105 (H) 07/30/2012    LDLCALC 105 (H) 01/10/2012     Lab Results   Component Value Date    LABA1C 5.5 06/05/2019     Lab Results   Component Value Date    TSHFT4 2.52 11/14/2019    TSH 1.24 06/19/2018    TSHHS 4.450 (H) 07/18/2019         /80 (Site: Left Upper Arm, Position: Sitting, Cuff Size: Large Adult)   Pulse 88   Wt 236 lb (107 kg)   LMP  (LMP Unknown)   SpO2 94%   BMI 44.59 kg/m²     BP Readings from Last 3 Encounters:   02/23/21 122/80   09/24/20 122/84   08/20/20 100/80       Wt Readings from Last 3 Encounters:   02/23/21 236 lb (107 kg)   09/24/20 227 lb (103 kg)   08/20/20 226 lb (102.5 kg)         Physical Exam  Constitutional:       General: She is not in acute distress. Appearance: She is obese. She is not ill-appearing. HENT:      Head: Normocephalic and atraumatic. Eyes:      General: No scleral icterus. Pupils: Pupils are equal, round, and reactive to light. Neck:      Musculoskeletal: Normal range of motion and neck supple. No neck rigidity. Cardiovascular:      Rate and Rhythm: Normal rate and regular rhythm. Heart sounds: No murmur. Pulmonary:      Effort: Pulmonary effort is normal.      Breath sounds: Normal breath sounds. No wheezing. Abdominal:      General: There is no distension. Palpations: There is no mass. Tenderness: There is no abdominal tenderness. Musculoskeletal: Normal range of motion. Right lower leg: No edema. Left lower leg: No edema. Neurological:      General: No focal deficit present. Mental Status: She is alert and oriented to person, place, and time. Psychiatric:         Mood and Affect: Mood is anxious. Affect is inappropriate. Behavior: Behavior normal.         ASSESSMENT/ PLAN:    1. Hypothyroidism, unspecified type  - stable  - TSH without Reflex; Future  - T4, Free; Future  - levothyroxine (SYNTHROID) 125 MCG tablet; Take 1 tablet by mouth every morning  Dispense: 90 tablet; Refill: 3    2. COPD, moderate ([address]  - stable    3.  Major depressive disorder, recurrent episode, mild ([address]  - stable  - busPIRone (BUSPAR) 7.5 MG tablet; Take 1 tablet by mouth 2 times daily  Dispense: 180 tablet; Refill: 3    4. Environmental allergies  - stable  - montelukast (SINGULAIR) 10 MG tablet; Take 1 tablet by mouth nightly  Dispense: 90 tablet; Refill: 3  - loratadine (CLARITIN) 10 MG tablet; Take 1 tablet by mouth daily  Dispense: 90 tablet; Refill: 3    5. Pedal edema  - under control  - Basic Metabolic Panel; Future    6. Lipid screening  - Lipid Panel; Future    7. Dizziness  - AFL - Tomy, Keyur Barrett DO, Neurology, [place] [facility]              - All old blood work reviewed with the patient  - Appropriate prescription are addressed. - After visit summery provided. - Questions answered and patient verbalizes understanding.  - Call for any problem, questions, or concerns.  - RTC if symptoms worse. Return in about 6 months (around 8/23/2021).

## 2021-02-25 NOTE — TELEPHONE ENCOUNTER
Accessed chart to print the referral and then get provider name. Found him in [place]. Will contact that office after lunch for fax.

## 2021-02-25 NOTE — TELEPHONE ENCOUNTER
Accessed this chart to verify this is our patient to see if DME supply form was needed. Outcome:    [facility] just needed OV note.  Printed and will send back

## 2021-02-25 NOTE — TELEPHONE ENCOUNTER
Tried to find this physician and looks like the [facility] is actually based out of [facility]. I have reach out to the patient and she will call me back with his number since I am having issues locating.

## 2021-02-25 NOTE — TELEPHONE ENCOUNTER
Patient returned call with this phone number [phone]. This is the office of Dr Edmund simmons nephro provider.

## 2021-03-02 NOTE — TELEPHONE ENCOUNTER
Spoke with Dr Dover Leisure office they can not see her for neuro. Patient advised to call insurance and see who is in network.

## 2021-03-09 NOTE — TELEPHONE ENCOUNTER
Accessed this chart to verify this is our patient to see if DME supply form was needed.  Outcome:    Sent to Provider

## 2021-03-12 NOTE — TELEPHONE ENCOUNTER
Angie Hutson  Request for PA recieved via fax. Accessed chart to verkenroyiy PA need.  Outcome:  PA needed & initiated awaiting response

## 2021-04-02 NOTE — PATIENT INSTRUCTIONS
Continue gentle range of motion as tolerated  Take ibuprofen or Tylenol as needed for pain  Ice or elevate as needed   Steroid injection given today. Rest for the next 24-48 hours as needed.   Physical therapy ordered  Follow-up in 8 weeks

## 2021-04-02 NOTE — PROGRESS NOTES
CHOLECYSTECTOMY      COLONOSCOPY  01/28/2019    Polyps x4, Internal grade 1 hemorrhoids    COLONOSCOPY N/A 1/28/2019    COLONOSCOPY POLYPECTOMY SNARE/COLD BIOPSY performed by Wendi Munoz MD at [address], COLON, DIAGNOSTIC  09/03/2019    biopsy f/u with pcp    HYSTERECTOMY      due to bleeding.   complete    THYROIDECTOMY  2009    TONSILLECTOMY      TUBAL LIGATION      UPPER GASTROINTESTINAL ENDOSCOPY N/A 9/4/2019    EGD BIOPSY performed by Sandra Linares MD at [address] Left        Family History   Problem Relation Age of Onset    Diabetes Mother     Hypertension Mother     Depression Mother         suicide    Diabetes Father     Depression Father     Diabetes Brother     Birth Defects Maternal Grandmother     Hypertension Maternal Grandmother     Diabetes Sister     Depression Brother     Diabetes Sister     Diabetes Brother     Other Brother         black mold    Diabetes Brother     Other Daughter         GI issues    Asthma Daughter     Seizures Daughter        Social History     Socioeconomic History    Marital status:      Spouse name: Not on file    Number of children: 2    Years of education: 15    Highest education level: GED or equivalent   Occupational History    Occupation: unemployed   Social Needs    Financial resource strain: Not on file    Food insecurity     Worry: Not on file     Inability: Not on file   Spreaker needs     Medical: Not on file     Non-medical: Not on file   Tobacco Use    Smoking status: Never Smoker    Smokeless tobacco: Never Used   Substance and Sexual Activity    Alcohol use: No    Drug use: No    Sexual activity: Not Currently     Partners: Male     Birth control/protection: Post-menopausal   Lifestyle    Physical activity     Days per week: Not on file     Minutes per session: Not on file    Stress: Not on file   Relationships    Social connections     Talks on phone: Not on file Gets together: Not on file     Attends Buddhism service: Not on file     Active member of club or organization: Not on file     Attends meetings of clubs or organizations: Not on file     Relationship status: Not on file    Intimate partner violence     Fear of current or ex partner: Not on file     Emotionally abused: Not on file     Physically abused: Not on file     Forced sexual activity: Not on file   Other Topics Concern    Not on file   Social History Narrative    Not on file       Current Outpatient Medications   Medication Sig Dispense Refill    budesonide-formoterol (SYMBICORT) 160-4.5 MCG/ACT AERO Inhale 2 puffs into the lungs 2 times daily 3 Inhaler 1    buPROPion (WELLBUTRIN XL) 300 MG extended release tablet Take 1 tablet by mouth every morning 90 tablet 1    albuterol sulfate HFA (VENTOLIN HFA) 108 (90 Base) MCG/ACT inhaler Inhale 2 puffs into the lungs every 6 hours as needed for Shortness of Breath 1 Inhaler 5    fluticasone-salmeterol (WIXELA INHUB) 250-50 MCG/DOSE AEPB Inhale 1 puff into the lungs 2 times daily 1 Inhaler 5    furosemide (LASIX) 20 MG tablet Take 1 tablet by mouth daily 60 tablet 5    montelukast (SINGULAIR) 10 MG tablet Take 1 tablet by mouth nightly 90 tablet 3    busPIRone (BUSPAR) 7.5 MG tablet Take 1 tablet by mouth 2 times daily 180 tablet 3    levothyroxine (SYNTHROID) 125 MCG tablet Take 1 tablet by mouth every morning 90 tablet 3    loratadine (CLARITIN) 10 MG tablet Take 1 tablet by mouth daily 90 tablet 3    UNABLE TO FIND cane 1 Device 0    theophylline (THEODUR) 300 MG extended release tablet Take 1 tablet by mouth 2 times daily 60 tablet 5    albuterol (PROVENTIL) (2.5 MG/3ML) 0.083% nebulizer solution Take 3 mLs by nebulization 2 times daily as needed for Wheezing or Shortness of Breath 60 each 2    traMADol (ULTRAM) 50 MG tablet Take 50 mg by mouth every 6 hours as needed for Pain.        No current facility-administered medications for this visit. Allergies   Allergen Reactions    Latex Itching    Iodine Anaphylaxis    Levofloxacin Anaphylaxis    Pcn [Penicillins] Anaphylaxis    Sulfa Antibiotics Anaphylaxis    Flexeril [Cyclobenzaprine]     Morphine Itching    Zofran Itching    Caffeine Palpitations     Doesn't sleep    Lidocaine Nausea And Vomiting    Volumen [Barium Sulfate] Itching, Nausea And Vomiting and Rash       Vitals:    04/02/21 0908   Pulse: 67   SpO2: 97%   Weight: 230 lb (104.3 kg)   Height: 5' 1.5\" (1.562 m)         Review of Systems:   Review of Systems   Constitutional: Negative. HENT: Negative. Eyes: Negative. Respiratory: Negative. Cardiovascular: Negative. Gastrointestinal: Negative. Endocrine: Negative. Genitourinary: Negative. Musculoskeletal: Positive for arthralgias. Skin: Negative. Allergic/Immunologic: Negative. Neurological: Negative. Hematological: Negative. Psychiatric/Behavioral: Negative. Physical Exam:   Gen/Psych:Examination reveals a pleasant individual in no acute distress. The patient is oriented to time, place and person. The patient's mood and affect are appropriate. Patient appears well nourished. HEENT: Head is atraumatic normocephalic, ears are symmetric, eyes show equal pupils bilaterally, extraocular muscles intact. Hearing is intact. Lymph: No obvious lymphedema in right upper extremity     Skin: Intact in righupper extremity with no ulcerations, lesions, rash, erythema. Vascular: There are no obvious varicosities in right upper extremity, sensation intact to light touch over right upper extremity. Capillary refill less than 3. Radial pulses equal and intact bilaterally. Musculoskeletal:  Right shoulder exam:  Skin:  Clear with no erythema, there is no significant joint effusion. Deformity: None  Atrophy: None  Tenderness: Tenderness to palpation over the posterior shoulder and AC joint region.   Nontender to posterior elbow region. Soft compartments. Active ROM:    FE:   130 degrees   Ad/Abduction:  90 degrees  IR side:    Si     ER side: 65 degrees   Passive ROM:   F/E: 145 degrees   IR side: 70 degrees   ER side: 70 degrees   Ad/Abduction:  Strength:   F/E:5/5         Neer: Mildly positive  Esteban:  Mildly positive   Empty Can:  Mildly positive  Saxon: Negative   Cross over test: positive  The patient can perform an OK sign, perform thumbs up, touch each finger to thumb, abduct and adduct the fingers, perform . Patient can flex and extend the elbow with no difficulty.  strength: 5/5  Midline bony cervical Spine tenderness: no    Imagin views of the right shoulder pain which showed no acute fracture or dislocation. The official read and interpretation of these x-rays will be done by the the [facility]. Please see their impression below. Impression   Mild-to-moderate degenerative changes without acute osseous abnormality.         Assessment:   1. Osteoarthritis of the right [facility], [place], operated by [facility] joint    Plan:   The patient was seen in clinic today for evaluation of her right shoulder pain. Patient states she was laying on her left side to days ago developed right shoulder pain. She states she has noted some popping/grinding sensations of the right shoulder. She denies any injury to the right shoulder. X-ray imaging of the patient's right shoulder was obtained which showed no acute fracture or dislocation. Degenerative changes of the right AC joint noted. On physical exam, patient was tender palpation of the posterior elbow and right AC joint region. No erythema or ecchymosis seen. Soft compartments. Radial pulse equal intact bilaterally and sensation intact to light touch. Treatment options regarding the patient's right shoulder pain were discussed including steroid injection and physical therapy. She was agreeable and elected to proceed with steroid injection, please see procedure note below.   Physical therapy was also ordered for the patient. The patient will follow up in 8 weeks. Continue gentle range of motion as tolerated  Take ibuprofen or Tylenol as needed for pain  Ice or elevate as needed   Steroid injection given today. Rest for the next 24-48 hours as needed. Physical therapy ordered  Follow-up in 8 weeks     Right Subacromial Injection Procedure:  Pre-procedure diagnosis: Osteoarthritis of the right shoulder  Post-procedure diagnosis: Same as above    Multiple treatment options were discussed. This injection was recommended as a part of the overall treatment plan. Details of the procedure, potential risks, and potential benefits were discussed. Patient's questions were answered. Patient elected to proceed with procedure. Medication: Kenalog (40 MG/ML) 2ml, 1% plain lidocaine 3ml  Procedure:  Sterile technique was used as the skin over the injection site was prepped with alcohol. The right subacromial bursa was then injected with the above listed medication. A sterile bandage was placed over the injection site. The patient tolerated the procedure well without complication. Please note this report has been partially produced using speech recognition Dragon software and may contain errors related to that system including errors in grammar, punctuation, and spelling, as well as words and phrases that may be inappropriate. If there are any questions or concerns please feel free to contact the dictating provider for clarification.

## 2021-04-02 NOTE — PROGRESS NOTES
Patient presents for evaluation of right shoulder pain. She reports several days ago she was lying on her left side nad her right shoulder started hurting. She reports is started in the front and radiates to the top and back side. Her pain is 10/10 and she has decreased range of motion. She does report she has had a cortisone injection in the past, but cannot recall when.

## 2021-04-06 NOTE — PROGRESS NOTES
obstructive pulmonary disease) ([facility] [address]     H/O cardiac catheterization 09/17/2019     normal coronaries    H/O cardiovascular stress test     H/O Doppler carotid ultrasound 08/07/2019    Mild (0-49%) disease of the Bilateral Internal carotid artery.  H/O Doppler lower venous ultrasound 08/07/2019    Significant reflux noted of the Right GSV. Right GSV too small currently for ablation procedure. Significant reflux noted in the Left GSV at mid thigh . Left GSV difficult to follow between mid thigh and mid calf due to multiple  splits/branches. GSV knee tributary is very superficial.    H/O echocardiogram 08/07/2019    EF 55-60%, Mild MR & TR.    Hepatic steatosis 6/19/2015    History of nuclear stress test 09/05/2019     Mild anterior wall Ischemia of a medium sized territory. Possibility of breast artifact causing the abnormality cannot be excluded.     Hyperlipidemia     Hypertension     Hypothyroid        Current Medications:      Current Outpatient Medications:     fluticasone-salmeterol (ADVAIR) 250-50 MCG/DOSE AEPB, Inhale 1 puff into the lungs every 12 hours, Disp: , Rfl:     buPROPion (WELLBUTRIN XL) 300 MG extended release tablet, Take 1 tablet by mouth every morning, Disp: 90 tablet, Rfl: 1    albuterol sulfate HFA (VENTOLIN HFA) 108 (90 Base) MCG/ACT inhaler, Inhale 2 puffs into the lungs every 6 hours as needed for Shortness of Breath, Disp: 1 Inhaler, Rfl: 5    furosemide (LASIX) 20 MG tablet, Take 1 tablet by mouth daily, Disp: 60 tablet, Rfl: 5    montelukast (SINGULAIR) 10 MG tablet, Take 1 tablet by mouth nightly, Disp: 90 tablet, Rfl: 3    busPIRone (BUSPAR) 7.5 MG tablet, Take 1 tablet by mouth 2 times daily, Disp: 180 tablet, Rfl: 3    levothyroxine (SYNTHROID) 125 MCG tablet, Take 1 tablet by mouth every morning, Disp: 90 tablet, Rfl: 3    loratadine (CLARITIN) 10 MG tablet, Take 1 tablet by mouth daily, Disp: 90 tablet, Rfl: 3    UNABLE TO FIND, cane, Disp: 1 Device, Rfl: 0   theophylline (THEODUR) 300 MG extended release tablet, Take 1 tablet by mouth 2 times daily, Disp: 60 tablet, Rfl: 5    albuterol (PROVENTIL) (2.5 MG/3ML) 0.083% nebulizer solution, Take 3 mLs by nebulization 2 times daily as needed for Wheezing or Shortness of Breath, Disp: 60 each, Rfl: 2    traMADol (ULTRAM) 50 MG tablet, Take 50 mg by mouth every 6 hours as needed for Pain., Disp: , Rfl:     budesonide-formoterol (SYMBICORT) 160-4.5 MCG/ACT AERO, Inhale 2 puffs into the lungs 2 times daily (Patient not taking: Reported on 4/6/2021), Disp: 3 Inhaler, Rfl: 1    fluticasone-salmeterol (WIXELA INHUB) 250-50 MCG/DOSE AEPB, Inhale 1 puff into the lungs 2 times daily (Patient not taking: Reported on 4/6/2021), Disp: 1 Inhaler, Rfl: 5    Allergies   Allergen Reactions    Latex Itching    Iodine Anaphylaxis    Levofloxacin Anaphylaxis    Pcn [Penicillins] Anaphylaxis    Sulfa Antibiotics Anaphylaxis    Flexeril [Cyclobenzaprine]     Morphine Itching    Zofran Itching    Caffeine Palpitations     Doesn't sleep    Lidocaine Nausea And Vomiting    Volumen [Barium Sulfate] Itching, Nausea And Vomiting and Rash       Social History:    Social History     Socioeconomic History    Marital status:      Spouse name: None    Number of children: 2    Years of education: 12    Highest education level: GED or equivalent   Occupational History    Occupation: unemployed   Social Needs    Financial resource strain: None    Food insecurity     Worry: None     Inability: None    Transportation needs     Medical: None     Non-medical: None   Tobacco Use    Smoking status: Never Smoker    Smokeless tobacco: Never Used   Substance and Sexual Activity    Alcohol use: No    Drug use: No    Sexual activity: Not Currently     Partners: Male     Birth control/protection: Post-menopausal   Lifestyle    Physical activity     Days per week: None     Minutes per session: None    Stress: None   Relationships    Social connections     Talks on phone: None     Gets together: None     Attends Hinduism service: None     Active member of club or organization: None     Attends meetings of clubs or organizations: None     Relationship status: None    Intimate partner violence     Fear of current or ex partner: None     Emotionally abused: None     Physically abused: None     Forced sexual activity: None   Other Topics Concern    None   Social History Narrative    None       Family History:    Family History   Problem Relation Age of Onset    Diabetes Mother     Hypertension Mother     Depression Mother         suicide    Diabetes Father     Depression Father     Diabetes Brother     Birth Defects Maternal Grandmother     Hypertension Maternal Grandmother     Diabetes Sister     Depression Brother     Diabetes Sister     Diabetes Brother     Other Brother         black mold    Diabetes Brother     Other Daughter         GI issues    Asthma Daughter     Seizures Daughter          REVIEW OF SYSTEMS:    CONSTITUTIONAL:  Negative for fevers, chills, diaphoresis, activity change, appetite change, night sweats, unexpected weight change.    HEENT:  Negative for hearing loss,  sinus pressure, nasal congestion, epistaxis and snoring  RESPIRATORY: Negative shortness of breath, negative wheeze, positive nonproductive cough  CARDIOVASCULAR:  Negative for chest pain, positive palpitations, negative exertional chest pressure/discomfort, negative edema, negative syncope  GASTROINTESTINAL: Negative for nausea, vomiting, diarrhea, constipation, blood in stool and abdominal pain  GENITOURINARY:  Negative for frequency, dysuria and hematuria  HEMATOLOGIC/LYMPHATIC:  Negative for easy bruising, bleeding and lymphadenopathy  ALLERGIC/IMMUNOLOGIC:  Negative for recurrent infections, angioedema, anaphylaxis and drug reaction  MUSCULOSKELETAL:  Negative for  pain, joint swelling, decreased range of motion and muscle weakness  NEURO: Negative for headache, AMS, decrease sensations  SKIN: Negative for rashes or lesions      Objective:   VITALS:   Vitals:    04/06/21 0958   BP: 118/74   Pulse: 70   SpO2: 94%   Weight: 237 lb (107.5 kg)   Height: 5' 1\" (1.549 m)     Neck circumference: 17  Inches  Hudson - Total score: 14  MALLAMPATI: 3  Body mass index is 44.78 kg/m². PHYSICAL EXAM:    CONSTITUTIONAL:  awake, alert, cooperative, no apparent distress, and appears stated age  HEENT:  Supple and nontender,  trachea midline, no adenopathy, thyroid normal, no JVD, no wheezing or stridor over neck  CHEST: Chest expansion equal and symmetrical, no intercostal retraction, no increased work of breathing  LUNGS: shallow resp effort, breath sounds clear, no cough, no wheeze, no rale, no use of accessory muscles to support breathing. CARDIOVASCULAR: Normal S1 and S2 , no murmurs or gallops ,no pericardial rubs  ABDOMEN:  normal bowel sounds, non-distended and no masses palpated, and no tenderness to palpation. LYMPHADENOPATHY:  no axillary or supraclavicular adenopathy. No cervical adenopathy  EXTREMITIES: No edema, no inflammation, no tenderness, no clubbing of digits  NEURO: Oriented X 3, No focal deficits  SKIN: warm and dry      RADIOLOGY:  6/16/2020 CT chest w/o contrast  There is a 9 mm noncalcified pulmonary nodule within the right upper lobe. Recommend correlation with outside prior imaging to evaluate for    stability, if available. Correlation with PET/CT would be beneficial.    Continued follow up is recommended       PFT:   Refuses  Office spirometry 4/6/2021  FEV1 61%, FVC 55%, FEF 25 to 75 85%. Refuses additional testing or additional trials. Refuses bronchodilator portion of testing. Overall poor effort on patient part to perform testing. I feel patient most likely does have a restrictive component due to her body habitus along with a possible obstructive component. Assessment      1. COPD, moderate ([address]    2.  Incidental lung nodule, greater than or equal to 8mm    3. Snores    4. Obesity, Class III, BMI 40-49.9 (morbid obesity) ([facility] [address]    5. Healthcare maintenance        Plan:  Bobby Lopez is a poor historian. She has not had a pulmonary function test in several year since she saw a pulmonogist in [place]. She refuses to do hospital based PFT stating \"I won't go in that box\". We attempted to perform office spirometry however she was unable and unwilling to follow instruction, and refused bronchodilator portion of testing. She was instructed to take deep breath but instead would blow out, after several attempt by MA we elected to abort office spirometry. She states to MA she was taking Symbicort, however during my discussion she states she was taking \"that purple\" medication. She is using her albuterol nebulizer solution 4 - 5 times a day along with using her Pro-Air MDI 2 times a day. We have discussed use of her medication and overuse could lead to increased HR. She had a CT of chest without contrast ordered by Dr. Nuñez Monday on 6/16/2020 which demonstrated a 9 mm x 9 mm partial noncalcified nodule in the right upper posterior lobe. Compared to prior CT scan that she completed 3/22/2016 nodule has increased in size from 7 mm. She was to follow-up and have a PET CT scan but never completed. We will order a PET CT scan but she states she is not sure that she will complete the testing. She has been tested for sleep apnea in the past but has refused to wear the mask and continues to refuse to wear a mask. We have discussed at length health effects of untreated sleep apnea including worsening hypertension, cardiovascular disease, pulmonary disease, stroke, MI, etc.  She still continues to refuse testing or treatment and states she is aware that she does have obstructive sleep apnea. We have discussed how her obesity is affecting her lung disease along with her overall health.   She states she has no intention of dieting or exercising. She states \"you had to go down that road\". Does not care to discuss any further health related issues secondary to obesity I do feel that her obesity is however affecting her respiratory disease and causing a possible restrictive pattern. We have discussed the COVID-19 immunization and my recommendations. We have discussed the mechanism in which immunization aids in protecting [place] during coronavirus -19 outbreaks. She refuses all vaccinations stating she does not believe in them. We have discussed the need to maintain yearly flu immunization, pneumococcal vaccination. We have discussed Coronavirus precaution including: social distancing when needing to be in public, handwashing practice, wiping items touched in public such as gas pumps, door handles, shopping carts, etc. Self monitoring for infection - fever, chills, cough, SOB. Should they develop symptoms they should call office for further instructions. Return in about 3 months (around 7/6/2021) for Pet-CT. This dictation was performed with a verbal recognition program and it was checked for errors. It is possible that there are still dictated errors within this office note. Any errors should be brought immediately to my attention for correction. All efforts were made to ensure that this office note is accurate.        Electronically signed by DELORES Joaquin CNP on 4/6/2021 at 4:31 PM

## 2021-05-15 NOTE — ED PROVIDER NOTES
eMERGENCY dEPARTMENT eNCOUnter         [facility]      PCP: Jerson Galan MD    [facility]    Chief Complaint   Patient presents with    Leg Pain     right lower leg pain, NKI       HPI    Agnes Charlton is a 77 y.o. female who presents with right lateral ankle pain. Onset is prior to arrival, yesterday. Context is patient denies any known history of trauma or injury. No previous history of right ankle fracture surgery. Patient is endorsing 10/10 throbbing aching pain that worsens with palpation of this area as well as ambulation. She does report concern for DVT but has never had one in the past.  No recent prolonged immobilizations, travel, hospitalizations, calf pain or swelling. She is not a diabetic, denies history of IV drug use. Has not noted any skin changes redness or warmth. Has tried Tylenol with some relief of pain. No history of gouty arthritis. No history of malignancy or estrogen use. REVIEW OF SYSTEMS    General: No fever or chills  Head: No headaches, trauma, dizziness  Skin: No lacerations or puncture wounds  Pulmonary: No SOB, wheezing  Cardiovascular: No chest pain, palpitations  Musculoskeletal: Complains of ankle pain - see HPI   All other review of systems are negative  See HPI and nursing notes for additional information     PAST MEDICAL & SURGICAL HISTORY    Past Medical History:   Diagnosis Date    Anxiety 1/28/2019    Arthritis     Asthma     CAD (coronary artery disease)     COPD (chronic obstructive pulmonary disease) ([facility] [address]     H/O cardiac catheterization 09/17/2019     normal coronaries    H/O cardiovascular stress test     H/O Doppler carotid ultrasound 08/07/2019    Mild (0-49%) disease of the Bilateral Internal carotid artery.  H/O Doppler lower venous ultrasound 08/07/2019    Significant reflux noted of the Right GSV. Right GSV too small currently for ablation procedure.  Significant reflux noted in the Left GSV at mid thigh . Left GSV difficult to follow between mid thigh and mid calf due to multiple  splits/branches. GSV knee tributary is very superficial.    H/O echocardiogram 08/07/2019    EF 55-60%, Mild MR & TR.    Hepatic steatosis 6/19/2015    History of nuclear stress test 09/05/2019     Mild anterior wall Ischemia of a medium sized territory. Possibility of breast artifact causing the abnormality cannot be excluded.  Hyperlipidemia     Hypertension     Hypothyroid      Past Surgical History:   Procedure Laterality Date    CARPAL TUNNEL RELEASE Left     CHOLECYSTECTOMY      COLONOSCOPY  01/28/2019    Polyps x4, Internal grade 1 hemorrhoids    COLONOSCOPY N/A 1/28/2019    COLONOSCOPY POLYPECTOMY SNARE/COLD BIOPSY performed by Vladimir Perkins MD at [facility], COLON, DIAGNOSTIC  09/03/2019    biopsy f/u with pcp    HYSTERECTOMY      due to bleeding.   complete    THYROIDECTOMY  2009    TONSILLECTOMY      TUBAL LIGATION      UPPER GASTROINTESTINAL ENDOSCOPY N/A 9/4/2019    EGD BIOPSY performed by Kayden Retana MD at [address] [place]        CURRENT MEDICATIONS    Current Outpatient Rx   Medication Sig Dispense Refill    naproxen (NAPROSYN) 500 MG tablet Take 1 tablet by mouth 2 times daily as needed for Pain 20 tablet 0    fluticasone-salmeterol (ADVAIR) 250-50 MCG/DOSE AEPB Inhale 1 puff into the lungs every 12 hours      budesonide-formoterol (SYMBICORT) 160-4.5 MCG/ACT AERO Inhale 2 puffs into the lungs 2 times daily (Patient not taking: Reported on 4/6/2021) 3 Inhaler 1    buPROPion (WELLBUTRIN XL) 300 MG extended release tablet Take 1 tablet by mouth every morning 90 tablet 1    albuterol sulfate HFA (VENTOLIN HFA) 108 (90 Base) MCG/ACT inhaler Inhale 2 puffs into the lungs every 6 hours as needed for Shortness of Breath 1 Inhaler 5    fluticasone-salmeterol (WIXELA INHUB) 250-50 MCG/DOSE AEPB Inhale 1 puff into the lungs 2 times daily (Patient not taking: Reported on 4/6/2021) 1 Inhaler 5    furosemide (LASIX) 20 MG tablet Take 1 tablet by mouth daily 60 tablet 5    montelukast (SINGULAIR) 10 MG tablet Take 1 tablet by mouth nightly 90 tablet 3    busPIRone (BUSPAR) 7.5 MG tablet Take 1 tablet by mouth 2 times daily 180 tablet 3    levothyroxine (SYNTHROID) 125 MCG tablet Take 1 tablet by mouth every morning 90 tablet 3    loratadine (CLARITIN) 10 MG tablet Take 1 tablet by mouth daily 90 tablet 3    UNABLE TO FIND cane 1 Device 0    theophylline (THEODUR) 300 MG extended release tablet Take 1 tablet by mouth 2 times daily 60 tablet 5    albuterol (PROVENTIL) (2.5 MG/3ML) 0.083% nebulizer solution Take 3 mLs by nebulization 2 times daily as needed for Wheezing or Shortness of Breath 60 each 2    traMADol (ULTRAM) 50 MG tablet Take 50 mg by mouth every 6 hours as needed for Pain.          ALLERGIES    Allergies   Allergen Reactions    Latex Itching    Iodine Anaphylaxis    Levofloxacin Anaphylaxis    Pcn [Penicillins] Anaphylaxis    Sulfa Antibiotics Anaphylaxis    Flexeril [Cyclobenzaprine]     Morphine Itching    Zofran Itching    Caffeine Palpitations     Doesn't sleep    Lidocaine Nausea And Vomiting    Volumen [Barium Sulfate] Itching, Nausea And Vomiting and Rash       SOCIAL & FAMILY HISTORY    Social History     Socioeconomic History    Marital status:      Spouse name: None    Number of children: 2    Years of education: 12    Highest education level: GED or equivalent   Occupational History    Occupation: unemployed   Tobacco Use    Smoking status: Never Smoker    Smokeless tobacco: Never Used   Vaping Use    Vaping Use: Never used   Substance and Sexual Activity    Alcohol use: No    Drug use: No    Sexual activity: Not Currently     Partners: Male     Birth control/protection: Post-menopausal   Other Topics Concern    None   Social History Narrative    None     Social Determinants of Health Financial Resource Strain:     Difficulty of Paying Living Expenses:    Food Insecurity:     Worried About Running Out of Food in the Last Year:     [address] in the Last Year:    Transportation Needs:     Lack of Transportation (Medical):  Lack of Transportation (Non-Medical):    Physical Activity:     Days of Exercise per Week:     Minutes of Exercise per Session:    Stress:     Feeling of Stress :    Social Connections:     Frequency of Communication with Friends and Family:     Frequency of Social Gatherings with Friends and Family:     Attends Bahai Services:     Active Member of Clubs or Organizations:     Attends Club or Organization Meetings:     Marital Status:    Intimate Partner Violence:     Fear of Current or Ex-Partner:     Emotionally Abused:     Physically Abused:     Sexually Abused:      Family History   Problem Relation Age of Onset    Diabetes Mother     Hypertension Mother     Depression Mother         suicide    Diabetes Father     Depression Father     Diabetes Brother     Birth Defects Maternal Grandmother     Hypertension Maternal Grandmother     Diabetes Sister     Depression Brother     Diabetes Sister     Diabetes Brother     Other Brother         black mold    Diabetes Brother     Other Daughter         GI issues    Asthma Daughter     Seizures Daughter          PHYSICAL EXAM    VITAL SIGNS: /67   Pulse 92   Temp 98.6 °F (37 °C) (Oral)   Resp 16   Ht 5' 5\" (1.651 m)   Wt 230 lb (104.3 kg)   LMP  (LMP Unknown)   SpO2 93%   BMI 38.27 kg/m²   Constitutional:  Well developed, well nourished, texting on cell phone, swinging right lower leg at edge of bed and intermittently tapping on the ground in no acute distress. HENT:  Atraumatic, Normocephalic, sclera clear and non icteric, moist mucus membrane  Respiratory:  Nonlabored breathing. Musculoskeletal: The right ankle demonstrates intact skin.  No gross bony deformities, skin discoloration, erythema, or warmth. No significant soft tissue swelling over the ankle joints or foot dorsum. No ankle joint effusion. Localized tenderness palpation along the lateral ankle, along the distal fibula without palpable bony or soft tissue defects. No tenderness over the proximal midfoot dorsum including over the proximal fifth and fourth metatarsals. Compartments are soft throughout the right lower extremity. Active range of motion slow and difficult to assess due to pain - no obvious deficits. The ankle joint is stable. Achilles tendon clinically intact. 5/5 strength with ankle dorsi/plantar flexion No swelling, discoloration, or tenderness to palpation of the proximal to distal lower leg bones, foot bones/toes. No pretibial pitting edema    Vascular:  Posterior tibial and Dorsalis pedis pulse 2+, capillary refill intact. Integument:  No open wounds. Neurologic:  Awake alert, no slurred speech. No foot drop of the affected extremity. DTRs and distal sensation equal/intact. RADIOLOGY       XR ANKLE RIGHT (MIN 3 VIEWS) (Final result)  Result time 05/15/21 17:31:54  Final result by Yoni Telles (05/15/21 17:31:54)                Impression:    Mild ankle soft tissue swelling without radiographic evidence of acute   osseous abnormality of the right ankle seen. The bones appear demineralized. Degenerative changes as above. Narrative:    EXAMINATION:   THREE XRAY VIEWS OF THE RIGHT ANKLE     5/15/2021 4:18 pm     COMPARISON:   None.      HISTORY:   ORDERING SYSTEM PROVIDED HISTORY: nki, pain   TECHNOLOGIST PROVIDED HISTORY:   Reason for exam:->nki, pain   Reason for Exam: nki, right foot pain   Acuity: Acute   Type of Exam: Initial     FINDINGS:   There is 4 mm well corticated ossicle seen along the tip of the lateral   malleolus, likely reflecting sequela of an old trauma.  There is mild ankle   soft tissue swelling.  The bones appear demineralized without radiographic evidence of acute fracture or dislocation seen.  The talar dome appears   intact.  There is small spur arising from the anterior tibial plafond.  There   is a small to moderate-sized plantar calcaneal spur. Reina Mettle is a tiny   posterior calcaneal spur.                     VL DUP LOWER EXTREMITY VENOUS RIGHT (Final result)  Result time 05/15/21 17:15:02  Final result by Dasha Pabon (05/15/21 17:15:02)                Impression:    No evidence of DVT in the right lower extremity. Narrative:    EXAMINATION:   DUPLEX VENOUS ULTRASOUND OF THE RIGHT LOWER EXTREMITY, 5/15/2021 4:32 pm     TECHNIQUE:   Duplex ultrasound using B-mode/gray scaled imaging and Doppler spectral   analysis and color flow was obtained of the right lower extremity. COMPARISON:   None. HISTORY:   ORDERING SYSTEM PROVIDED HISTORY: nki, pain, eval for dvt   TECHNOLOGIST PROVIDED HISTORY:   Reason for exam:->nki, pain, eval for dvt   Reason for Exam: right leg pain   Acuity: Acute   Type of Exam: Initial     FINDINGS:   The visualized veins of the right lower extremity are patent and free of   echogenic thrombus. The veins demonstrate good compressibility with normal   color flow study and spectral analysis. ED COURSE & MEDICAL DECISION MAKING       Vital signs and nursing notes reviewed during ED course. I have independently evaluated this patient . Supervising MD - Dr. Faustino Mari - present in the Emergency Department, available for consultation, throughout entirety of  patient care. All pertinent Lab data and radiographic results reviewed with patient at bedside. The patient and/or the family were informed of the results of any tests/labs/imaging, the treatment plan, and time was allotted to answer questions. Differential diagnosis includes but not limited to fracture, dislocation, vascular injury, neurologic injury. Clinical  IMPRESSION    1.  Acute right ankle pain      Patient presents with lateral right ankle pain without history of trauma or injury. On exam, well-appearing nontoxic 80-year-old female, noted ambulatory emergency department on the affected extremity. Ankle joint is well appearing, no redness warmth or ankle joint effusion. Full range of motion and strength and patient is neurovascular intact distally. Soft compartments throughout. Patient is given naproxen while in the emergency department. X-ray of the right ankle does show some mild soft tissue swelling without evidence of acute fracture or subluxation. Ultrasound of the right lower leg also negative for evidence of DVT. At this time, unclear exact etiology for ankle pain. Most likely musculoskeletal nature, possible ankle tendinitis. No evidence of an infectious process or septic joint at this time. We discussed conservative symptomatic management, continue weightbearing as tolerated outpatient follow-up with her family doctor and orthopedist, Maddy Callaway PA-C as needed if symptoms persist or worsen. She is comfortable and agreeable this plan. Low clinical suspicion for ischemic limb, compartment syndrome, intra-articular joint infection/septic arthritis, DVT, osteomyelitis, arterial/neurologic injury, necrotizing fasciitis, or infected/rapidly expanding hematoma. Patient is discharged in stable condition. Educated on [address] therapy. Patient is given an Ace wrap, anti-inflammatories for pain. . Patient is instructed to followup with orthopedics in 7-10 days, sooner with worsened symptoms. Return precautions back to the ED discussed for any new or worsening symptoms. Diagnosis and plan discussed in detail with patient who understands and agrees. Patient agrees to return emergency department if symptoms worsen or any new symptoms develop.     Comment: Please note this report has been produced using speech recognition software and may contain errors related to that system including errors in grammar, punctuation, and spelling, as well as words and phrases that may be inappropriate. If there are any questions or concerns please feel free to contact the dictating provider for clarification.         Eddie Velasquez PA-C  05/15/21 1792

## 2021-05-17 NOTE — CARE COORDINATION
Dc 45 Transitions Initial Follow Up Call    Call within 2 business days of discharge: {TCM 48HR YES/NO:11493}    Patient: Manuel Enrique Patient : 1955   MRN: <[id number]>  Reason for Admission: ***  Discharge Date: 5/15/21 RARS: No data recorded    Last Discharge [facility]       Complaint Diagnosis Description Type Department Provider    5/15/21 Leg Pain Acute right ankle pain ED (DISCHARGE) [facility] ED            Spoke with: ***    Facility: [unfilled]    Non-face-to-face services provided:  {[facility]:87415:p}    Care Transitions 24 Hour Call    Schedule Follow Up Appointment with PCP: Completed  Do you have any ongoing symptoms?: Yes  Patient-reported symptoms: Pain  Interventions for patient-reported symptoms: Notified PCP/Physician  Do you have a copy of your discharge instructions?: Yes  Do you have all of your prescriptions and are they filled?: Yes  Have you scheduled your follow up appointment?: No  Were you discharged with any Home Care or Post Acute Services: No  Care Transitions Interventions         Follow Up  Future Appointments   Date Time Provider Mayur Denton   2021  9:40 AM Miguel Kolb PA-C [facility]   2021 10:30 AM Atiya Garrett MD [facility]   2021  3:30 PM Jeancarlos Magaña MD [facility]       Nely Spencer RN

## 2021-05-17 NOTE — CARE COORDINATION
medical action plan and red flag symptoms patient who verbalized understanding. Discussed COVID vaccination status Yes. Patient is not vaccinated. Education provided on COVID-19 vaccination as appropriate. Discussed exposure protocols and quarantine with CDC Guidelines. Patient was given an opportunity to verbalize any questions and concerns and agrees to contact ACM or health care provider for questions related to their healthcare. Patient reports that she \"does not believe in it\" and \"does not ever plan on\" getting the vaccine for COVID. Reviewed and educated patient on any new and changed medications related to discharge diagnosis     Was patient discharged with a pulse oximeter? No.    Patient reports that her daughter and her neighbors are supportive. Reports that she is independent at home. Denies the need for [address]. Reports that she does not want people to come to her home. Instructed on the role of ACM ongoing support. Patient declined further follow up. ACM provided contact information. No further follow up planned per patient request.        Follow up appointment with PCP scheduled 5/25 @ 1100. Spoke with patient. Confirmed appointment date and time. Patient informed ACM that she would like to cancel this appointment as she would like to follow up with Ortho. Physician. Patient declined ACM offer to assist.    Instructed patient to contact PCP for any changes in condition. Instructed on the [facility] as a bridge to primary care. Patient reports that she will not schedule with NP. Encouraged patient to utilize this resource if needs arise.     Care Transitions ED Follow Up    Care Transitions Interventions  Schedule Follow Up Appointment with Physician: Completed  Do you have any ongoing symptoms?: Yes   Onset of Patient-reported symptoms: Yesterday   Patient-reported symptoms: Pain   Did you call your PCP prior to going to the ED?: No - Did not call PCP   Do you have a copy of your discharge instructions?: Yes   Do you understand what to report and when to return?: Yes   Are you following your discharge instructions?: Yes   Do you have all of your prescriptions and are they filled?: Yes   Have you scheduled your follow up appointment?: No   Were you discharged with any Home Care or Post Acute Services or do you currently have any active services?: No

## 2021-05-31 NOTE — PROGRESS NOTES
[place]  [facility]     HPI:  Ebonie Rivero is a 77 y.o. female who presents for right ankle pain. Patient states her pain is 10/10 on the anterior lateral aspect of her ankle. She states she got out of bed and developed ankle pain. She denies any injuries or falls to her right ankle. She states she did go to the ER on 5/15. She states she has been taking Tylenol, ibuprofen which does help some but not much. She states standing and walking worsens her pain. She states she has been able to ambulate on the ankle. She denies any past surgeries of her right ankle. Past Medical History:   Diagnosis Date    Anxiety 1/28/2019    Arthritis     Asthma     CAD (coronary artery disease)     COPD (chronic obstructive pulmonary disease) ([address]     H/O cardiac catheterization 09/17/2019     normal coronaries    H/O cardiovascular stress test     H/O Doppler carotid ultrasound 08/07/2019    Mild (0-49%) disease of the Bilateral Internal carotid artery.  H/O Doppler lower venous ultrasound 08/07/2019    Significant reflux noted of the Right GSV. Right GSV too small currently for ablation procedure. Significant reflux noted in the Left GSV at mid thigh . Left GSV difficult to follow between mid thigh and mid calf due to multiple  splits/branches. GSV knee tributary is very superficial.    H/O echocardiogram 08/07/2019    EF 55-60%, Mild MR & TR.    Hepatic steatosis 6/19/2015    History of nuclear stress test 09/05/2019     Mild anterior wall Ischemia of a medium sized territory. Possibility of breast artifact causing the abnormality cannot be excluded.     Hyperlipidemia     Hypertension     Hypothyroid        Past Surgical History:   Procedure Laterality Date    CARPAL TUNNEL RELEASE Left     CHOLECYSTECTOMY      COLONOSCOPY  01/28/2019    Polyps x4, Internal grade 1 hemorrhoids    COLONOSCOPY N/A 1/28/2019    COLONOSCOPY POLYPECTOMY SNARE/COLD BIOPSY performed by Be Cuevas Devyn Neely MD at [address], COLON, DIAGNOSTIC  09/03/2019    biopsy f/u with pcp    HYSTERECTOMY      due to bleeding. complete    THYROIDECTOMY  2009    TONSILLECTOMY      TUBAL LIGATION      UPPER GASTROINTESTINAL ENDOSCOPY N/A 9/4/2019    [facility]SY [facility] Salazar Nicole [facility] [address]        Family History   Problem Relation Age of Onset    Diabetes Mother     Hypertension Mother     Depression Mother         suicide    Diabetes Father     Depression Father     Diabetes Brother     Birth Defects Maternal Grandmother     Hypertension Maternal Grandmother     Diabetes Sister     Depression Brother     Diabetes Sister     Diabetes Brother     Other Brother         black mold    Diabetes Brother     Other Daughter         GI issues    Asthma Daughter     Seizures Daughter        Social History     Socioeconomic History    Marital status:      Spouse name: None    Number of children: 2    Years of education: 15    Highest education level: GED or equivalent   Occupational History    Occupation: unemployed   Tobacco Use    Smoking status: Never Smoker    Smokeless tobacco: Never Used   Vaping Use    Vaping Use: Never used   Substance and Sexual Activity    Alcohol use: No    Drug use: No    Sexual activity: Not Currently     Partners: Male     Birth control/protection: Post-menopausal   Other Topics Concern    None   Social History Narrative    None     Social Determinants of Health     Financial Resource Strain:     Difficulty of Paying Living Expenses:    Food Insecurity:     Worried About Running Out of Food in the Last Year:     Ran Out of Food in the Last Year:    Transportation Needs:     Lack of Transportation (Medical):      Lack of Transportation (Non-Medical):    Physical Activity:     Days of Exercise per Week:     Minutes of Exercise per Session:    Stress:     Feeling of Stress :    Social Connections:     Frequency of Communication with Friends and Family:     Frequency of Social Gatherings with Friends and Family:     Attends [employer] Services:     Active Member of Clubs or Organizations:     Attends Club or Organization Meetings:     Marital Status:    Intimate Partner Violence:     Fear of Current or Ex-Partner:     Emotionally Abused:     Physically Abused:     Sexually Abused:        Current Outpatient Medications   Medication Sig Dispense Refill    fluticasone-salmeterol (ADVAIR) 250-50 MCG/DOSE AEPB Inhale 1 puff into the lungs every 12 hours      buPROPion (WELLBUTRIN XL) 300 MG extended release tablet Take 1 tablet by mouth every morning 90 tablet 1    albuterol sulfate HFA (VENTOLIN HFA) 108 (90 Base) MCG/ACT inhaler Inhale 2 puffs into the lungs every 6 hours as needed for Shortness of Breath 1 Inhaler 5    furosemide (LASIX) 20 MG tablet Take 1 tablet by mouth daily 60 tablet 5    montelukast (SINGULAIR) 10 MG tablet Take 1 tablet by mouth nightly 90 tablet 3    busPIRone (BUSPAR) 7.5 MG tablet Take 1 tablet by mouth 2 times daily 180 tablet 3    levothyroxine (SYNTHROID) 125 MCG tablet Take 1 tablet by mouth every morning 90 tablet 3    loratadine (CLARITIN) 10 MG tablet Take 1 tablet by mouth daily 90 tablet 3    UNABLE TO FIND cane 1 Device 0    theophylline (THEODUR) 300 MG extended release tablet Take 1 tablet by mouth 2 times daily 60 tablet 5    albuterol (PROVENTIL) (2.5 MG/3ML) 0.083% nebulizer solution Take 3 mLs by nebulization 2 times daily as needed for Wheezing or Shortness of Breath 60 each 2    traMADol (ULTRAM) 50 MG tablet Take 50 mg by mouth every 6 hours as needed for Pain.       naproxen (NAPROSYN) 500 MG tablet Take 1 tablet by mouth 2 times daily as needed for Pain 20 tablet 0    budesonide-formoterol (SYMBICORT) 160-4.5 MCG/ACT AERO Inhale 2 puffs into the lungs 2 times daily (Patient not taking: Reported on 4/6/2021) 3 Inhaler 1    fluticasone-salmeterol (WIXELA INHUB) 250-50 MCG/DOSE AEPB Inhale 1 puff into the lungs 2 times daily (Patient not taking: Reported on 4/6/2021) 1 Inhaler 5     No current facility-administered medications for this visit. Allergies   Allergen Reactions    Latex Itching    Iodine Anaphylaxis    Levofloxacin Anaphylaxis    Pcn [Penicillins] Anaphylaxis    Sulfa Antibiotics Anaphylaxis    Flexeril [Cyclobenzaprine]     Morphine Itching    Zofran Itching    Caffeine Palpitations     Doesn't sleep    Lidocaine Nausea And Vomiting    Volumen [Barium Sulfate] Itching, Nausea And Vomiting and Rash     Review of Systems:    Review of Systems   Constitutional: Negative. HENT: Negative. Eyes: Negative. Respiratory: Negative. Cardiovascular: Negative. Gastrointestinal: Negative. Endocrine: Negative. Genitourinary: Negative. Musculoskeletal: Positive for arthralgias and joint swelling. Skin: Negative. Allergic/Immunologic: Negative. Neurological: Negative. Hematological: Negative. Psychiatric/Behavioral: Negative. Physical Exam:   Pulse 77   Resp 16   Ht 5' 1.5\" (1.562 m)   Wt 230 lb (104.3 kg)   LMP  (LMP Unknown)   SpO2 98%   BMI 42.75 kg/m²       The patient can bear weight on the injured extremity. Gen/Psych:Examination reveals a pleasant individual in no acute distress. The patient is oriented to time, place and person. The patient's mood and affect are appropriate. Patient appears well nourished. HEENT: Head is atraumatic normocephalic,  ears are symmetric, eyes show equal pupils bilaterally, extraocular muscles intact. Hearing is intact. Lymph:  No obvious lymphedema in right lower extremity. Skin: Intact in right lower extremity with no ulcerations, lesions, rash, erythema. Vascular: There are no obvious varicosities in right lower extremity, sensation present to light touch over right lower extremity. Capillary refill less than 3.     Musculoskeletal: Right ankle/foot exam:  Inspection: No erythema or ecchymosis seen. Mild swelling noted of the anterolateral ankle region. Palpation: Tenderness to palpation of the anterior lateral aspect of the right ankle. Nontender region of the medial or lateral malleolus. Nontender to palpation of the toes, arch, midfoot, heel, Achilles tendon, or lateral border of the right foot. Nontender patient posterior calf, soft compartments. Nontender palpation of the fibular head region. No palpable defect of the Achilles tendon noted. Active range of motion:   Dorsiflexion 15 degrees   Plantarflexion 20 degrees   Eversion 15 degrees   Inversion 15 degrees  Patient is able to wiggle her toes. Stability: anterior drawer negative  [place] test: Negative  Distal dorsalis pedis pulse palpated. Strength: DF: 5/5    Outside record review: ER provider notes and xrays from 5/15/21 were reviewed. Impression   Mild ankle soft tissue swelling without radiographic evidence of acute   osseous abnormality of the right ankle seen.       The bones appear demineralized.       Degenerative changes as above.       Doppler U/S:   Impression   No evidence of DVT in the right lower extremity. Imaging:   3 views of the right ankle were obtained. The official read and interpretation of these x-rays will be done by the the [facility]. Please see their impression below. Impression   Soft tissue swelling laterally       No fracture or dislocation         Impression:    1. Right ankle swelling    Plan:    The patient was seen in clinic for evaluation of her left ankle pain. Patient denies any injury to her right ankle and states she woke up with anterior lateral ankle pain. Patient states she did go to the ER and Doppler ultrasound completed which was negative for DVT. Patient states she also had x-ray completed which showed no acute fracture or dislocation.   Repeat x-ray was completed in clinic today which showed no fracture or dislocation. On physical exam, mild anterior lateral swelling was noted. No erythema or streaking erythema seen. Patient was able to dorsiflex and plantarflex the right ankle. Sensation intact to light touch. Capillary refill less than 3. Treatment options were discussed with the patient including rest, ice, anti-inflammatories, and bracing. Patient was provided ankle brace in clinic today. I informed patient to continue working on range of motion. The patient will follow up in 2 weeks. Continue to use ankle brace as tolerates  Continue weight bear as tolerated using cane/walker  Continue range of motion as tolerated  May take Tylenol or Motrin for pain as needed  Ice and elevate as needed  Follow up in 2 weeks    Please note this report has been partially produced using speech recognition Dragon software and may contain errors related to that system including errors in grammar, punctuation, and spelling, as well as words and phrases that may be inappropriate. If there are any questions or concerns please feel free to contact the dictating provider for clarification.

## 2021-06-11 NOTE — PROGRESS NOTES
[place]  [facility]     HPI:  Angelica Page is a 77 y.o. presents for follow-up of her right ankle swelling. Patient was noted to have gone to the ER on 5/15 for her right ankle swelling. Patient states she did not have any known injury to the ankle and woke up that morning and felt lateral ankle pain. Patient rates her pain 9/10 describes an ache in the lateral ankle region. She states she did note some difficulty using the ankle brace that was provided at the last office due to her increased ankle swelling at night. Patient states she has taken Tylenol, ice, and elevated the ankle which does help some. She states walking and standing worsen her pain. She denies any new injury or past surgeries of her ankle. Patient states she has noted bilateral ankle swelling and states she does have an appointment with cardiology this morning. Past Medical History:   Diagnosis Date    Anxiety 1/28/2019    Arthritis     Asthma     CAD (coronary artery disease)     [address] (ch[facility] obstructive [facility] disease) [address] [address]     H/O cardiac catheterization 09/17/2019     normal coronaries    H/O cardiovascular stress test     H/O Doppler carotid ultrasound 08/07/2019    Mild (0-49%) disease of the Bilateral Internal carotid artery.  H/O Doppler lower venous ultrasound 08/07/2019    Significant reflux noted of the Right GSV. Right GSV too small currently for ablation procedure. Significant reflux noted in the Left GSV at mid thigh . Left GSV difficult to follow between mid thigh and mid calf due to multiple  splits/branches. GSV knee tributary is very superficial.    H/O echocardiogram 08/07/2019    EF 55-60%, Mild MR & TR.    Hepatic steatosis 6/19/2015    History of nuclear stress test 09/05/2019     Mild anterior wall Ischemia of a medium sized territory. Possibility of breast artifact causing the abnormality cannot be excluded.     Hyperlipidemia     Hypertension     Hypothyroid Past Surgical History:   Procedure Laterality Date    CARPAL TUNNEL RELEASE Left     CHOLECYSTECTOMY      COLONOSCOPY  01/28/2019    Polyps x4, Internal grade 1 hemorrhoids    COLONOSCOPY N/A 1/28/2019    COLONOSCOPY POLYPECTOMY SNARE/COLD BIOPSY performed by Vladimir Perkins MD at [address] [facility], COLON, DIAGNOSTIC  09/03/2019    biopsy f/u with pcp    HYSTERECTOMY      due to bleeding.   complete    THYROIDECTOMY  2009    TONSILLECTOMY      TUBAL LIGATION      UPPER GASTROINTESTINAL ENDOSCOPY N/A 9/4/2019    EGD BIOPSY performed by Kayden Retana MD at [address]  Left        Family History   Problem Relation Age of Onset    Diabetes Mother     Hypertension Mother     Depression Mother         suicide    Diabetes Father     Depression Father     Diabetes Brother     Birth Defects Maternal Grandmother     Hypertension Maternal Grandmother     Diabetes Sister     Depression Brother     Diabetes Sister     Diabetes Brother     Other Brother         black mold    Diabetes Brother     Other Daughter         GI issues    Asthma Daughter     Seizures Daughter        Social History     Socioeconomic History    Marital status:      Spouse name: Not on file    Number of children: 2    Years of education: 15    Highest education level: GED or equivalent   Occupational History    Occupation: unemployed   Tobacco Use    Smoking status: Never Smoker    Smokeless tobacco: Never Used   Vaping Use    Vaping Use: Never used   Substance and Sexual Activity    Alcohol use: No    Drug use: No    Sexual activity: Not Currently     Partners: Male     Birth control/protection: Post-menopausal   Other Topics Concern    Not on file   Social History Narrative    Not on file     Social Determinants of Health     Financial Resource Strain:     Difficulty of Paying Living Expenses:    Food Insecurity:     Worried About Running Out of Food in the Last Year:     Ran Out of Food in the Last Year:    Transportation Needs:     Lack of Transportation (Medical):      Lack of Transportation (Non-Medical):    Physical Activity:     Days of Exercise per Week:     Minutes of Exercise per Session:    Stress:     Feeling of Stress :    Social Connections:     Frequency of Communication with Friends and Family:     Frequency of Social Gatherings with Friends and Family:     Attends [employer] Services:     Active Member of Clubs or Organizations:     Attends Club or Organization Meetings:     Marital Status:    Intimate Partner Violence:     Fear of Current or Ex-Partner:     Emotionally Abused:     Physically Abused:     Sexually Abused:        Current Outpatient Medications   Medication Sig Dispense Refill    naproxen (NAPROSYN) 500 MG tablet Take 1 tablet by mouth 2 times daily as needed for Pain 20 tablet 0    fluticasone-salmeterol (ADVAIR) 250-50 MCG/DOSE AEPB Inhale 1 puff into the lungs every 12 hours      budesonide-formoterol (SYMBICORT) 160-4.5 MCG/ACT AERO Inhale 2 puffs into the lungs 2 times daily (Patient not taking: Reported on 4/6/2021) 3 Inhaler 1    buPROPion (WELLBUTRIN XL) 300 MG extended release tablet Take 1 tablet by mouth every morning 90 tablet 1    albuterol sulfate HFA (VENTOLIN HFA) 108 (90 Base) MCG/ACT inhaler Inhale 2 puffs into the lungs every 6 hours as needed for Shortness of Breath 1 Inhaler 5    fluticasone-salmeterol (WIXELA INHUB) 250-50 MCG/DOSE AEPB Inhale 1 puff into the lungs 2 times daily (Patient not taking: Reported on 4/6/2021) 1 Inhaler 5    furosemide (LASIX) 20 MG tablet Take 1 tablet by mouth daily 60 tablet 5    montelukast (SINGULAIR) 10 MG tablet Take 1 tablet by mouth nightly 90 tablet 3    busPIRone (BUSPAR) 7.5 MG tablet Take 1 tablet by mouth 2 times daily 180 tablet 3    levothyroxine (SYNTHROID) 125 MCG tablet Take 1 tablet by mouth every morning 90 tablet 3    loratadine (CLARITIN) 10 MG tablet Take 1 tablet by mouth daily 90 tablet 3    UNABLE TO FIND cane 1 Device 0    theophylline (THEODUR) 300 MG extended release tablet Take 1 tablet by mouth 2 times daily 60 tablet 5    albuterol (PROVENTIL) (2.5 MG/3ML) 0.083% nebulizer solution Take 3 mLs by nebulization 2 times daily as needed for Wheezing or Shortness of Breath 60 each 2    traMADol (ULTRAM) 50 MG tablet Take 50 mg by mouth every 6 hours as needed for Pain. No current facility-administered medications for this visit. Allergies   Allergen Reactions    Latex Itching    Iodine Anaphylaxis    Levofloxacin Anaphylaxis    Pcn [Penicillins] Anaphylaxis    Sulfa Antibiotics Anaphylaxis    Flexeril [Cyclobenzaprine]     Morphine Itching    Zofran Itching    Caffeine Palpitations     Doesn't sleep    Lidocaine Nausea And Vomiting    Volumen [Barium Sulfate] Itching, Nausea And Vomiting and Rash       Review of Systems:    Review of Systems   Constitutional: Negative. HENT: Negative. Eyes: Negative. Respiratory: Negative. Cardiovascular: Negative. Gastrointestinal: Negative. Endocrine: Negative. Genitourinary: Negative. Musculoskeletal: Positive for arthralgias and joint swelling. Skin: Negative. Allergic/Immunologic: Negative. Neurological: Negative. Hematological: Negative. Psychiatric/Behavioral: Negative. Physical Exam:   Pulse 76   Ht 5' 1.5\" (1.562 m)   Wt 230 lb (104.3 kg)   LMP  (LMP Unknown)   SpO2 95%   BMI 42.75 kg/m²       The patient can bear weight on the injured extremity. Gen/Psych:Examination reveals a pleasant individual in no acute distress. The patient is oriented to time, place and person. The patient's mood and affect are appropriate. Patient appears well nourished. HEENT: Head is atraumatic normocephalic,  ears are symmetric, eyes show equal pupils bilaterally, extraocular muscles intact. Hearing is intact.      Lymph:  No obvious lymphedema in right lower any initial injury of her right ankle. She states she has had some difficulty using her brace at night due to her ankle swelling worsening in the evening. X-ray imaging from the patient's previous visit was reviewed which showed no fracture or dislocation. Patient's x-ray imaging from her ER visit was also reviewed which showed no acute fracture dislocation. On physical exam, patient was tender palpation over the posterior lateral ankle region. No erythema or ecchymosis seen. Patient was able to demonstrate dorsiflexion and plantarflexion of the ankle with no difficulty. Due to patient's difficulty with the lace up ankle brace, patient was placed in Ace wrap. I informed patient to continue working on her range of motion. The patient will follow up in 3 weeks. Ace wrap provided today  Continue to use ankle brace as tolerated  Continue weight bear as tolerated using cane/walker  Continue range of motion as tolerated  May take Tylenol or Motrin for pain as needed  Ice and elevate as needed   Home exercises given  Follow-up in 2-3 weeks  Follow-up with your PCP/Cardiologist     Please note this report has been partially produced using speech recognition Dragon software and may contain errors related to that system including errors in grammar, punctuation, and spelling, as well as words and phrases that may be inappropriate. If there are any questions or concerns please feel free to contact the dictating provider for clarification.

## 2021-06-11 NOTE — PROGRESS NOTES
Patience Farr MD        OFFICE  FOLLOWUP NOTE    Chief complaints: patient is here for management of chest pain, palpitations, obesity, leg swelling, shortness of breath, HTN, dyslpidemia,sleep apnea, copd    Subjective: patient feels leg swelling and electrical pain in left leg no chest pain, no shortness of breath, no dizziness, no palpitations    JEREMY Huber is a 77 y. o.year old who  has a past medical history of Anxiety, Arthritis, Asthma, CAD (coronary artery disease), COPD (chronic obstructive pulmonary disease) ([facility], [place] [address] H/O cardiac catheterization, H/O cardiovascular stress test, H/O Doppler carotid ultrasound, H/O Doppler lower venous ultrasound, H/O echocardiogram, Hepatic steatosis, History of nuclear stress test, Hyperlipidemia, Hypertension, and Hypothyroid. and presents for management of chest pain, palpitations, obesity, leg swelling, shortness of breath, HTN, dyslpidemia,sleep apnea, copd which are well controlled    She is not using leg compression socks as her insurance is not paying for it.     Current Outpatient Medications   Medication Sig Dispense Refill    naproxen (NAPROSYN) 500 MG tablet Take 1 tablet by mouth 2 times daily as needed for Pain 20 tablet 0    buPROPion (WELLBUTRIN XL) 300 MG extended release tablet Take 1 tablet by mouth every morning 90 tablet 1    albuterol sulfate HFA (VENTOLIN HFA) 108 (90 Base) MCG/ACT inhaler Inhale 2 puffs into the lungs every 6 hours as needed for Shortness of Breath 1 Inhaler 5    fluticasone-salmeterol (WIXELA INHUB) 250-50 MCG/DOSE AEPB Inhale 1 puff into the lungs 2 times daily 1 Inhaler 5    furosemide (LASIX) 20 MG tablet Take 1 tablet by mouth daily 60 tablet 5    montelukast (SINGULAIR) 10 MG tablet Take 1 tablet by mouth nightly 90 tablet 3    busPIRone (BUSPAR) 7.5 MG tablet Take 1 tablet by mouth 2 times daily 180 tablet 3    levothyroxine (SYNTHROID) 125 MCG tablet Take 1 tablet by mouth every morning 90 tablet 3    hemorrhoids    COLONOSCOPY N/A 1/28/2019    COLONOSCOPY POLYPECTOMY SNARE/COLD BIOPSY performed by Rocio Morales MD at [address], COLON, DIAGNOSTIC  09/03/2019    biopsy f/u with pcp    HYSTERECTOMY      due to bleeding.   complete    THYROIDECTOMY  2009    TONSILLECTOMY      TUBAL LIGATION      UPPER GASTROINTESTINAL ENDOSCOPY N/A 9/4/2019    EGD BIOPSY performed by Diamond Callaway MD at [address]  Left      Family History   Problem Relation Age of Onset    Diabetes Mother     Hypertension Mother     Depression Mother         suicide    Diabetes Father     Depression Father     Diabetes Brother     Birth Defects Maternal Grandmother     Hypertension Maternal Grandmother     Diabetes Sister     Depression Brother     Diabetes Sister     Diabetes Brother     Other Brother         black mold    Diabetes Brother     Other Daughter         GI issues    Asthma Daughter     Seizures Daughter      Social History     Tobacco Use    Smoking status: Never Smoker    Smokeless tobacco: Never Used   Substance Use Topics    Alcohol use: No      [unfilled]  Review of Systems:   · Constitutional: No Fever or Weight Loss   · Eyes: No Decreased Vision  · ENT: No Headaches, Hearing Loss or Vertigo  · Cardiovascular: No chest pain, dyspnea on exertion, palpitations or loss of consciousness  · Respiratory: No cough or wheezing    · Gastrointestinal: No abdominal pain, appetite loss, blood in stools, constipation, diarrhea or heartburn  · Genitourinary: No dysuria, trouble voiding, or hematuria  · Musculoskeletal:  No gait disturbance, weakness or joint complaints  · Integumentary: No rash or pruritis  · Neurological: No TIA or stroke symptoms  · Psychiatric: No anxiety or depression  · Endocrine: No malaise, fatigue or temperature intolerance  · Hematologic/Lymphatic: No bleeding problems, blood clots or swollen lymph nodes  · Allergic/Immunologic: No nasal congestion or hives  All systems negative except as marked. Objective:  /68 (Site: Left Upper Arm, Position: Sitting, Cuff Size: Large Adult)   Pulse 72   Ht 5' 1\" (1.549 m)   Wt 244 lb 6.4 oz (110.9 kg)   LMP  (LMP Unknown)   BMI 46.18 kg/m²   Wt Readings from Last 3 Encounters:   06/11/21 244 lb 6.4 oz (110.9 kg)   06/11/21 230 lb (104.3 kg)   05/26/21 230 lb (104.3 kg)     Body mass index is 46.18 kg/m². GENERAL - Alert, oriented, pleasant, in no apparent distress,normal grooming  HEENT  pupils are intact, cornea intact, conjunctive normal color, ears are normal in exam,  Neck - Supple. No jugular venous distention noted. No carotid bruits, no apical -carotid delay  Respiratory:  Normal breath sounds, No respiratory distress, No wheezing, No chest tenderness. ,no use of accessory muscles, diaphragm movement is normal  Cardiovascular: (PMI) apex non displaced,no lifts no thrills, no s3,no s4, Normal heart rate, Normal rhythm, No murmurs, No rubs, No gallops. Carotid arteries pulse and amplitude are normal no bruit, no abdominal bruit noted ( normal abdominal aorta ausculation),   Extremities - No cyanosis, clubbing, or significant edema, no varicose veins    Abdomen  No masses, tenderness, or organomegaly, no hepato-splenomegally, no bruits  Musculoskeletal+  edema, + varicose veins, no kyphosis or scoliosis, no deformity in any extremity noted, muscle strength and tone are normal  Skin: no ulcer,no scar,no stasis dermatitis   Neurologic  alert oriented times 3,Cranial nerves II through XII are grossly intact. There were no gross focal neurologic abnormalities.    Psychiatric: normal mood and affect    Lab Results   Component Value Date    TROPONINI <0.006 03/16/2012     BNP:    Lab Results   Component Value Date    BNP 12 06/10/2013     PT/INR:  No results found for: [facility]  Lab Results   Component Value Date    LABA1C 5.5 06/05/2019    LABA1C 5.1 06/19/2018     Lab Results   Component Value Date    CHOL 187 04/25/2017    TRIG 159 (H) 04/25/2017    HDL 50 04/25/2017    LDLCALC 105 (H) 04/25/2017    LDLDIRECT 124 (H) 03/30/2015     Lab Results   Component Value Date    ALT 17 02/16/2020    AST 19 02/16/2020     TSH:    Lab Results   Component Value Date    TSH 1.24 06/19/2018       Ekg: nsr    Impression:  Tyrese Lundberg is a 77 y. o.year old who  has a past medical history of Anxiety, Arthritis, Asthma, CAD (coronary artery disease), COPD (chronic obstructive pulmonary disease) ([address] H/O cardiac catheterization, H/O cardiovascular stress test, H/O Doppler carotid ultrasound, H/O Doppler lower venous ultrasound, H/O echocardiogram, Hepatic steatosis, History of nuclear stress test, Hyperlipidemia, Hypertension, and Hypothyroid. and presents with     Plan:  1. Chest pain: resolved, most likley acid reflux as her LHC was normal, will not pursue it from cardiac standpoint,will continue protoxin  2. Palpitations: 24 hrs holter monitor ordered, and recommend to discuss with pulmonary for reducing the dose of theophylline and her symptoms of palpitations are manily at the time of sinus tachycardia  3. COPD: from 2nd hand smoking  4. Dizziness: caroitid doppler showed 0-49% stenosis  5. Leg swelling showed severe venous reflux disease, could not use compressions socks, will refer to dr Cristel Mchugh for ablation. 6. HTN: stable, off medications  7. Dyslipidemia: check lipids  8. Obesity:recommend to lose weight  All labs, medications and tests reviewed, continue all other medications of all above medical condition listed as is.     @[facility]@

## 2021-06-11 NOTE — PATIENT INSTRUCTIONS
**It is YOUR responsibilty to bring medication bottles and/or updated medication list to [address] [place] [address]. This will allow us to better serve you and all your healthcare needs**    Please be informed that if you contact our office outside of normal business hours the physician on call cannot help with any scheduling or rescheduling issues, procedure instruction questions or any type of medication issue. We advise you for any urgent/emergency that you go to the nearest emergency room!     PLEASE CALL OUR OFFICE DURING NORMAL BUSINESS HOURS    Monday - Friday   8 am to 5 pm    [place]: [address]: [phone]    [place]:  [phone]

## 2021-06-11 NOTE — LETTER
Dwayne Lobo Navarrete  1955  [id number]    Have you had any Chest Pain that is not new? - Yes  If Yes DO EKG - How does it feel - Tightness   How long does the pain last - 5 minutes    How long have you been having the pain - Day's   Did you take a NONE   And did it relieve the pain - it goes away on it's own      Have you had any Shortness of Breath - Yes  If Yes - When at rest and on exertion     Have you had any dizziness - No       Have you had any palpitations that are not new? - Yes  If Yes DO EKG - Do you feel your heart racing  How long does it last - .3  seconds     Do you have any edema - swelling in legs to feet. Pt feels shock sensation through left leg   If Yes - CHECK TO SEE IF THE EDEMA IS PITTING  How long have they been having edema - Day's  If Yes - Have they worn compression stockings No . Previous Venous study 8/7/2019      When did you have your last labs drawn None recent in chart      If we do not have these labs you are retrieve these labs for these providers!     Do you have a surgery or procedure scheduled in the near future - No  If Yes- DO EKG

## 2021-06-11 NOTE — PATIENT INSTRUCTIONS
Continue to use ankle brace as tolerated  Continue weight bear as tolerated using cane/walker  Continue range of motion as tolerated  May take Tylenol or Motrin for pain as needed  Ice and elevate as needed  Follow-up in 2-3 weeks  Follow-up with your PCP/Cardiologist      Patient Education        Ankle Sprain: Rehab Exercises  Introduction  Here are some examples of exercises for you to try. The exercises may be suggested for a condition or for rehabilitation. Start each exercise slowly. Ease off the exercises if you start to have pain. You will be told when to start these exercises and which ones will work best for you. How to do the exercises  'Alphabet' exercise   1. Trace the alphabet with your toe. This helps your ankle move in all directions. Side-to-side knee swing exercise   1. Sit in a chair with your foot flat on the floor. 2. Slowly move your knee from side to side. Keep your foot pressed flat. 3. Continue this exercise for 2 to 3 minutes. Towel curl   1. While sitting, place your foot on a towel on the floor. Scrunch the towel toward you with your toes. 2. Then use your toes to push the towel away from you. 3. To make this exercise more challenging you can put something on the other end of the towel. A can of soup is about the right weight for this. Towel stretch   1. Sit with your legs extended and knees straight. 2. Place a towel around your foot just under the toes. 3. Hold each end of the towel in each hand, with your hands above your knees. 4. Pull back with the towel so that your foot stretches toward you. 5. Hold the position for at least 15 to 30 seconds. 6. Repeat 2 to 4 times a session. Do up to 5 sessions a day. Ankle eversion exercise   1. Start by sitting with your foot flat on the floor. Push your foot outward against a wall or a piece of furniture that doesn't move. Hold for about 6 seconds, and relax. Repeat 8 to 12 times.   2. After you feel comfortable with this, try using rubber tubing looped around the outside of your feet for resistance. Push your foot out to the side against the tubing, and then count to 10 as you slowly bring your foot back to the middle. Repeat 8 to 12 times. Isometric opposition exercises   1. While sitting, put your feet together flat on the floor. 2. Press your injured foot inward against your other foot. Hold for about 6 seconds, and relax. Repeat 8 to 12 times. 3. Then place the heel of your other foot on top of the injured one. Push down with the top heel while trying to push up with your injured foot. Hold for about 6 seconds, and relax. Repeat 8 to 12 times. Resisted ankle inversion   1. Sit on the floor with your good leg crossed over your other leg. 2. Hold both ends of an exercise band and loop the band around the inside of your affected foot. Then press your other foot against the band. 3. Keeping your legs crossed, slowly push your affected foot against the band so that foot moves away from your other foot. Then slowly relax. 4. Repeat 8 to 12 times. Resisted ankle eversion   1. Sit on the floor with your legs straight. 2. Hold both ends of an exercise band and loop the band around the outside of your affected foot. Then press your other foot against the band. 3. Keeping your leg straight, slowly push your affected foot outward against the band and away from your other foot without letting your leg rotate. Then slowly relax. 4. Repeat 8 to 12 times. Resisted ankle dorsiflexion   1. Tie the ends of an exercise band together to form a loop. Attach one end of the loop to a secure object or shut a door on it to hold it in place. (Or you can have someone hold one end of the loop to provide resistance.)  2. While sitting on the floor or in a chair, loop the other end of the band over the top of your affected foot.   3. Keeping your knee and leg straight, slowly flex your foot to pull back on the exercise band, and then slowly relax.  4. Repeat 8 to 12 times. Single-leg balance   1. Stand on a flat surface with your arms stretched out to your sides like you are making the letter \"T. \" Then lift your good leg off the floor, bending it at the knee. If you are not steady on your feet, use one hand to hold on to a chair, counter, or wall. 2. Standing on the leg with your affected ankle, keep that knee straight. Try to balance on that leg for up to 30 seconds. Then rest for up to 10 seconds. 3. Repeat 6 to 8 times. 4. When you can balance on your affected leg for 30 seconds with your eyes open, try to balance on it with your eyes closed. 5. When you can do this exercise with your eyes closed for 30 seconds and with ease and no pain, try standing on a pillow or piece of foam, and repeat steps 1 through 4. Follow-up care is a key part of your treatment and safety. Be sure to make and go to all appointments, and call your doctor if you are having problems. It's also a good idea to know your test results and keep a list of the medicines you take. Where can you learn more? Go to https://[employer] Response[employer]. org and sign in to your [facility] account. Enter Arcelia Silva in the [facility] box to learn more about \"Ankle Sprain: Rehab Exercises. \"     If you do not have an account, please click on the \"Sign Up Now\" link. Current as of: November 16, 2020               Content Version: 12.8  © 2006-2021 [employer]. Care instructions adapted under license by [facility] ([facility]). If you have questions about a medical condition or this instruction, always ask your healthcare professional. [address] any warranty or liability for your use of this information.

## 2021-06-12 NOTE — ED PROVIDER NOTES
As the Remote Tele physician-in-triage, I performed a medical screening history and physical exam on this patient remotely via the R Dany Devante [address] is a 77 y.o. female with periumbilical pain that is moved to the right lower quadrant associated with nausea. Patient with history of cholecystectomy and hysterectomy. PHYSICAL EXAM  BP (!) 144/97   Pulse 72   Temp 98.7 °F (37.1 °C) (Oral)   Ht 5' 1\" (1.549 m)   Wt 230 lb (104.3 kg)   LMP  (LMP Unknown)   SpO2 96%   BMI 43.46 kg/m²     On exam, the patient appears in no acute distress. Speech is clear. Breathing is unlabored. Comment: Please note this report has been produced using speech recognition software and may contain errors related to that system including errors in grammar, punctuation, and spelling, as well as words and phrases that may be inappropriate. If there are any questions or concerns please feel free to contact the dictating provider for clarification.         Crystal Flores MD  06/11/21 [id number]

## 2021-06-12 NOTE — ED PROVIDER NOTES
[address]      TRIAGE CHIEF COMPLAINT:   Abdominal Pain      [place]:  Kris Saeed is a 77 y.o. female that presents complaint of abdominal pain nausea vomiting. Patient states sudden onset of right side abdominal pain nausea vomiting before arrival.  Denies any fevers chest pain shortness of breath no other question concerns. No diarrhea no other abdominal pain. REVIEW OF SYSTEMS:  At least 10 systems reviewed and otherwise acutely negative except as in the [address] Review of Systems   Constitutional: Negative. HENT: Negative. Eyes: Negative. Respiratory: Negative. Cardiovascular: Negative. Gastrointestinal: Positive for abdominal pain, nausea and vomiting. Endocrine: Negative. Genitourinary: Negative. Musculoskeletal: Negative. Skin: Negative. Allergic/Immunologic: Negative. Neurological: Negative. Hematological: Negative. Negative for adenopathy. Psychiatric/Behavioral: Negative. All other systems reviewed and are negative. Past Medical History:   Diagnosis Date    Anxiety 1/28/2019    Arthritis     Asthma     CAD (coronary artery disease)     COPD (chronic obstructive pulmonary disease) ([facility] [address]     H/O cardiac catheterization 09/17/2019     normal coronaries    H/O cardiovascular stress test     H/O Doppler carotid ultrasound 08/07/2019    Mild (0-49%) disease of the Bilateral Internal carotid artery.  H/O Doppler lower venous ultrasound 08/07/2019    Significant reflux noted of the Right GSV. Right GSV too small currently for ablation procedure. Significant reflux noted in the Left GSV at mid thigh . Left GSV difficult to follow between mid thigh and mid calf due to multiple  splits/branches.  GSV knee tributary is very superficial.    H/O echocardiogram 08/07/2019    EF 55-60%, Mild MR & TR.    Hepatic steatosis 6/19/2015    History of nuclear stress test 09/05/2019     Mild anterior wall Ischemia of a medium sized territory. Possibility of breast artifact causing the abnormality cannot be excluded.  Hyperlipidemia     Hypertension     Hypothyroid      Past Surgical History:   Procedure Laterality Date    CARPAL TUNNEL RELEASE Left     CHOLECYSTECTOMY      COLONOSCOPY  01/28/2019    Polyps x4, Internal grade 1 hemorrhoids    COLONOSCOPY N/A 1/28/2019    COLONOSCOPY POLYPECTOMY SNARE/COLD BIOPSY performed by Emilie Eagle MD at [address], COLON, DIAGNOSTIC  09/03/2019    biopsy f/u with pcp    HYSTERECTOMY      due to bleeding.   complete    THYROIDECTOMY  2009    TONSILLECTOMY      TUBAL LIGATION      UPPER GASTROINTESTINAL ENDOSCOPY N/A 9/4/2019    EGD BIOPSY performed by David Case MD at [address] Left      Family History   Problem Relation Age of Onset    Diabetes Mother     Hypertension Mother     Depression Mother         suicide    Diabetes Father     Depression Father     Diabetes Brother     Birth Defects Maternal Grandmother     Hypertension Maternal Grandmother     Diabetes Sister     Depression Brother     Diabetes Sister     Diabetes Brother     Other Brother         black mold    Diabetes Brother     Other Daughter         GI issues    Asthma Daughter     Seizures Daughter      Social History     Socioeconomic History    Marital status:      Spouse name: Not on file    Number of children: 2    Years of education: 15    Highest education level: GED or equivalent   Occupational History    Occupation: unemployed   Tobacco Use    Smoking status: Never Smoker    Smokeless tobacco: Never Used   Vaping Use    Vaping Use: Never used   Substance and Sexual Activity    Alcohol use: No    Drug use: No    Sexual activity: Not Currently     Partners: Male     Birth control/protection: Post-menopausal   Other Topics Concern    Not on file   Social History Narrative    Not on file     Social Determinants of Health     Financial Resource Strain:     Difficulty of Paying Living Expenses:    Food Insecurity:     Worried About Running Out of Food in the Last Year:     [address] in the Last Year:    Transportation Needs:     Lack of Transportation (Medical):  Lack of Transportation (Non-Medical):    Physical Activity:     Days of Exercise per Week:     Minutes of Exercise per Session:    Stress:     Feeling of Stress :    Social Connections:     Frequency of Communication with Friends and Family:     Frequency of Social Gatherings with Friends and Family:     Attends [employer] Services:     Active Member of Clubs or Organizations:     Attends Club or Organization Meetings:     Marital Status:    Intimate Partner Violence:     Fear of Current or Ex-Partner:     Emotionally Abused:     Physically Abused:     Sexually Abused:      Current Facility-Administered Medications   Medication Dose Route Frequency Provider Last Rate Last Admin    fentaNYL (SUBLIMAZE) injection 50 mcg  50 mcg Intravenous Q1H PRN Chris Mark Anthony, DO        promethazine (PHENERGAN) injection 25 mg  25 mg Intramuscular Once Chris Mark Anthony, DO         Current Outpatient Medications   Medication Sig Dispense Refill    famotidine (PEPCID) 20 MG tablet Take 1 tablet by mouth 2 times daily 14 tablet 0    promethazine (PHENERGAN) 25 MG tablet Take 1 tablet by mouth every 6 hours as needed for Nausea 28 tablet 0    polyethylene glycol (MIRALAX) 17 g packet Take 17 g by mouth daily as needed for Other (Constipation) 527 g 1    HYDROcodone-acetaminophen (NORCO) 5-325 MG per tablet Take 1-2 tablets by mouth every 6 hours as needed for Pain for up to 2 days.  12 tablet 0    polyethylene glycol (MIRALAX) 17 g packet Take 17 g by mouth daily as needed for Other (Constipation) 30 each 0    fluticasone (FLONASE) 50 MCG/ACT nasal spray 1 spray by Each Nostril route daily      naproxen (NAPROSYN) 500 MG tablet Take 1 tablet by mouth 2 times daily as needed for Pain 20 tablet 0    buPROPion (WELLBUTRIN XL) 300 MG extended release tablet Take 1 tablet by mouth every morning 90 tablet 1    albuterol sulfate HFA (VENTOLIN HFA) 108 (90 Base) MCG/ACT inhaler Inhale 2 puffs into the lungs every 6 hours as needed for Shortness of Breath 1 Inhaler 5    fluticasone-salmeterol (WIXELA INHUB) 250-50 MCG/DOSE AEPB Inhale 1 puff into the lungs 2 times daily 1 Inhaler 5    furosemide (LASIX) 20 MG tablet Take 1 tablet by mouth daily 60 tablet 5    montelukast (SINGULAIR) 10 MG tablet Take 1 tablet by mouth nightly 90 tablet 3    busPIRone (BUSPAR) 7.5 MG tablet Take 1 tablet by mouth 2 times daily 180 tablet 3    levothyroxine (SYNTHROID) 125 MCG tablet Take 1 tablet by mouth every morning 90 tablet 3    loratadine (CLARITIN) 10 MG tablet Take 1 tablet by mouth daily 90 tablet 3    UNABLE TO FIND cane 1 Device 0    theophylline (THEODUR) 300 MG extended release tablet Take 1 tablet by mouth 2 times daily 60 tablet 5    albuterol (PROVENTIL) (2.5 MG/3ML) 0.083% nebulizer solution Take 3 mLs by nebulization 2 times daily as needed for Wheezing or Shortness of Breath 60 each 2      Allergies   Allergen Reactions    Latex Itching    Iodine Anaphylaxis    Levofloxacin Anaphylaxis    Pcn [Penicillins] Anaphylaxis    Sulfa Antibiotics Anaphylaxis    Flexeril [Cyclobenzaprine]     Morphine Itching    Zofran Itching    Caffeine Palpitations     Doesn't sleep    Lidocaine Nausea And Vomiting    Volumen [Barium Sulfate] Itching, Nausea And Vomiting and Rash     Current Facility-Administered Medications   Medication Dose Route Frequency Provider Last Rate Last Admin    fentaNYL (SUBLIMAZE) injection 50 mcg  50 mcg Intravenous Q1H PRN Linford Stain, DO        promethazine (PHENERGAN) injection 25 mg  25 mg Intramuscular Once Linford Stain, DO         Current Outpatient Medications   Medication Sig Dispense Refill    famotidine (PEPCID) 20 MG tablet Take 1 tablet by mouth 2 times daily 14 tablet 0    promethazine (PHENERGAN) 25 MG tablet Take 1 tablet by mouth every 6 hours as needed for Nausea 28 tablet 0    polyethylene glycol (MIRALAX) 17 g packet Take 17 g by mouth daily as needed for Other (Constipation) 527 g 1    HYDROcodone-acetaminophen (NORCO) 5-325 MG per tablet Take 1-2 tablets by mouth every 6 hours as needed for Pain for up to 2 days.  12 tablet 0    polyethylene glycol (MIRALAX) 17 g packet Take 17 g by mouth daily as needed for Other (Constipation) 30 each 0    fluticasone (FLONASE) 50 MCG/ACT nasal spray 1 spray by Each Nostril route daily      naproxen (NAPROSYN) 500 MG tablet Take 1 tablet by mouth 2 times daily as needed for Pain 20 tablet 0    buPROPion (WELLBUTRIN XL) 300 MG extended release tablet Take 1 tablet by mouth every morning 90 tablet 1    albuterol sulfate HFA (VENTOLIN HFA) 108 (90 Base) MCG/ACT inhaler Inhale 2 puffs into the lungs every 6 hours as needed for Shortness of Breath 1 Inhaler 5    fluticasone-salmeterol (WIXELA INHUB) 250-50 MCG/DOSE AEPB Inhale 1 puff into the lungs 2 times daily 1 Inhaler 5    furosemide (LASIX) 20 MG tablet Take 1 tablet by mouth daily 60 tablet 5    montelukast (SINGULAIR) 10 MG tablet Take 1 tablet by mouth nightly 90 tablet 3    busPIRone (BUSPAR) 7.5 MG tablet Take 1 tablet by mouth 2 times daily 180 tablet 3    levothyroxine (SYNTHROID) 125 MCG tablet Take 1 tablet by mouth every morning 90 tablet 3    loratadine (CLARITIN) 10 MG tablet Take 1 tablet by mouth daily 90 tablet 3    UNABLE TO FIND cane 1 Device 0    theophylline (THEODUR) 300 MG extended release tablet Take 1 tablet by mouth 2 times daily 60 tablet 5    albuterol (PROVENTIL) (2.5 MG/3ML) 0.083% nebulizer solution Take 3 mLs by nebulization 2 times daily as needed for Wheezing or Shortness of Breath 60 each 2       Nursing Notes Reviewed    VITAL SIGNS:  ED Triage Vitals   Enc Vitals Group      BP 06/11/21 2252 (!) 144/97 Pulse 06/11/21 2252 72      Resp --       Temp 06/11/21 2252 98.7 °F (37.1 °C)      Temp Source 06/11/21 2252 Oral      SpO2 06/11/21 2252 96 %      Weight 06/11/21 2249 244 lb (110.7 kg)      Height 06/11/21 2249 5' 1\" (1.549 m)      Head Circumference --       Peak Flow --       Pain Score --       Pain Loc --       Pain Edu? --       Excl. in [address]? --        PHYSICAL EXAM:  Physical Exam  Vitals and nursing note reviewed. Constitutional:       General: She is not in acute distress. Appearance: She is well-developed and well-groomed. She is obese. She is ill-appearing. She is not toxic-appearing or diaphoretic. HENT:      Head: Normocephalic and atraumatic. Right Ear: External ear normal.      Left Ear: External ear normal.      Nose: No congestion or rhinorrhea. Eyes:      General: No scleral icterus. Right eye: No discharge. Left eye: No discharge. Extraocular Movements: Extraocular movements intact. Conjunctiva/sclera: Conjunctivae normal.   Neck:      Vascular: No JVD. Trachea: Phonation normal.   Cardiovascular:      Rate and Rhythm: Normal rate and regular rhythm. Pulses: Normal pulses. Heart sounds: Normal heart sounds. No murmur heard. No friction rub. No gallop. Pulmonary:      Effort: Pulmonary effort is normal. No respiratory distress. Breath sounds: Normal breath sounds. No stridor. No wheezing, rhonchi or rales. Abdominal:      General: Bowel sounds are normal. There is no distension. Palpations: Abdomen is soft. There is no mass. Tenderness: There is abdominal tenderness in the right upper quadrant, right lower quadrant and suprapubic area. There is right CVA tenderness. There is no guarding or rebound. Negative signs include Cunningham's sign, Rovsing's sign and McBurney's sign. Hernia: No hernia is present. Musculoskeletal:         General: No swelling, tenderness, deformity or signs of injury. Normal range of motion. Cervical back: Full passive range of motion without pain and normal range of motion. No edema, erythema, signs of trauma, rigidity, torticollis or crepitus. No pain with movement. Normal range of motion. Right lower leg: No edema. Left lower leg: No edema. Skin:     General: Skin is warm. Coloration: Skin is not jaundiced or pale. Findings: No bruising, erythema, lesion or rash. Neurological:      General: No focal deficit present. Mental Status: She is alert and oriented to person, place, and time. GCS: GCS eye subscore is 4. GCS verbal subscore is 5. GCS motor subscore is 6. Cranial Nerves: Cranial nerves are intact. No cranial nerve deficit, dysarthria or facial asymmetry. Sensory: Sensation is intact. No sensory deficit. Motor: Motor function is intact. No weakness, tremor, atrophy, abnormal muscle tone or seizure activity. Coordination: Coordination is intact. Coordination normal.      Gait: Gait normal.   Psychiatric:         Mood and Affect: Mood normal.         Behavior: Behavior normal. Behavior is cooperative. Thought Content:  Thought content normal.           I have reviewed andinterpreted all of the currently available lab results from this visit (if applicable):    Results for orders placed or performed during the hospital encounter of 06/11/21   CBC auto diff   Result Value Ref Range    WBC 10.9 (H) 4.0 - 10.5 K/CU MM    RBC 4.73 4.2 - 5.4 M/CU MM    Hemoglobin 14.9 12.5 - 16.0 GM/DL    Hematocrit 44.4 37 - 47 %    MCV 93.9 78 - 100 FL    MCH 31.5 (H) 27 - 31 PG    MCHC 33.6 32.0 - 36.0 %    RDW 13.2 11.7 - 14.9 %    Platelets 618 615 - 017 K/CU MM    MPV 9.4 7.5 - 11.1 FL    Differential Type AUTOMATED DIFFERENTIAL     Segs Relative 52.0 36 - 66 %    Lymphocytes % 35.0 24 - 44 %    Monocytes % 9.1 (H) 0 - 4 %    Eosinophils % 2.7 0 - 3 %    Basophils % 0.9 0 - 1 %    Segs Absolute 5.6 K/CU MM    Lymphocytes Absolute 3.8 K/CU MM Monocytes Absolute 1.0 K/CU MM    Eosinophils Absolute 0.3 K/CU MM    Basophils Absolute 0.1 K/CU MM    Nucleated RBC % 0.0 %    Total Nucleated RBC 0.0 K/CU MM    Total Immature Neutrophil 0.03 K/CU MM    Immature Neutrophil % 0.3 0 - 0.43 %   CMP   Result Value Ref Range    Sodium 141 135 - 145 MMOL/L    Potassium 3.7 3.5 - 5.1 MMOL/L    Chloride 106 99 - 110 mMol/L    CO2 22 21 - 32 MMOL/L    BUN 17 6 - 23 MG/DL    CREATININE 0.8 0.6 - 1.1 MG/DL    Glucose 141 (H) 70 - 99 MG/DL    Calcium 10.0 8.3 - 10.6 MG/DL    Albumin 4.2 3.4 - 5.0 GM/DL    Total Protein 6.0 (L) 6.4 - 8.2 GM/DL    Total Bilirubin 0.2 0.0 - 1.0 MG/DL    ALT 15 10 - 40 U/L    AST 17 15 - 37 IU/L    Alkaline Phosphatase 88 40 - 129 IU/L    GFR Non-African American >60 >60 mL/min/1.73m2    GFR African American >60 >60 mL/min/1.73m2    Anion Gap 13 4 - 16   Lipase   Result Value Ref Range    Lipase 19 13 - 60 IU/L   Urinalysis with microscopic   Result Value Ref Range    Color, UA YELLOW YELLOW    Clarity, UA HAZY (A) CLEAR    Glucose, Urine NEGATIVE NEGATIVE MG/DL    Bilirubin Urine NEGATIVE NEGATIVE MG/DL    Ketones, Urine NEGATIVE NEGATIVE MG/DL    Specific Gravity, UA 1.017 1.001 - 1.035    Blood, Urine LARGE (A) NEGATIVE    pH, Urine 6.0 5.0 - 8.0    Protein, UA 30 (A) NEGATIVE MG/DL    Urobilinogen, Urine NEGATIVE 0.2 - 1.0 MG/DL    Nitrite Urine, Quantitative NEGATIVE NEGATIVE    Leukocyte Esterase, Urine TRACE (A) NEGATIVE    RBC, UA 64 (H) 0 - 6 /HPF    WBC, UA 4 0 - 5 /HPF    Bacteria, UA RARE (A) NEGATIVE /HPF    Squam Epithel, UA 2 /HPF    Trans Epithel, UA <1 /HPF    Mucus, UA RARE (A) NEGATIVE HPF    Trichomonas, UA NONE SEEN NONE SEEN /HPF    Hyaline Casts, UA 0 /LPF    Ca Oxalate Елена, UA FEW /HPF   Lactic Acid, Plasma   Result Value Ref Range    Lactate 1.4 0.4 - 2.0 mMOL/L        Radiographs (if obtained):  [] The following radiograph was interpreted by myself in the absence of a radiologist:  [x] Radiologist's Report Reviewed:    CT Abd/pelv    XR ANKLE RIGHT (MIN 3 VIEWS)    Result Date: 5/26/2021  EXAMINATION: THREE XRAY VIEWS OF THE RIGHT ANKLE 5/26/2021 3:38 pm COMPARISON: 05/15/2021 HISTORY: ORDERING SYSTEM PROVIDED HISTORY: Follow up FINDINGS: No evidence of acute fracture or dislocation. Normal alignment of the ankle mortise. No focal osseous lesion. No evidence of joint effusion. Soft tissue swelling laterally. Soft tissue swelling laterally No fracture or dislocation     XR ANKLE RIGHT (MIN 3 VIEWS)    Result Date: 5/15/2021  EXAMINATION: THREE XRAY VIEWS OF THE RIGHT ANKLE 5/15/2021 4:18 pm COMPARISON: None. HISTORY: ORDERING SYSTEM PROVIDED HISTORY: nki, pain TECHNOLOGIST PROVIDED HISTORY: Reason for exam:->nki, pain Reason for Exam: nki, right foot pain Acuity: Acute Type of Exam: Initial FINDINGS: There is 4 mm well corticated ossicle seen along the tip of the lateral malleolus, likely reflecting sequela of an old trauma. There is mild ankle soft tissue swelling. The bones appear demineralized without radiographic evidence of acute fracture or dislocation seen. The talar dome appears intact. There is small spur arising from the anterior tibial plafond. There is a small to moderate-sized plantar calcaneal spur. There is a tiny posterior calcaneal spur. Mild ankle soft tissue swelling without radiographic evidence of acute osseous abnormality of the right ankle seen. The bones appear demineralized. Degenerative changes as above. VL DUP LOWER EXTREMITY VENOUS RIGHT    Result Date: 5/15/2021  EXAMINATION: DUPLEX VENOUS ULTRASOUND OF THE RIGHT LOWER EXTREMITY, 5/15/2021 4:32 pm TECHNIQUE: Duplex ultrasound using B-mode/gray scaled imaging and Doppler spectral analysis and color flow was obtained of the right lower extremity. COMPARISON: None.  HISTORY: ORDERING SYSTEM PROVIDED HISTORY: nki, pain, eval for dvt TECHNOLOGIST PROVIDED HISTORY: Reason for exam:->nki, pain, eval for dvt Reason for Exam: day  Urology      DISPOSITION MEDICATIONS (if applicable):  New Prescriptions    FAMOTIDINE (PEPCID) 20 MG TABLET    Take 1 tablet by mouth 2 times daily    HYDROCODONE-ACETAMINOPHEN (NORCO) 5-325 MG PER TABLET    Take 1-2 tablets by mouth every 6 hours as needed for Pain for up to 2 days.     POLYETHYLENE GLYCOL (MIRALAX) 17 G PACKET    Take 17 g by mouth daily as needed for Other (Constipation)    POLYETHYLENE GLYCOL (MIRALAX) 17 G PACKET    Take 17 g by mouth daily as needed for Other (Constipation)    PROMETHAZINE (PHENERGAN) 25 MG TABLET    Take 1 tablet by mouth every 6 hours as needed for Nausea          Arely Walker, DO Arely Walker DO  06/12/21 [id number]

## 2021-06-21 PROBLEM — N20.0 KIDNEY STONE: Status: ACTIVE | Noted: 2021-01-01

## 2021-06-21 NOTE — ED NOTES
Patient ambulating to restroom independently w/o difficulty. Fayrene Dryer.  CORDELIA Griffiths  06/21/21 2914

## 2021-06-21 NOTE — ANESTHESIA PRE PROCEDURE
Department of Anesthesiology  Preprocedure Note       Name:  Sara Leonard   Age:  77 y.o.  :  1955                                          MRN:  [id number]         Date:  2021      Surgeon: David Castro):  Haritha Nazario MD    Procedure: Procedure(s):  CYSTOSCOPY RIGHT RETROGRADE PYLEOGRAMS POSSIBLE RIGHT STONE MANIPULATION    Medications prior to admission:   Prior to Admission medications    Medication Sig Start Date End Date Taking?  Authorizing Provider   polyethylene glycol (MIRALAX) 17 g packet Take 17 g by mouth daily as needed for Other (Constipation) 21 Yes Ashley Pro, DO   buPROPion (WELLBUTRIN XL) 300 MG extended release tablet Take 1 tablet by mouth every morning 3/11/21  Yes Sharon Helm MD   fluticasone-salmeterol (WIXELA INHUB) 250-50 MCG/DOSE AEPB Inhale 1 puff into the lungs 2 times daily 3/11/21  Yes Sharon Helm MD   furosemide (LASIX) 20 MG tablet Take 1 tablet by mouth daily 3/11/21  Yes Sharon Helm MD   montelukast (SINGULAIR) 10 MG tablet Take 1 tablet by mouth nightly 21  Yes Sharon Helm MD   busPIRone (BUSPAR) 7.5 MG tablet Take 1 tablet by mouth 2 times daily 21  Yes Sharon Helm MD   levothyroxine (SYNTHROID) 125 MCG tablet Take 1 tablet by mouth every morning 21  Yes Sharon Helm MD   loratadine (CLARITIN) 10 MG tablet Take 1 tablet by mouth daily 21  Yes Sharon Helm MD   theophylline (THEODUR) 300 MG extended release tablet Take 1 tablet by mouth 2 times daily 20  Yes Sharon Helm MD   famotidine (PEPCID) 20 MG tablet Take 1 tablet by mouth 2 times daily 21   Ashley Pro DO   albuterol sulfate HFA (VENTOLIN HFA) 108 (90 Base) MCG/ACT inhaler Inhale 2 puffs into the lungs every 6 hours as needed for Shortness of Breath 3/11/21   Sharon Helm MD   albuterol (PROVENTIL) (2.5 MG/3ML) 0.083% nebulizer solution Take 3 mLs by nebulization 2 times daily as needed for Wheezing or Shortness of Breath 8/11/20   DELORES Cristina - CNP       Current medications:    Current Facility-Administered Medications   Medication Dose Route Frequency Provider Last Rate Last Admin    albuterol (PROVENTIL) nebulizer solution 2.5 mg  2.5 mg Nebulization BID PRN Jose Alfaro MD        albuterol sulfate  (90 Base) MCG/ACT inhaler 2 puff  2 puff Inhalation Q6H PRN Jose Alfaro MD        buPROPion [facility]) extended release tablet 300 mg  300 mg Oral QASEBASTIÁN Alfaro MD        busPIRone (BUSPAR) tablet 7.5 mg  7.5 mg Oral BID Jose Alfaro MD        famotidine (PEPCID) tablet 20 mg  20 mg Oral BID Jose Alfaro MD        levothyroxine (SYNTHROID) tablet 125 mcg  125 mcg Oral QAM Jose Alfaro MD        montelukast (SINGULAIR) tablet 10 mg  10 mg Oral Nightly Jose Alfaro MD        polyethylene glycol [facility]) packet 17 g  17 g Oral Daily PRN Jose Alfaro MD        theophylline [facility]) extended release capsule 300 mg  300 mg Oral BID Jose Alfaro MD        0.9 % sodium chloride infusion   Intravenous Continuous Jose Alfaro  mL/hr at 06/21/21 1226 New Bag at 06/21/21 1226    sodium chloride flush 0.9 % injection 5-40 mL  5-40 mL Intravenous 2 times per day Jose Alfaro MD        sodium chloride flush 0.9 % injection 5-40 mL  5-40 mL Intravenous PRN Jose Alfaro MD        0.9 % sodium chloride infusion  25 mL Intravenous PRN Jose Alfaro MD        acetaminophen (TYLENOL) tablet 650 mg  650 mg Oral Q4H PRN Jose Alfaro MD        [START ON 6/22/2021] enoxaparin (LOVENOX) injection 40 mg  40 mg Subcutaneous Daily Jose Alfaro MD        HYDROmorphone (DILAUDID) injection 0.25 mg  0.25 mg Intravenous Q3H PRN Jose Alfaro MD        Or    HYDROmorphone (DILAUDID) injection 0.5 mg  0.5 mg Intravenous Q3H PRN Jose Alfaro MD   0.5 mg at 06/21/21 1405    promethazine (PHENERGAN) injection 6.25 mg  6.25 mg Intramuscular Q6H PRN Antione Simon MD        budesonide-formoterol [facility]) 160-4.5 MCG/ACT inhaler 2 puff  2 puff Inhalation BID Antione Simon MD        gentamicin (GARAMYCIN) IVPB 80 mg  80 mg Intravenous Once Crissy Jalloh MD           Allergies:     Allergies   Allergen Reactions    Latex Itching    Iodine Anaphylaxis    Levofloxacin Anaphylaxis    Pcn [Penicillins] Anaphylaxis    Sulfa Antibiotics Anaphylaxis    Adhesive Tape     Flexeril [Cyclobenzaprine]     Morphine Itching    Zofran Itching    Caffeine Palpitations     Doesn't sleep    Lidocaine Nausea And Vomiting    Volumen [Barium Sulfate] Itching, Nausea And Vomiting and Rash       Problem List:    Patient Active Problem List   Diagnosis Code    Angina pectoris ([facility]) I20.9    LVH (left ventricular hypertrophy) due to hypertensive disease I11.9    COPD, moderate ([facility]) J44.9    TR (tricuspid regurgitation) I07.1    Venous (peripheral) insufficiency I87.2    Mitral insufficiency I34.0    Asthma, persistent [id number]    Hypothyroid E03.9    Hepatic steatosis K76.0    Obesity, Class III, BMI 40-49.9 (morbid obesity) ([facility]) E66.01    Impaired glucose tolerance R73.02    Snores R06.83    Anxiety F41.9    Panic attacks F41.0    History of claustrophobia Z86.59    History of colon polyps Z86.010    Gastroesophageal reflux disease without esophagitis K21.9    Major depressive disorder, recurrent episode, mild ([facility]) F33.0    Environmental allergies Z91.09    Pedal edema R60.0    Dizziness R42    Kidney stone N20.0       Past Medical History:        Diagnosis Date    Anxiety 1/28/2019    Arthritis     Asthma     CAD (coronary artery disease)     COPD (chronic obstructive pulmonary disease) ([facility] [address]     H/O cardiac catheterization 09/17/2019     normal coronaries    H/O cardiovascular stress test     H/O Doppler carotid ultrasound 08/07/2019    Mild (0-49%) disease of the Bilateral Internal carotid artery.  H/O Doppler lower venous ultrasound 08/07/2019    Significant reflux noted of the Right GSV. Right GSV too small currently for ablation procedure. Significant reflux noted in the Left GSV at mid thigh . Left GSV difficult to follow between mid thigh and mid calf due to multiple  splits/branches. GSV knee tributary is very superficial.    H/O echocardiogram 08/07/2019    EF 55-60%, Mild MR & TR.    Hepatic steatosis 6/19/2015    History of nuclear stress test 09/05/2019     Mild anterior wall Ischemia of a medium sized territory. Possibility of breast artifact causing the abnormality cannot be excluded.  Hyperlipidemia     Hypertension     Hypothyroid        Past Surgical History:        Procedure Laterality Date    CARPAL TUNNEL RELEASE Left     CHOLECYSTECTOMY      COLONOSCOPY  01/28/2019    Polyps x4, Internal grade 1 hemorrhoids    COLONOSCOPY N/A 1/28/2019    COLONOSCOPY POLYPECTOMY SNARE/COLD BIOPSY performed by Tiff Olivares MD at [address], COLON, DIAGNOSTIC  09/03/2019    biopsy f/u with pcp    HYSTERECTOMY      due to bleeding. complete    THYROIDECTOMY  2009    TONSILLECTOMY      TUBAL LIGATION      UPPER GASTROINTESTINAL ENDOSCOPY N/A 9/4/2019    EGD BIOPSY performed by Scot Slaughter MD at [address] [facility] [address] Left        Social History:    Social History     Tobacco Use    Smoking status: Never Smoker    Smokeless tobacco: Never Used   Substance Use Topics    Alcohol use:  No                                Counseling given: Not Answered      Vital Signs (Current):   Vitals:    06/21/21 0739 06/21/21 1030 06/21/21 1153 06/21/21 1215   BP: (!) 153/84 138/80 125/64 (!) 155/70   Pulse: 70 68 61 60   Resp: 17 17 17 16   Temp:   36.8 °C (98.3 °F) 37.1 °C (98.7 °F)   TempSrc:   Oral Oral   SpO2: 96% 96% 95% 93%   Weight:    244 lb 1.6 oz (110.7 kg)   Height:                                                  BP Readings from Last 3 Encounters:   06/21/21 (!) 155/70   06/11/21 (!) 144/97   06/11/21 116/68       NPO Status:                                                                                 BMI:   Wt Readings from Last 3 Encounters:   06/21/21 244 lb 1.6 oz (110.7 kg)   06/11/21 230 lb (104.3 kg)   06/11/21 244 lb 6.4 oz (110.9 kg)     Body mass index is 46.12 kg/m². CBC:   Lab Results   Component Value Date    WBC 10.9 06/21/2021    RBC 4.80 06/21/2021    HGB 14.7 06/21/2021    HCT 45.4 06/21/2021    MCV 94.6 06/21/2021    RDW 13.2 06/21/2021     06/21/2021       CMP:   Lab Results   Component Value Date     06/21/2021    K 3.8 06/21/2021     06/21/2021    CO2 24 06/21/2021    BUN 25 06/21/2021    CREATININE 0.9 06/21/2021    GFRAA >60 06/21/2021    AGRATIO 1.8 06/19/2018    LABGLOM >60 06/21/2021    GLUCOSE 119 06/21/2021    PROT 6.0 06/11/2021    PROT 6.4 07/30/2012    CALCIUM 10.4 06/21/2021    BILITOT 0.2 06/11/2021    ALKPHOS 88 06/11/2021    AST 17 06/11/2021    ALT 15 06/11/2021       POC Tests: No results for input(s): POCGLU, POCNA, POCK, POCCL, POCBUN, POCHEMO, POCHCT in the last 72 hours.     Coags:   Lab Results   Component Value Date    PROTIME 10.0 09/16/2019    PROTIME 10.8 10/25/2010    INR 0.88 09/16/2019    APTT 25.2 09/16/2019       HCG (If Applicable): No results found for: PREGTESTUR, PREGSERUM, HCG, HCGQUANT     ABGs: No results found for: PHART, PO2ART, LZT0BIB, BOL9FMV, BEART, Z2TGBPSV     Type & Screen (If Applicable):  No results found for: LABABO, LABRH    Drug/Infectious Status (If Applicable):  Lab Results   Component Value Date    HEPCAB NON REACTIVE 07/18/2019       COVID-19 Screening (If Applicable):   Lab Results   Component Value Date    COVID19 NOT DETECTED 06/21/2021           Anesthesia Evaluation  Patient summary reviewed  Airway: Mallampati: II  TM distance: >3 FB   Neck ROM: full  Mouth opening: > = 3 FB Dental:    (+) lower dentures and upper dentures Pulmonary:   (+) COPD:  asthma:                            Cardiovascular:    (+) hypertension:, angina:, CAD:,       ECG reviewed  Rhythm: regular  Rate: normal  Echocardiogram reviewed                  Neuro/Psych:   (+) psychiatric history:depression/anxiety             GI/Hepatic/Renal:   (+) GERD:,           Endo/Other:    (+) hypothyroidism::., .                 Abdominal:   (+) obese,     Abdomen: soft. Vascular:                                        Anesthesia Plan      general     ASA 3       Induction: intravenous. MIPS: Prophylactic antiemetics administered. Anesthetic plan and risks discussed with patient. Use of blood products discussed with patient whom consented to blood products. Plan discussed with attending.                   DELORES Winchester - REMBERTO   6/21/2021

## 2021-06-21 NOTE — BRIEF OP NOTE
Brief Postoperative Note      Patient: Hermes Castro  YOB: 1955  MRN: [id number]    Date of Procedure: 6/21/2021    Pre-Op Diagnosis: flank pain right    Post-Op Diagnosis: Same       Procedure(s):  CYSTOSCOPY RIGHT RETROGRADE PYLEOGRAMS RIGHT STONE MANIPULATION    Surgeon(s):  Radha Centeno MD    Assistant:  * No surgical staff found *    Anesthesia: General    Estimated Blood Loss (mL): Minimal    Complications: None    Specimens:   * No specimens in log *    Implants:  * No implants in log *      Drains: * No LDAs found *    Findings: 5mm right distal stone    Electronically signed by Radha Centeno MD on 6/21/2021 at 4:27 PM

## 2021-06-21 NOTE — ED PROVIDER NOTES
Triage Chief Complaint:   Abdominal Pain (L sided)    Mcgrath:  Today in the ED I had the pleasure of caring for Sloane Adler who is a 77 y.o. female that presents the emergency department complaining of right-sided lower abdominal pain, right-sided flank pain. Context is 10 days ago patient was seen here for renal colic. Diagnosed with urolithiasis. Had a 3 mm proximal ureteral stone. States she is ran out of her Norco.  The pain went away but he came back today. She has been having associated nausea no vomiting. No fever no chills. She denies any dysuria did not endorses mild hematuria however. No chest pain shortness of breath. ROS:  REVIEW OF SYSTEMS    At least 10 systems reviewed      All other review of systems are negative  See HPI and nursing notes for additional information       Past Medical History:   Diagnosis Date    Anxiety 1/28/2019    Arthritis     Asthma     CAD (coronary artery disease)     COPD (chronic obstructive pulmonary disease) ([facility] [address]     H/O cardiac catheterization 09/17/2019     normal coronaries    H/O cardiovascular stress test     H/O Doppler carotid ultrasound 08/07/2019    Mild (0-49%) disease of the Bilateral Internal carotid artery.  H/O Doppler lower venous ultrasound 08/07/2019    Significant reflux noted of the Right GSV. Right GSV too small currently for ablation procedure. Significant reflux noted in the Left GSV at mid thigh . Left GSV difficult to follow between mid thigh and mid calf due to multiple  splits/branches. GSV knee tributary is very superficial.    H/O echocardiogram 08/07/2019    EF 55-60%, Mild MR & TR.    Hepatic steatosis 6/19/2015    History of nuclear stress test 09/05/2019     Mild anterior wall Ischemia of a medium sized territory. Possibility of breast artifact causing the abnormality cannot be excluded.     Hyperlipidemia     Hypertension     Hypothyroid      Past Surgical History:   Procedure Laterality Date    CARPAL TUNNEL RELEASE Left     CHOLECYSTECTOMY      COLONOSCOPY  01/28/2019    Polyps x4, Internal grade 1 hemorrhoids    COLONOSCOPY N/A 1/28/2019    COLONOSCOPY POLYPECTOMY SNARE/COLD BIOPSY performed by Jennifer Gastelum MD at [address], COLON, DIAGNOSTIC  09/03/2019    biopsy f/u with pcp    HYSTERECTOMY      due to bleeding.   complete    THYROIDECTOMY  2009    TONSILLECTOMY      TUBAL LIGATION      UPPER GASTROINTESTINAL ENDOSCOPY N/A 9/4/2019    EGD BIOPSY performed by Ned Gunn MD at [address] Left      Family History   Problem Relation Age of Onset    Diabetes Mother     Hypertension Mother     Depression Mother         suicide    Diabetes Father     Depression Father     Diabetes Brother     Birth Defects Maternal Grandmother     Hypertension Maternal Grandmother     Diabetes Sister     Depression Brother     Diabetes Sister     Diabetes Brother     Other Brother         black mold    Diabetes Brother     Other Daughter         GI issues    Asthma Daughter     Seizures Daughter      Social History     Socioeconomic History    Marital status:      Spouse name: Not on file    Number of children: 2    Years of education: 15    Highest education level: GED or equivalent   Occupational History    Occupation: unemployed   Tobacco Use    Smoking status: Never Smoker    Smokeless tobacco: Never Used   Vaping Use    Vaping Use: Never used   Substance and Sexual Activity    Alcohol use: No    Drug use: No    Sexual activity: Not Currently     Partners: Male     Birth control/protection: Post-menopausal   Other Topics Concern    Not on file   Social History Narrative    Not on file     Social Determinants of Health     Financial Resource Strain:     Difficulty of Paying Living Expenses:    Food Insecurity:     Worried About Running Out of Food in the Last Year:     [address] in the Last Year:    Transportation Needs:     Lack of Transportation (Medical):      Lack of Transportation (Non-Medical):    Physical Activity:     Days of Exercise per Week:     Minutes of Exercise per Session:    Stress:     Feeling of Stress :    Social Connections:     Frequency of Communication with Friends and Family:     Frequency of Social Gatherings with Friends and Family:     Attends Christian Services:     Active Member of Clubs or Organizations:     Attends Club or Organization Meetings:     Marital Status:    Intimate Partner Violence:     Fear of Current or Ex-Partner:     Emotionally Abused:     Physically Abused:     Sexually Abused:      Current Facility-Administered Medications   Medication Dose Route Frequency Provider Last Rate Last Admin    ketorolac (TORADOL) injection 15 mg  15 mg Intravenous Once Cole Simpers, PA-C        0.9 % sodium chloride bolus  1,000 mL Intravenous Once Cole Simpers, PA-C        promethazine (PHENERGAN) injection 25 mg  25 mg Intramuscular Once Cole Simpers, PA-C         Current Outpatient Medications   Medication Sig Dispense Refill    famotidine (PEPCID) 20 MG tablet Take 1 tablet by mouth 2 times daily 14 tablet 0    polyethylene glycol (MIRALAX) 17 g packet Take 17 g by mouth daily as needed for Other (Constipation) 527 g 1    polyethylene glycol (MIRALAX) 17 g packet Take 17 g by mouth daily as needed for Other (Constipation) 30 each 0    fluticasone (FLONASE) 50 MCG/ACT nasal spray 1 spray by Each Nostril route daily      naproxen (NAPROSYN) 500 MG tablet Take 1 tablet by mouth 2 times daily as needed for Pain 20 tablet 0    buPROPion (WELLBUTRIN XL) 300 MG extended release tablet Take 1 tablet by mouth every morning 90 tablet 1    albuterol sulfate HFA (VENTOLIN HFA) 108 (90 Base) MCG/ACT inhaler Inhale 2 puffs into the lungs every 6 hours as needed for Shortness of Breath 1 Inhaler 5    fluticasone-salmeterol (WIXELA INHUB) 250-50 MCG/DOSE AEPB Inhale 1 puff into the lungs 2 times daily 1 Inhaler 5    furosemide (LASIX) 20 MG tablet Take 1 tablet by mouth daily 60 tablet 5    montelukast (SINGULAIR) 10 MG tablet Take 1 tablet by mouth nightly 90 tablet 3    busPIRone (BUSPAR) 7.5 MG tablet Take 1 tablet by mouth 2 times daily 180 tablet 3    levothyroxine (SYNTHROID) 125 MCG tablet Take 1 tablet by mouth every morning 90 tablet 3    loratadine (CLARITIN) 10 MG tablet Take 1 tablet by mouth daily 90 tablet 3    UNABLE TO FIND cane 1 Device 0    theophylline (THEODUR) 300 MG extended release tablet Take 1 tablet by mouth 2 times daily 60 tablet 5    albuterol (PROVENTIL) (2.5 MG/3ML) 0.083% nebulizer solution Take 3 mLs by nebulization 2 times daily as needed for Wheezing or Shortness of Breath 60 each 2     Allergies   Allergen Reactions    Latex Itching    Iodine Anaphylaxis    Levofloxacin Anaphylaxis    Pcn [Penicillins] Anaphylaxis    Sulfa Antibiotics Anaphylaxis    Flexeril [Cyclobenzaprine]     Morphine Itching    Zofran Itching    Caffeine Palpitations     Doesn't sleep    Lidocaine Nausea And Vomiting    Volumen [Barium Sulfate] Itching, Nausea And Vomiting and Rash       Nursing Notes Reviewed    Physical Exam:  ED Triage Vitals [06/21/21 0433]   Enc Vitals Group      BP (!) 124/94      Pulse 71      Resp 19      Temp 97.8 °F (36.6 °C)      Temp Source Oral      SpO2 95 %      Weight       Height       Head Circumference       Peak Flow       Pain Score       Pain Loc       Pain Edu? Excl. in [address]? General :Patient is awake alert oriented person place and time no acute distress nontoxic appearing  HEENT: Pupils are equally round and reactive to light extraocular motors are intact conjunctivae clear sclerae white there is no injection no icterus. Nose without any rhinorrhea or epistaxis. Oral mucosa is moist no exudate buccal mucosa shows no ulcerations.  Uvula is midline    Neck: Neck is supple full range of motion trachea midline thyroid nonpalpable  Cardiac: Heart regular rate rhythm no murmurs rubs clicks or gallops  Lungs: Lungs are clear to auscultation there is no wheezing rhonchi or rales. There is no use of accessory muscles no nasal flaring identified. Chest wall: There is no tenderness to palpation over the chest wall or over ribs  Abdomen: Abdomen is soft nontender nondistended. There is no firm or pulsatile masses no rebound rigidity or guarding negative Cunningham's negative McBurney, no peritoneal signs. No flank tenderness palpation. No flank tenderness to palpation. Suprapubic:  there is no tenderness to palpation over the external bladder   Musculoskeletal: 5 out of 5 strength in all 4 extremities full flexion extension abduction and adduction supination pronation of all extremities and all digits. No obvious muscle atrophy is noted. No focal muscle deficits are appreciated  Dermatology: Skin is warm and dry there is no obvious abscesses lacerations or lesions noted  Psych: Mentation is grossly normal cognition is grossly normal. Affect is appropriate  Neuro: Motor intact sensory intact cranial nerves II through XII are intact level of consciousness is normal cerebellar function is normal reflexes are grossly normal. No evidence of incontinence or loss of bowel or bladder no saddle anesthesia noted Lymphatic: There is no submandibular or cervical adenopathy appreciated. I have reviewed and interpreted all of the currently available lab results from this visit (if applicable):  No results found for this visit on 06/21/21. Radiographs (if obtained):  [] The following radiograph was interpreted by myself in the absence of a radiologist:   [] Radiologist's Report Reviewed:  CT ABDOMEN PELVIS WO CONTRAST Additional Contrast? None    (Results Pending)       EKG (if obtained):   Please See Note of attending physician for EKG interpretation.      Chart review shows recent radiograph(s):  CT ABDOMEN PELVIS WO CONTRAST Additional Contrast? None    Result Date: 6/12/2021  EXAMINATION: CT OF THE ABDOMEN AND PELVIS WITHOUT CONTRAST 6/12/2021 12:33 am TECHNIQUE: CT of the abdomen and pelvis was performed without the administration of intravenous contrast. Multiplanar reformatted images are provided for review. Dose modulation, iterative reconstruction, and/or weight based adjustment of the mA/kV was utilized to reduce the radiation dose to as low as reasonably achievable. COMPARISON: 02/16/2020 HISTORY: ORDERING SYSTEM PROVIDED HISTORY: abd pain TECHNOLOGIST PROVIDED HISTORY: Reason for exam:->abd pain Additional Contrast?->None Decision Support Exception - unselect if not a suspected or confirmed emergency medical condition->Emergency Medical Condition (MA) FINDINGS: THE STUDY IS LIMITED DUE TO LACK OF INTRAVENOUS CONTRAST AND DEGRADED BY THE PATIENT'S RESPIRATORY MOTION. Lower Chest: Lung bases clear. Coronary artery calcifications noted. Organs: Calcified granulomas in the spleen. Grossly unremarkable liver, pancreas, and adrenals on this unenhanced study. 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 Stones in the right kidney measuring up to 0.3 cm in diameter. Mild right-sided hydronephrosis and proximal hydroureter secondary to a 0.5 cm stone in the proximal right ureter. GI/Bowel: A few distal colonic diverticula. No acute diverticulitis. Bowel loops nonobstructed. No CT evidence of acute appendicitis. Pelvis: Status posthysterectomy. No adnexal mass. No radiopaque stone in the urinary bladder. Peritoneum/Retroperitoneum: No free air or free fluid. No adenopathy. No abdominal aortic aneurysm Bones/Soft Tissues: Multilevel thoracolumbar spondylosis. Mild levoscoliosis of the thoracolumbar junction. A 0.4 cm stone in the proximal right ureter causing mild upstream hydroureteronephrosis. Nonobstructing stones in the right kidney. Other chronic findings as above.      XR ANKLE RIGHT (MIN 3 VIEWS)    Result Date: 5/26/2021  EXAMINATION: THREE XRAY VIEWS OF THE RIGHT ANKLE 5/26/2021 3:38 pm COMPARISON: 05/15/2021 HISTORY: ORDERING SYSTEM PROVIDED HISTORY: Follow up FINDINGS: No evidence of acute fracture or dislocation. Normal alignment of the ankle mortise. No focal osseous lesion. No evidence of joint effusion. Soft tissue swelling laterally. Soft tissue swelling laterally No fracture or dislocation       MDM:     Interventions given this visit:   Orders Placed This Encounter   Medications    ketorolac (TORADOL) injection 15 mg    0.9 % sodium chloride bolus    promethazine (PHENERGAN) injection 25 mg       Patient presents to the emergency department with complaints of renal colic. A CT scan does reveal migration of her ureteral stone from proximal ureter distal ureter however with persistent pain. Intractable pain patient will require admission to the hospital.  Urinalysis is clean no evidence of infection UA urine culture pending. Renal function is preserved. Did speak to on-call urology team.  Who are agreeable with admission to the hospital.      On-call physician  Was consulted regarding patient. After thorough discussion regarding patient's history, physical exam.  laboratory values, radiographic evidence (if applicable  theymyself as well as my attending physician agreed Given the patient's presenting concerns, medical history and clinical findings, the patient will be admitted at this time to undergo further evaluation and disposition. . During patient's entire stay in the ED patient remained stable and comfortable. Analgesia is well-controlled. Patient will be admitted for all information regarding ongoing management and care of patient please see note of Admitting physician.       All EKG interpretations are performed by Attending physician           I independently managed patient today in the ED    BP (!) 124/94   Pulse 71   Temp 97.8 °F (36.6 °C) (Oral)   Resp 19 LMP  (LMP Unknown)   SpO2 95%       Clinical Impression:  1. Kidney stone          Disposition referral (if applicable):  No follow-up provider specified. Disposition medications (if applicable):  New Prescriptions    No medications on file         Comment: Please note this report has been produced using speech recognition software and may contain errors related to that system including errors in grammar, punctuation, and spelling, as well as words and phrases that may be inappropriate. If there are any questions or concerns please feel free to contact the dictating provider for clarification.       Ansley Phillips, [address] [place] [address]  06/28/21 5366

## 2021-06-21 NOTE — PROGRESS NOTES
11/18/20  Yes Cora Valentin MD   famotidine (PEPCID) 20 MG tablet Take 1 tablet by mouth 2 times daily 6/12/21   Jarrod Galvez DO   albuterol sulfate HFA (VENTOLIN HFA) 108 (90 Base) MCG/ACT inhaler Inhale 2 puffs into the lungs every 6 hours as needed for Shortness of Breath 3/11/21   Cora Valentin MD   albuterol (PROVENTIL) (2.5 MG/3ML) 0.083% nebulizer solution Take 3 mLs by nebulization 2 times daily as needed for Wheezing or Shortness of Breath 8/11/20   DELORES العراقي - CNP     Medications removed from list (include reason, ex. noncompliance, medication cost, therapy complete etc.):   Fluticasone spray no longer using  Naproxen no longer taking  Polyethylene duplicate    Comments:  Medication list reviewed with patient and insurance claims verified. Patient states she has taken no medications piror to ER visit but yesterday as scheduled.     To my knowledge the above medication history is accurate as of 6/21/2021 9:32 AM.   Reed Solomon CPhT   6/21/2021 9:32 AM

## 2021-06-21 NOTE — PROGRESS NOTES
1640- Pt arrived from OR awake and following commands. Monitor applied. Alarms on and verified. Bedside hand off provided by [employer] CRNA.   2777- Turned and repositioned. Bed pad changed. 1705- Ice chips given to pt. Tolerated well. Pt denies complaint of surgical pain/discomfort or nausea. 1710- PACU care complete. Report called to [facility].

## 2021-06-21 NOTE — ANESTHESIA POSTPROCEDURE EVALUATION
Department of Anesthesiology  Postprocedure Note    Patient: James Conrad  MRN: [id number]  YOB: 1955  Date of evaluation: 6/21/2021  Time:  4:44 PM     Procedure Summary     Date: 06/21/21 Room / Location: [place] [address] / [facility]    Anesthesia Start: 4137 Anesthesia Stop:     Procedure: CYSTOSCOPY RIGHT RETROGRADE PYLEOGRAMS RIGHT STONE MANIPULATION (Right Ureter) Diagnosis: (flank pain right)    Surgeons: Sha Ramires MD Responsible Provider: Marly Rose MD    Anesthesia Type: general ASA Status: 3          Anesthesia Type: No value filed. Radha Phase I:      Radha Phase II:      Last vitals: Reviewed and per EMR flowsheets.        Anesthesia Post Evaluation    Patient location during evaluation: PACU  Patient participation: complete - patient participated  Level of consciousness: awake and alert  Pain score: 0  Airway patency: patent  Nausea & Vomiting: no nausea and no vomiting  Complications: no  Cardiovascular status: hemodynamically stable and blood pressure returned to baseline  Respiratory status: acceptable, spontaneous ventilation, nonlabored ventilation and face mask  Hydration status: stable

## 2021-06-21 NOTE — ED NOTES
Patient in ED14 with complaints of RLQ ABD pain bilateral flank pain and dysuria.       Srinivasa Bhatti RN  06/21/21 0190

## 2021-06-21 NOTE — CONSULTS
[address], [address]   Consult Note  [facility] 1 2 3 4 5    Date: 2021   Patient: Shea Victor   : 1955   DOA: 2021   MRN: [id number]   ROOM#: [id number]     Reason for Consult: Kidney stone  Requesting Physician:  Paz Gerard, [facility]  Collaborating Urologist on Call at time of admission: Dr. Verna Archibald: Right flank pain    History Obtained From:  patient, electronic medical record    HISTORY OF PRESENT ILLNESS:                The patient is a 77 y.o. female with significant past medical history of CAD, COPD, and nephrolithiasis who presented with right flank pain and nausea/vomiting. Pt was originally diagnosed with a right ureteral stone 21, but failed outpatient therapy due to worsening severity of her pain. She complains of RLQ abdominal pain that radiates to her right flank with associated nausea, vomiting, urinary frequency, and mild dysuria. Denies fevers/chills, gross hematuria, or other sx. Denies implants or anticoagulation. Discussed recommendation for cystoscopy, right retrograde pyelogram, right ureteroscopy/stone manipulation, and right ureteral stent placement today with associated risks. Pt verbalizes understanding and is agreeable to proceed. ED Provider's HPI 21: In brief, Shea Victor presented for right flank and lower quadrant pain. Patient states she was diagnosed with a kidney stone on the right side on 2021. She states she had some mild improvement of symptoms, however last night her symptoms worsened again. She states her current pain is 8/10 and tolerable. The pain is sharp and constant. Nothing alleviated or exacerbated her symptoms. States she has not followed up with urology. She called to make an appointment, however did not have the money. She states she is trying to find a urologist that accepts her insurance. She does have some nausea and vomiting.   She denies any fevers, chills, or other complaints. Past Medical History:        Diagnosis Date    Anxiety 1/28/2019    Arthritis     Asthma     CAD (coronary artery disease)     COPD (chronic obstructive pulmonary disease) ([facility] [address]     H/O cardiac catheterization 09/17/2019     normal coronaries    H/O cardiovascular stress test     H/O Doppler carotid ultrasound 08/07/2019    Mild (0-49%) disease of the Bilateral Internal carotid artery.  H/O Doppler lower venous ultrasound 08/07/2019    Significant reflux noted of the Right GSV. Right GSV too small currently for ablation procedure. Significant reflux noted in the Left GSV at mid thigh . Left GSV difficult to follow between mid thigh and mid calf due to multiple  splits/branches. GSV knee tributary is very superficial.    H/O echocardiogram 08/07/2019    EF 55-60%, Mild MR & TR.    Hepatic steatosis 6/19/2015    History of nuclear stress test 09/05/2019     Mild anterior wall Ischemia of a medium sized territory. Possibility of breast artifact causing the abnormality cannot be excluded.  Hyperlipidemia     Hypertension     Hypothyroid      Past Surgical History:        Procedure Laterality Date    CARPAL TUNNEL RELEASE Left     CHOLECYSTECTOMY      COLONOSCOPY  01/28/2019    Polyps x4, Internal grade 1 hemorrhoids    COLONOSCOPY N/A 1/28/2019    COLONOSCOPY POLYPECTOMY SNARE/COLD BIOPSY performed by Donaldo Macias MD at [facility], COLON, DIAGNOSTIC  09/03/2019    biopsy f/u with pcp    HYSTERECTOMY      due to bleeding. complete    THYROIDECTOMY  2009    TONSILLECTOMY      TUBAL LIGATION      UPPER GASTROINTESTINAL ENDOSCOPY N/A 9/4/2019    EGD BIOPSY performed by Rogelio Aguilar MD at [address] [place]      Current Medications:   No current facility-administered medications for this encounter.     Allergies:  Latex, Iodine, Levofloxacin, Pcn [penicillins], Sulfa antibiotics, Flexeril [cyclobenzaprine], Morphine, Zofran, Caffeine, Lidocaine, and Volumen [barium sulfate]    Social History:   TOBACCO:   reports that she has never smoked. She has never used smokeless tobacco.  ETOH:   reports no history of alcohol use. DRUGS:   reports no history of drug use. Family History:       Problem Relation Age of Onset    Diabetes Mother     Hypertension Mother     Depression Mother         suicide    Diabetes Father     Depression Father     Diabetes Brother     Birth Defects Maternal Grandmother     Hypertension Maternal Grandmother     Diabetes Sister     Depression Brother     Diabetes Sister     Diabetes Brother     Other Brother         black mold    Diabetes Brother     Other Daughter         GI issues    Asthma Daughter     Seizures Daughter        REVIEW OF SYSTEMS:     CONSTITUTIONAL:  positive for  fatigue  RESPIRATORY:  negative  CARDIOVASCULAR:  negative  GASTROINTESTINAL:  positive for nausea, vomiting and abdominal pain  GENITOURINARY:  positive for frequency and dysuria    PHYSICAL EXAM:      VITALS:  BP (!) 153/84   Pulse 70   Temp 98.1 °F (36.7 °C) (Oral)   Resp 17   Ht 5' 1\" (1.549 m)   Wt 208 lb (94.3 kg)   LMP  (LMP Unknown)   SpO2 96%   BMI 39.30 kg/m²      TEMPERATURE:  Current - Temp: 98.1 °F (36.7 °C);  Max - Temp  Av °F (36.7 °C)  Min: 97.8 °F (36.6 °C)  Max: 98.1 °F (36.7 °C)  24HR BLOOD PRESSURE RANGE:  Systolic (85OFI), IKS:271 , Min:124 , PGH:774   ; Diastolic (00JXC), ESM:04, Min:83, Max:94      General appearance: alert, appears stated age, cooperative, fatigued, moderate distress and moderately obese  Head: Normocephalic, without obvious abnormality, atraumatic  Back: Right CVA tenderness  Abdomen: Soft non-distended TTP in RLQ    DATA:    WBC:    Lab Results   Component Value Date    WBC 10.9 2021     Hemoglobin/Hematocrit:    Lab Results   Component Value Date    HGB 14.7 2021    HCT 45.4 2021     BMP:    Lab Results   Component Value Date     2021    K 3.8 06/21/2021     06/21/2021    CO2 24 06/21/2021    BUN 25 06/21/2021    LABALBU 4.2 06/11/2021    CREATININE 0.9 06/21/2021    CALCIUM 10.4 06/21/2021    GFRAA >60 06/21/2021    LABGLOM >60 06/21/2021     PT/INR:    Lab Results   Component Value Date    PROTIME 10.0 09/16/2019    PROTIME 10.8 10/25/2010    INR 0.88 09/16/2019     Urine Culture: pending    Imaging:  CT ABDOMEN PELVIS WO CONTRAST Additional Contrast? None    Result Date: 6/21/2021  EXAMINATION: CT OF THE ABDOMEN AND PELVIS WITHOUT CONTRAST 6/21/2021 6:49 am TECHNIQUE: CT of the abdomen and pelvis was performed without the administration of intravenous contrast. Multiplanar reformatted images are provided for review. Dose modulation, iterative reconstruction, and/or weight based adjustment of the mA/kV was utilized to reduce the radiation dose to as low as reasonably achievable. COMPARISON: 06/12/2021 HISTORY: Acute right lower quadrant/right flank pain. FINDINGS: Lower Chest: Organs: Cholecystectomy. A 5-6 mm stone has migrated from the proximal ureter to the distal ureter. Mild hydroureteronephrosis is unchanged. Additional right renal stones measure up to 4 mm. Calcified splenic granulomas. Fatty infiltration of the pancreas. Remaining solid abdominal organs are unremarkable. GI/Bowel: No acute abnormality. Minimal colonic diverticulosis. Pelvis: Hysterectomy. Bladder is unremarkable. No lymphadenopathy or free fluid. Peritoneum/Retroperitoneum: No lymphadenopathy or ascites. Bones/Soft Tissues: Tiny fat containing umbilical hernia. Scattered degenerative changes throughout the visualized spine. Osteopenia. Compared with CT 9 days ago, a 5-6 mm stone has migrated from the right proximal ureter to the distal ureter while mild hydroureteronephrosis is unchanged.            CT ABDOMEN PELVIS WO CONTRAST Additional Contrast? None    Result Date: 6/12/2021  EXAMINATION: CT OF THE ABDOMEN AND PELVIS WITHOUT CONTRAST 6/12/2021 12:33 am TECHNIQUE: CT of the abdomen and pelvis was performed without the administration of intravenous contrast. Multiplanar reformatted images are provided for review. Dose modulation, iterative reconstruction, and/or weight based adjustment of the mA/kV was utilized to reduce the radiation dose to as low as reasonably achievable. COMPARISON: 02/16/2020 HISTORY: ORDERING SYSTEM PROVIDED HISTORY: abd pain TECHNOLOGIST PROVIDED HISTORY: Reason for exam:->abd pain Additional Contrast?->None Decision Support Exception - unselect if not a suspected or confirmed emergency medical condition->Emergency Medical Condition (MA) FINDINGS: THE STUDY IS LIMITED DUE TO LACK OF INTRAVENOUS CONTRAST AND DEGRADED BY THE PATIENT'S RESPIRATORY MOTION. Lower Chest: Lung bases clear. Coronary artery calcifications noted. Organs: Calcified granulomas in the spleen. Grossly unremarkable liver, pancreas, and adrenals on this unenhanced study. 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 Stones in the right kidney measuring up to 0.3 cm in diameter. Mild right-sided hydronephrosis and proximal hydroureter secondary to a 0.5 cm stone in the proximal right ureter. GI/Bowel: A few distal colonic diverticula. No acute diverticulitis. Bowel loops nonobstructed. No CT evidence of acute appendicitis. Pelvis: Status posthysterectomy. No adnexal mass. No radiopaque stone in the urinary bladder. Peritoneum/Retroperitoneum: No free air or free fluid. No adenopathy. No abdominal aortic aneurysm Bones/Soft Tissues: Multilevel thoracolumbar spondylosis. Mild levoscoliosis of the thoracolumbar junction. A 0.4 cm stone in the proximal right ureter causing mild upstream hydroureteronephrosis. Nonobstructing stones in the right kidney. Other chronic findings as above.      XR ANKLE RIGHT (MIN 3 VIEWS)    Result Date: 5/26/2021  EXAMINATION: THREE XRAY VIEWS OF THE RIGHT ANKLE 5/26/2021 3:38 pm COMPARISON: 05/15/2021

## 2021-06-21 NOTE — ED PROVIDER NOTES
6:08 AM EDT  Margaret Rae was checked out to me at bedside by [place], [place]. Please see his/her initial documentation for details of the patient's ED presentation, physical exam and completed studies. At time of patient signout, CT abdomen and pelvis pending. In brief, Margaret Rae presented for right flank and lower quadrant pain. Patient states she was diagnosed with a kidney stone on the right side on June 11, 2021. She states she had some mild improvement of symptoms, however last night her symptoms worsened again. She states her current pain is 8/10 and tolerable. The pain is sharp and constant. Nothing alleviated or exacerbated her symptoms. States she has not followed up with urology. She called to make an appointment, however did not have the money. She states she is trying to find a urologist that accepts her insurance. She does have some nausea and vomiting. She denies any fevers, chills, or other complaints. REVIEW OF SYSTEMS    Constitutional:  Denies fever, chills, weight loss or weakness   HENT:  Denies sore throat or ear pain   Cardiovascular:  Denies chest pain, palpitations   Respiratory:  Denies cough or shortness of breath    GI:  Denies abdominal pain, nausea, vomiting, or diarrhea  :  Denies any urinary symptoms or vaginal symptoms. Musculoskeletal:  Denies back pain,   Skin:  Denies rash  Neurologic:  Denies headache, focal weakness or sensory changes   Endocrine:  Denies polyuria or polydypsia   Lymphatic:  Denies swollen glands     All other review of systems are negative  See HPI and nursing notes for additional information       PHYSICAL EXAM    VITAL SIGNS: /68   Pulse 67   Temp 97.1 °F (36.2 °C) (Temporal)   Resp 16   Ht 5' 1\" (1.549 m)   Wt 244 lb 1.6 oz (110.7 kg)   LMP  (LMP Unknown)   SpO2 98%   BMI 46.12 kg/m²    Constitutional:  Well developed, Well nourished  HENT:  Normocephalic, Atraumatic, PERRL. EOMI.   Sclera clear.Conjunctiva normal, No discharge. Neck/Lymphatics: supple, no JVD, no swollen nodes  Cardiovascular:  Rate regular, Noraml Rhythm, no murmurs/rubs/gallops. No carotid bruits or murmurs heard in carotids. No JVD  Respiratory:  Nonlabored breathing. Normal breath sounds, No wheezing  Abdomen: Bowel sounds normal, Soft, No tenderness, no masses. Musculoskeletal:    There is no edema, asymmetry, or calf / thigh tenderness bilaterally. No cyanosis. No cool or pale-appearing limb. Distal cap refill and pulses intact bilateral upper and lower extremities  Bilateral upper and lower extremity ROM intact without pain or obvious deficit  Integument:  Warm, Dry  Neurologic: Alert & oriented , No focal deficits noted. Cranial nerves II through XII grossly intact. Normal gross motor coordination & motor strength bilateral upper and lower extremities  Sensation intact.   Psychiatric:  Affect normal, Mood normal.       I have reviewed and interpreted all of the currently available lab/imaging results from this visit (if applicable):  LABS:  Results for orders placed or performed during the hospital encounter of 06/21/21   COVID-19, Rapid    Specimen: Nasopharyngeal   Result Value Ref Range    Source UNKNOWN     SARS-CoV-2, NAAT NOT DETECTED NOT DETECTED   BMP   Result Value Ref Range    Sodium 138 135 - 145 MMOL/L    Potassium 3.8 3.5 - 5.1 MMOL/L    Chloride 102 99 - 110 mMol/L    CO2 24 21 - 32 MMOL/L    Anion Gap 12 4 - 16    BUN 25 (H) 6 - 23 MG/DL    CREATININE 0.9 0.6 - 1.1 MG/DL    Glucose 119 (H) 70 - 99 MG/DL    Calcium 10.4 8.3 - 10.6 MG/DL    GFR Non-African American >60 >60 mL/min/1.73m2    GFR African American >60 >60 mL/min/1.73m2   CBC auto diff   Result Value Ref Range    WBC 10.9 (H) 4.0 - 10.5 K/CU MM    RBC 4.80 4.2 - 5.4 M/CU MM    Hemoglobin 14.7 12.5 - 16.0 GM/DL    Hematocrit 45.4 37 - 47 %    MCV 94.6 78 - 100 FL    MCH 30.6 27 - 31 PG    MCHC 32.4 32.0 - 36.0 %    RDW 13.2 11.7 - 14.9 % Platelets 497 290 - 180 K/CU MM    MPV 9.6 7.5 - 11.1 FL    Differential Type AUTOMATED DIFFERENTIAL     Segs Relative 60.9 36 - 66 %    Lymphocytes % 27.8 24 - 44 %    Monocytes % 7.7 (H) 0 - 4 %    Eosinophils % 2.4 0 - 3 %    Basophils % 0.8 0 - 1 %    Segs Absolute 6.6 K/CU MM    Lymphocytes Absolute 3.0 K/CU MM    Monocytes Absolute 0.8 K/CU MM    Eosinophils Absolute 0.3 K/CU MM    Basophils Absolute 0.1 K/CU MM    Nucleated RBC % 0.0 %    Total Nucleated RBC 0.0 K/CU MM    Total Immature Neutrophil 0.04 K/CU MM    Immature Neutrophil % 0.4 0 - 0.43 %   Urinalysis   Result Value Ref Range    Color, UA YELLOW (A) YELLOW    Clarity, UA CLEAR CLEAR    Glucose, Urine NEGATIVE NEGATIVE MG/DL    Bilirubin Urine NEGATIVE NEGATIVE MG/DL    Ketones, Urine NEGATIVE NEGATIVE MG/DL    Specific Gravity, UA 1.015 1.001 - 1.035    Blood, Urine NEGATIVE NEGATIVE    pH, Urine 6.0 5.0 - 8.0    Protein, UA NEGATIVE NEGATIVE MG/DL    Urobilinogen, Urine NORMAL 0.2 - 1.0 MG/DL    Nitrite Urine, Quantitative NEGATIVE NEGATIVE    Leukocyte Esterase, Urine NEGATIVE NEGATIVE    RBC, UA NONE SEEN 0 - 6 /HPF    WBC, UA NONE SEEN 0 - 5 /HPF    Bacteria, UA OCCASIONAL (A) NEGATIVE /HPF    Trichomonas, UA NONE SEEN NONE SEEN /HPF   Theophylline   Result Value Ref Range    Theophylline Lvl 5.9 (L) 10 - 20 ug/mL    DOSE AMOUNT DOSE AMT. GIVEN - UNKNOWN     DOSE TIME DOSE TIME GIVEN - UNKNOWN          IMAGING:    FL LESS THAN 1 HOUR   Final Result      CT ABDOMEN PELVIS WO CONTRAST Additional Contrast? None   Final Result   Compared with CT 9 days ago, a 5-6 mm stone has migrated from the right   proximal ureter to the distal ureter while mild hydroureteronephrosis is   unchanged. ED COURSE & MEDICAL DECISION MAKING        71-year-old female with known right ureteral stone presents emergency department with worsening right lower extremity pain with nausea.   Patient has been unable to follow-up with urology due to financial reasons. She states she had been feeling better, however symptoms worsened over the past few days. C showed mild leukocytosis with a WBC of 10.9. CMP did not show any severe electrolyte derangement. Urinalysis showed occasional bacteria, otherwise no significant abnormalities. CT of the abdomen and pelvis was obtained and showed a 5 to 6 mm stone that has migrated from the right proximal ureter to the distal ureter while mild hydroureteronephrosis is unchanged.  was consulted via telephone. He states to make the patient n.p.o. and admit to the hospitalist and he will consult. Hospitalist consulted for admission. Patient was admitted in stable condition. Vital signs and nursing notes reviewed during ED course. At bedside I reviewed any applicable labs/imaging, the treatment plan, and time was allotted for questions. Final Impression:  1.  Kidney stone        (Please note that portions of this note may have been completed with a voice recognition program. Efforts were made to edit the dictations but occasionally words are mis-transcribed.)    DELORES Blake CNP, APRN - CNP  06/21/21 [id number]

## 2021-06-21 NOTE — H&P
Pt A&O x4. VSS on 2 L NC. PRN neb given x1 overnight with improvement. Tele SR w/ BBB. Using PCA dilaudid and scheduled methadone for pain. Still 7/10 in abd- pressed 16 times in 8 hours. Clear liquid diet. On TF through j-tube: 60 ml/hr with q4h BG- refused 2 am BG. Pt also refusing q2h T/R overnight to promote sleep. PICC TKO w/ abx. PIV SL. Gave miralax for constipation-  Large loose stool x1 overnight. Adequate UOP- suprapubic with pink around stoma. Drains with milky tan output. Plan for antibiotics and pain control.   Sharron Bowen. She was diagnosed with COPD, moderate. A PET CT scan was ordered to evaluate the lung nodule that she has, however per notes there was a question as to whether or not she will do it. She reports that she had a heart attack in the past, however, she had 2 cardiac caths both of which I reviewed in the system-they did not show any significant coronary disease.     Pharmacy:  [facility] on [address]    Surrogate decision maker per patient:  Chnio Ice - Daughter    CODE STATUS Per patient:  Full code    Living will:  States she does not have a living will      Medication history as below:   [address] Medication Instructions IOF:[id number]    Printed on:06/21/21 5576   Medication Information                      albuterol (PROVENTIL) (2.5 MG/3ML) 0.083% nebulizer solution  Take 3 mLs by nebulization 2 times daily as needed for Wheezing or Shortness of Breath    Uses this as a last resort             albuterol sulfate HFA (VENTOLIN HFA) 108 (90 Base) MCG/ACT inhaler  Inhale 2 puffs into the lungs every 6 hours as needed for Shortness of Breath    Y             buPROPion (WELLBUTRIN XL) 300 MG extended release tablet  Take 1 tablet by mouth every morning    Y             busPIRone (BUSPAR) 7.5 MG tablet  Take 1 tablet by mouth 2 times daily    Y             famotidine (PEPCID) 20 MG tablet  Take 1 tablet by mouth 2 times daily    Y - takes prn             fluticasone-salmeterol (WIXELA INHUB) 250-50 MCG/DOSE AEPB  Inhale 1 puff into the lungs 2 times daily    Y             furosemide (LASIX) 20 MG tablet  Take 1 tablet by mouth daily    Y - but denies CHF - takes it for water weight gain, it starts in her legs and goes way up    Next month heart doctor will open up the veins she said             levothyroxine (SYNTHROID) 125 MCG tablet  Take 1 tablet by mouth every morning    Y             loratadine (CLARITIN) 10 MG tablet  Take 1 tablet by mouth daily    Y             montelukast (SINGULAIR) 10 MG tablet  Take 1 tablet by mouth nightly    Y             polyethylene glycol (MIRALAX) 17 g packet  Take 17 g by mouth daily as needed for Other (Constipation)    It does not work             theophylline (THEODUR) 300 MG extended release tablet  Take 1 tablet by mouth 2 times daily    Taking                    10-14 point ROS reviewed negative, unless as noted above  -No vomiting or bleeding noted    Objective: Intake/Output Summary (Last 24 hours) at 6/21/2021 1407  Last data filed at 6/21/2021 1241  Gross per 24 hour   Intake --   Output 200 ml   Net -200 ml      Vitals:   Vitals:    06/21/21 1215   BP: (!) 155/70   Pulse: 60   Resp: 16   Temp: 98.7 °F (37.1 °C)   SpO2: 93%     Physical Exam:    GEN Awake female, sitting upright in bed, well-developed and well-nourished. She is not diaphoretic. EYES extraocular muscles are intact. HENT Mucous membranes are moist.   RESP Clear to auscultation, no wheezes, rales or rhonchi. Symmetric chest movement while on room air. CARDIO/VASC S1/S2 auscultated. Regular rate without appreciable murmurs, rubs, or gallops. GI Abdomen is soft. SKIN Normal coloration, warm, dry. NEURO Cranial nerves appear grossly intact, normal speech, no lateralizing weakness. No pronator drift. 5 out of 5 all extremities. PSYCH Awake, alert, oriented x 4. Affect appropriate. She is cooperative    Past Medical History: PMHx:   Past Medical History:   Diagnosis Date    Anxiety 1/28/2019    Arthritis     Asthma     CAD (coronary artery disease)     COPD (chronic obstructive pulmonary disease) ([facility] [address]     H/O cardiac catheterization 09/17/2019     normal coronaries    H/O cardiovascular stress test     H/O Doppler carotid ultrasound 08/07/2019    Mild (0-49%) disease of the Bilateral Internal carotid artery.  H/O Doppler lower venous ultrasound 08/07/2019    Significant reflux noted of the Right GSV. Right GSV too small currently for ablation procedure.  Significant reflux noted in the Left GSV at mid thigh . Left GSV difficult to follow between mid thigh and mid calf due to multiple  splits/branches. GSV knee tributary is very superficial.    H/O echocardiogram 08/07/2019    EF 55-60%, Mild MR & TR.    Hepatic steatosis 6/19/2015    History of nuclear stress test 09/05/2019     Mild anterior wall Ischemia of a medium sized territory. Possibility of breast artifact causing the abnormality cannot be excluded.  Hyperlipidemia     Hypertension     Hypothyroid      PSHx:  has a past surgical history that includes Hysterectomy; Carpal tunnel release (Left); Tonsillectomy; Wrist surgery (Left); Thyroidectomy (2009); Cholecystectomy; Tubal ligation; Colonoscopy (01/28/2019); Colonoscopy (N/A, 1/28/2019); Endoscopy, colon, diagnostic (09/03/2019); and Upper gastrointestinal endoscopy (N/A, 9/4/2019). Allergies: Allergies   Allergen Reactions    Latex Itching    Iodine Anaphylaxis    Levofloxacin Anaphylaxis    Pcn [Penicillins] Anaphylaxis    Sulfa Antibiotics Anaphylaxis    Flexeril [Cyclobenzaprine]     Morphine Itching    Zofran Itching    Caffeine Palpitations     Doesn't sleep    Lidocaine Nausea And Vomiting    Volumen [Barium Sulfate] Itching, Nausea And Vomiting and Rash     Home Medications:   Prior to Admission medications    Medication Sig Start Date End Date Taking?  Authorizing Provider   polyethylene glycol (MIRALAX) 17 g packet Take 17 g by mouth daily as needed for Other (Constipation) 6/12/21 7/12/21 Yes Julianna Bernardo DO   buPROPion (WELLBUTRIN XL) 300 MG extended release tablet Take 1 tablet by mouth every morning 3/11/21  Yes Ed Reynolds MD   fluticasone-salmeterol (WIXELA INHUB) 250-50 MCG/DOSE AEPB Inhale 1 puff into the lungs 2 times daily 3/11/21  Yes Ed Reynolds MD   furosemide (LASIX) 20 MG tablet Take 1 tablet by mouth daily 3/11/21  Yes Ed Reynolds MD   montelukast (SINGULAIR) 10 MG tablet Take 1 tablet by mouth nightly 2/23/21 Yes Valeriy Wright MD   busPIRone (BUSPAR) 7.5 MG tablet Take 1 tablet by mouth 2 times daily 2/23/21  Yes Valeriy Wright MD   levothyroxine (SYNTHROID) 125 MCG tablet Take 1 tablet by mouth every morning 2/23/21  Yes Valeriy Wright MD   loratadine (CLARITIN) 10 MG tablet Take 1 tablet by mouth daily 2/23/21  Yes Valeriy Wright MD   theophylline (THEODUR) 300 MG extended release tablet Take 1 tablet by mouth 2 times daily 11/18/20  Yes Valeriy Wright MD   famotidine (PEPCID) 20 MG tablet Take 1 tablet by mouth 2 times daily 6/12/21   Harry Valdez DO   albuterol sulfate HFA (VENTOLIN HFA) 108 (90 Base) MCG/ACT inhaler Inhale 2 puffs into the lungs every 6 hours as needed for Shortness of Breath 3/11/21   Valeriy Wright MD   albuterol (PROVENTIL) (2.5 MG/3ML) 0.083% nebulizer solution Take 3 mLs by nebulization 2 times daily as needed for Wheezing or Shortness of Breath 8/11/20   Ya Teague APRN - CNP     FHx: family history includes Asthma in her daughter; Birth Defects in her maternal grandmother; Depression in her brother, father, and mother; Diabetes in her brother, brother, brother, father, mother, sister, and sister; Hypertension in her maternal grandmother and mother; Other in her brother and daughter; Seizures in her daughter.   SHx:   Social History     Socioeconomic History    Marital status:      Spouse name: Not on file    Number of children: 2    Years of education: 15    Highest education level: GED or equivalent   Occupational History    Occupation: unemployed   Tobacco Use    Smoking status: Never Smoker    Smokeless tobacco: Never Used   Vaping Use    Vaping Use: Never used   Substance and Sexual Activity    Alcohol use: No    Drug use: No    Sexual activity: Not Currently     Partners: Male     Birth control/protection: Post-menopausal   Other Topics Concern    Not on file   Social History Narrative    Not on file     Social Determinants of Health     Financial Resource Strain:     Difficulty of Paying Living Expenses:    Food Insecurity:     Worried About Running Out of Food in the Last Year:     [address] in the Last Year:    Transportation Needs:     Lack of Transportation (Medical):      Lack of Transportation (Non-Medical):    Physical Activity:     Days of Exercise per Week:     Minutes of Exercise per Session:    Stress:     Feeling of Stress :    Social Connections:     Frequency of Communication with Friends and Family:     Frequency of Social Gatherings with Friends and Family:     Attends Taoism Services:     Active Member of Clubs or Organizations:     Attends Club or Organization Meetings:     Marital Status:    Intimate Partner Violence:     Fear of Current or Ex-Partner:     Emotionally Abused:     Physically Abused:     Sexually Abused:        Medications:   Medications:    buPROPion  300 mg Oral QAM    busPIRone  7.5 mg Oral BID    famotidine  20 mg Oral BID    levothyroxine  125 mcg Oral QAM    montelukast  10 mg Oral Nightly    theophylline  300 mg Oral BID    sodium chloride flush  5-40 mL Intravenous 2 times per day    [START ON 6/22/2021] enoxaparin  40 mg Subcutaneous Daily    budesonide-formoterol  2 puff Inhalation BID      Infusions:    sodium chloride 150 mL/hr at 06/21/21 1226    sodium chloride       PRN Meds: albuterol, 2.5 mg, BID PRN  albuterol sulfate HFA, 2 puff, Q6H PRN  polyethylene glycol, 17 g, Daily PRN  sodium chloride flush, 5-40 mL, PRN  sodium chloride, 25 mL, PRN  acetaminophen, 650 mg, Q4H PRN  HYDROmorphone, 0.25 mg, Q3H PRN   Or  HYDROmorphone, 0.5 mg, Q3H PRN  promethazine, 6.25 mg, Q6H PRN      Old chart reviewed: She saw cardiologist Dr. Ying Taylor on June 11.  -She had chest pain most likely acid reflux per cardiologist.  -Palpitations: A 24-hour Holter monitor was ordered, and it was suggested that she reduce her dose of theophylline, test showed normal sinus rhythm-  -Leg swelling: She had severe venous reflux disease and she was referred to Dr. Vitaly Gonzalez for ablation    Electronically signed by Ayleen Kenyon MD on 6/21/2021 at 2:07 PM

## 2021-06-21 NOTE — ED NOTES
Cherie BRADY ANSWERED     [facility].  [facility] Born  06/21/21 2802

## 2021-06-22 NOTE — CARE COORDINATION
Reviewed chart and spoke with pt about discharge needs/plans. Pt lives alone, PTA she was totally independent with ADL's and uses [employer] for transportation to doctor appointments. Placed transportation info in AVS. Pt has a PCP and insurance that covers medications. Discussed HC but does not want any strangers in her home. Plan is home with no needs identified.

## 2021-06-22 NOTE — H&P
H&P - full note to follow      Right ureteral calculus with right flank pain  -to OR today    COPD/Asthma -not in exacerbation  -not a smoker, but is exposed 2nd hand  -not on oxygen  -albuterol prn + Wixela + Singulair + theophylline    Edema - on Lasix once a day  -denies CHF  -patient reports hx of MI - but cath in 2014 by Dr. Neftaly Michelle showed \"no critical coronary artery disease\" and cath in 2019 by Dr. Amara Lubin showed \"normal coronaries\"    Mood disorder  -bupropion and buspirone    Morbid obesity  Body mass index is 46.12 kg/m².

## 2021-06-22 NOTE — OP NOTE
[address], [address] [facility]                                OPERATIVE REPORT    PATIENT NAME: Bryan Campo                   :        1955  MED REC NO:   [id number]                          ROOM:       4130  ACCOUNT NO:   [de-identified]                           ADMIT DATE: 2021  PROVIDER:     Mercy Cardoza MD    DATE OF PROCEDURE:  2021    PREOPERATIVE DIAGNOSIS:  Right ureteral calculus. POSTOPERATIVE DIAGNOSIS:  Right ureteral calculus. PROCEDURE:  Cystoscopy, right retrograde pyelogram, right uteroscopy,  laser lithotripsy, stone basket extraction, placement of right double-J  ureteral stent. SURGEON:  Mercy Cardoza MD    ANESTHESIA:  General anesthesia. ESTIMATED BLOOD LOSS:  None. COMPLICATIONS:  None. FINDINGS:  5 mm right distal ureteral stone. BRIEF HISTORY:  This is a 60-year-old female who presented to the  hospital for the second time for right flank pain, diagnosed with 5 mm  right ureteral stone, admitted for pain control and above said  procedures. DESCRIPTION OF PROCEDURE:  The patient was identified in the holding  area, taken to the procedure room, and placed in a dorsal lithotomy  position. The patient's genital region was prepped and draped in  sterile fashion. A 21-Cambodian cystoscope sheath was introduced per  urethra under direct visualization. Normal-appearing bladder. No signs  of infection. Right ureteral orifice was identified. RETROGRADE PYELOGRAM PORTION OF THE PROCEDURE:  An 8-Cambodian cone-tip  catheter was inserted into the right ureteral orifice. Injection of  retrograde contrast material demonstrated a filling defect in the right  distal ureter consistent with a 5 mm stone on CAT scan with hydroureter  above this. At this point, we elected to proceed with semi-rigid ureteroscopy. A  Sensor wire was passed into the right ureter.   Semi-rigid ureteroscope  under direct visualization was passed up to the stone. There was a  narrowing right at the UVJ. I had to use a UroMax balloon dilator. This was passed over the wire at the area of the narrowing, dilated for  5 minutes at a pressure of 10 atmospheres. Once this was done, this was  removed. Then, we elected to place semi-rigid ureteroscope. This was  then passed back up in the right distal ureter under direct  visualization to the stone. Using 365-micron holmium laser fiber,  setting at 0.8 joules, rate of 15, stone was fragmented into multiple  small pieces. A ZeroTip stone basket was used to retrieve all the  pieces. At this point, a 4.5 [place] variable length double-J ureteral  stent was passed over guidewire in standard fashion. Position was  confirmed using fluoroscopy and cystoscopy, deemed good. The patient's  bladder was emptied, taken to the recovery room, tolerating the  procedure well. DISCHARGE SUMMARY:  The patient was discharged from same-day surgery in  good condition. We will see her back in my office in 1-2 weeks for  cysto and stent removal with my advanced practice provider.         Rashi Funez MD    D: 06/21/2021 16:34:18       T: 06/21/2021 16:38:25     [email or web address]  Job#: [id number]     Doc#: [id number]    CC:

## 2021-06-22 NOTE — PROGRESS NOTES
[facility] [address]   Progress Note  [facility] 0 1 2      Date: 2021   Patient: Henry Galo   : 1955   DOA: 2021   MRN: [id number]   ROOM#: [id number]     Admit Date: 2021     Collaborating Urologist on Call at time of admission: Dr. Kiera Magallon  CC: Right flank pain  Reason for Consult: Kidney stone  POD #1: Cystoscopy, right RGPG, right ureteroscopy/stone manipulation, and right ureteral stent placement    Subjective:     Pain: none, no nausea and no vomiting,   Bowel Movement/Flatus:   Yes  Voiding:  easily,     Pt resting in bed, states she is feeling much better and is ready to go home.     Objective:    Vitals:    /66   Pulse 63   Temp 98 °F (36.7 °C) (Oral)   Resp 16   Ht 5' 1\" (1.549 m)   Wt 244 lb 1.6 oz (110.7 kg)   LMP  (LMP Unknown)   SpO2 92%   BMI 46.12 kg/m²    Temp  Av.7 °F (36.5 °C)  Min: 97 °F (36.1 °C)  Max: 98.7 °F (37.1 °C)       Intake/Output Summary (Last 24 hours) at 2021 0908  Last data filed at 2021 1850  Gross per 24 hour   Intake 560 ml   Output 600 ml   Net -40 ml       Physical Exam:   General appearance: alert, appears stated age, cooperative, fatigued, moderate distress and moderately obese  Head: Normocephalic, without obvious abnormality, atraumatic  Back: No CVA tenderness  Abdomen: Soft non-distended non-tender    Labs:   WBC:    Lab Results   Component Value Date    WBC 13.0 2021      Hemoglobin/Hematocrit:    Lab Results   Component Value Date    HGB 15.4 2021    HCT 46.6 2021      BMP:   Lab Results   Component Value Date     2021    K 3.8 2021     2021    CO2 24 2021    BUN 25 2021    LABALBU 4.2 2021    CREATININE 0.9 2021    CALCIUM 10.4 2021    GFRAA >60 2021    LABGLOM >60 2021      PT/INR:    Lab Results   Component Value Date    PROTIME 10.0 2019    PROTIME 10.8 10/25/2010    INR 0.88 2019      PTT: Lab Results   Component Value Date    APTT 25.2 09/16/2019     Imaging:  CT ABDOMEN PELVIS WO CONTRAST Additional Contrast? None    Result Date: 6/21/2021  EXAMINATION: CT OF THE ABDOMEN AND PELVIS WITHOUT CONTRAST 6/21/2021 6:49 am TECHNIQUE: CT of the abdomen and pelvis was performed without the administration of intravenous contrast. Multiplanar reformatted images are provided for review. Dose modulation, iterative reconstruction, and/or weight based adjustment of the mA/kV was utilized to reduce the radiation dose to as low as reasonably achievable. COMPARISON: 06/12/2021 HISTORY: Acute right lower quadrant/right flank pain. FINDINGS: Lower Chest: Organs: Cholecystectomy. A 5-6 mm stone has migrated from the proximal ureter to the distal ureter. Mild hydroureteronephrosis is unchanged. Additional right renal stones measure up to 4 mm. Calcified splenic granulomas. Fatty infiltration of the pancreas. Remaining solid abdominal organs are unremarkable. GI/Bowel: No acute abnormality. Minimal colonic diverticulosis. Pelvis: Hysterectomy. Bladder is unremarkable. No lymphadenopathy or free fluid. Peritoneum/Retroperitoneum: No lymphadenopathy or ascites. Bones/Soft Tissues: Tiny fat containing umbilical hernia. Scattered degenerative changes throughout the visualized spine. Osteopenia. Compared with CT 9 days ago, a 5-6 mm stone has migrated from the right proximal ureter to the distal ureter while mild hydroureteronephrosis is unchanged. CT ABDOMEN PELVIS WO CONTRAST Additional Contrast? None    Result Date: 6/12/2021  EXAMINATION: CT OF THE ABDOMEN AND PELVIS WITHOUT CONTRAST 6/12/2021 12:33 am TECHNIQUE: CT of the abdomen and pelvis was performed without the administration of intravenous contrast. Multiplanar reformatted images are provided for review.  Dose modulation, iterative reconstruction, and/or weight based adjustment of the mA/kV was utilized to reduce the radiation dose to as low as reasonably achievable. COMPARISON: 02/16/2020 HISTORY: ORDERING SYSTEM PROVIDED HISTORY: abd pain TECHNOLOGIST PROVIDED HISTORY: Reason for exam:->abd pain Additional Contrast?->None Decision Support Exception - unselect if not a suspected or confirmed emergency medical condition->Emergency Medical Condition (MA) FINDINGS: THE STUDY IS LIMITED DUE TO LACK OF INTRAVENOUS CONTRAST AND DEGRADED BY THE PATIENT'S RESPIRATORY MOTION. Lower Chest: Lung bases clear. Coronary artery calcifications noted. Organs: Calcified granulomas in the spleen. Grossly unremarkable liver, pancreas, and adrenals on this unenhanced study. 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 Stones in the right kidney measuring up to 0.3 cm in diameter. Mild right-sided hydronephrosis and proximal hydroureter secondary to a 0.5 cm stone in the proximal right ureter. GI/Bowel: A few distal colonic diverticula. No acute diverticulitis. Bowel loops nonobstructed. No CT evidence of acute appendicitis. Pelvis: Status posthysterectomy. No adnexal mass. No radiopaque stone in the urinary bladder. Peritoneum/Retroperitoneum: No free air or free fluid. No adenopathy. No abdominal aortic aneurysm Bones/Soft Tissues: Multilevel thoracolumbar spondylosis. Mild levoscoliosis of the thoracolumbar junction. A 0.4 cm stone in the proximal right ureter causing mild upstream hydroureteronephrosis. Nonobstructing stones in the right kidney. Other chronic findings as above. XR ANKLE RIGHT (MIN 3 VIEWS)    Result Date: 5/26/2021  EXAMINATION: THREE XRAY VIEWS OF THE RIGHT ANKLE 5/26/2021 3:38 pm COMPARISON: 05/15/2021 HISTORY: ORDERING SYSTEM PROVIDED HISTORY: Follow up FINDINGS: No evidence of acute fracture or dislocation. Normal alignment of the ankle mortise. No focal osseous lesion. No evidence of joint effusion. Soft tissue swelling laterally.      Soft tissue swelling laterally No fracture or dislocation     FL LESS THAN 1 HOUR    Result Date: 6/21/2021  Radiology exam is complete. No Radiologist dictation. Please follow up with ordering provider. Assessment & Plan:      Meena Garay is a 77y.o. year old female admitted 6/21/2021 for ureteral stone. 1) Right Ureteral Calculus              POD #1: Cystoscopy, right RGPG, right ureteroscopy/stone manipulation, and right ureteral stent placement   CT a/p 6/21/21: Compared with CT 9 days ago, a 5-6 mm stone has migrated from the right proximal ureter to the distal ureter while mild hydroureteronephrosis is unchanged. Cr 0.9              Urine cx 6/12/21 negative   Pt to follow up in our office in 1-2 weeks for cystoscopy, right ureteral stent removal.    Pt stable from a  standpoint. Will sign off, please call with any questions. Pt to follow up in our office in 1-2 weeks for cystoscopy, right ureteral stent removal.    Patient seen and examined, chart reviewed.      Electronically signed by Claritza Almonte PA-C on 6/22/2021 at 9:08 AM

## 2021-06-22 NOTE — DISCHARGE SUMMARY
Discharge Summary           Name:  Natalya Barrett /Age/Sex: 1955  (34 y.o. female)   MRN & CSN:  [id number] & [id number] Admission Date/Time: 2021  4:25 AM   Attending:  Cindy Patel MD Discharging Physician: Cindy Patel MD     Hospital Course:   Natalya Barrett is a 77 y.o.  female  who presents with right flank and abdominal pain, she has history of kidney stone, she had sever pain, patient had Ct showed a 5-6 mm stone has migrated from the right proximal ureter to the distal ureter while mild hydroureteronephrosis is unchanged. Patient was evaluated by urology and she had cystoscopy, right retrograde pyelogram, right uteroscopy, laser lithotripsy, stone basket extraction, placement of right double-J ureteral stent. Pt to follow up with urology in 1-2 weeks for cystoscopy, right ureteral stent removal.          The patient expressed appropriate understanding of and agreement with the discharge recommendations, medications, and plan.      Consults this admission:  IP CONSULT TO Lis Nick 141 TO HOSPITALIST    Discharge Instruction:   Follow up appointments: urology in 2 weeks  Primary care physician:  within 2 weeks    Diet:  regular diet   Activity: activity as tolerated  Disposition: Discharged to:   [x]Home, []C, []SNF, []Acute Rehab, []Hospice  Condition on discharge: Stable    Discharge Medications:      Elsy Bah   Creole Medication Instructions KARSON:[id number]    Printed on:21 1142   Medication Information                      albuterol (PROVENTIL) (2.5 MG/3ML) 0.083% nebulizer solution  Take 3 mLs by nebulization 2 times daily as needed for Wheezing or Shortness of Breath             albuterol sulfate HFA (VENTOLIN HFA) 108 (90 Base) MCG/ACT inhaler  Inhale 2 puffs into the lungs every 6 hours as needed for Shortness of Breath             buPROPion (WELLBUTRIN XL) 300 MG extended release tablet  Take 1 tablet by mouth every morning             busPIRone (BUSPAR) 7.5 MG tablet  Take 1 tablet by mouth 2 times daily             famotidine (PEPCID) 20 MG tablet  Take 1 tablet by mouth 2 times daily             fluticasone-salmeterol (WIXELA INHUB) 250-50 MCG/DOSE AEPB  Inhale 1 puff into the lungs 2 times daily             furosemide (LASIX) 20 MG tablet  Take 1 tablet by mouth daily             levothyroxine (SYNTHROID) 125 MCG tablet  Take 1 tablet by mouth every morning             loratadine (CLARITIN) 10 MG tablet  Take 1 tablet by mouth daily             montelukast (SINGULAIR) 10 MG tablet  Take 1 tablet by mouth nightly             polyethylene glycol (MIRALAX) 17 g packet  Take 17 g by mouth daily as needed for Other (Constipation)             theophylline (THEODUR) 300 MG extended release tablet  Take 1 tablet by mouth 2 times daily                 Objective Findings at Discharge:   /66   Pulse 63   Temp 98 °F (36.7 °C) (Oral)   Resp 16   Ht 5' 1\" (1.549 m)   Wt 244 lb 1.6 oz (110.7 kg)   LMP  (LMP Unknown)   SpO2 92%   BMI 46.12 kg/m²            PHYSICAL EXAM   GEN Awake female, sitting upright in bed in no apparent distress. Appears given age. Morbid obesity  EYES No scleral erythema, discharge, or conjunctivitis. HENT Mucous membranes are moist. Oral pharynx without exudates, no evidence of thrush. NECK Supple, no apparent thyromegaly or masses. RESP Clear to auscultation, no wheezes, rales or rhonchi. Symmetric chest movement while on room air. CARDIO/VASC S1/S2 auscultated. Regular rate without appreciable murmurs, rubs, or gallops. No JVD or carotid bruits. Peripheral pulses equal bilaterally and palpable. No peripheral edema. GI Abdomen is soft without significant tenderness, masses, or guarding. Bowel sounds are normoactive. Rectal exam deferred.  No costovertebral angle tenderness. MSK Bilateral +1 edema. SKIN Normal coloration, warm, dry.   NEURO Cranial nerves appear grossly intact, normal speech, no lateralizing weakness. PSYCH Awake, alert, oriented x 4. Affect appropriate.     BMP/CBC  Recent Labs     06/21/21  0530 06/22/21  0713    142   K 3.8 4.3    108   CO2 24 23   BUN 25* 17   CREATININE 0.9 0.8   WBC 10.9* 13.0*   HCT 45.4 46.6    247       IMAGING:  CT as mentioned above    Discharge Time of 20 minutes    Electronically signed by Corey Boone MD on 6/22/2021 at 11:42 AM

## 2021-06-24 PROBLEM — G47.33 OBSTRUCTIVE SLEEP APNEA: Status: ACTIVE | Noted: 2021-01-01

## 2021-06-29 NOTE — ED PROVIDER NOTES
Patient Identification  Samuel Anglin is a 77 y.o. female    Chief Complaint  Abdominal Pain, Back Pain, and Nausea      HPI  (History provided by patient)  This is a 77 y.o. female who was brought in by self for chief complaint of abdominal pain, back pain, nausea. Onset was yesterday. Patient states she began having some hematuria yesterday, developed severe pain in right upper abdomen that radiates into the right back today. Notes that she was just admitted a week ago for right ureteral stone and had stent placed by Dr. Sueanne Goodpasture. Pain is 10 out of 10. Sharp. Constant since onset. Does note some dysuria that developed 2 days ago which is not present when she was discharged from the hospital.  No fevers. Endorses nausea but no vomiting. REVIEW OF SYSTEMS    Constitutional:  Denies fever, chills  HENT:  Denies sore throat or ear pain   Eyes: Denies vision changes, eye pain  Cardiovascular:  Denies chest pain, syncope  Respiratory:  Denies shortness of breath, cough   GI:  Denies vomiting.  + abdominal pain, nausea  :  Denies dysuria, discharge  Musculoskeletal:  Denies back pain, joint pain  Skin:  Denies rash, pruritis  Neurologic:  Denies headache, focal weakness, or sensory changes     See HPI and nursing notes for additional information     I have reviewed the following nursing documentation:  Allergies:    Allergies   Allergen Reactions    Latex Itching    Iodine Anaphylaxis    Levofloxacin Anaphylaxis    Pcn [Penicillins] Anaphylaxis    Sulfa Antibiotics Anaphylaxis    Adhesive Tape     Flexeril [Cyclobenzaprine]     Morphine Itching    Zofran Itching    Caffeine Palpitations     Doesn't sleep    Lidocaine Nausea And Vomiting    Volumen [Barium Sulfate] Itching, Nausea And Vomiting and Rash       Past medical history:  has a past medical history of Anxiety (1/28/2019), Arthritis, Asthma, CAD (coronary artery disease), COPD (chronic obstructive pulmonary disease) ([facility] [address] H/O cardiac catheterization (09/17/2019), H/O cardiovascular stress test, H/O Doppler carotid ultrasound (08/07/2019), H/O Doppler lower venous ultrasound (08/07/2019), H/O echocardiogram (08/07/2019), Hepatic steatosis (6/19/2015), History of nuclear stress test (09/05/2019), Hyperlipidemia, Hypertension, and Hypothyroid. Past surgical history:  has a past surgical history that includes Hysterectomy; Carpal tunnel release (Left); Tonsillectomy; Wrist surgery (Left); Thyroidectomy (2009); Cholecystectomy; Tubal ligation; Colonoscopy (01/28/2019); Colonoscopy (N/A, 1/28/2019); Endoscopy, colon, diagnostic (09/03/2019); Upper gastrointestinal endoscopy (N/A, 9/4/2019); and Cystoscopy (Right, 6/21/2021). Home medications:   Prior to Admission medications    Medication Sig Start Date End Date Taking? Authorizing Provider   HYDROcodone-acetaminophen (NORCO) 5-325 MG per tablet Take 1 tablet by mouth every 6 hours as needed for Pain for up to 3 days. 6/29/21 7/2/21 Yes Fernando Lambert PA-C   promethazine (PHENERGAN) 25 MG tablet Take 1 tablet by mouth every 6 hours as needed for Nausea WARNING:  May cause drowsiness. May impair ability to operate vehicles or machinery. Do not use in combination with alcohol.  6/29/21 7/6/21 Yes Fernando Lambert PA-C   famotidine (PEPCID) 20 MG tablet Take 1 tablet by mouth 2 times daily 6/12/21   [employer], DO   polyethylene glycol (MIRALAX) 17 g packet Take 17 g by mouth daily as needed for Other (Constipation) 6/12/21 7/12/21  [employer] [place], DO   buPROPion (WELLBUTRIN XL) 300 MG extended release tablet Take 1 tablet by mouth every morning 3/11/21   Yamel Ambrocio MD   albuterol sulfate HFA (VENTOLIN HFA) 108 (90 Base) MCG/ACT inhaler Inhale 2 puffs into the lungs every 6 hours as needed for Shortness of Breath 3/11/21   Yamel Ambrocio MD   fluticasone-salmeterol (WIXELA INHUB) 250-50 MCG/DOSE AEPB Inhale 1 puff into the lungs 2 times daily 3/11/21   Yamel Ambrocio MD furosemide (LASIX) 20 MG tablet Take 1 tablet by mouth daily 3/11/21   Terrance Yan MD   montelukast (SINGULAIR) 10 MG tablet Take 1 tablet by mouth nightly 2/23/21   Terrance Yan MD   busPIRone (BUSPAR) 7.5 MG tablet Take 1 tablet by mouth 2 times daily 2/23/21   Terrance Yan MD   levothyroxine (SYNTHROID) 125 MCG tablet Take 1 tablet by mouth every morning 2/23/21   Terrance Yan MD   loratadine (CLARITIN) 10 MG tablet Take 1 tablet by mouth daily 2/23/21   Terrance Yan MD   theophylline (THEODUR) 300 MG extended release tablet Take 1 tablet by mouth 2 times daily 11/18/20   Terrance Yan MD   albuterol (PROVENTIL) (2.5 MG/3ML) 0.083% nebulizer solution Take 3 mLs by nebulization 2 times daily as needed for Wheezing or Shortness of Breath 8/11/20   Preston Shaikh APRN - CNP       Social history:  reports that she has never smoked. She has never used smokeless tobacco. She reports that she does not drink alcohol and does not use drugs. Family history:    Family History   Problem Relation Age of Onset    Diabetes Mother     Hypertension Mother     Depression Mother         suicide    Diabetes Father     Depression Father     Diabetes Brother     Birth Defects Maternal Grandmother     Hypertension Maternal Grandmother     Diabetes Sister     Depression Brother     Diabetes Sister     Diabetes Brother     Other Brother         black mold    Diabetes Brother     Other Daughter         GI issues    Asthma Daughter     Seizures Daughter          Exam  /70   Pulse 68   Temp 97.5 °F (36.4 °C) (Oral)   Resp 17   Ht 5' 1\" (1.549 m)   Wt 244 lb (110.7 kg)   LMP  (LMP Unknown)   SpO2 95%   BMI 46.10 kg/m²   Nursing note and vitals reviewed. Constitutional: Well developed, well nourished. Appears uncomfortable. HENT:      Head: Normocephalic and atraumatic. Ears: External ears normal.      Nose: Nose normal.     Mouth: Membrane mucosa moist and pink.   No posterior oropharynx erythema or tonsillar edema  Eyes: Anicteric sclera. No discharge, PERRL  Neck: Supple. Trachea midline. Cardiovascular: RRR, no murmurs, rubs, or gallops, radial pulses 2+ bilaterally. Pulmonary/Chest: Effort normal. No respiratory distress. CTAB. No stridor. No wheezes. No rales. Abdominal: Soft. Tender to palpation right upper quadrant and right flank. . No distension. No guarding, rebound tenderness, or evidence of ascites. : No CVA tenderness. Musculoskeletal: Moves all extremities. No gross deformity. Neurological: Alert and oriented to person, place, and time. Normal muscle tone. Skin: Warm and dry. No rash. Psychiatric: Normal mood and affect. Behavior is normal.      Radiographs (if obtained):  [] The following radiograph was interpreted by myself in the absence of a radiologist:   [x] Radiologist's Report Reviewed:  XR ABDOMEN (KUB) (SINGLE AP VIEW)   Final Result   Right-sided double pigtail ureteral catheter in good position. 7 mm calcific density overlying the right sacrum, overlying the distal 3rd of   the ureteral catheter, may be related to stable phleboliths versus ureteral   calculus. 3 mm nonobstructive calculus overlying the lower pole right kidney.                 Labs  Results for orders placed or performed during the hospital encounter of 06/29/21   Comprehensive Metabolic Panel   Result Value Ref Range    Sodium 141 135 - 145 MMOL/L    Potassium 3.2 (L) 3.5 - 5.1 MMOL/L    Chloride 106 99 - 110 mMol/L    CO2 23 21 - 32 MMOL/L    BUN 13 6 - 23 MG/DL    CREATININE 0.7 0.6 - 1.1 MG/DL    Glucose 124 (H) 70 - 99 MG/DL    Calcium 9.2 8.3 - 10.6 MG/DL    Albumin 3.8 3.4 - 5.0 GM/DL    Total Protein 6.2 (L) 6.4 - 8.2 GM/DL    Total Bilirubin 0.5 0.0 - 1.0 MG/DL    ALT 11 10 - 40 U/L    AST 12 (L) 15 - 37 IU/L    Alkaline Phosphatase 79 40 - 129 IU/L    GFR Non-African American >60 >60 mL/min/1.73m2    GFR African American >60 >60 mL/min/1.73m2    Anion Gap 12 4 - 16   CBC Auto Differential   Result Value Ref Range    WBC 11.5 (H) 4.0 - 10.5 K/CU MM    RBC 4.68 4.2 - 5.4 M/CU MM    Hemoglobin 14.9 12.5 - 16.0 GM/DL    Hematocrit 44.6 37 - 47 %    MCV 95.3 78 - 100 FL    MCH 31.8 (H) 27 - 31 PG    MCHC 33.4 32.0 - 36.0 %    RDW 13.3 11.7 - 14.9 %    Platelets 921 480 - 267 K/CU MM    MPV 9.9 7.5 - 11.1 FL    Differential Type AUTOMATED DIFFERENTIAL     Segs Relative 64.5 36 - 66 %    Lymphocytes % 23.5 (L) 24 - 44 %    Monocytes % 7.9 (H) 0 - 4 %    Eosinophils % 2.9 0 - 3 %    Basophils % 0.6 0 - 1 %    Segs Absolute 7.4 K/CU MM    Lymphocytes Absolute 2.7 K/CU MM    Monocytes Absolute 0.9 K/CU MM    Eosinophils Absolute 0.3 K/CU MM    Basophils Absolute 0.1 K/CU MM    Nucleated RBC % 0.0 %    Total Nucleated RBC 0.0 K/CU MM    Total Immature Neutrophil 0.07 K/CU MM    Immature Neutrophil % 0.6 (H) 0 - 0.43 %   Urinalysis   Result Value Ref Range    Color, UA RED (A) YELLOW    Clarity, UA CLOUDY (A) CLEAR    Glucose, Urine NEGATIVE NEGATIVE MG/DL    Bilirubin Urine NEGATIVE NEGATIVE MG/DL    Ketones, Urine SMALL (A) NEGATIVE MG/DL    Specific Gravity, UA 1.008 1.001 - 1.035    Blood, Urine LARGE (A) NEGATIVE    pH, Urine 9.0 (HH) 5.0 - 8.0    Protein, UA 30 (A) NEGATIVE MG/DL    Urobilinogen, Urine NEGATIVE 0.2 - 1.0 MG/DL    Nitrite Urine, Quantitative NEGATIVE NEGATIVE    Leukocyte Esterase, Urine MODERATE (A) NEGATIVE    RBC, UA 1,704 (H) 0 - 6 /HPF    WBC, UA 8 (H) 0 - 5 /HPF    Bacteria, UA RARE (A) NEGATIVE /HPF    Squam Epithel, UA 1 /HPF    Trichomonas, UA NONE SEEN NONE SEEN /HPF    Amorphous, UA RARE /HPF         MDM  Patient presents for right abdomen and flank pain. Has known history of recent kidney stone with ureteral stent placement. X-ray here shows 7 mm calcification near the area of the distal ureteral stents, could be stone versus phlebolith. Potassium slightly low, repleted here.   Kidney function normal.  White blood cell count slightly elevated but actually improved from previous. Urine shows gross hematuria. Patient given medication in ED and much improved. Consult placed to urology, spoke with Gretchen Shin PA-C. Discussed history and physical exam, labs, imaging. He recommended follow-up in office, patient has follow-up appointment tomorrow, they may plan for surgical removal of stent to ensure that stone itself is also removed at that time. I estimate there is LOW risk for ACUTE APPENDICITIS, BOWEL OBSTRUCTION, CHOLECYSTITIS, DIVERTICULITIS, INCARCERATED HERNIA, PANCREATITIS, PELVIC INFLAMMATORY DISEASE, PERFORATED BOWEL, PERFORATED OR BLEEDING ULCER, ECTOPIC PREGNANCY, TUBO-OVARIAN ABSCESS, thus I consider the discharge disposition reasonable. Tyesha Vu and I have discussed the diagnosis and risks, and we agree with discharging home to follow-up with their primary doctor. We also discussed returning to the Emergency Department immediately if new or worsening symptoms occur. We have discussed the symptoms which are most concerning (e.g., bloody stool, fever, changing or worsening pain, intractable vomiting) that necessitate immediate return. I have independently evaluated this patient. Final Impression  1. Ureteric stone        Blood pressure 132/70, pulse 68, temperature 97.5 °F (36.4 °C), temperature source Oral, resp. rate 17, height 5' 1\" (1.549 m), weight 244 lb (110.7 kg), SpO2 95 %, not currently breastfeeding. Disposition:  Discharge to home in stable condition. Patient was given scripts for the following medications. I counseled patient how to take these medications. Discharge Medication List as of 6/29/2021 10:24 AM      START taking these medications    Details   HYDROcodone-acetaminophen (NORCO) 5-325 MG per tablet Take 1 tablet by mouth every 6 hours as needed for Pain for up to 3 days. , Disp-8 tablet, R-0Normal      promethazine (PHENERGAN) 25 MG tablet Take 1 tablet by mouth every 6 hours as needed for Nausea WARNING:  May cause drowsiness. May impair ability to operate vehicles or machinery. Do not use in combination with alcohol., Disp-12 tablet, R-0Normal             This chart was generated using the [address] [place] [address] [address]  [employer] system. I created this record but it may contain dictation errors given the limitations of this technology.        Easton Gamino PA-C  06/29/21 7882

## 2021-06-29 NOTE — ED PROVIDER NOTES
This EKG was interpreted by me. Rate is 72, rhythm is sinus. VA and QT intervals are within normal limits. There is no ST segment or T wave changes. This EKG was compared to previous EKG from date 6/11/21. Appears similar to previous EKG.      Maya Leos DO  06/29/21 6907

## 2021-06-29 NOTE — ED NOTES
Discussed discharge instructions with patient. All questions answered. Patient verbalized understanding.           Magali Stahl RN  06/29/21 5409

## 2021-07-20 NOTE — DISCHARGE INSTR - COC
Continuity of Care Form    Patient Name: Shea Victor   :  1955  MRN:  [id number]    Admit date:  2021  Discharge date:  ***    Code Status Order: Prior   Advance Directives:     Admitting Physician:  No admitting provider for patient encounter. PCP: Gilberto Rinaldi MD    Discharging Nurse: [facility] Unit/Room#: ED33/ED-33  Discharging Unit Phone Number: ***    Emergency Contact:   Extended Emergency Contact Information  Primary Emergency Contact: Nuvia Hay  Address: [address] [place] [address] Phone: [phone]  Relation: Child    Past Surgical History:  Past Surgical History:   Procedure Laterality Date    CARPAL TUNNEL RELEASE Left     CHOLECYSTECTOMY      COLONOSCOPY  2019    Polyps x4, Internal grade 1 hemorrhoids    COLONOSCOPY N/A 2019    COLONOSCOPY POLYPECTOMY SNARE/COLD BIOPSY performed by Claudean Ancona, MD at [address] Right 2021    CYSTOSCOPY RIGHT RETROGRADE PYLEOGRAMS RIGHT STONE MANIPULATION performed by Asiya Alfaro MD at [facility], DIAGNOSTIC  2019    biopsy f/u with pcp    HYSTERECTOMY      due to bleeding.   complete    THYROIDECTOMY  2009    TONSILLECTOMY      TUBAL LIGATION      UPPER GASTROINTESTINAL ENDOSCOPY N/A 2019    EGD BIOPSY performed by Ana Luisa Goel MD at [address] [place]        Immunization History:   Immunization History   Administered Date(s) Administered    Tdap (Boostrix, Adacel) 2019       Active Problems:  Patient Active Problem List   Diagnosis Code    Angina pectoris ([facility] [address] I20.9    LVH (left ventricular hypertrophy) due to hypertensive disease I11.9    COPD, moderate (HCC) J44.9    TR (tricuspid regurgitation) I07.1    Venous (peripheral) insufficiency I87.2    Mitral insufficiency I34.0    Asthma, persistent [id number]    Hypothyroid E03.9    Hepatic steatosis K76.0  Obesity, Class III, BMI 40-49.9 (morbid obesity) ([facility]) E66.01    Impaired glucose tolerance R73.02    Snores R06.83    Anxiety F41.9    Panic attacks F41.0    History of claustrophobia Z86.59    History of colon polyps Z86.010    Gastroesophageal reflux disease without esophagitis K21.9    Major depressive disorder, recurrent episode, mild ([facility]) F33.0    Environmental allergies Z91.09    Pedal edema R60.0    Dizziness R42    Kidney stone N20.0    Obstructive sleep apnea G47.33       Isolation/Infection:   Isolation          No Isolation        Patient Infection Status     None to display          Nurse Assessment:  Last Vital Signs: /76   Pulse 73   Temp 97.8 °F (36.6 °C)   Resp 17   Ht 5' 1\" (1.549 m)   Wt 240 lb (108.9 kg)   LMP  (LMP Unknown)   SpO2 95%   BMI 45.35 kg/m²     Last documented pain score (0-10 scale): Pain Level: 8  Last Weight:   Wt Readings from Last 1 Encounters:   07/20/21 240 lb (108.9 kg)     Mental Status:  {IP PT MENTAL STATUS:53797}    IV Access:  { MADELYN IV ACCESS:504447112}    Nursing Mobility/ADLs:  Walking   {P DME VFHX:857488268}  Transfer  {P DME VPLN:220507561}  Bathing  {P DME IPVW:393902982}  Dressing  {P DME ZPAC:004330935}  Toileting  {P DME IKNC:381451726}  Feeding  {[facility] DME EIKZ:126811520}  Med Admin  {[facility] DME NLHY:135104718}  Med Delivery   {[facility] MED Delivery:606203383}    Wound Care Documentation and Therapy:        Elimination:  Continence:   · Bowel: {YES / JF:19136}  · Bladder: {YES / QQ:58493}  Urinary Catheter: {Urinary Catheter:349570788}   Colostomy/Ileostomy/Ileal Conduit: {YES / DL:64745}       Date of Last BM: ***  No intake or output data in the 24 hours ending 07/20/21 1047  No intake/output data recorded.     Safety Concerns:     [address] Safety Concerns:223192261}    Impairments/Disabilities:      [address] Impairments/Disabilities:860986356}    Nutrition Therapy:  Current Nutrition Therapy:   [address] MADELYN Diet

## 2021-07-20 NOTE — CARE COORDINATION
Patient identified as potential readmission. Last admission 6/21-6/22 for kidney stone. Patient here today for dysuria and urinary frequency. Patient received IVF, pain medication and nausea medication. No acute findings requiring admission identified today. Readmission was avoided and patient was able to be discharged home for outpatient follow up.

## 2021-07-20 NOTE — ED PROVIDER NOTES
eMERGENCY dEPARTMENT eNCOUnter         [address] [facility]     PCP: Hanna Peres MD    [facility]    Chief Complaint   Patient presents with    Dysuria    Urinary Frequency       HPI    Hermes Castro is a 77 y.o. female who presents with urinary discomfort, right lower quad abdominal pain nausea and vomiting. Onset was 1 hour prior to ED arrival.  Context is patient states she began having 10/10 sharp stabbing pain in the right lower abdomen, radiating into the suprapubic region and right lower back with nausea and vomiting. Has had dysuria and increased frequency and urgency since yesterday, was worse this morning. No gross hematuria. Patient has a history of kidney stones, was admitted last month for kidney stone as well as ureteral stent but states stent is still in place. Has not followed up with urology since last month. No associated fever chills chest pain shortness of breath nausea vomiting diaphoresis or near syncope. Not currently on any oral antibiotics. REVIEW OF SYSTEMS    Constitutional:  Denies fever, chills, weight loss or weakness   HENT:  Denies sore throat or ear pain   Cardiovascular:  Denies chest pain, palpitations or swelling   Respiratory:  Denies cough or shortness of breath   GI:  See HPI above  : See HPI  Musculoskeletal:  Denies back pain or groin pain or masses. No pain or swelling of extremities.   Skin:  Denies rash  Neurologic:  Denies headache, focal weakness or sensory changes   Endocrine:  Denies polyuria or polydypsia   Lymphatic:  Denies swollen glands     All other review of systems are negative  See HPI and nursing notes for additional information     PAST MEDICAL & SURGICAL HISTORY    Past Medical History:   Diagnosis Date    Anxiety 1/28/2019    Arthritis     Asthma     CAD (coronary artery disease)     COPD (chronic obstructive pulmonary disease) ([facility] [address]     H/O cardiac catheterization 09/17/2019     normal coronaries    H/O cardiovascular stress test     H/O Doppler carotid ultrasound 08/07/2019    Mild (0-49%) disease of the Bilateral Internal carotid artery.  H/O Doppler lower venous ultrasound 08/07/2019    Significant reflux noted of the Right GSV. Right GSV too small currently for ablation procedure. Significant reflux noted in the Left GSV at mid thigh . Left GSV difficult to follow between mid thigh and mid calf due to multiple  splits/branches. GSV knee tributary is very superficial.    H/O echocardiogram 08/07/2019    EF 55-60%, Mild MR & TR.    Hepatic steatosis 6/19/2015    History of nuclear stress test 09/05/2019     Mild anterior wall Ischemia of a medium sized territory. Possibility of breast artifact causing the abnormality cannot be excluded.  Hyperlipidemia     Hypertension     Hypothyroid      Past Surgical History:   Procedure Laterality Date    CARPAL TUNNEL RELEASE Left     CHOLECYSTECTOMY      COLONOSCOPY  01/28/2019    Polyps x4, Internal grade 1 hemorrhoids    COLONOSCOPY N/A 1/28/2019    COLONOSCOPY POLYPECTOMY SNARE/COLD BIOPSY performed by Nino Haley MD at [address] Right 6/21/2021    CYSTOSCOPY RIGHT RETROGRADE PYLEOGRAMS RIGHT STONE MANIPULATION performed by Brenden Mitchell MD at [address] [facility], COLON, DIAGNOSTIC  09/03/2019    biopsy f/u with pcp    HYSTERECTOMY      due to bleeding.   complete    THYROIDECTOMY  2009    TONSILLECTOMY      TUBAL LIGATION      UPPER GASTROINTESTINAL ENDOSCOPY N/A 9/4/2019    EGD BIOPSY performed by Mary Du MD at [address] [place]        CURRENT MEDICATIONS    Current Outpatient Rx   Medication Sig Dispense Refill    nitrofurantoin, macrocrystal-monohydrate, (MACROBID) 100 MG capsule Take 1 capsule by mouth 2 times daily for 7 days 14 capsule 0    HYDROcodone-acetaminophen (NORCO) 5-325 MG per tablet Take 1 tablet by mouth every 8 hours as needed for Pain for up to 3 days.  15 tablet 0    phenazopyridine (PYRIDIUM) 100 MG tablet Take 1 tablet by mouth 3 times daily as needed for Pain (dysuria) 9 tablet 0    nitrofurantoin, macrocrystal-monohydrate, (MACROBID) 100 MG capsule Take 1 capsule by mouth 2 times daily for 7 days 14 capsule 0    ondansetron (ZOFRAN ODT) 4 MG disintegrating tablet Take 1 tablet by mouth every 8 hours as needed for Nausea 15 tablet 0    famotidine (PEPCID) 20 MG tablet Take 1 tablet by mouth 2 times daily 14 tablet 0    buPROPion (WELLBUTRIN XL) 300 MG extended release tablet Take 1 tablet by mouth every morning 90 tablet 1    albuterol sulfate HFA (VENTOLIN HFA) 108 (90 Base) MCG/ACT inhaler Inhale 2 puffs into the lungs every 6 hours as needed for Shortness of Breath 1 Inhaler 5    fluticasone-salmeterol (WIXELA INHUB) 250-50 MCG/DOSE AEPB Inhale 1 puff into the lungs 2 times daily 1 Inhaler 5    furosemide (LASIX) 20 MG tablet Take 1 tablet by mouth daily 60 tablet 5    montelukast (SINGULAIR) 10 MG tablet Take 1 tablet by mouth nightly 90 tablet 3    busPIRone (BUSPAR) 7.5 MG tablet Take 1 tablet by mouth 2 times daily 180 tablet 3    levothyroxine (SYNTHROID) 125 MCG tablet Take 1 tablet by mouth every morning 90 tablet 3    loratadine (CLARITIN) 10 MG tablet Take 1 tablet by mouth daily 90 tablet 3    theophylline (THEODUR) 300 MG extended release tablet Take 1 tablet by mouth 2 times daily 60 tablet 5    albuterol (PROVENTIL) (2.5 MG/3ML) 0.083% nebulizer solution Take 3 mLs by nebulization 2 times daily as needed for Wheezing or Shortness of Breath 60 each 2       ALLERGIES    Allergies   Allergen Reactions    Latex Itching    Iodine Anaphylaxis    Levofloxacin Anaphylaxis    Pcn [Penicillins] Anaphylaxis    Sulfa Antibiotics Anaphylaxis    Adhesive Tape     Flexeril [Cyclobenzaprine]     Morphine Itching    Zofran Itching    Caffeine Palpitations     Doesn't sleep    Lidocaine Nausea And Vomiting    Volumen [Barium Sulfate] Itching, Nausea And Vomiting and Rash       SOCIAL AND FAMILY HISTORY    Social History     Socioeconomic History    Marital status:      Spouse name: None    Number of children: 2    Years of education: 15    Highest education level: GED or equivalent   Occupational History    Occupation: unemployed   Tobacco Use    Smoking status: Never Smoker    Smokeless tobacco: Never Used   Vaping Use    Vaping Use: Never used   Substance and Sexual Activity    Alcohol use: No    Drug use: No    Sexual activity: Not Currently     Partners: Male     Birth control/protection: Post-menopausal   Other Topics Concern    None   Social History Narrative    None     Social Determinants of Health     Financial Resource Strain:     Difficulty of Paying Living Expenses:    Food Insecurity:     Worried About Running Out of Food in the Last Year:     Ran Out of Food in the Last Year:    Transportation Needs:     Lack of Transportation (Medical):      Lack of Transportation (Non-Medical):    Physical Activity:     Days of Exercise per Week:     Minutes of Exercise per Session:    Stress:     Feeling of Stress :    Social Connections:     Frequency of Communication with Friends and Family:     Frequency of Social Gatherings with Friends and Family:     Attends Yarsanism Services:     Active Member of Clubs or Organizations:     Attends Club or Organization Meetings:     Marital Status:    Intimate Partner Violence:     Fear of Current or Ex-Partner:     Emotionally Abused:     Physically Abused:     Sexually Abused:      Family History   Problem Relation Age of Onset    Diabetes Mother     Hypertension Mother     Depression Mother         suicide    Diabetes Father     Depression Father     Diabetes Brother     Birth Defects Maternal Grandmother     Hypertension Maternal Grandmother     Diabetes Sister     Depression Brother     Diabetes Sister     Diabetes Brother  Other Brother         black mold    Diabetes Brother     Other Daughter         GI issues    Asthma Daughter     Seizures Daughter        PHYSICAL EXAM    VITAL SIGNS: /75   Pulse 72   Temp 97.8 °F (36.6 °C)   Resp 20   Ht 5' 1\" (1.549 m)   Wt 240 lb (108.9 kg)   LMP  (LMP Unknown)   SpO2 98%   BMI 45.35 kg/m²   General:  Well developed, elevated BMI, nontoxic, mildly uncomfortable during, nontoxic  Eyes:  Sclera nonicteric, Conjunctiva moist, No discharge  Head:  Normocephalic, Atramautic  Neck/Lymphatics: Supple, no JVD, no swollen nodes  Respiratory:  Clear to ausculation bilaterally, No retractions, Non labored breathing  Cardiovascular:  RRR, Normal S1/S2  GI:   No gross discoloration. Bowel sounds present in all quadrants, No audible bruits. Soft,  Nondistended. Mild right mid to lower abdominal tenderness without rebound tenderness or guarding, No palpable pulsatile masses or obvious hernias. No McBurney's point tenderness  Negative Rovsing sign   Negative Cunningham's sign.   Back:  +mild right CVA tenderness to percussion, none on the left  Musculoskeletal:  No edema, No deformity  Peripheral Vascular: Distal pulses 2+ equal bilaterally  Integument: No rash, Normal turgor  Neurologic:  Alert & oriented, Normal speech  Psychiatric: Cooperative, pleasant affect       I have reviewed and interpreted all of the currently available lab results from this visit (if applicable):  Results for orders placed or performed during the hospital encounter of 07/20/21   Urinalysis   Result Value Ref Range    Color, UA RED (A) YELLOW    Clarity, UA SLIGHTLY CLOUDY (A) CLEAR    Glucose, Urine NEGATIVE NEGATIVE MG/DL    Bilirubin Urine NEGATIVE NEGATIVE MG/DL    Ketones, Urine NEGATIVE NEGATIVE MG/DL    Specific Gravity, UA 1.009 1.001 - 1.035    Blood, Urine LARGE (A) NEGATIVE    pH, Urine 8.0 5.0 - 8.0    Protein, UA 30 (A) NEGATIVE MG/DL    Urobilinogen, Urine NEGATIVE 0.2 - 1.0 MG/DL    Nitrite Urine, Quantitative NEGATIVE NEGATIVE    Leukocyte Esterase, Urine LARGE (A) NEGATIVE    RBC, UA 2,136 (H) 0 - 6 /HPF    WBC, UA 33 (H) 0 - 5 /HPF    Bacteria, UA NEGATIVE NEGATIVE /HPF    Squam Epithel, UA 1 /HPF    Mucus, UA RARE (A) NEGATIVE HPF    Trichomonas, UA NONE SEEN NONE SEEN /HPF   CBC auto diff   Result Value Ref Range    WBC 9.9 4.0 - 10.5 K/CU MM    RBC 4.59 4.2 - 5.4 M/CU MM    Hemoglobin 14.4 12.5 - 16.0 GM/DL    Hematocrit 44.0 37 - 47 %    MCV 95.9 78 - 100 FL    MCH 31.4 (H) 27 - 31 PG    MCHC 32.7 32.0 - 36.0 %    RDW 13.3 11.7 - 14.9 %    Platelets 052 003 - 833 K/CU MM    MPV 9.6 7.5 - 11.1 FL    Differential Type AUTOMATED DIFFERENTIAL     Segs Relative 62.2 36 - 66 %    Lymphocytes % 22.8 (L) 24 - 44 %    Monocytes % 7.8 (H) 0 - 4 %    Eosinophils % 5.9 (H) 0 - 3 %    Basophils % 1.1 (H) 0 - 1 %    Segs Absolute 6.2 K/CU MM    Lymphocytes Absolute 2.3 K/CU MM    Monocytes Absolute 0.8 K/CU MM    Eosinophils Absolute 0.6 K/CU MM    Basophils Absolute 0.1 K/CU MM    Nucleated RBC % 0.0 %    Total Nucleated RBC 0.0 K/CU MM    Total Immature Neutrophil 0.02 K/CU MM    Immature Neutrophil % 0.2 0 - 0.43 %   CMP   Result Value Ref Range    Sodium 138 135 - 145 MMOL/L    Potassium 3.6 3.5 - 5.1 MMOL/L    Chloride 105 99 - 110 mMol/L    CO2 22 21 - 32 MMOL/L    BUN 16 6 - 23 MG/DL    CREATININE 0.6 0.6 - 1.1 MG/DL    Glucose 100 (H) 70 - 99 MG/DL    Calcium 8.9 8.3 - 10.6 MG/DL    Albumin 4.0 3.4 - 5.0 GM/DL    Total Protein 6.3 (L) 6.4 - 8.2 GM/DL    Total Bilirubin 0.2 0.0 - 1.0 MG/DL    ALT 14 10 - 40 U/L    AST 17 15 - 37 IU/L    Alkaline Phosphatase 81 40 - 129 IU/L    GFR Non-African American >60 >60 mL/min/1.73m2    GFR African American >60 >60 mL/min/1.73m2    Anion Gap 11 4 - 16   Lipase   Result Value Ref Range    Lipase 12 (L) 13 - 60 IU/L   Lactic Acid, Plasma   Result Value Ref Range    Lactate 1.8 0.4 - 2.0 mMOL/L        RADIOLOGY/PROCEDURES        XR ABDOMEN (KUB) (SINGLE AP VIEW) (Final result)  Result time 07/20/21 10:36:51  Final result by Sundeep Ruiz MD (07/20/21 10:36:51)                Impression:    Similar position of right double-J ureteral stent and 5 mm right distal   ureteral calculus. Narrative:    EXAMINATION:   ONE SUPINE XRAY VIEW(S) OF THE ABDOMEN     7/20/2021 9:36 am     COMPARISON:   06/29/2021     HISTORY:   ORDERING SYSTEM PROVIDED HISTORY: right flank pain, recent stent, eval for   stone/stent placement   TECHNOLOGIST PROVIDED HISTORY:   Reason for exam:->right flank pain, recent stent, eval for stone/stent   placement   Reason for Exam: right flank pain   recent stent   stone/stent placement   Acuity: Unknown   Type of Exam: Unknown     FINDINGS:   Nonobstructive bowel gas pattern with normal stool volume.  No free air or   pneumatosis.  Similar position of right double-J ureteral stent.  The   previously demonstrated right renal calculus is not well demonstrated which   may be related to motion.  There is similar position of the 5 mm right distal   ureteral calculus.                       ED COURSE & MEDICAL DECISION MAKING      Vital signs and nursing notes reviewed during ED course. I have independently evaluated this patient . Supervising MD - Dr Shae Tamayo - present in the Emergency Department, available for consultation, throughout entirety of  patient care. All pertinent Lab data and radiographic results reviewed with patient at bedside. The patient and / or the family were informed of the results of any tests, a time was given to answer questions, a plan was proposed and they agreed with plan. Differential diagnosis: Abdominal Aortic Aneurysm, Ischemic Bowel, Bowel Obstruction, Acute Cholecystitis, Acute Appendicitis, other    Clinical  IMPRESSION    1. Ureteric stone        Patient presents with right-sided flank pain and urinary complaints, following recent admission for ureteral stent placement.   On exam, well-appearing nontoxic 66-year-old female, in no acute distress. Abdominal exam is nonsurgical.  Mild tenderness in the right to lower abdomen without rebound guarding or other peritoneal signs. Mild right-sided CVA tenderness, none on the left. Ambulating the ED with steady gait. Patient start IV fluids, Toradol and Phenergan. CBC, CMP without significant injury. Normal white count. Normal creatinine. Lipase is normal.  Lactic within normal range. UA shows large gross blood, over 2000 red blood cells, 33 white blood cells and large leukocytes, negative for bacteria and nitrites. Sent for urine culture. KUB shows similar position right double-J ureteral stent with a 5 mm right distal ureteral calculus of similar position. Following medication the ED, patient is feeling significantly improved and ready for discharge home. We will plan to consult with urology further recommendations but do think she is patient is appropriate for discharge home. I did consult with Dr. Grace Arias - urology - and discussed patient's history, ED presentation/course including any pertinent laboratory findings and imaging study findings. He/she does agree with plan for discharge, does not recommend any additional CT imaging at this time. Would like patient be started on Barbette Core as well as pain control with close follow-up in office in the next several days. This plan was discussed with patient verbalized understanding agreement. I estimate there is low risk for urosepsis, infected obstructive uropathy, acute appendicitis, bowel obstruction, cholecystitis, ruptured diverticulitis, incarcerated hernia, hemorrhagic pancreatitis, or perforated bowel/ulcer, ectopic pregnancy, ovarian torsion or tubo-ovarian ovarian abscess thus I consider the discharge disposition reasonable. Patient is discharged stable condition with Macrobid, Norco, Pyridium and Zofran. Follow-up with urology for in the next 1 to 2 days.   Return warnings back to the

## 2021-07-21 NOTE — TELEPHONE ENCOUNTER
Cardiologist: Dr. Koreen Sandifer  Surgeon: Dr. Regino Nick  Surgery: Cystoscopy, Ureteroscopy, Retrograde Pyelgram, stone manipulation with Holium laser, Stent    Anesthesia: General  Date: ASAP  FAX# [phone]  Ph# [phone]    Last OV 6/11/2021 w/Anita    1. Chest pain: resolved, most likley acid reflux as her LHC was normal, will not pursue it from cardiac standpoint,will continue protoxin  2. Palpitations: 24 hrs holter monitor ordered, and recommend to discuss with pulmonary for reducing the dose of theophylline and her symptoms of palpitations are manily at the time of sinus tachycardia  3. COPD: from 2nd hand smoking  4. Dizziness: caroitid doppler showed 0-49% stenosis  5. Leg swelling showed severe venous reflux disease, could not use compressions socks, will refer to dr Ashlyn Flores for ablation. 6. HTN: stable, off medications  7. Dyslipidemia: check lipids  8. Obesity:recommend to lose weight        NM- 9/5/2019  Abnormal Study.    Mild anterior wall Ischemia of a medium sized territory.    Possibility of breast artifact causing the abnormality cannot be excluded.    Normal LV function. LVEF is > 70 %. Echo-8/7/2019   Left ventricular systolic function is normal with an ejection fraction of   55-60%. Grade I diastolic dysfunction. Mild septal wall asymmetrical left ventricular hypertrophy. Doppler evaluation reveals trace aortic, mild mitral, and mild tricuspid   regurgitation.    No evidence of pericardial effusion        CATH- 9/17/2019  normal coronaries

## 2021-08-04 PROBLEM — I83.893 VARICOSE VEINS OF BOTH LEGS WITH EDEMA: Status: ACTIVE | Noted: 2021-01-01

## 2021-08-04 NOTE — PATIENT INSTRUCTIONS
CVI: Patient has Symptomatic C4 venous disease. Last US almost 2 years ago. At that time vein sizes were small. Also patient has not been wearing stockings. Suggest compression stockings, reat venous US. Office Visit for test results.

## 2021-08-04 NOTE — PROGRESS NOTES
CARDIAC CONSULT NOTE       Edison Soriano  77 y.o.  female    Chief Complaint   Patient presents with    Edema       Referring physician:  Terrance Yan MD     Primary care physician:  Terrance Yan MD    History of Present Illness:     Edison Soriano is a 77 y.o. female referred for evaluation and management of CVI. Patient C/O Leg edema, prominent Leg veins, skin discoloration, night time cramps, aching & heavy Legs. Patient has not been wearing stockings. She does not smoke. has a past medical history of Anxiety, Arthritis, Asthma, CAD (coronary artery disease), COPD (chronic obstructive pulmonary disease) ([facility] [address] H/O cardiac catheterization, H/O cardiovascular stress test, H/O Doppler carotid ultrasound, H/O Doppler lower venous ultrasound, H/O echocardiogram, Hepatic steatosis, History of nuclear stress test, Hyperlipidemia, Hypertension, and Hypothyroid. has a past surgical history that includes Hysterectomy; Carpal tunnel release (Left); Tonsillectomy; Wrist surgery (Left); Thyroidectomy (2009); Cholecystectomy; Tubal ligation; Colonoscopy (01/28/2019); Colonoscopy (N/A, 1/28/2019); Endoscopy, colon, diagnostic (09/03/2019); Upper gastrointestinal endoscopy (N/A, 9/4/2019); and Cystoscopy (Right, 6/21/2021). reports that she has never smoked. She has never used smokeless tobacco. She reports that she does not drink alcohol and does not use drugs. family history includes Asthma in her daughter; Birth Defects in her maternal grandmother; Depression in her brother, father, and mother; Diabetes in her brother, brother, brother, father, mother, sister, and sister; Hypertension in her maternal grandmother and mother; Other in her brother and daughter; Seizures in her daughter. Review of Systems:   1. Cardiovascular: No chest pain, dyspnea on exertion, palpitations or loss of consciousness  2. Respiratory: No cough or wheezing    3.  Musculoskeletal:  No gait disturbance, weakness, muscle cramps, aches & pains or joint complaints  4. Neurological: No TIA or stroke symptoms  5. Psychiatric: No anxiety or depression  6. Hematologic/Lymphatic: No bleeding problems, blood clots or swollen lymph nodes    Physical Examination:    /80   Pulse 80   Ht 5' 1\" (1.549 m)   Wt 242 lb 3.2 oz (109.9 kg)   LMP  (LMP Unknown)   BMI 45.76 kg/m²    Wt Readings from Last 3 Encounters:   08/04/21 242 lb 3.2 oz (109.9 kg)   07/20/21 240 lb (108.9 kg)   06/29/21 244 lb (110.7 kg)     Body mass index is 45.76 kg/m². General Appearance: Morbidly obese/Well Nourished  1. Skin: It is warm & dry. No rashes noted. 2. Eyes: No conjunctival Pallor seen. No jaundice noted. 3. Neck: is supple there is no elevation of JVD. No thyromegaly  4. Respiratory:  · Resp Assessment: No abnormal findings. · Resp Auscultation: Vesicular breath sounds without rales or wheezing. 5. Cardiovascular:  · Auscultation: Normal S1 & S2, No prominent murmurs  · Carotid Arteries: No bruits present  · Abdominal Aorta: Non-palpable  · Pedal Pulses: 2+ and equal   6. Abdomen:  · No masses or tenderness  · Liver/Spleen: No Abnormalities Noted, no organomegaly. 7. Musculoskeletal: No joint deformities. No muscle wasting  8. Extremities:  ·  No Cyanosis or Clubbing. There is significant edema with C4 venous changes. 9. Rectal / genital:  ·  Deferred  10.  Neurological/Psychiatric:  · Oriented to time, place, and person  · Non-anxious    Lab Results   Component Value Date    TROPONINT <0.010 09/12/2017    TROPONINT <0.010 07/08/2017     BNP:    Lab Results   Component Value Date    BNP 12 06/10/2013    BNP 8 10/25/2010    PROBNP 72.60 07/08/2017     PT/INR:  No results found for: PTINR  Lab Results   Component Value Date    LABA1C 5.5 06/05/2019    LABA1C 5.1 06/19/2018     Lab Results   Component Value Date    CHOL 187 04/25/2017    CHOL 196 03/30/2015    TRIG 159 (H) 04/25/2017    TRIG 126 03/30/2015    HDL 50 04/25/2017    HDL 56 03/30/2015    LDLCALC 105 (H) 04/25/2017    LDLCALC 105 (H) 07/30/2012    LDLDIRECT 124 (H) 03/30/2015    LDLDIRECT 102 (H) 06/10/2013     Lab Results   Component Value Date    ALT 14 07/20/2021    ALT 11 06/29/2021    AST 17 07/20/2021    AST 12 (L) 06/29/2021     BMP:    Lab Results   Component Value Date     07/20/2021     06/29/2021    K 3.6 07/20/2021    K 3.2 06/29/2021     07/20/2021     06/29/2021    CO2 22 07/20/2021    CO2 23 06/29/2021    BUN 16 07/20/2021    BUN 13 06/29/2021    CREATININE 0.6 07/20/2021    CREATININE 0.7 06/29/2021     CMP:    Lab Results   Component Value Date     07/20/2021     06/29/2021    K 3.6 07/20/2021    K 3.2 06/29/2021     07/20/2021     06/29/2021    CO2 22 07/20/2021    CO2 23 06/29/2021    BUN 16 07/20/2021    BUN 13 06/29/2021    CREATININE 0.6 07/20/2021    CREATININE 0.7 06/29/2021    PROT 6.3 07/20/2021    PROT 6.2 06/29/2021    PROT 6.4 07/30/2012    PROT 6.6 04/10/2012     TSH:    Lab Results   Component Value Date    TSH 1.24 06/19/2018    TSH 2.43 04/25/2017    TSHHS 4.450 07/18/2019    TSHHS 1.250 03/22/2016     VENOUS US 8/2019    No evidence of DVT or SVT in the bilateral common femoral vein, femoral    vein, popliteal vein, greater saphenous vein or small saphenous vein.    Significant reflux noted of the Right GSV at SFJ (1.5s).  Right GSV too small currently for ablation procedure.    Significant reflux noted in the Left GSV at mid thigh (0.8s).  Left GSV difficult to follow between mid thigh and mid calf due to multiple    splits/branches. GSV knee tributary is very superficial.       QUALITY MEASURES REVIEWED:  1.CAD:Patient is taking anti platelet agent:No  Patient does not have Hx of documented CAD  2. DYSLIPIDEMIA: Patient is on cholesterol lowering medication:No  3. Beta-Blocker therapy for CAD, if prior Myocardial Infarction:No  4. Counselled regarding smoking cessation.  No   Patient does not Smoke. 5.Anticoagulation therapy (for A.Fib) No   Does Not have A.Fib.  6.Discussed weight management strategies. Assessment, Recommendations & Tests:     CVI: Patient has Symptomatic C4 venous disease. Last US almost 2 years ago. At that time vein sizes were small. Also patient has not been wearing stockings. Suggest compression stockings, reat venous US. Office Visit for test results.      Polly Duffy MD, 8/4/2021 3:45 PM

## 2021-08-04 NOTE — LETTER
[facility]  [address] [address]  Phone: [phone]  Fax: [phone]    Shawanda Rogers MD    August 4, 2021     Harshil Avalos MD  [address] [place] [address]    Patient: Tyesha Vu   MR Number: [id number]   YOB: 1955   Date of Visit: 8/4/2021       Dear Harshil Avalos: Thank you for referring Clemnadiya Otto to me for evaluation/treatment. Below are the relevant portions of my assessment and plan of care. If you have questions, please do not hesitate to call me. I look forward to following Portia Alvarez along with you.     Sincerely,        Shawanda Rogers MD

## 2021-08-04 NOTE — LETTER
Charlotte Traore  1955  [id number]    Have you had any Chest Pain that is not new? - no      Have you had any Shortness of Breath - No    Have you had any dizziness - No    Have you had any palpitations that are not new? - No    Do you have any edema - swelling in ankles and feet    If Yes - CHECK TO SEE IF THE EDEMA IS PITTING  How long have they been having edema - Weeks  If Yes - Have they worn compression stockings No    Vein \"LEG PROBLEM Questionnaire\"  1. Do you have prominent leg veins? Yes   2. Do you have any skin discoloration? Yes  3. Do you have any healed or active sores? No  4. Do you have swelling of the legs? Yes  5. Do you have a family history of varicose veins? No  6. Does your profession involve pro-longed        standing or heavy lifting? No  7. Have you been fighting overweight problems? Yes  8. Do you have restless legs? No  9. Do you have any night time cramps? Yes  10. Do you have any of the following in your legs:        ache, heavy     When did you have your last labs drawn   Where did you have them done   What doctor ordered     If we do not have these labs you are retrieve these labs for these providers!     Do you have a surgery or procedure scheduled in the near future - No     Ask patient if they want to sign [facility] if they are not already signed up     Check to see if we have an E-MAIL on file for the patient     Check medication list thoroughly!!! AND RECONCILE OUTSIDE MEDICATIONS  If dose has changed change the entire order not just the MG  BE SURE TO ASK PATIENT IF THEY NEED MEDICATION REFILLS

## 2021-08-17 NOTE — TELEPHONE ENCOUNTER
From: Maria Elena Arias  To:  Kacy Bruno MD  Sent: 8/16/2021 4:14 PM EDT  Subject: Prescription Question    Every sence I had a kidney stone and a stent taken out I have heart burn ever when I only drink water

## 2021-08-24 NOTE — TELEPHONE ENCOUNTER
LM on  for patient to return call to discuss. No evidence of DVT or SVT in the bilateral common femoral vein, femoral vein, popliteal vein, greater saphenous vein or small saphenous vein. Significant reflux noted of the Right GSV at SFJ (1.3s). Right GSV from mid thigh to mid calf tortuous. There is approximately 15-20 cm from SFJ to mid calf for ablation. Significant reflux noted in the Left GSV at SFJ (0.8s). Left GSV from distal thigh to mid calf tortuous. Greater than 20 cm available for ablation. Recommendations   Advice thigh high pressure stockings, 20 to 30 mm of Hg. Keep feet elevated. Increase walking. Significant changes or pathology noted. Schedule for OV for follow up.

## 2021-08-30 NOTE — TELEPHONE ENCOUNTER
Per chart -   Patient has been given PPI in the past for daily use. Recent prescription of Pepcid for 14 day supply in June 2021    Options:  I can refill Pepcid   she can be seen in the walk-in clinic or by this provider in the office if availability is open  She can see PCP as scheduled in 3 weeks on 9/16.

## 2021-08-30 NOTE — PROGRESS NOTES
OFFICE PROGRESS NOTES      Thierry Wells is a 77 y.o. female who has    CHIEF COMPLAINT AS FOLLOWS:  CHEST PAIN: Patient denies any C/O chest pains at this time. SOB: No C/O SOB at this time. LEG EDEMA: Patient C/O Leg edema, prominent Leg veins, skin discoloration, night time cramps, aching & heavy Legs. PALPITATIONS: Denies any C/O Palpitations   DIZZINESS: No C/O Dizziness   SYNCOPE: None   OTHER: Being seen for CVI. HPI: Patient is here for F/U on her CAD,CVI, HTN & Dyslipidemia problems. CAD: Patient has known Hx of  CAD. CVI: Has B/L GSV Reflex. HTN: Patient has known Hx of essential HTN. Has been treated with guideline recommended medical / physical/ diet therapy as stated below. Dyslipidemia: Patient has known Hx of mixed dyslipidemia. Has been treated with guideline recommended medical / physical/ diet therapy as stated below. She does not have any new complaints at this time. Current Outpatient Medications   Medication Sig Dispense Refill    HYDROcodone-acetaminophen (NORCO) 5-325 MG per tablet Take 1 tablet by mouth every 6 hours as needed for Pain.       ondansetron (ZOFRAN ODT) 4 MG disintegrating tablet Take 1 tablet by mouth every 8 hours as needed for Nausea 15 tablet 0    buPROPion (WELLBUTRIN XL) 300 MG extended release tablet Take 1 tablet by mouth every morning 90 tablet 1    albuterol sulfate HFA (VENTOLIN HFA) 108 (90 Base) MCG/ACT inhaler Inhale 2 puffs into the lungs every 6 hours as needed for Shortness of Breath 1 Inhaler 5    fluticasone-salmeterol (WIXELA INHUB) 250-50 MCG/DOSE AEPB Inhale 1 puff into the lungs 2 times daily 1 Inhaler 5    furosemide (LASIX) 20 MG tablet Take 1 tablet by mouth daily 60 tablet 5    montelukast (SINGULAIR) 10 MG tablet Take 1 tablet by mouth nightly 90 tablet 3    busPIRone (BUSPAR) 7.5 MG tablet Take 1 tablet by mouth 2 times daily 180 tablet 3    levothyroxine (SYNTHROID) 125 MCG tablet Take 1 tablet by mouth every morning 90 tablet 3    loratadine (CLARITIN) 10 MG tablet Take 1 tablet by mouth daily 90 tablet 3    theophylline (THEODUR) 300 MG extended release tablet Take 1 tablet by mouth 2 times daily 60 tablet 5    albuterol (PROVENTIL) (2.5 MG/3ML) 0.083% nebulizer solution Take 3 mLs by nebulization 2 times daily as needed for Wheezing or Shortness of Breath 60 each 2     No current facility-administered medications for this visit. Allergies: Latex, Iodine, Levofloxacin, Pcn [penicillins], Sulfa antibiotics, Adhesive tape, Flexeril [cyclobenzaprine], Morphine, Zofran, Caffeine, Lidocaine, and Volumen [barium sulfate]  Review of Systems:    Constitutional: Negative for diaphoresis and fatigue  Respiratory: Negative for shortness of breath  Cardiovascular: Negative for chest pain, dyspnea on exertion, claudication, edema, irregular heartbeat, murmur, palpitations or shortness of breath  Musculoskeletal: Negative for muscle pain, muscular weakness, negative for pain in arm and leg or swelling in foot and leg    Objective:  /82   Pulse 83   Ht 5' 1\" (1.549 m)   Wt 238 lb 3.2 oz (108 kg)   LMP  (LMP Unknown)   BMI 45.01 kg/m²   Wt Readings from Last 3 Encounters:   08/30/21 238 lb 3.2 oz (108 kg)   08/04/21 242 lb 3.2 oz (109.9 kg)   07/20/21 240 lb (108.9 kg)     Body mass index is 45.01 kg/m². GENERAL - Alert, oriented, pleasant, in no apparent distress. EYES: No jaundice, no conjunctival pallor. Neck - Supple. No jugular venous distention noted. No carotid bruits. Cardiovascular  Normal S1 and S2 without obvious murmur or gallop. Extremities - No cyanosis, clubbing, There is significant edema with C4 changes. Pulmonary  No respiratory distress. No wheezes or rales.       Lab Review   Lab Results   Component Value Date    TROPONINT <0.010 09/12/2017    TROPONINT <0.010 07/08/2017     Lab Results   Component Value Date    BNP 12 06/10/2013    BNP 8

## 2021-08-30 NOTE — LETTER
Patient Name: Gautam King  : 1955  MRN# [id number]    REASON FOR VISIT:       Current Outpatient Medications   Medication Sig Dispense Refill    HYDROcodone-acetaminophen (NORCO) 5-325 MG per tablet Take 1 tablet by mouth every 6 hours as needed for Pain.  ondansetron (ZOFRAN ODT) 4 MG disintegrating tablet Take 1 tablet by mouth every 8 hours as needed for Nausea 15 tablet 0    famotidine (PEPCID) 20 MG tablet Take 1 tablet by mouth 2 times daily 14 tablet 0    buPROPion (WELLBUTRIN XL) 300 MG extended release tablet Take 1 tablet by mouth every morning 90 tablet 1    albuterol sulfate HFA (VENTOLIN HFA) 108 (90 Base) MCG/ACT inhaler Inhale 2 puffs into the lungs every 6 hours as needed for Shortness of Breath 1 Inhaler 5    fluticasone-salmeterol (WIXELA INHUB) 250-50 MCG/DOSE AEPB Inhale 1 puff into the lungs 2 times daily 1 Inhaler 5    furosemide (LASIX) 20 MG tablet Take 1 tablet by mouth daily 60 tablet 5    montelukast (SINGULAIR) 10 MG tablet Take 1 tablet by mouth nightly 90 tablet 3    busPIRone (BUSPAR) 7.5 MG tablet Take 1 tablet by mouth 2 times daily 180 tablet 3    levothyroxine (SYNTHROID) 125 MCG tablet Take 1 tablet by mouth every morning 90 tablet 3    loratadine (CLARITIN) 10 MG tablet Take 1 tablet by mouth daily 90 tablet 3    theophylline (THEODUR) 300 MG extended release tablet Take 1 tablet by mouth 2 times daily 60 tablet 5    albuterol (PROVENTIL) (2.5 MG/3ML) 0.083% nebulizer solution Take 3 mLs by nebulization 2 times daily as needed for Wheezing or Shortness of Breath 60 each 2     No current facility-administered medications for this visit. Smoke: What:                           How much:    Alcohol:                                      How Much:     Caffeine: Pop:         Tea:            Coffee:                Chocolate:    Exercise:    Labs: Lipids:             CBC:       BMP/CMP:          TSH:              A1C:    Last Visit:  Complaints:  Changes:    Last EKG:    VL DUP LOWER EXTREMITY VENOUS BILATERAL: 8/2021  No evidence of DVT or SVT in the bilateral common femoral vein, femoral    vein, popliteal vein, greater saphenous vein or small saphenous vein.    Significant reflux noted of the Right GSV at SFJ (1.3s).  Right GSV from mid thigh to mid calf tortuous. There is approximately 15-20    cm from SFJ to mid calf for ablation.    Significant reflux noted in the Left GSV at SFJ (0.8s).  Left GSV from distal thigh to mid calf tortuous. Greater than 20 cm    available for ablation     Possible Varithena ???    STRESS TEST:  9/2019  Abnormal Study.    Mild anterior wall Ischemia of a medium sized territory.    Possibility of breast artifact causing the abnormality cannot be excluded.    Normal LV function. LVEF is > 70 %       ECHO: 8/2019   Left ventricular systolic function is normal with an ejection fraction of   55-60%. Grade I diastolic dysfunction. Mild septal wall asymmetrical left ventricular hypertrophy. Doppler evaluation reveals trace aortic, mild mitral, and mild tricuspid   regurgitation. No evidence of pericardial effusion    CAROTID: NONE    MUGA: NONE    LAST PACER CHECK: NONE    CARDIAC CATH: 9/2019  normal coronaries    Amio Protocol:    CHADS: [id number] Score for Atrial Fibrillation Stroke Risk   Risk   Factors  Component Value   C CHF No 0   H HTN No 0   A2 Age >= 76 No,  (68 y.o.) 0   D DM No 0   S2 Prior Stroke/TIA No 0   V Vascular Disease No 0   A Age 74-69 Yes,  (68 y.o.) 1   Sc Sex female 1    [id number]  Score  2   Score last updated 8/30/21 1:44 AM EDT    Click here for a link to the UpToDate guideline \"Atrial Fibrillation: Anticoagulation therapy to prevent embolization    Disclaimer: Risk Score calculation is dependent on accuracy of patient problem list and past encounter diagnosis.

## 2021-08-30 NOTE — LETTER
[facility]  [address] [address]  Phone: [phone]  Fax: [phone]    Isaac Gomes MD    August 30, 2021     Antwan Aguila MD  [address] [place] [address]    Patient: Raisa Draper   MR Number: [id number]   YOB: 1955   Date of Visit: 8/30/2021       Dear Antwan Aguila: Thank you for referring Margaret Cohen to me for evaluation/treatment. Below are the relevant portions of my assessment and plan of care. If you have questions, please do not hesitate to call me. I look forward to following Concepcion Hathaway along with you.     Sincerely,        Isaac Gomes MD

## 2021-08-30 NOTE — PATIENT INSTRUCTIONS
CVI:yes,    Patient has B/L symptomatic C4 disease   US reveals significant reflux of right GSV  left GSV   OTHER RELEVANT DIAGNOSIS:as noted in patient's active problem list:  TESTS ORDERED:   Serial ablations of both GSVs.  All previously ordered tests reviewed. MEDICATIONS: CPM   Office f/u in a month after the procedures.

## 2021-09-16 NOTE — PROGRESS NOTES
March Clipper  1955  09/18/21    Chief Complaint   Patient presents with    6 Month Follow-Up    Hypothyroidism    COPD    Leg Swelling           Patient here for 6 months f/u regarding hypothyroidism, COPD, depression, and leg swelling. Patient doing fine, her COPD stable, taking her thyroid medication, her depression under control, leg swelling better. Past Medical History:   Diagnosis Date    Anxiety 01/28/2019    Arthritis     Asthma     CAD (coronary artery disease)     COPD (chronic obstructive pulmonary disease) ([address]     H/O cardiac catheterization 09/17/2019     normal coronaries    H/O cardiovascular stress test     H/O Doppler carotid ultrasound 08/07/2019    Mild (0-49%) disease of the Bilateral Internal carotid artery.  H/O Doppler lower venous ultrasound 08/07/2019    Significant reflux noted of the Right GSV. Right GSV too small currently for ablation procedure. Significant reflux noted in the Left GSV at mid thigh . Left GSV difficult to follow between mid thigh and mid calf due to multiple  splits/branches. GSV knee tributary is very superficial.    H/O echocardiogram 08/07/2019    EF 55-60%, Mild MR & TR.    Hepatic steatosis 06/19/2015    History of nuclear stress test 09/05/2019     Mild anterior wall Ischemia of a medium sized territory. Possibility of breast artifact causing the abnormality cannot be excluded.     Hx of Venous Doppler ultrasound 08/23/2021    No DVT or SVT, Significant reflux in RGSV, LGSV    Hyperlipidemia     Hypertension     Hypothyroid      Past Surgical History:   Procedure Laterality Date    CARPAL TUNNEL RELEASE Left     CHOLECYSTECTOMY      COLONOSCOPY  01/28/2019    Polyps x4, Internal grade 1 hemorrhoids    COLONOSCOPY N/A 1/28/2019    COLONOSCOPY POLYPECTOMY SNARE/COLD BIOPSY performed by Christophe Manrique MD at [address] Right 6/21/2021    CYSTOSCOPY RIGHT RETROGRADE PYLEOGRAMS RIGHT STONE MANIPULATION performed by Tere Hickman MD at [address], COLON, DIAGNOSTIC  09/03/2019    biopsy f/u with pcp    HYSTERECTOMY      due to bleeding. complete    THYROIDECTOMY  2009    TONSILLECTOMY      TUBAL LIGATION      UPPER GASTROINTESTINAL ENDOSCOPY N/A 9/4/2019    [facility] [address]      Family History   Problem Relation Age of Onset    Diabetes Mother     Hypertension Mother     Depression Mother         suicide    Diabetes Father     Depression Father     Diabetes Brother     Birth Defects Maternal Grandmother     Hypertension Maternal Grandmother     Diabetes Sister     Depression Brother     Diabetes Sister     Diabetes Brother     Other Brother         black mold    Diabetes Brother     Other Daughter         GI issues    Asthma Daughter     Seizures Daughter      Social History     Socioeconomic History    Marital status:      Spouse name: Not on file    Number of children: 2    Years of education: 15    Highest education level: GED or equivalent   Occupational History    Occupation: unemployed   Tobacco Use    Smoking status: Never Smoker    Smokeless tobacco: Never Used   Vaping Use    Vaping Use: Never used   Substance and Sexual Activity    Alcohol use: No    Drug use: No    Sexual activity: Not Currently     Partners: Male     Birth control/protection: Post-menopausal   Other Topics Concern    Not on file   Social History Narrative    Not on file     Social Determinants of Health     Financial Resource Strain:     Difficulty of Paying Living Expenses:    Food Insecurity:     Worried About Running Out of Food in the Last Year:     [address] in the Last Year:    Transportation Needs:     Lack of Transportation (Medical):      Lack of Transportation (Non-Medical):    Physical Activity:     Days of Exercise per Week:     Minutes of Exercise per Session:    Stress:     Feeling of Stress : Social Connections:     Frequency of Communication with Friends and Family:     Frequency of Social Gatherings with Friends and Family:     Attends Nondenominational Services:     Active Member of Clubs or Organizations:     Attends Club or Organization Meetings:     Marital Status:    Intimate Partner Violence:     Fear of Current or Ex-Partner:     Emotionally Abused:     Physically Abused:     Sexually Abused: Allergies   Allergen Reactions    Latex Itching    Iodine Anaphylaxis    Levofloxacin Anaphylaxis    Pcn [Penicillins] Anaphylaxis    Sulfa Antibiotics Anaphylaxis    Adhesive Tape     Flexeril [Cyclobenzaprine]     Morphine Itching    Zofran Itching    Caffeine Palpitations     Doesn't sleep    Lidocaine Nausea And Vomiting    Volumen [Barium Sulfate] Itching, Nausea And Vomiting and Rash     Current Outpatient Medications   Medication Sig Dispense Refill    fluticasone-salmeterol (ADVAIR) 250-50 MCG/DOSE AEPB inhale 1 puff by mouth and INTO THE LUNGS twice a day 1 each 0    buPROPion (WELLBUTRIN XL) 300 MG extended release tablet take 1 tablet by mouth every morning 30 tablet 0    HYDROcodone-acetaminophen (NORCO) 5-325 MG per tablet Take 1 tablet by mouth every 6 hours as needed for Pain.       albuterol sulfate HFA (VENTOLIN HFA) 108 (90 Base) MCG/ACT inhaler Inhale 2 puffs into the lungs every 6 hours as needed for Shortness of Breath 1 Inhaler 5    furosemide (LASIX) 20 MG tablet Take 1 tablet by mouth daily 60 tablet 5    montelukast (SINGULAIR) 10 MG tablet Take 1 tablet by mouth nightly 90 tablet 3    busPIRone (BUSPAR) 7.5 MG tablet Take 1 tablet by mouth 2 times daily 180 tablet 3    levothyroxine (SYNTHROID) 125 MCG tablet Take 1 tablet by mouth every morning 90 tablet 3    loratadine (CLARITIN) 10 MG tablet Take 1 tablet by mouth daily 90 tablet 3    theophylline (THEODUR) 300 MG extended release tablet Take 1 tablet by mouth 2 times daily 60 tablet 5    albuterol (PROVENTIL) (2.5 MG/3ML) 0.083% nebulizer solution Take 3 mLs by nebulization 2 times daily as needed for Wheezing or Shortness of Breath 60 each 2    famotidine (PEPCID) 20 MG tablet Take 1 tablet by mouth 2 times daily for 14 days 28 tablet 0     No current facility-administered medications for this visit. Review of Systems   Constitutional: Negative for activity change, appetite change, chills, fatigue and fever. HENT: Negative for congestion. Respiratory: Negative for cough, chest tightness, shortness of breath and wheezing. Cardiovascular: Positive for leg swelling (better). Negative for chest pain. Gastrointestinal: Negative for abdominal pain. Genitourinary: Negative for dysuria and frequency. Musculoskeletal: Negative for back pain. Skin: Negative for rash. Neurological: Negative for dizziness, weakness and headaches. Psychiatric/Behavioral: Negative for agitation, behavioral problems and sleep disturbance. The patient is not nervous/anxious.         Lab Results   Component Value Date    WBC 9.9 07/20/2021    HGB 14.4 07/20/2021    HCT 44.0 07/20/2021    MCV 95.9 07/20/2021     07/20/2021     Lab Results   Component Value Date     (H) 09/17/2021    K 4.1 09/17/2021     09/17/2021    CO2 27 09/17/2021    BUN 20 09/17/2021    CREATININE 0.7 09/17/2021    GLUCOSE 112 (H) 09/17/2021    CALCIUM 9.5 09/17/2021    PROT 6.3 (L) 07/20/2021    LABALBU 4.0 07/20/2021    BILITOT 0.2 07/20/2021    ALKPHOS 81 07/20/2021    AST 17 07/20/2021    ALT 14 07/20/2021    LABGLOM >60 09/17/2021    GFRAA >60 09/17/2021    AGRATIO 1.8 06/19/2018    GLOB 2.3 06/19/2018     Lab Results   Component Value Date    CHOL 182 09/17/2021    CHOL 187 04/25/2017    CHOL 196 03/30/2015     Lab Results   Component Value Date    TRIG 95 09/17/2021    TRIG 159 (H) 04/25/2017    TRIG 126 03/30/2015     Lab Results   Component Value Date    HDL 45 09/17/2021    HDL 50 04/25/2017    HDL 56 03/30/2015     Lab Results   Component Value Date    LDLCALC 105 (H) 04/25/2017    LDLCALC 105 (H) 07/30/2012    LDLCALC 105 (H) 01/10/2012     Lab Results   Component Value Date    LABA1C 5.4 09/16/2021     Lab Results   Component Value Date    TSHFT4 2.52 11/14/2019    TSH 1.24 06/19/2018    TSHHS 3.420 09/17/2021         /84 (Site: Left Upper Arm, Position: Sitting, Cuff Size: Large Adult)   Pulse 74   Wt 245 lb 3.2 oz (111.2 kg)   LMP  (LMP Unknown)   SpO2 96%   BMI 46.33 kg/m²     BP Readings from Last 3 Encounters:   09/18/21 (!) 146/83   09/16/21 126/84   08/30/21 130/82       Wt Readings from Last 3 Encounters:   09/16/21 245 lb 3.2 oz (111.2 kg)   08/30/21 238 lb 3.2 oz (108 kg)   08/04/21 242 lb 3.2 oz (109.9 kg)         Physical Exam  Constitutional:       General: She is not in acute distress. Appearance: She is well-developed. She is not diaphoretic. HENT:      Head: Normocephalic and atraumatic. Cardiovascular:      Rate and Rhythm: Normal rate and regular rhythm. Heart sounds: Normal heart sounds. No murmur heard. Pulmonary:      Effort: Pulmonary effort is normal.      Breath sounds: Normal breath sounds. No wheezing. Musculoskeletal:         General: Normal range of motion. Cervical back: Normal range of motion and neck supple. Right lower leg: No edema. Left lower leg: No edema. Neurological:      Mental Status: She is alert and oriented to person, place, and time. Psychiatric:         Behavior: Behavior normal.         ASSESSMENT/ PLAN:    1. Hypothyroidism, unspecified type  - didn't do the blood work yet    2. COPD, moderate ([address]  - stable    3. Major depressive disorder, recurrent episode, mild (HCC)  - stable    4. Pedal edema  - better    5.  Hyperglycemia  - from the blood work has high sugar so:  - POCT glycosylated hemoglobin (Hb A1C) WNL              - All old blood work reviewed with the patient  - Appropriate prescription are addressed. - After visit summery provided. - Questions answered and patient verbalizes understanding.  - Call for any problem, questions, or concerns. Return in about 6 months (around 3/16/2022).

## 2021-09-17 NOTE — PROGRESS NOTES
VENOUS PRE-PROCEDURE H & P  9/17/2021    Subjective:  Maria Eugenia Hickman is a 77 y.o. female    GENERAL - A-Ox3- In no respiratory distress  Heart - Regular rhythm, normal S1, S2, no gallops, no murmurs, no friction rubs  Chest - CTA & percussion    Vein Exam:     Right ext - varicosities, spider and reticular veins Yes, skin changes Yes, ulcers No, edema Yes    Left ext  - varicosities, spider and reticular veins Yes, skin changes Yes, ulcers No, edema Yes    Reflex Study:    No evidence of DVT or SVT in the bilateral common femoral vein, femoral    vein, popliteal vein, greater saphenous vein or small saphenous vein.    Significant reflux noted of the Right GSV at SFJ (1.3s).  Right GSV from mid thigh to mid calf tortuous. There is approximately 15-20    cm from SFJ to mid calf for ablation.    Significant reflux noted in the Left GSV at SFJ (0.8s).  Left GSV from distal thigh to mid calf tortuous. Greater than 20 cm    available for ablation. Assessment:   Patient has symptomatic C4 venous disease. Plan:   Ablation of Left GSV  Informed consent obtained.     Cezar Triana MD, [facility]

## 2021-09-17 NOTE — PROGRESS NOTES
Endovenous Ablation with Varithena Operative Report    9/17/2021    Subjective:  Orion Barone is a 77 y.o. female    Informed consent obtained    Material used:  #5 F micropuncture sheath set with 3 way stop cock. 5 cc of freshly prepared material injected under US guidance. Procedure Performed:    Endovenous Ablation of the Great Saphenous Vein with Varithena Closure System of the  Left side. Indication  Duplex ultrasound was used to map out the insufficient saphenous vein, and access was determined and marked on the overlying skin. The depth and diameter of the vein(s) to be treated was documented. The patient was placed supine on the procedure table and the leg was prepped and draped using sterile technique. Ultrasound guidance was again used to localize the access site. 1% lidocaine was injected as a local anesthetic in the subcutaneous tissues at the target location in the GSV in the lower leg. Using ultrasound guidance, access was gained at this location with the 21 gauge thin walled access needle and followed by introduction of a short guidewire, location confirmed with ultrasound. A small, 3 mm incision was made at the access site to allow for introduction and placement of the 5 Fr x7cm introducer/dilator. The dilator and guidewire were removed. The remaining sheath was flushed with sterile saline, with the syringe remaining in place prior to the next steps. Once Perforators were protected, Varithena solution was injected at a rate of 1cc / sec. US guidance was used to confirm the progression of foam train. Proximal GSV: Elta Broccoli was slowly administered at 1.0cc/second with close observation by ultrasound of its course in the GSV. When delivery arrived at approximately 3-5 cm from the SFJ, the GSV was compressed to prevent flow into the common femoral vein.   Further administration of Elta Broccoli was stopped at this point and the treated GSV was observed for spasm with ultrasound over a period of 3-5 minutes. After the administration of Sukhjinder Dienes the patient was asked to dorsifelx the ankle to limit flow of foam into perforating veins. Once appropriate spasm had been confirmed in the treated veins, the introducer sheath was pulled out from the access site. Hemostasis was achieved with manual compression and an adhesive bandage was applied to the incision. Ultrasound confirmed incomplete coaptation and closure of the treated segments of the GSV, and the absence of any DVT at the saphenofemoral junction. Treatment dose was approximately 5 ml and the vein length treated was >20 cm. Once satisfactory results were obtained, procedure concluded. The drapes were removed and the patient cleaned and prepared for discharge. Post op ultrasound check is scheduled for   48- 72 hours and the patient was given written post-op instructions. Complications: none immediate    Blood Loss: minimal    Conclusion:   Successful Endovenous Ablation of the Great Saphenous Vein with Varithena chemical ablation System of the  Left side.       Polly Duffy MD, [facility] [place]

## 2021-09-19 NOTE — ED NOTES
Patient discharge, and follow up reviewed with patient, verbalizes understanding and denies questions. Patient appears in no acute distress; A+Ox4, respirations equal and unlabored, denies pain, skin warm and dry. Patient with all belongings and in wheelchair from ED to ED waiting area without incident.       Salomon Singh RN  09/18/21 2008

## 2021-09-19 NOTE — ED PROVIDER NOTES
D[place]        PCP: Yolis Chacko MD    CHIEF COMPLAINT    Chief Complaint   Patient presents with    Other     per EMS patient had a procedure yesterday to \"put glue in her veins. \" Patient reortedly called 911, because the kerlix was coming undone. Medics asked when it was due to come off and she stated tomorrow. Medics report that they offered to rewrap her Kerlix and she refused, and requested to be transported to the ED. This patient was not evaluated by the attending physician. I have independently evaluated this patient . HPI    Meena Garay is a 77 y.o. female who presents to the emergency department today via EMS for a wound check encounter. Patient had a vein ablation yesterday, the dressing was moving down her thigh so she called EMS to be evaluated. She is only here to have her dressing change in her wound evaluated. Has no significant pain in this area of redness, drainage. REVIEW OF SYSTEMS    General:   Denies symptoms preceding injury. Skin:  SEE HPI  Musculoskeletal:  No distal numbness, tingling. No obvious tendon or motor deficits. Denies any other musculoskeletal injuries or skin trauma. All other review of systems are negative  See HPI and nursing notes for additional information     PAST MEDICAL & SURGICAL HISTORY    Past Medical History:   Diagnosis Date    Anxiety 01/28/2019    Arthritis     Asthma     CAD (coronary artery disease)     COPD (chronic obstructive pulmonary disease) ([facility] [address]     H/O cardiac catheterization 09/17/2019     normal coronaries    H/O cardiovascular stress test     H/O Doppler carotid ultrasound 08/07/2019    Mild (0-49%) disease of the Bilateral Internal carotid artery.  H/O Doppler lower venous ultrasound 08/07/2019    Significant reflux noted of the Right GSV. Right GSV too small currently for ablation procedure. Significant reflux noted in the Left GSV at mid thigh .   Left GSV difficult to follow between mid thigh and mid calf due to multiple  splits/branches. GSV knee tributary is very superficial.    H/O echocardiogram 08/07/2019    EF 55-60%, Mild MR & TR.    Hepatic steatosis 06/19/2015    History of nuclear stress test 09/05/2019     Mild anterior wall Ischemia of a medium sized territory. Possibility of breast artifact causing the abnormality cannot be excluded.  Hx of Venous Doppler ultrasound 08/23/2021    No DVT or SVT, Significant reflux in RGSV, LGSV    Hyperlipidemia     Hypertension     Hypothyroid      Past Surgical History:   Procedure Laterality Date    CARPAL TUNNEL RELEASE Left     CHOLECYSTECTOMY      COLONOSCOPY  01/28/2019    Polyps x4, Internal grade 1 hemorrhoids    COLONOSCOPY N/A 1/28/2019    COLONOSCOPY POLYPECTOMY SNARE/COLD BIOPSY performed by Brandie Zavala MD at [address] 6/21/2021    CYSTOSCOPY RIGHT RETROGRADE PYLEOGRAMS RIGHT STONE MANIPULATION performed by Caroline Pinedo MD at [address], COLON, DIAGNOSTIC  09/03/2019    biopsy f/u with pcp    HYSTERECTOMY      due to bleeding. complete    THYROIDECTOMY  2009    TONSILLECTOMY      TUBAL LIGATION      UPPER GASTROINTESTINAL ENDOSCOPY N/A 9/4/2019    EGD BIOPSY performed by Esteban Geller MD at [address] [place]        CURRENT MEDICATIONS    Current Outpatient Rx   Medication Sig Dispense Refill    fluticasone-salmeterol (ADVAIR) 250-50 MCG/DOSE AEPB inhale 1 puff by mouth and INTO THE LUNGS twice a day 1 each 0    buPROPion (WELLBUTRIN XL) 300 MG extended release tablet take 1 tablet by mouth every morning 30 tablet 0    famotidine (PEPCID) 20 MG tablet Take 1 tablet by mouth 2 times daily for 14 days 28 tablet 0    HYDROcodone-acetaminophen (NORCO) 5-325 MG per tablet Take 1 tablet by mouth every 6 hours as needed for Pain.       albuterol sulfate HFA (VENTOLIN HFA) 108 (90 Base) MCG/ACT inhaler Inhale 2 puffs into the lungs every 6 hours as needed for Shortness of Breath 1 Inhaler 5    furosemide (LASIX) 20 MG tablet Take 1 tablet by mouth daily 60 tablet 5    montelukast (SINGULAIR) 10 MG tablet Take 1 tablet by mouth nightly 90 tablet 3    busPIRone (BUSPAR) 7.5 MG tablet Take 1 tablet by mouth 2 times daily 180 tablet 3    levothyroxine (SYNTHROID) 125 MCG tablet Take 1 tablet by mouth every morning 90 tablet 3    loratadine (CLARITIN) 10 MG tablet Take 1 tablet by mouth daily 90 tablet 3    theophylline (THEODUR) 300 MG extended release tablet Take 1 tablet by mouth 2 times daily 60 tablet 5    albuterol (PROVENTIL) (2.5 MG/3ML) 0.083% nebulizer solution Take 3 mLs by nebulization 2 times daily as needed for Wheezing or Shortness of Breath 60 each 2       ALLERGIES    Allergies   Allergen Reactions    Latex Itching    Iodine Anaphylaxis    Levofloxacin Anaphylaxis    Pcn [Penicillins] Anaphylaxis    Sulfa Antibiotics Anaphylaxis    Adhesive Tape     Flexeril [Cyclobenzaprine]     Morphine Itching    Zofran Itching    Caffeine Palpitations     Doesn't sleep    Lidocaine Nausea And Vomiting    Volumen [Barium Sulfate] Itching, Nausea And Vomiting and Rash       SOCIAL & FAMILY HISTORY    Social History     Socioeconomic History    Marital status:      Spouse name: None    Number of children: 2    Years of education: 15    Highest education level: GED or equivalent   Occupational History    Occupation: unemployed   Tobacco Use    Smoking status: Never Smoker    Smokeless tobacco: Never Used   Vaping Use    Vaping Use: Never used   Substance and Sexual Activity    Alcohol use: No    Drug use: No    Sexual activity: Not Currently     Partners: Male     Birth control/protection: Post-menopausal   Other Topics Concern    None   Social History Narrative    None     Social Determinants of Health     Financial Resource Strain:     Difficulty of Paying Living Expenses:    Food Insecurity:     Worried About Running Out of Food in the Last Year:    [address] in the Last Year:    Transportation Needs:     Lack of Transportation (Medical):  Lack of Transportation (Non-Medical):    Physical Activity:     Days of Exercise per Week:     Minutes of Exercise per Session:    Stress:     Feeling of Stress :    Social Connections:     Frequency of Communication with Friends and Family:     Frequency of Social Gatherings with Friends and Family:     Attends Christian Services:     Active Member of Clubs or Organizations:     Attends Club or Organization Meetings:     Marital Status:    Intimate Partner Violence:     Fear of Current or Ex-Partner:     Emotionally Abused:     Physically Abused:     Sexually Abused:      Family History   Problem Relation Age of Onset    Diabetes Mother     Hypertension Mother     Depression Mother         suicide    Diabetes Father     Depression Father     Diabetes Brother     Birth Defects Maternal Grandmother     Hypertension Maternal Grandmother     Diabetes Sister     Depression Brother     Diabetes Sister     Diabetes Brother     Other Brother         black mold    Diabetes Brother     Other Daughter         GI issues    Asthma Daughter     Seizures Daughter            PHYSICAL EXAM    VITAL SIGNS: BP (!) 146/83   Pulse 72   Temp 98.2 °F (36.8 °C)   Resp 15   LMP  (LMP Unknown)   SpO2 95%   Constitutional:  Well developed, Appears comfortable  HEENT:  Normocephalic, Atraumatic. PERRL, EOMI. Ear canals, nasal passages, oropharynx clear of blood or clear fluid. Musculoskeletal:  No gross deformities. No motor deficits. Distal sensation and capillary refill intact. Vascular: Distal pulses and capillary refill intact. Integument:    On inspection there is an appropriately healing surgical wound without signs of significant erythema or redness, no induration. No significant drainage, crepitus noted      See below for further details.     Neurologic: Awake and alert, normal flow of speech. CN 2-12 intact. Psychiatric: Cooperative, pleasant affect    ED COURSE & MEDICAL DECISION MAKING      Patient presents as above. Patient here for a wound check/dressing change from a vein ablation. Overall the wound appears to be healing appropriately no signs superimposed infection. No significant tenderness, bruising noted. Will replace dressing. Advised to follow-up with cardiology. Will discharge back home. All pertinent Lab data and radiographic results reviewed with patient at bedside. The patient and/or the family were informed of the results of any tests/labs/imaging, the treatment plan, and time was allotted to answer questions. Clinical  IMPRESSION    1. Encounter for postoperative wound check          Comment: Please note this report has been produced using speech recognition software and may contain errors related to that system including errors in grammar, punctuation, and spelling, as well as words and phrases that may be inappropriate. If there are any questions or concerns please feel free to contact the dictating provider for clarification.         Dwayne Levine 411, PA  09/18/21 2022

## 2021-10-01 NOTE — PROGRESS NOTES
Endovenous Ablation with Varithena Operative Report    10/1/2021    Procedure could not be performed because either there is no straight segment available with too may tortuosities or small size with branches or being too deep & rolling vein. Explained to the patient. Procedure stopped after trying for half an hour or so. No complications.

## 2021-10-01 NOTE — PROGRESS NOTES
VENOUS PRE-PROCEDURE H & P  10/1/2021    Subjective:  Rossy Geller is a 77 y.o. female    GENERAL - A-Ox3- In no respiratory distress  Heart - Regular rhythm, normal S1, S2, no gallops, no murmurs, no friction rubs  Chest - CTA & percussion    Vein Exam:   Right ext - varicosities, spider and reticular veins Yes, skin changes Yes, ulcers No, edema Yes    Left ext  - varicosities, spider and reticular veins Yes, skin changes Yes, ulcers No, edema Yes    Reflex Study:    No evidence of DVT or SVT in the bilateral common femoral vein, femoral    vein, popliteal vein, greater saphenous vein or small saphenous vein.    Significant reflux noted of the Right GSV at SFJ (1.3s).  Right GSV from mid thigh to mid calf tortuous. There is approximately 15-20    cm from SFJ to mid calf for ablation.    Significant reflux noted in the Left GSV at SFJ (0.8s).  Left GSV from distal thigh to mid calf tortuous. Greater than 20 cm    available for ablation. Assessment:   Patient has symptomatic C4 venous disease. Plan:   aBLATION OF RIGHT gsv  Informed consent obtained.     Rose Price MD, [facility] [place]

## 2021-10-04 NOTE — TELEPHONE ENCOUNTER
[address]  Endoscopy Preprocedure Instructions      1. On the day of your surgery, please report to registration located on the 2nd floor of the  [facility]. yes    2. You must have a responsible adult to drive you to the hospital, stay at the hospital during your procedure and drive you home. If they leave your procedure will not be started (no exceptions). yes    3. Do not have anything to eat or drink (including water, gum, mints, coffee, and juice) after midnight. This does not apply to the medications you were instructed to take by your physician. yes  If you are currently taking Plavix, Coumadin, Aspirin, or other blood-thinning agents, contact your physician for special instructions. yes    4. If you are having a procedure that requires bowel prep: We recommend that you have only clear liquids the day before your procedure and begin your bowel prep by 5:00 pm.  You may continue to drink clear liquids until midnight. If for any reason you are not able to complete your prep please notify your physician immediately. yes    5. Have a list of all current medications, including vitamins, herbal supplements and any other over the counter medications. yes    6. If you wear glasses, contacts, dentures and/or hearing aids, they may be removed prior to procedure, please bring a case to store them in. yes    7. You should understand that if you do not follow these instructions your procedure may be cancelled. If your physical condition changes (I.e. fever, cold or flu) please contact your doctor as soon as possible. 8. It is important that you be on time. If for any reason you are unable to keep your appointment please call [phone] the day of or your physicians office prior to your scheduled procedure    9.  Have you received your COVID Vaccine? yes If no, you will need to receive a COVID test/swab here at [address] [place] [address] the [facility] parking lot Monday - Friday 8a - 11am. There are no Saturday Patient may need a pre-authorization for her Nexium. She has been on Prilosec, Zantac and Pepcid in the past.  Also, please schedule follow-up in clinic in 3 months. Thank you. or Sunday swabbing at any [address] [facility] facility. (patient verbalizes understanding) yes    Pt verbalizes understanding that her  must remain present for the entire duration of her procedure. Pt has COVID test done today at [facility], resutls currently pending.

## 2021-11-09 NOTE — PROGRESS NOTES
OFFICE PROGRESS NOTES      Kwame Escobar is a 77 y.o. female who has    CHIEF COMPLAINT AS FOLLOWS:  CHEST PAIN: Patient denies any C/O chest pains at this time. SOB: No C/O SOB at this time. LEG EDEMA: Says Has a bump on the Left Leg which is painful ? B/L Lower extremity edema is present but no change over previous. PALPITATIONS: Denies any C/O Palpitations   DIZZINESS: No C/O Dizziness   SYNCOPE: None   OTHER:                                     HPI: Patient is here for F/U on her CAD, CVI,  Arrhythmia, HTN & Dyslipidemia problems. CAD: Patient has known CAD. CVI: S/P Left GSV ablation. HTN: Patient has known essential HTN. Has been treated with guideline recommended medical / physical/ diet therapy as stated below. Dyslipidemia: Patient has known mixed dyslipidemia. Has been treated with guideline recommended medical / physical/ diet therapy as stated below.                 Current Outpatient Medications   Medication Sig Dispense Refill    albuterol sulfate HFA (VENTOLIN HFA) 108 (90 Base) MCG/ACT inhaler Inhale 2 puffs into the lungs every 6 hours as needed for Shortness of Breath 1 each 5    theophylline (THEODUR) 300 MG extended release tablet Take 1 tablet by mouth 2 times daily 60 tablet 5    loratadine (CLARITIN) 10 MG tablet Take 1 tablet by mouth daily 90 tablet 3    furosemide (LASIX) 20 MG tablet Take 1 tablet by mouth daily 60 tablet 5    budesonide (RINOCORT AQUA) 32 MCG/ACT nasal spray 1 spray by Each Nostril route daily 1 each 3    fluticasone-salmeterol (ADVAIR) 250-50 MCG/DOSE AEPB inhale 1 puff by mouth and INTO THE LUNGS twice a day 1 each 0    buPROPion (WELLBUTRIN XL) 300 MG extended release tablet take 1 tablet by mouth every morning 30 tablet 0    famotidine (PEPCID) 20 MG tablet Take 1 tablet by mouth 2 times daily for 14 days 28 tablet 0    HYDROcodone-acetaminophen (NORCO) 5-325 MG per tablet Take 1 tablet by mouth every 6 hours as needed for Pain.  montelukast (SINGULAIR) 10 MG tablet Take 1 tablet by mouth nightly 90 tablet 3    busPIRone (BUSPAR) 7.5 MG tablet Take 1 tablet by mouth 2 times daily 180 tablet 3    levothyroxine (SYNTHROID) 125 MCG tablet Take 1 tablet by mouth every morning 90 tablet 3    albuterol (PROVENTIL) (2.5 MG/3ML) 0.083% nebulizer solution Take 3 mLs by nebulization 2 times daily as needed for Wheezing or Shortness of Breath 60 each 2     No current facility-administered medications for this visit. Allergies: Latex, Iodine, Levofloxacin, Pcn [penicillins], Sulfa antibiotics, Adhesive tape, Flexeril [cyclobenzaprine], Morphine, Zofran, Caffeine, Lidocaine, and Volumen [barium sulfate]  Review of Systems:    Constitutional: Negative for diaphoresis and fatigue  Respiratory: Negative for shortness of breath  Cardiovascular: Negative for chest pain, dyspnea on exertion, claudication, edema, irregular heartbeat, murmur, palpitations or shortness of breath  Musculoskeletal: Negative for muscle pain, muscular weakness, negative for pain in arm and leg or swelling in foot and leg    Objective:  /80   Pulse 78   Ht 5' 1\" (1.549 m)   Wt 235 lb 9.6 oz (106.9 kg)   LMP  (LMP Unknown)   BMI 44.52 kg/m²   Wt Readings from Last 3 Encounters:   11/09/21 235 lb 9.6 oz (106.9 kg)   09/16/21 245 lb 3.2 oz (111.2 kg)   08/30/21 238 lb 3.2 oz (108 kg)     Body mass index is 44.52 kg/m². GENERAL - Alert, oriented, pleasant, in no apparent distress. EYES: No jaundice, no conjunctival pallor. Neck - Supple. No jugular venous distention noted. No carotid bruits. Cardiovascular  Normal S1 and S2 without obvious murmur or gallop. Extremities - No cyanosis, clubbing, or significant edema. ON exam there are no tender or Lumpy area or discoloration. I examined patient along with my MA Danish. Pulmonary  No respiratory distress. No wheezes or rales.       Lab Review   Lab Results   Component Value Date TROPONINT <0.010 09/12/2017    TROPONINT <0.010 07/08/2017     Lab Results   Component Value Date    BNP 12 06/10/2013    BNP 8 10/25/2010    PROBNP 72.60 07/08/2017     Lab Results   Component Value Date    INR 0.88 09/16/2019    INR 0.97 07/08/2017     Lab Results   Component Value Date    LABA1C 5.4 09/16/2021    LABA1C 5.5 06/05/2019     Lab Results   Component Value Date    WBC 9.9 07/20/2021    WBC 11.5 (H) 06/29/2021    HCT 44.0 07/20/2021    HCT 44.6 06/29/2021    MCV 95.9 07/20/2021    MCV 95.3 06/29/2021     07/20/2021     06/29/2021     Lab Results   Component Value Date    CHOL 182 09/17/2021    CHOL 187 04/25/2017    TRIG 95 09/17/2021    TRIG 159 (H) 04/25/2017    HDL 45 09/17/2021    HDL 50 04/25/2017    LDLCALC 105 (H) 04/25/2017    LDLCALC 105 (H) 07/30/2012    LDLDIRECT 115 (H) 09/17/2021    LDLDIRECT 124 (H) 03/30/2015     Lab Results   Component Value Date    ALT 14 07/20/2021    ALT 11 06/29/2021    AST 17 07/20/2021    AST 12 (L) 06/29/2021     BMP:    Lab Results   Component Value Date     09/17/2021     07/20/2021    K 4.1 09/17/2021    K 3.6 07/20/2021     09/17/2021     07/20/2021    CO2 27 09/17/2021    CO2 22 07/20/2021    BUN 20 09/17/2021    BUN 16 07/20/2021    CREATININE 0.7 09/17/2021    CREATININE 0.6 07/20/2021     CMP:   Lab Results   Component Value Date     09/17/2021     07/20/2021    K 4.1 09/17/2021    K 3.6 07/20/2021     09/17/2021     07/20/2021    CO2 27 09/17/2021    CO2 22 07/20/2021    BUN 20 09/17/2021    BUN 16 07/20/2021    CREATININE 0.7 09/17/2021    CREATININE 0.6 07/20/2021    PROT 6.3 07/20/2021    PROT 6.2 06/29/2021    PROT 6.4 07/30/2012    PROT 6.6 04/10/2012     Lab Results   Component Value Date    TSH 1.24 06/19/2018    TSH 2.43 04/25/2017    TSHHS 3.420 09/17/2021    TSHHS 4.450 07/18/2019     VENOUS US 9/23/2021    Left CFV is patent with good compressibility and respirophasic signal with  good augmentation.    The Left GSV is non-compressible with no evidence of flow just past the    saphenofemoral junction to the knee. QUALITY MEASURES REVIEWED:  1.CAD:Patient is taking anti platelet agent:No  Patient does not have Hx of documented CAD  2. DYSLIPIDEMIA: Patient is on cholesterol lowering medication:No   3. Beta-Blocker therapy for CAD, if prior Myocardial Infarction:No   4. Counselled regarding smoking cessation. No   Patient does not Smoke. 5.Anticoagulation therapy (for A.Fib) No   Does Not have A.Fib.  6.Discussed weight management strategies. Assessment & Plan:  Primary / Secondary prevention is the goal by aggressive risk modification, healthy and therapeutic life style changes for cardiovascular risk reduction. CHRONIC VENOUS INSUFFICIENCY:yes,    Patient has symptomatic C4 disease   S/P Left GSV ablation. DOUBT any procedure related abnormality. Procedure was done 9/17/2021, More than 1 1/2 month ago. Reassured the patient. TESTS ORDERED: Venous US. PREVIOUSLY ORDERED TESTS REVIEWED & DISCUSSED WITH THE PATIENT:     I personally reviewed & interpreted, all previously ordered tests as copied above. Latest Labs are pulled in to the note with dates. Labs, specially in Reference to Lipid profile, Cardiac testing in the form of Echo, stress tests & other relevant cardiac testing reviewed with patient & recommendations made based on assessment of the results. MEDICATIONS: List of medications patient is currently taking is reviewed in detail with the patient . Discussed any side effects or problems taking the medication. Recommend Continue present management & medications as listed. AFFIRMATION: I spent at least 20 minutes of time reviewing patient's history, previous & current medical problems & all Labs + testing. This includes chart prep even prior to the vosit. Various goals are discussed and multiple questions answered. Relevant concelling performed. Patient to keep F/U appointment after the Esther[facility] Prader.

## 2021-11-09 NOTE — LETTER
[facility]  [address] [address]  Phone: [phone]  Fax: [phone]    Edouard Li MD    November 9, 2021     Sloane Cabrera MD  [address] [place] [address]    Patient: Rey Diallo   MR Number: [id number]   YOB: 1955   Date of Visit: 11/9/2021       Dear Sloane Cabrera: Thank you for referring Bianca Rosas to me for evaluation/treatment. Below are the relevant portions of my assessment and plan of care. If you have questions, please do not hesitate to call me. I look forward to following Jojo Abdullahi along with you.     Sincerely,      Edouard Li MD

## 2021-11-09 NOTE — LETTER
2021 @4:15  Patient Name: Mu Matias  : 1955  MRN# [id number]    REASON FOR VISIT:f/u. pt having trouble with left leg  Cardiology Problems  LVH (left ventricular hypertrophy) due to hypertensive disease  TR (tricuspid regurgitation)  Venous (peripheral) insufficiency  Mitral insufficiency  Angina pectoris (HCC)  Varicose veins of both legs with edema    Current Outpatient Medications   Medication Sig Dispense Refill    albuterol sulfate HFA (VENTOLIN HFA) 108 (90 Base) MCG/ACT inhaler Inhale 2 puffs into the lungs every 6 hours as needed for Shortness of Breath 1 each 5    theophylline (THEODUR) 300 MG extended release tablet Take 1 tablet by mouth 2 times daily 60 tablet 5    loratadine (CLARITIN) 10 MG tablet Take 1 tablet by mouth daily 90 tablet 3    furosemide (LASIX) 20 MG tablet Take 1 tablet by mouth daily 60 tablet 5    budesonide (RINOCORT AQUA) 32 MCG/ACT nasal spray 1 spray by Each Nostril route daily 1 each 3    fluticasone-salmeterol (ADVAIR) 250-50 MCG/DOSE AEPB inhale 1 puff by mouth and INTO THE LUNGS twice a day 1 each 0    buPROPion (WELLBUTRIN XL) 300 MG extended release tablet take 1 tablet by mouth every morning 30 tablet 0    famotidine (PEPCID) 20 MG tablet Take 1 tablet by mouth 2 times daily for 14 days 28 tablet 0    HYDROcodone-acetaminophen (NORCO) 5-325 MG per tablet Take 1 tablet by mouth every 6 hours as needed for Pain.  montelukast (SINGULAIR) 10 MG tablet Take 1 tablet by mouth nightly 90 tablet 3    busPIRone (BUSPAR) 7.5 MG tablet Take 1 tablet by mouth 2 times daily 180 tablet 3    levothyroxine (SYNTHROID) 125 MCG tablet Take 1 tablet by mouth every morning 90 tablet 3    albuterol (PROVENTIL) (2.5 MG/3ML) 0.083% nebulizer solution Take 3 mLs by nebulization 2 times daily as needed for Wheezing or Shortness of Breath 60 each 2     No current facility-administered medications for this visit.      Smoke: What:                           How much:    Alcohol: How Much:     Caffeine: Pop:         Tea:            Coffee:                Chocolate:    Exercise:    Labs: Lipids:             CBC:       BMP/CMP:          TSH:              A1C:    Last Visit:  Complaints:  Changes:    Last EK2021    VL DUP LOWER EXTREMITY VENOUS LEFT:2021  Left CFV is patent with good compressibility and respirophasic signal with    good augmentation.    The Left GSV is non-compressible with no evidence of flow just past the    saphenofemoral junction to the knee     VL DUP LOWER EXTREMITY VENOUS BILATERAL:2021    No evidence of DVT or SVT in the bilateral common femoral vein, femoral    vein, popliteal vein, greater saphenous vein or small saphenous vein.    Significant reflux noted of the Right GSV at SFJ (1.3s).  Right GSV from mid thigh to mid calf tortuous. There is approximately 15-20    cm from SFJ to mid calf for ablation.    Significant reflux noted in the Left GSV at SFJ (0.8s).  Left GSV from distal thigh to mid calf tortuous. Greater than 20 cm    available for ablation. Needs the right done       STRESS TEST:  2019  Abnormal Study.    Mild anterior wall Ischemia of a medium sized territory.    Possibility of breast artifact causing the abnormality cannot be excluded.    Normal LV function. LVEF is > 70 %. ECHO: 2019  Left ventricular systolic function is normal with an ejection fraction of   55-60%. Grade I diastolic dysfunction. Mild septal wall asymmetrical left ventricular hypertrophy. Doppler evaluation reveals trace aortic, mild mitral, and mild tricuspid   regurgitation.    No evidence of pericardial effusion    CAROTID: NONE    MUGA: NONE    LAST PACER CHECK: NONE    CARDIAC CATH: 2019  normal coronaries    [facility] Protocol:    CHADS: [id number] Score for Atrial Fibrillation Stroke Risk   Risk   Factors  Component Value   C CHF No 0   H HTN No 0   A2 Age >= 75 No,  (68 y.o.) 0   D DM No 0 S2 Prior Stroke/TIA No 0   V Vascular Disease No 0   A Age 74-69 Yes,  (68 y.o.) 1   Sc Sex female 1    [id number]  Score  2   Score last updated 11/9/21 6:12 AM EST    Click here for a link to the UpToDate guideline \"Atrial Fibrillation: Anticoagulation therapy to prevent embolization    Disclaimer: Risk Score calculation is dependent on accuracy of patient problem list and past encounter diagnosis.

## 2021-11-09 NOTE — PATIENT INSTRUCTIONS
CHRONIC VENOUS INSUFFICIENCY:yes,    Patient has symptomatic C4 disease   S/P Left GSV ablation. DOUBT any procedure related abnormality. Procedure was done 9/17/2021, More than 1 1/2 month ago. Reassured the patient. TESTS ORDERED: Venous US. PREVIOUSLY ORDERED TESTS REVIEWED & DISCUSSED WITH THE PATIENT:     I personally reviewed & interpreted, all previously ordered tests as copied above. Latest Labs are pulled in to the note with dates. Labs, specially in Reference to Lipid profile, Cardiac testing in the form of Echo, stress tests & other relevant cardiac testing reviewed with patient & recommendations made based on assessment of the results. MEDICATIONS: List of medications patient is currently taking is reviewed in detail with the patient . Discussed any side effects or problems taking the medication. Recommend Continue present management & medications as listed. AFFIRMATION: I spent at least 20 minutes of time reviewing patient's history, previous & current medical problems & all Labs + testing. This includes chart prep even prior to the vosit. Various goals are discussed and multiple questions answered. Relevant concelling performed. Patient to keep F/U appointment after the [address].

## 2021-11-12 NOTE — TELEPHONE ENCOUNTER
Advised patient of results. Patient voiced understanding. Left CFV is patent with good compressibility and respirophasic signal with good augmentation. The Left GSV is non-compressible with no evidence of flow just past the saphenofemoral junction to the knee. Left GSV at the dst thigh and knee is partially compressible with no evidence of flow.

## 2021-11-15 NOTE — TELEPHONE ENCOUNTER
----- Message from Samantha Maradiaga sent at 11/15/2021  9:41 AM EST -----  Subject: Appointment Request    Reason for Call: Urgent (Patient Request) No Script    QUESTIONS  Type of Appointment? Established Patient  Reason for appointment request? Available appointments did not meet   patient need  Additional Information for Provider? Patient is asking for a prescription   to be called in for her left ear ache and cough.  ---------------------------------------------------------------------------  --------------  CALL BACK INFO  What is the best way for the office to contact you? OK to leave message on   voicemail  Preferred Call Back Phone Number? [phone]  ---------------------------------------------------------------------------  --------------  SCRIPT ANSWERS  Relationship to Patient? Self  (Is the patient requesting to see the provider for a procedure?)? No  (Is the patient requesting to see the provider urgently  today or   tomorrow. )? Yes  Have you been diagnosed with, awaiting test results for, or told that you   are suspected of having COVID-19 (Coronavirus)? (If patient has tested   negative or was tested as a requirement for work, school, or travel and   not based on symptoms, answer no)? No  Within the past two weeks have you developed any of the following symptoms   (answer no if symptoms have been present longer than 2 weeks or began   more than 2 weeks ago)? Fever or Chills, Cough, Shortness of breath or   difficulty breathing, Loss of taste or smell, Sore throat, Nasal   congestion, Sneezing or runny nose, Fatigue or generalized body aches   (answer no if pain is specific to a body part e.g. back pain), Diarrhea,   Headache?  Yes

## 2021-11-15 NOTE — PROGRESS NOTES
11/15/2021    TELEHEALTH EVALUATION -- Audio/Visual (During [place] [address] public health emergency)    Chief Complaint   Patient presents with    Cough     3 days     Nausea     no vomitting    Otalgia     left ear          HPI:    Jason Fitch (:  1955) has requested an audio/video evaluation for the following concern(s):    Patient seen today c/o cough, productive, going on for 3 days, no fever, no SOB, no headache, no chest pain, also wake up today with left ear pain. Review of Systems   Constitutional: Negative for activity change, chills and fever. HENT: Positive for congestion, ear pain, postnasal drip, sinus pressure and sinus pain. Negative for ear discharge. Respiratory: Positive for cough. Negative for shortness of breath and wheezing. Cardiovascular: Negative for chest pain. Neurological: Negative for dizziness and headaches. Prior to Visit Medications    Medication Sig Taking?  Authorizing Provider   doxycycline hyclate (VIBRA-TABS) 100 MG tablet Take 1 tablet by mouth 2 times daily for 7 days Yes Reza Diamond MD   benzonatate (TESSALON) 100 MG capsule Take 1 capsule by mouth 2 times daily as needed for Cough Yes Reza Diamond MD   theophylline (THEODUR) 300 MG extended release tablet Take 1 tablet by mouth 2 times daily  Reza Diamond MD   loratadine (CLARITIN) 10 MG tablet Take 1 tablet by mouth daily  Reza Diamond MD   furosemide (LASIX) 20 MG tablet Take 1 tablet by mouth daily  Reza Diamond MD   albuterol sulfate HFA (VENTOLIN HFA) 108 (90 Base) MCG/ACT inhaler Inhale 2 puffs into the lungs every 6 hours as needed for Shortness of Breath  Rin Stauffer MD   budesonide (RINOCORT AQUA) 32 MCG/ACT nasal spray 1 spray by Each Nostril route daily  Reza Diamond MD   fluticasone-salmeterol (ADVAIR) 250-50 MCG/DOSE AEPB inhale 1 puff by mouth and INTO THE LUNGS twice a day  DELORES Sellers - NP   buPROPion (WELLBUTRIN XL) 300 MG extended release tablet take 1 tablet by mouth every morning  DELORES Katz NP   famotidine (PEPCID) 20 MG tablet Take 1 tablet by mouth 2 times daily for 14 days  DELORES Katz NP   HYDROcodone-acetaminophen (NORCO) 5-325 MG per tablet Take 1 tablet by mouth every 6 hours as needed for Pain. Historical Provider, MD   montelukast (SINGULAIR) 10 MG tablet Take 1 tablet by mouth nightly  Ryan Fine MD   busPIRone (BUSPAR) 7.5 MG tablet Take 1 tablet by mouth 2 times daily  Ryan Fine MD   levothyroxine (SYNTHROID) 125 MCG tablet Take 1 tablet by mouth every morning  Ryan Fine MD   albuterol (PROVENTIL) (2.5 MG/3ML) 0.083% nebulizer solution Take 3 mLs by nebulization 2 times daily as needed for Wheezing or Shortness of Breath  DELORES Hightower - ANGELA       Social History     Tobacco Use    Smoking status: Never Smoker    Smokeless tobacco: Never Used   Vaping Use    Vaping Use: Never used   Substance Use Topics    Alcohol use: No    Drug use: No        Allergies   Allergen Reactions    Latex Itching    Iodine Anaphylaxis    Levofloxacin Anaphylaxis    Pcn [Penicillins] Anaphylaxis    Sulfa Antibiotics Anaphylaxis    Adhesive Tape     Flexeril [Cyclobenzaprine]     Morphine Itching    Zofran Itching    Caffeine Palpitations     Doesn't sleep    Lidocaine Nausea And Vomiting    Volumen [Barium Sulfate] Itching, Nausea And Vomiting and Rash   ,   Past Medical History:   Diagnosis Date    Anxiety 01/28/2019    Arthritis     Asthma     CAD (coronary artery disease)     COPD (chronic obstructive pulmonary disease) ([facility] [address]     H/O cardiac catheterization 09/17/2019     normal coronaries    H/O cardiovascular stress test     H/O Doppler carotid ultrasound 08/07/2019    Mild (0-49%) disease of the Bilateral Internal carotid artery.  H/O Doppler lower venous ultrasound 08/07/2019    Significant reflux noted of the Right GSV.   Right GSV too small currently for ablation suicide    Diabetes Father     Depression Father     Diabetes Brother     Birth Defects Maternal Grandmother     Hypertension Maternal Grandmother     Diabetes Sister     Depression Brother     Diabetes Sister     Diabetes Brother     Other Brother         black mold    Diabetes Brother     Other Daughter         GI issues    Asthma Daughter     Seizures Daughter        PHYSICAL EXAMINATION:  [ INSTRUCTIONS:  \"[x]\" Indicates a positive item  \"[]\" Indicates a negative item  -- DELETE ALL ITEMS NOT EXAMINED]  Vital Signs: (As obtained by patient/caregiver or practitioner observation)    Blood pressure-  Heart rate-    Respiratory rate-    Temperature-  Pulse oximetry-     Constitutional: [x] Appears well-developed and well-nourished [x] No apparent distress      [] Abnormal-   Mental status  [x] Alert and awake  [x] Oriented to person/place/time []Able to follow commands      Eyes:  EOM    [x]  Normal  [] Abnormal-  Sclera  []  Normal  [] Abnormal -         Discharge []  None visible  [] Abnormal -    HENT:   [x] Normocephalic, atraumatic. [] Abnormal   [] Mouth/Throat: Mucous membranes are moist.     External Ears [] Normal  [] Abnormal-     Neck: [] No visualized mass     Pulmonary/Chest: [x] Respiratory effort normal.  [x] No visualized signs of difficulty breathing or respiratory distress        [] Abnormal-      Musculoskeletal:   [] Normal gait with no signs of ataxia         [] Normal range of motion of neck        [] Abnormal-       Neurological:        [x] No Facial Asymmetry (Cranial nerve 7 motor function) (limited exam to video visit)          [] No gaze palsy        [] Abnormal-         Skin:        [] No significant exanthematous lesions or discoloration noted on facial skin         [] Abnormal-            Psychiatric:       [] Normal Affect [] No Hallucinations        [] Abnormal-     Other pertinent observable physical exam findings-     ASSESSMENT/PLAN:  1.  Acute bacterial sinusitis  -

## 2021-11-24 NOTE — PROGRESS NOTES
OFFICE PROGRESS NOTES      Iwona Lyles is a 77 y.o. female who has    CHIEF COMPLAINT AS FOLLOWS:  CHEST PAIN: Patient denies any C/O chest pains at this time. SOB: No C/O SOB at this time. LEG EDEMA:   B/L Lower extremity edema is present but better than before in Left Leg. [place] PALPITATIONS: Denies any C/O Palpitations   DIZZINESS: No C/O Dizziness   SYNCOPE: None   OTHER:                                     HPI: Patient is here for F/U on her CAD, CVI,  Arrhythmia, HTN & Dyslipidemia problems. CAD: Patient has known CAD. CVI: S/P Left GSV ablation. HTN: Patient has known essential HTN. Has been treated with guideline recommended medical / physical/ diet therapy as stated below. Dyslipidemia: Patient has known mixed dyslipidemia. Has been treated with guideline recommended medical / physical/ diet therapy as stated below. Current Outpatient Medications   Medication Sig Dispense Refill    theophylline (THEODUR) 300 MG extended release tablet Take 1 tablet by mouth 2 times daily 60 tablet 5    loratadine (CLARITIN) 10 MG tablet Take 1 tablet by mouth daily 90 tablet 3    furosemide (LASIX) 20 MG tablet Take 1 tablet by mouth daily 60 tablet 5    albuterol sulfate HFA (VENTOLIN HFA) 108 (90 Base) MCG/ACT inhaler Inhale 2 puffs into the lungs every 6 hours as needed for Shortness of Breath 1 each 5    budesonide (RINOCORT AQUA) 32 MCG/ACT nasal spray 1 spray by Each Nostril route daily 1 each 3    fluticasone-salmeterol (ADVAIR) 250-50 MCG/DOSE AEPB inhale 1 puff by mouth and INTO THE LUNGS twice a day 1 each 0    buPROPion (WELLBUTRIN XL) 300 MG extended release tablet take 1 tablet by mouth every morning 30 tablet 0    HYDROcodone-acetaminophen (NORCO) 5-325 MG per tablet Take 1 tablet by mouth every 6 hours as needed for Pain.       montelukast (SINGULAIR) 10 MG tablet Take 1 tablet by mouth nightly 90 tablet 3    busPIRone (BUSPAR) 7.5 MG tablet Take 1 tablet by mouth 8 10/25/2010    PROBNP 72.60 07/08/2017     Lab Results   Component Value Date    INR 0.88 09/16/2019    INR 0.97 07/08/2017     Lab Results   Component Value Date    LABA1C 5.4 09/16/2021    LABA1C 5.5 06/05/2019     Lab Results   Component Value Date    WBC 9.9 07/20/2021    WBC 11.5 (H) 06/29/2021    HCT 44.0 07/20/2021    HCT 44.6 06/29/2021    MCV 95.9 07/20/2021    MCV 95.3 06/29/2021     07/20/2021     06/29/2021     Lab Results   Component Value Date    CHOL 182 09/17/2021    CHOL 187 04/25/2017    TRIG 95 09/17/2021    TRIG 159 (H) 04/25/2017    HDL 45 09/17/2021    HDL 50 04/25/2017    LDLCALC 105 (H) 04/25/2017    LDLCALC 105 (H) 07/30/2012    LDLDIRECT 115 (H) 09/17/2021    LDLDIRECT 124 (H) 03/30/2015     Lab Results   Component Value Date    ALT 14 07/20/2021    ALT 11 06/29/2021    AST 17 07/20/2021    AST 12 (L) 06/29/2021     BMP:    Lab Results   Component Value Date     09/17/2021     07/20/2021    K 4.1 09/17/2021    K 3.6 07/20/2021     09/17/2021     07/20/2021    CO2 27 09/17/2021    CO2 22 07/20/2021    BUN 20 09/17/2021    BUN 16 07/20/2021    CREATININE 0.7 09/17/2021    CREATININE 0.6 07/20/2021     CMP:   Lab Results   Component Value Date     09/17/2021     07/20/2021    K 4.1 09/17/2021    K 3.6 07/20/2021     09/17/2021     07/20/2021    CO2 27 09/17/2021    CO2 22 07/20/2021    BUN 20 09/17/2021    BUN 16 07/20/2021    CREATININE 0.7 09/17/2021    CREATININE 0.6 07/20/2021    PROT 6.3 07/20/2021    PROT 6.2 06/29/2021    PROT 6.4 07/30/2012    PROT 6.6 04/10/2012     Lab Results   Component Value Date    TSH 1.24 06/19/2018    TSH 2.43 04/25/2017    TSHHS 3.420 09/17/2021    TSHHS 4.450 07/18/2019     VENOUS US 11/10/2021    Left CFV is patent with good compressibility and respirophasic signal with    good augmentation.    The Left GSV is non-compressible with no evidence of flow just past the    saphenofemoral junction to the knee.    Left GSV at the dst thigh and knee is partially compressible with no    evidence of flow. QUALITY MEASURES REVIEWED:  1.CAD:Patient is taking anti platelet agent:No  Patient does not have Hx of documented CAD  2. DYSLIPIDEMIA: Patient is on cholesterol lowering medication:No  3. Beta-Blocker therapy for CAD, if prior Myocardial Infarction:No   4. Counselled regarding smoking cessation. No   Patient does not Smoke. 5.Anticoagulation therapy (for A.Fib) No   Does Not have A.Fib.  6.Discussed weight management strategies. Assessment & Plan:  Primary / Secondary prevention is the goal by aggressive risk modification, healthy and therapeutic life style changes for cardiovascular risk reduction.        CHRONIC VENOUS INSUFFICIENCY:yes,               Patient has symptomatic C4 disease              S/P Left GSV ablation. Excellent results, reviewed with the patient              Right GSV still to be done    TESTS ORDERED: Repeat Right GSV US prior to next visit     [address]:     I personally reviewed & interpreted, all previously ordered tests as copied above. Latest Labs are pulled in to the note with dates. MEDICATIONS: List of medications patient is currently taking is reviewed in detail with the patient . Discussed any side effects or problems taking the medication. Recommend Continue present management & medications as listed. AFFIRMATION: I reviewed patient's history, previous & current medical problems & all Labs + testing. This includes chart prep even prior to the vosit. Various goals are discussed and multiple questions answered. Relevant concelling performed. Office follow up with me in three months.

## 2021-11-24 NOTE — LETTER
Patient Name: Amanda Marie  : 1955  MRN# [id number]    REASON FOR VISIT:       Current Outpatient Medications   Medication Sig Dispense Refill    theophylline (THEODUR) 300 MG extended release tablet Take 1 tablet by mouth 2 times daily 60 tablet 5    loratadine (CLARITIN) 10 MG tablet Take 1 tablet by mouth daily 90 tablet 3    furosemide (LASIX) 20 MG tablet Take 1 tablet by mouth daily 60 tablet 5    albuterol sulfate HFA (VENTOLIN HFA) 108 (90 Base) MCG/ACT inhaler Inhale 2 puffs into the lungs every 6 hours as needed for Shortness of Breath 1 each 5    budesonide (RINOCORT AQUA) 32 MCG/ACT nasal spray 1 spray by Each Nostril route daily 1 each 3    fluticasone-salmeterol (ADVAIR) 250-50 MCG/DOSE AEPB inhale 1 puff by mouth and INTO THE LUNGS twice a day 1 each 0    buPROPion (WELLBUTRIN XL) 300 MG extended release tablet take 1 tablet by mouth every morning 30 tablet 0    famotidine (PEPCID) 20 MG tablet Take 1 tablet by mouth 2 times daily for 14 days 28 tablet 0    HYDROcodone-acetaminophen (NORCO) 5-325 MG per tablet Take 1 tablet by mouth every 6 hours as needed for Pain.  montelukast (SINGULAIR) 10 MG tablet Take 1 tablet by mouth nightly 90 tablet 3    busPIRone (BUSPAR) 7.5 MG tablet Take 1 tablet by mouth 2 times daily 180 tablet 3    levothyroxine (SYNTHROID) 125 MCG tablet Take 1 tablet by mouth every morning 90 tablet 3    albuterol (PROVENTIL) (2.5 MG/3ML) 0.083% nebulizer solution Take 3 mLs by nebulization 2 times daily as needed for Wheezing or Shortness of Breath 60 each 2     No current facility-administered medications for this visit.        VL LOWER EXTREMITY VENOUS LEFT:2021  Left CFV is patent with good compressibility and respirophasic signal with    good augmentation.    The Left GSV is non-compressible with no evidence of flow just past the    saphenofemoral junction to the knee.    Left GSV at the dst thigh and knee is partially compressible with no

## 2021-11-24 NOTE — LETTER
[facility]  [address] [address]  Phone: [phone]  Fax: [phone]    Heather Rodríguez MD    November 24, 2021     Susanne Cruz MD  [address] [place] [address]    Patient: Mike Camacho   MR Number: [id number]   YOB: 1955   Date of Visit: 11/24/2021       Dear Susanne Cruz: Thank you for referring Breonna White to me for evaluation/treatment. Below are the relevant portions of my assessment and plan of care. If you have questions, please do not hesitate to call me. I look forward to following Jose Daniel Gunning along with you.     Sincerely,      Heather Rodríguez MD

## 2022-01-01 ENCOUNTER — VIRTUAL VISIT (OUTPATIENT)
Dept: FAMILY MEDICINE CLINIC | Age: 67
End: 2022-01-01
Payer: MEDICAID

## 2022-01-01 ENCOUNTER — CARE COORDINATION (OUTPATIENT)
Dept: CARE COORDINATION | Age: 67
End: 2022-01-01

## 2022-01-01 ENCOUNTER — HOSPITAL ENCOUNTER (INPATIENT)
Age: 67
LOS: 3 days | Discharge: ANOTHER HOSPITAL | DRG: 137 | End: 2022-01-29
Attending: STUDENT IN AN ORGANIZED HEALTH CARE EDUCATION/TRAINING PROGRAM | Admitting: INTERNAL MEDICINE
Payer: MEDICAID

## 2022-01-01 ENCOUNTER — TELEPHONE (OUTPATIENT)
Dept: FAMILY MEDICINE CLINIC | Age: 67
End: 2022-01-01

## 2022-01-01 ENCOUNTER — APPOINTMENT (OUTPATIENT)
Dept: CT IMAGING | Age: 67
End: 2022-01-01
Payer: MEDICAID

## 2022-01-01 ENCOUNTER — APPOINTMENT (OUTPATIENT)
Dept: GENERAL RADIOLOGY | Age: 67
End: 2022-01-01
Payer: MEDICAID

## 2022-01-01 ENCOUNTER — HOSPITAL ENCOUNTER (EMERGENCY)
Age: 67
Discharge: HOME | End: 2022-02-23
Payer: MEDICAID

## 2022-01-01 ENCOUNTER — APPOINTMENT (OUTPATIENT)
Dept: GENERAL RADIOLOGY | Age: 67
DRG: 137 | End: 2022-01-01
Payer: MEDICAID

## 2022-01-01 ENCOUNTER — APPOINTMENT (OUTPATIENT)
Dept: ULTRASOUND IMAGING | Age: 67
DRG: 137 | End: 2022-01-01
Payer: MEDICAID

## 2022-01-01 ENCOUNTER — OFFICE VISIT (OUTPATIENT)
Dept: CARDIOLOGY CLINIC | Age: 67
End: 2022-01-01
Payer: MEDICAID

## 2022-01-01 ENCOUNTER — HOSPITAL ENCOUNTER (EMERGENCY)
Age: 67
Discharge: HOME | End: 2022-01-18
Payer: MEDICAID

## 2022-01-01 ENCOUNTER — APPOINTMENT (OUTPATIENT)
Dept: CT IMAGING | Age: 67
DRG: 137 | End: 2022-01-01
Payer: MEDICAID

## 2022-01-01 VITALS
SYSTOLIC BLOOD PRESSURE: 119 MMHG | HEIGHT: 61 IN | RESPIRATION RATE: 18 BRPM | OXYGEN SATURATION: 96 % | WEIGHT: 230 LBS | BODY MASS INDEX: 43.43 KG/M2 | HEART RATE: 97 BPM | DIASTOLIC BLOOD PRESSURE: 85 MMHG

## 2022-01-01 VITALS
HEIGHT: 62 IN | BODY MASS INDEX: 42.1 KG/M2 | HEART RATE: 83 BPM | SYSTOLIC BLOOD PRESSURE: 110 MMHG | WEIGHT: 228.8 LBS | DIASTOLIC BLOOD PRESSURE: 62 MMHG

## 2022-01-01 VITALS
HEART RATE: 77 BPM | WEIGHT: 217.37 LBS | SYSTOLIC BLOOD PRESSURE: 106 MMHG | RESPIRATION RATE: 25 BRPM | OXYGEN SATURATION: 93 % | HEIGHT: 62 IN | TEMPERATURE: 97.6 F | DIASTOLIC BLOOD PRESSURE: 68 MMHG | BODY MASS INDEX: 40 KG/M2

## 2022-01-01 VITALS
HEART RATE: 65 BPM | TEMPERATURE: 99.4 F | RESPIRATION RATE: 20 BRPM | SYSTOLIC BLOOD PRESSURE: 116 MMHG | HEIGHT: 62 IN | WEIGHT: 220 LBS | OXYGEN SATURATION: 93 % | DIASTOLIC BLOOD PRESSURE: 66 MMHG | BODY MASS INDEX: 40.48 KG/M2

## 2022-01-01 DIAGNOSIS — R10.9 RIGHT FLANK PAIN: ICD-10-CM

## 2022-01-01 DIAGNOSIS — U07.1 COVID-19: ICD-10-CM

## 2022-01-01 DIAGNOSIS — J18.9 PNEUMONIA OF BOTH LUNGS DUE TO INFECTIOUS ORGANISM, UNSPECIFIED PART OF LUNG: ICD-10-CM

## 2022-01-01 DIAGNOSIS — R06.02 SHORTNESS OF BREATH: ICD-10-CM

## 2022-01-01 DIAGNOSIS — R10.31 ABDOMINAL PAIN, RIGHT LOWER QUADRANT: Primary | ICD-10-CM

## 2022-01-01 DIAGNOSIS — N39.0 URINARY TRACT INFECTION WITHOUT HEMATURIA, SITE UNSPECIFIED: Primary | ICD-10-CM

## 2022-01-01 DIAGNOSIS — R10.9 RIGHT FLANK PAIN: Primary | ICD-10-CM

## 2022-01-01 DIAGNOSIS — R11.2 NAUSEA AND VOMITING, INTRACTABILITY OF VOMITING NOT SPECIFIED, UNSPECIFIED VOMITING TYPE: ICD-10-CM

## 2022-01-01 DIAGNOSIS — J96.01 ACUTE RESPIRATORY FAILURE WITH HYPOXIA (HCC): Primary | ICD-10-CM

## 2022-01-01 DIAGNOSIS — R11.2 NON-INTRACTABLE VOMITING WITH NAUSEA, UNSPECIFIED VOMITING TYPE: Primary | ICD-10-CM

## 2022-01-01 DIAGNOSIS — I34.0 NONRHEUMATIC MITRAL VALVE REGURGITATION: Chronic | ICD-10-CM

## 2022-01-01 DIAGNOSIS — R09.02 HYPOXIA: Primary | ICD-10-CM

## 2022-01-01 DIAGNOSIS — E66.01 MORBID OBESITY (HCC): ICD-10-CM

## 2022-01-01 DIAGNOSIS — I87.2 VENOUS (PERIPHERAL) INSUFFICIENCY: Primary | Chronic | ICD-10-CM

## 2022-01-01 LAB
ALBUMIN SERPL-MCNC: 3 GM/DL (ref 3.4–5)
ALBUMIN SERPL-MCNC: 3.1 GM/DL (ref 3.4–5)
ALBUMIN SERPL-MCNC: 3.3 GM/DL (ref 3.4–5)
ALBUMIN SERPL-MCNC: 3.4 GM/DL (ref 3.4–5)
ALBUMIN SERPL-MCNC: 3.5 GM/DL (ref 3.4–5)
ALBUMIN SERPL-MCNC: 3.5 GM/DL (ref 3.4–5)
ALP BLD-CCNC: 114 IU/L (ref 40–129)
ALP BLD-CCNC: 55 IU/L (ref 40–129)
ALP BLD-CCNC: 62 IU/L (ref 40–129)
ALP BLD-CCNC: 66 IU/L (ref 40–128)
ALP BLD-CCNC: 66 IU/L (ref 40–129)
ALP BLD-CCNC: 85 IU/L (ref 40–129)
ALT SERPL-CCNC: 11 U/L (ref 10–40)
ALT SERPL-CCNC: 12 U/L (ref 10–40)
ALT SERPL-CCNC: 28 U/L (ref 10–40)
ALT SERPL-CCNC: 71 U/L (ref 10–40)
ANION GAP SERPL CALCULATED.3IONS-SCNC: 14 MMOL/L (ref 4–16)
ANION GAP SERPL CALCULATED.3IONS-SCNC: 14 MMOL/L (ref 4–16)
ANION GAP SERPL CALCULATED.3IONS-SCNC: 15 MMOL/L (ref 4–16)
ANION GAP SERPL CALCULATED.3IONS-SCNC: 16 MMOL/L (ref 4–16)
ANION GAP SERPL CALCULATED.3IONS-SCNC: 19 MMOL/L (ref 4–16)
AST SERPL-CCNC: 19 IU/L (ref 15–37)
AST SERPL-CCNC: 21 IU/L (ref 15–37)
AST SERPL-CCNC: 21 IU/L (ref 15–37)
AST SERPL-CCNC: 22 IU/L (ref 15–37)
AST SERPL-CCNC: 28 IU/L (ref 15–37)
AST SERPL-CCNC: 32 IU/L (ref 15–37)
BACTERIA: NEGATIVE /HPF
BACTERIA: NEGATIVE /HPF
BASE EXCESS MIXED: 8 (ref 0–2)
BASOPHILS ABSOLUTE: 0 K/CU MM
BASOPHILS ABSOLUTE: 0.1 K/CU MM
BASOPHILS RELATIVE PERCENT: 0.2 % (ref 0–1)
BASOPHILS RELATIVE PERCENT: 0.4 % (ref 0–1)
BILIRUB SERPL-MCNC: 0.3 MG/DL (ref 0–1)
BILIRUB SERPL-MCNC: 0.4 MG/DL (ref 0–1)
BILIRUB SERPL-MCNC: 0.7 MG/DL (ref 0–1)
BILIRUB SERPL-MCNC: 0.9 MG/DL (ref 0–1)
BILIRUBIN DIRECT: 0.2 MG/DL (ref 0–0.3)
BILIRUBIN DIRECT: 0.2 MG/DL (ref 0–0.3)
BILIRUBIN URINE: ABNORMAL MG/DL
BILIRUBIN URINE: NEGATIVE MG/DL
BILIRUBIN, INDIRECT: 0.2 MG/DL (ref 0–0.7)
BILIRUBIN, INDIRECT: 0.5 MG/DL (ref 0–0.7)
BLOOD, URINE: NEGATIVE
BLOOD, URINE: NEGATIVE
BUN BLDV-MCNC: 18 MG/DL (ref 6–23)
BUN BLDV-MCNC: 23 MG/DL (ref 6–23)
BUN BLDV-MCNC: 24 MG/DL (ref 6–23)
BUN BLDV-MCNC: 26 MG/DL (ref 6–23)
BUN BLDV-MCNC: 26 MG/DL (ref 6–23)
CALCIUM SERPL-MCNC: 10 MG/DL (ref 8.3–10.6)
CALCIUM SERPL-MCNC: 8.5 MG/DL (ref 8.3–10.6)
CALCIUM SERPL-MCNC: 8.7 MG/DL (ref 8.3–10.6)
CALCIUM SERPL-MCNC: 9.3 MG/DL (ref 8.3–10.6)
CALCIUM SERPL-MCNC: 9.6 MG/DL (ref 8.3–10.6)
CHLORIDE BLD-SCNC: 101 MMOL/L (ref 99–110)
CHLORIDE BLD-SCNC: 95 MMOL/L (ref 99–110)
CHLORIDE BLD-SCNC: 97 MMOL/L (ref 99–110)
CHLORIDE BLD-SCNC: 98 MMOL/L (ref 99–110)
CHLORIDE BLD-SCNC: 99 MMOL/L (ref 99–110)
CLARITY: CLEAR
CLARITY: CLEAR
CO2: 19 MMOL/L (ref 21–32)
CO2: 25 MMOL/L (ref 21–32)
CO2: 28 MMOL/L (ref 21–32)
CO2: 28 MMOL/L (ref 21–32)
CO2: 30 MMOL/L (ref 21–32)
COLOR: YELLOW
COLOR: YELLOW
COMMENT: ABNORMAL
CREAT SERPL-MCNC: 0.7 MG/DL (ref 0.6–1.1)
CREAT SERPL-MCNC: 0.7 MG/DL (ref 0.6–1.1)
CREAT SERPL-MCNC: 0.9 MG/DL (ref 0.6–1.1)
CREAT SERPL-MCNC: 1 MG/DL (ref 0.6–1.1)
CREAT SERPL-MCNC: 1.1 MG/DL (ref 0.6–1.1)
CULTURE: NORMAL
D DIMER: 1022 NG/ML(DDU)
D DIMER: 1173 NG/ML(DDU)
D DIMER: 1722 NG/ML(DDU)
DIFFERENTIAL TYPE: ABNORMAL
EKG ATRIAL RATE: 114 BPM
EKG ATRIAL RATE: 78 BPM
EKG DIAGNOSIS: NORMAL
EKG DIAGNOSIS: NORMAL
EKG P AXIS: 45 DEGREES
EKG P AXIS: 53 DEGREES
EKG P-R INTERVAL: 156 MS
EKG P-R INTERVAL: 168 MS
EKG Q-T INTERVAL: 340 MS
EKG Q-T INTERVAL: 412 MS
EKG QRS DURATION: 68 MS
EKG QRS DURATION: 78 MS
EKG QTC CALCULATION (BAZETT): 468 MS
EKG QTC CALCULATION (BAZETT): 469 MS
EKG R AXIS: -29 DEGREES
EKG R AXIS: -76 DEGREES
EKG T AXIS: 20 DEGREES
EKG T AXIS: 38 DEGREES
EKG VENTRICULAR RATE: 114 BPM
EKG VENTRICULAR RATE: 78 BPM
EOSINOPHILS ABSOLUTE: 0 K/CU MM
EOSINOPHILS ABSOLUTE: 0.1 K/CU MM
EOSINOPHILS RELATIVE PERCENT: 0 % (ref 0–3)
EOSINOPHILS RELATIVE PERCENT: 0.1 % (ref 0–3)
EOSINOPHILS RELATIVE PERCENT: 0.3 % (ref 0–3)
EOSINOPHILS RELATIVE PERCENT: 0.3 % (ref 0–3)
EOSINOPHILS RELATIVE PERCENT: 0.6 % (ref 0–3)
FERRITIN: 757 NG/ML (ref 15–150)
FIBRINOGEN LEVEL: 670 MG/DL (ref 196.9–442.1)
GFR AFRICAN AMERICAN: 60 ML/MIN/1.73M2
GFR AFRICAN AMERICAN: >60 ML/MIN/1.73M2
GFR NON-AFRICAN AMERICAN: 50 ML/MIN/1.73M2
GFR NON-AFRICAN AMERICAN: 55 ML/MIN/1.73M2
GFR NON-AFRICAN AMERICAN: >60 ML/MIN/1.73M2
GLUCOSE BLD-MCNC: 107 MG/DL (ref 70–99)
GLUCOSE BLD-MCNC: 115 MG/DL (ref 70–99)
GLUCOSE BLD-MCNC: 190 MG/DL (ref 70–99)
GLUCOSE BLD-MCNC: 86 MG/DL (ref 70–99)
GLUCOSE BLD-MCNC: 99 MG/DL (ref 70–99)
GLUCOSE, URINE: NEGATIVE MG/DL
GLUCOSE, URINE: NEGATIVE MG/DL
GONADOTROPIN, CHORIONIC (HCG) QUANT: 0.5 UIU/ML
HCO3 VENOUS: 33.9 MMOL/L (ref 19–25)
HCT VFR BLD CALC: 45.5 % (ref 37–47)
HCT VFR BLD CALC: 48 % (ref 37–47)
HCT VFR BLD CALC: 49.2 % (ref 37–47)
HCT VFR BLD CALC: 50 % (ref 37–47)
HCT VFR BLD CALC: 50.3 % (ref 37–47)
HEMOGLOBIN: 14.8 GM/DL (ref 12.5–16)
HEMOGLOBIN: 15.2 GM/DL (ref 12.5–16)
HEMOGLOBIN: 15.4 GM/DL (ref 12.5–16)
HEMOGLOBIN: 16 GM/DL (ref 12.5–16)
HEMOGLOBIN: 16.3 GM/DL (ref 12.5–16)
HIGH SENSITIVE C-REACTIVE PROTEIN: 176.3 MG/L
HIGH SENSITIVE C-REACTIVE PROTEIN: 183.4 MG/L
HIGH SENSITIVE C-REACTIVE PROTEIN: 83.7 MG/L
HYALINE CASTS: >20 /LPF
ICTOTEST: NEGATIVE
IMMATURE NEUTROPHIL %: 0.2 % (ref 0–0.43)
IMMATURE NEUTROPHIL %: 0.7 % (ref 0–0.43)
IMMATURE NEUTROPHIL %: 0.7 % (ref 0–0.43)
IMMATURE NEUTROPHIL %: 1 % (ref 0–0.43)
IMMATURE NEUTROPHIL %: 2.3 % (ref 0–0.43)
KETONES, URINE: ABNORMAL MG/DL
KETONES, URINE: ABNORMAL MG/DL
LACTATE DEHYDROGENASE: 471 IU/L (ref 120–246)
LACTATE: 1.6 MMOL/L (ref 0.4–2)
LEUKOCYTE ESTERASE, URINE: ABNORMAL
LEUKOCYTE ESTERASE, URINE: ABNORMAL
LIPASE: 14 IU/L (ref 13–60)
LIPASE: 17 IU/L (ref 13–60)
LYMPHOCYTES ABSOLUTE: 0.9 K/CU MM
LYMPHOCYTES ABSOLUTE: 1 K/CU MM
LYMPHOCYTES ABSOLUTE: 1.7 K/CU MM
LYMPHOCYTES ABSOLUTE: 1.7 K/CU MM
LYMPHOCYTES ABSOLUTE: 2.1 K/CU MM
LYMPHOCYTES RELATIVE PERCENT: 11 % (ref 24–44)
LYMPHOCYTES RELATIVE PERCENT: 11.7 % (ref 24–44)
LYMPHOCYTES RELATIVE PERCENT: 16.8 % (ref 24–44)
LYMPHOCYTES RELATIVE PERCENT: 25.9 % (ref 24–44)
LYMPHOCYTES RELATIVE PERCENT: 9.6 % (ref 24–44)
Lab: NORMAL
MAGNESIUM: 2.4 MG/DL (ref 1.8–2.4)
MCH RBC QN AUTO: 29.2 PG (ref 27–31)
MCH RBC QN AUTO: 29.3 PG (ref 27–31)
MCH RBC QN AUTO: 29.4 PG (ref 27–31)
MCH RBC QN AUTO: 29.5 PG (ref 27–31)
MCH RBC QN AUTO: 30 PG (ref 27–31)
MCHC RBC AUTO-ENTMCNC: 30.4 % (ref 32–36)
MCHC RBC AUTO-ENTMCNC: 32.1 % (ref 32–36)
MCHC RBC AUTO-ENTMCNC: 32.4 % (ref 32–36)
MCHC RBC AUTO-ENTMCNC: 32.5 % (ref 32–36)
MCHC RBC AUTO-ENTMCNC: 32.5 % (ref 32–36)
MCV RBC AUTO: 90.3 FL (ref 78–100)
MCV RBC AUTO: 90.6 FL (ref 78–100)
MCV RBC AUTO: 91.4 FL (ref 78–100)
MCV RBC AUTO: 92.6 FL (ref 78–100)
MCV RBC AUTO: 96 FL (ref 78–100)
MONOCYTES ABSOLUTE: 0.5 K/CU MM
MONOCYTES ABSOLUTE: 0.6 K/CU MM
MONOCYTES ABSOLUTE: 0.7 K/CU MM
MONOCYTES RELATIVE PERCENT: 4.6 % (ref 0–4)
MONOCYTES RELATIVE PERCENT: 5.7 % (ref 0–4)
MONOCYTES RELATIVE PERCENT: 6.8 % (ref 0–4)
MONOCYTES RELATIVE PERCENT: 6.9 % (ref 0–4)
MONOCYTES RELATIVE PERCENT: 7.7 % (ref 0–4)
MUCUS: ABNORMAL HPF
NITRITE URINE, QUANTITATIVE: NEGATIVE
NITRITE URINE, QUANTITATIVE: NEGATIVE
NUCLEATED RBC %: 0 %
O2 SAT, VEN: 32.4 % (ref 50–70)
PCO2, VEN: 51 MMHG (ref 38–52)
PDW BLD-RTO: 12.9 % (ref 11.7–14.9)
PDW BLD-RTO: 13.3 % (ref 11.7–14.9)
PDW BLD-RTO: 13.5 % (ref 11.7–14.9)
PDW BLD-RTO: 13.6 % (ref 11.7–14.9)
PDW BLD-RTO: 15.1 % (ref 11.7–14.9)
PH VENOUS: 7.43 (ref 7.32–7.42)
PH, URINE: 6 (ref 5–8)
PH, URINE: 6 (ref 5–8)
PLATELET # BLD: 144 K/CU MM (ref 140–440)
PLATELET # BLD: 162 K/CU MM (ref 140–440)
PLATELET # BLD: 302 K/CU MM (ref 140–440)
PLATELET # BLD: 341 K/CU MM (ref 140–440)
PLATELET # BLD: 370 K/CU MM (ref 140–440)
PMV BLD AUTO: 10.3 FL (ref 7.5–11.1)
PMV BLD AUTO: 10.5 FL (ref 7.5–11.1)
PMV BLD AUTO: 10.5 FL (ref 7.5–11.1)
PMV BLD AUTO: 11 FL (ref 7.5–11.1)
PMV BLD AUTO: 11.2 FL (ref 7.5–11.1)
PO2, VEN: 40 MMHG (ref 28–48)
POTASSIUM SERPL-SCNC: 3 MMOL/L (ref 3.5–5.1)
POTASSIUM SERPL-SCNC: 3 MMOL/L (ref 3.5–5.1)
POTASSIUM SERPL-SCNC: 3.3 MMOL/L (ref 3.5–5.1)
POTASSIUM SERPL-SCNC: 3.5 MMOL/L (ref 3.5–5.1)
POTASSIUM SERPL-SCNC: 3.6 MMOL/L (ref 3.5–5.1)
POTASSIUM SERPL-SCNC: 3.6 MMOL/L (ref 3.5–5.1)
PRO-BNP: 83.91 PG/ML
PROCALCITONIN: 0.06
PROCALCITONIN: 0.08
PROTEIN UA: 30 MG/DL
PROTEIN UA: NEGATIVE MG/DL
RBC # BLD: 5.02 M/CU MM (ref 4.2–5.4)
RBC # BLD: 5.21 M/CU MM (ref 4.2–5.4)
RBC # BLD: 5.25 M/CU MM (ref 4.2–5.4)
RBC # BLD: 5.43 M/CU MM (ref 4.2–5.4)
RBC # BLD: 5.45 M/CU MM (ref 4.2–5.4)
RBC URINE: ABNORMAL /HPF (ref 0–6)
RBC URINE: ABNORMAL /HPF (ref 0–6)
REASON FOR REJECTION: NORMAL
REJECTED TEST: NORMAL
SARS-COV-2, NAAT: DETECTED
SARS-COV-2, NAAT: DETECTED
SEGMENTED NEUTROPHILS ABSOLUTE COUNT: 12.5 K/CU MM
SEGMENTED NEUTROPHILS ABSOLUTE COUNT: 5.3 K/CU MM
SEGMENTED NEUTROPHILS ABSOLUTE COUNT: 7 K/CU MM
SEGMENTED NEUTROPHILS ABSOLUTE COUNT: 7.4 K/CU MM
SEGMENTED NEUTROPHILS ABSOLUTE COUNT: 8 K/CU MM
SEGMENTED NEUTROPHILS RELATIVE PERCENT: 66 % (ref 36–66)
SEGMENTED NEUTROPHILS RELATIVE PERCENT: 74.9 % (ref 36–66)
SEGMENTED NEUTROPHILS RELATIVE PERCENT: 81.4 % (ref 36–66)
SEGMENTED NEUTROPHILS RELATIVE PERCENT: 81.6 % (ref 36–66)
SEGMENTED NEUTROPHILS RELATIVE PERCENT: 82 % (ref 36–66)
SODIUM BLD-SCNC: 133 MMOL/L (ref 135–145)
SODIUM BLD-SCNC: 141 MMOL/L (ref 135–145)
SODIUM BLD-SCNC: 142 MMOL/L (ref 135–145)
SOURCE: ABNORMAL
SOURCE: ABNORMAL
SPECIFIC GRAVITY UA: 1.01 (ref 1–1.03)
SPECIFIC GRAVITY UA: 1.02 (ref 1–1.03)
SPECIMEN: NORMAL
SQUAMOUS EPITHELIAL: 1 /HPF
SQUAMOUS EPITHELIAL: <1 /HPF
TOTAL IMMATURE NEUTOROPHIL: 0.02 K/CU MM
TOTAL IMMATURE NEUTOROPHIL: 0.06 K/CU MM
TOTAL IMMATURE NEUTOROPHIL: 0.07 K/CU MM
TOTAL IMMATURE NEUTOROPHIL: 0.1 K/CU MM
TOTAL IMMATURE NEUTOROPHIL: 0.36 K/CU MM
TOTAL NUCLEATED RBC: 0 K/CU MM
TOTAL PROTEIN: 5.6 GM/DL (ref 6.4–8.2)
TOTAL PROTEIN: 5.6 GM/DL (ref 6.4–8.2)
TOTAL PROTEIN: 6.4 GM/DL (ref 6.4–8.2)
TOTAL PROTEIN: 6.5 GM/DL (ref 6.4–8.2)
TOTAL PROTEIN: 6.5 GM/DL (ref 6.4–8.2)
TOTAL PROTEIN: 7 GM/DL (ref 6.4–8.2)
TRICHOMONAS: ABNORMAL /HPF
TRICHOMONAS: ABNORMAL /HPF
TROPONIN T: <0.01 NG/ML
UROBILINOGEN, URINE: NEGATIVE MG/DL (ref 0.2–1)
UROBILINOGEN, URINE: NORMAL MG/DL (ref 0.2–1)
WBC # BLD: 15.4 K/CU MM (ref 4–10.5)
WBC # BLD: 8 K/CU MM (ref 4–10.5)
WBC # BLD: 8.6 K/CU MM (ref 4–10.5)
WBC # BLD: 9.7 K/CU MM (ref 4–10.5)
WBC # BLD: 9.9 K/CU MM (ref 4–10.5)
WBC UA: 2 /HPF (ref 0–5)
WBC UA: 3 /HPF (ref 0–5)

## 2022-01-01 PROCEDURE — 83690 ASSAY OF LIPASE: CPT

## 2022-01-01 PROCEDURE — 96375 TX/PRO/DX INJ NEW DRUG ADDON: CPT

## 2022-01-01 PROCEDURE — 86141 C-REACTIVE PROTEIN HS: CPT

## 2022-01-01 PROCEDURE — 94761 N-INVAS EAR/PLS OXIMETRY MLT: CPT

## 2022-01-01 PROCEDURE — 83880 ASSAY OF NATRIURETIC PEPTIDE: CPT

## 2022-01-01 PROCEDURE — 74176 CT ABD & PELVIS W/O CONTRAST: CPT

## 2022-01-01 PROCEDURE — 84145 PROCALCITONIN (PCT): CPT

## 2022-01-01 PROCEDURE — 99285 EMERGENCY DEPT VISIT HI MDM: CPT

## 2022-01-01 PROCEDURE — 6370000000 HC RX 637 (ALT 250 FOR IP): Performed by: PHYSICIAN ASSISTANT

## 2022-01-01 PROCEDURE — 4040F PNEUMOC VAC/ADMIN/RCVD: CPT | Performed by: NURSE PRACTITIONER

## 2022-01-01 PROCEDURE — 82728 ASSAY OF FERRITIN: CPT

## 2022-01-01 PROCEDURE — G8399 PT W/DXA RESULTS DOCUMENT: HCPCS | Performed by: NURSE PRACTITIONER

## 2022-01-01 PROCEDURE — 6360000002 HC RX W HCPCS: Performed by: PHYSICIAN ASSISTANT

## 2022-01-01 PROCEDURE — 96367 TX/PROPH/DG ADDL SEQ IV INF: CPT

## 2022-01-01 PROCEDURE — 99284 EMERGENCY DEPT VISIT MOD MDM: CPT

## 2022-01-01 PROCEDURE — 85025 COMPLETE CBC W/AUTO DIFF WBC: CPT

## 2022-01-01 PROCEDURE — 80053 COMPREHEN METABOLIC PANEL: CPT

## 2022-01-01 PROCEDURE — 87635 SARS-COV-2 COVID-19 AMP PRB: CPT

## 2022-01-01 PROCEDURE — 96361 HYDRATE IV INFUSION ADD-ON: CPT

## 2022-01-01 PROCEDURE — 1036F TOBACCO NON-USER: CPT | Performed by: NURSE PRACTITIONER

## 2022-01-01 PROCEDURE — 1123F ACP DISCUSS/DSCN MKR DOCD: CPT | Performed by: FAMILY MEDICINE

## 2022-01-01 PROCEDURE — 85379 FIBRIN DEGRADATION QUANT: CPT

## 2022-01-01 PROCEDURE — 6370000000 HC RX 637 (ALT 250 FOR IP): Performed by: INTERNAL MEDICINE

## 2022-01-01 PROCEDURE — 36415 COLL VENOUS BLD VENIPUNCTURE: CPT

## 2022-01-01 PROCEDURE — G8399 PT W/DXA RESULTS DOCUMENT: HCPCS | Performed by: FAMILY MEDICINE

## 2022-01-01 PROCEDURE — 85384 FIBRINOGEN ACTIVITY: CPT

## 2022-01-01 PROCEDURE — 80076 HEPATIC FUNCTION PANEL: CPT

## 2022-01-01 PROCEDURE — 81001 URINALYSIS AUTO W/SCOPE: CPT

## 2022-01-01 PROCEDURE — 83735 ASSAY OF MAGNESIUM: CPT

## 2022-01-01 PROCEDURE — G8427 DOCREV CUR MEDS BY ELIG CLIN: HCPCS | Performed by: NURSE PRACTITIONER

## 2022-01-01 PROCEDURE — 1200000000 HC SEMI PRIVATE

## 2022-01-01 PROCEDURE — 2580000003 HC RX 258: Performed by: PHYSICIAN ASSISTANT

## 2022-01-01 PROCEDURE — 84702 CHORIONIC GONADOTROPIN TEST: CPT

## 2022-01-01 PROCEDURE — 80048 BASIC METABOLIC PNL TOTAL CA: CPT

## 2022-01-01 PROCEDURE — 96360 HYDRATION IV INFUSION INIT: CPT

## 2022-01-01 PROCEDURE — 1090F PRES/ABSN URINE INCON ASSESS: CPT | Performed by: NURSE PRACTITIONER

## 2022-01-01 PROCEDURE — 93010 ELECTROCARDIOGRAM REPORT: CPT | Performed by: INTERNAL MEDICINE

## 2022-01-01 PROCEDURE — 94640 AIRWAY INHALATION TREATMENT: CPT

## 2022-01-01 PROCEDURE — G8427 DOCREV CUR MEDS BY ELIG CLIN: HCPCS | Performed by: FAMILY MEDICINE

## 2022-01-01 PROCEDURE — 93970 EXTREMITY STUDY: CPT

## 2022-01-01 PROCEDURE — 6360000002 HC RX W HCPCS: Performed by: INTERNAL MEDICINE

## 2022-01-01 PROCEDURE — 6360000002 HC RX W HCPCS: Performed by: EMERGENCY MEDICINE

## 2022-01-01 PROCEDURE — 82805 BLOOD GASES W/O2 SATURATION: CPT

## 2022-01-01 PROCEDURE — 1123F ACP DISCUSS/DSCN MKR DOCD: CPT | Performed by: NURSE PRACTITIONER

## 2022-01-01 PROCEDURE — 82248 BILIRUBIN DIRECT: CPT

## 2022-01-01 PROCEDURE — 84132 ASSAY OF SERUM POTASSIUM: CPT

## 2022-01-01 PROCEDURE — G8484 FLU IMMUNIZE NO ADMIN: HCPCS | Performed by: NURSE PRACTITIONER

## 2022-01-01 PROCEDURE — 83605 ASSAY OF LACTIC ACID: CPT

## 2022-01-01 PROCEDURE — 2580000003 HC RX 258: Performed by: EMERGENCY MEDICINE

## 2022-01-01 PROCEDURE — 93000 ELECTROCARDIOGRAM COMPLETE: CPT | Performed by: NURSE PRACTITIONER

## 2022-01-01 PROCEDURE — 93005 ELECTROCARDIOGRAM TRACING: CPT | Performed by: EMERGENCY MEDICINE

## 2022-01-01 PROCEDURE — 3017F COLORECTAL CA SCREEN DOC REV: CPT | Performed by: FAMILY MEDICINE

## 2022-01-01 PROCEDURE — 84484 ASSAY OF TROPONIN QUANT: CPT

## 2022-01-01 PROCEDURE — 83615 LACTATE (LD) (LDH) ENZYME: CPT

## 2022-01-01 PROCEDURE — 96372 THER/PROPH/DIAG INJ SC/IM: CPT

## 2022-01-01 PROCEDURE — 99214 OFFICE O/P EST MOD 30 MIN: CPT | Performed by: NURSE PRACTITIONER

## 2022-01-01 PROCEDURE — 3017F COLORECTAL CA SCREEN DOC REV: CPT | Performed by: NURSE PRACTITIONER

## 2022-01-01 PROCEDURE — G8417 CALC BMI ABV UP PARAM F/U: HCPCS | Performed by: NURSE PRACTITIONER

## 2022-01-01 PROCEDURE — 2700000000 HC OXYGEN THERAPY PER DAY

## 2022-01-01 PROCEDURE — 1090F PRES/ABSN URINE INCON ASSESS: CPT | Performed by: FAMILY MEDICINE

## 2022-01-01 PROCEDURE — 4040F PNEUMOC VAC/ADMIN/RCVD: CPT | Performed by: FAMILY MEDICINE

## 2022-01-01 PROCEDURE — 96365 THER/PROPH/DIAG IV INF INIT: CPT

## 2022-01-01 PROCEDURE — 96366 THER/PROPH/DIAG IV INF ADDON: CPT

## 2022-01-01 PROCEDURE — 99213 OFFICE O/P EST LOW 20 MIN: CPT | Performed by: FAMILY MEDICINE

## 2022-01-01 PROCEDURE — 87086 URINE CULTURE/COLONY COUNT: CPT

## 2022-01-01 PROCEDURE — 71045 X-RAY EXAM CHEST 1 VIEW: CPT

## 2022-01-01 PROCEDURE — XW0DXM6 INTRODUCTION OF BARICITINIB INTO MOUTH AND PHARYNX, EXTERNAL APPROACH, NEW TECHNOLOGY GROUP 6: ICD-10-PCS | Performed by: PHYSICIAN ASSISTANT

## 2022-01-01 RX ORDER — KETOROLAC TROMETHAMINE 30 MG/ML
15 INJECTION, SOLUTION INTRAMUSCULAR; INTRAVENOUS ONCE
Status: DISCONTINUED | OUTPATIENT
Start: 2022-01-01 | End: 2022-01-01 | Stop reason: HOSPADM

## 2022-01-01 RX ORDER — ALBUTEROL SULFATE 90 UG/1
2 AEROSOL, METERED RESPIRATORY (INHALATION) 4 TIMES DAILY
Status: DISCONTINUED | OUTPATIENT
Start: 2022-01-01 | End: 2022-01-01 | Stop reason: HOSPADM

## 2022-01-01 RX ORDER — ACETAMINOPHEN 650 MG/1
650 SUPPOSITORY RECTAL EVERY 6 HOURS PRN
Status: DISCONTINUED | OUTPATIENT
Start: 2022-01-01 | End: 2022-01-01 | Stop reason: HOSPADM

## 2022-01-01 RX ORDER — BUDESONIDE AND FORMOTEROL FUMARATE DIHYDRATE 160; 4.5 UG/1; UG/1
2 AEROSOL RESPIRATORY (INHALATION) 2 TIMES DAILY
Status: DISCONTINUED | OUTPATIENT
Start: 2022-01-01 | End: 2022-01-01 | Stop reason: HOSPADM

## 2022-01-01 RX ORDER — POTASSIUM CHLORIDE 20 MEQ/1
40 TABLET, EXTENDED RELEASE ORAL PRN
Status: DISCONTINUED | OUTPATIENT
Start: 2022-01-01 | End: 2022-01-01 | Stop reason: HOSPADM

## 2022-01-01 RX ORDER — HYDROCODONE BITARTRATE AND ACETAMINOPHEN 5; 325 MG/1; MG/1
1 TABLET ORAL EVERY 6 HOURS PRN
Qty: 28 TABLET | Refills: 0
Start: 2022-01-01 | End: 2022-01-01

## 2022-01-01 RX ORDER — SODIUM CHLORIDE 0.9 % (FLUSH) 0.9 %
5-40 SYRINGE (ML) INJECTION PRN
Status: DISCONTINUED | OUTPATIENT
Start: 2022-01-01 | End: 2022-01-01 | Stop reason: HOSPADM

## 2022-01-01 RX ORDER — BUSPIRONE HYDROCHLORIDE 7.5 MG/1
7.5 TABLET ORAL 2 TIMES DAILY
Status: DISCONTINUED | OUTPATIENT
Start: 2022-01-01 | End: 2022-01-01 | Stop reason: HOSPADM

## 2022-01-01 RX ORDER — HYDROCODONE BITARTRATE AND ACETAMINOPHEN 5; 325 MG/1; MG/1
1 TABLET ORAL EVERY 6 HOURS PRN
Status: DISCONTINUED | OUTPATIENT
Start: 2022-01-01 | End: 2022-01-01 | Stop reason: HOSPADM

## 2022-01-01 RX ORDER — 0.9 % SODIUM CHLORIDE 0.9 %
1000 INTRAVENOUS SOLUTION INTRAVENOUS ONCE
Status: DISCONTINUED | OUTPATIENT
Start: 2022-01-01 | End: 2022-01-01 | Stop reason: HOSPADM

## 2022-01-01 RX ORDER — FLUTICASONE PROPIONATE 50 MCG
1 SPRAY, SUSPENSION (ML) NASAL DAILY
Status: DISCONTINUED | OUTPATIENT
Start: 2022-01-01 | End: 2022-01-01 | Stop reason: HOSPADM

## 2022-01-01 RX ORDER — ACETAMINOPHEN 500 MG
1000 TABLET ORAL ONCE
Status: DISCONTINUED | OUTPATIENT
Start: 2022-01-01 | End: 2022-01-01 | Stop reason: HOSPADM

## 2022-01-01 RX ORDER — METHYLPREDNISOLONE SODIUM SUCCINATE 40 MG/ML
40 INJECTION, POWDER, LYOPHILIZED, FOR SOLUTION INTRAMUSCULAR; INTRAVENOUS EVERY 12 HOURS
Status: DISCONTINUED | OUTPATIENT
Start: 2022-01-01 | End: 2022-01-01 | Stop reason: HOSPADM

## 2022-01-01 RX ORDER — ACETAMINOPHEN 325 MG/1
650 TABLET ORAL EVERY 6 HOURS PRN
Status: DISCONTINUED | OUTPATIENT
Start: 2022-01-01 | End: 2022-01-01 | Stop reason: HOSPADM

## 2022-01-01 RX ORDER — DEXAMETHASONE SODIUM PHOSPHATE 4 MG/ML
6 INJECTION, SOLUTION INTRA-ARTICULAR; INTRALESIONAL; INTRAMUSCULAR; INTRAVENOUS; SOFT TISSUE EVERY 24 HOURS
Status: DISCONTINUED | OUTPATIENT
Start: 2022-01-01 | End: 2022-01-01

## 2022-01-01 RX ORDER — GUAIFENESIN/DEXTROMETHORPHAN 100-10MG/5
5 SYRUP ORAL EVERY 4 HOURS PRN
Status: DISCONTINUED | OUTPATIENT
Start: 2022-01-01 | End: 2022-01-01 | Stop reason: HOSPADM

## 2022-01-01 RX ORDER — 0.9 % SODIUM CHLORIDE 0.9 %
1000 INTRAVENOUS SOLUTION INTRAVENOUS ONCE
Status: COMPLETED | OUTPATIENT
Start: 2022-01-01 | End: 2022-01-01

## 2022-01-01 RX ORDER — POLYETHYLENE GLYCOL 3350 17 G/17G
17 POWDER, FOR SOLUTION ORAL DAILY PRN
Status: DISCONTINUED | OUTPATIENT
Start: 2022-01-01 | End: 2022-01-01 | Stop reason: HOSPADM

## 2022-01-01 RX ORDER — DEXAMETHASONE SODIUM PHOSPHATE 10 MG/ML
6 INJECTION, SOLUTION INTRAMUSCULAR; INTRAVENOUS ONCE
Status: COMPLETED | OUTPATIENT
Start: 2022-01-01 | End: 2022-01-01

## 2022-01-01 RX ORDER — DICYCLOMINE HYDROCHLORIDE 10 MG/1
10 CAPSULE ORAL
Qty: 15 CAPSULE | Refills: 3 | Status: SHIPPED | OUTPATIENT
Start: 2022-01-01

## 2022-01-01 RX ORDER — LEVOTHYROXINE SODIUM 0.12 MG/1
125 TABLET ORAL
Status: DISCONTINUED | OUTPATIENT
Start: 2022-01-01 | End: 2022-01-01 | Stop reason: HOSPADM

## 2022-01-01 RX ORDER — POTASSIUM CHLORIDE 20 MEQ/1
40 TABLET, EXTENDED RELEASE ORAL
Status: DISCONTINUED | OUTPATIENT
Start: 2022-01-01 | End: 2022-01-01

## 2022-01-01 RX ORDER — PROMETHAZINE HYDROCHLORIDE 12.5 MG/1
12.5 TABLET ORAL EVERY 6 HOURS PRN
Status: DISCONTINUED | OUTPATIENT
Start: 2022-01-01 | End: 2022-01-01 | Stop reason: HOSPADM

## 2022-01-01 RX ORDER — SODIUM CHLORIDE 9 MG/ML
25 INJECTION, SOLUTION INTRAVENOUS PRN
Status: DISCONTINUED | OUTPATIENT
Start: 2022-01-01 | End: 2022-01-01 | Stop reason: HOSPADM

## 2022-01-01 RX ORDER — IPRATROPIUM BROMIDE AND ALBUTEROL SULFATE 2.5; .5 MG/3ML; MG/3ML
1 SOLUTION RESPIRATORY (INHALATION)
Status: DISCONTINUED | OUTPATIENT
Start: 2022-01-01 | End: 2022-01-01

## 2022-01-01 RX ORDER — POTASSIUM BICARBONATE 25 MEQ/1
50 TABLET, EFFERVESCENT ORAL 2 TIMES DAILY
Status: DISCONTINUED | OUTPATIENT
Start: 2022-01-01 | End: 2022-01-01 | Stop reason: CLARIF

## 2022-01-01 RX ORDER — FUROSEMIDE 20 MG/1
20 TABLET ORAL DAILY
Status: DISCONTINUED | OUTPATIENT
Start: 2022-01-01 | End: 2022-01-01

## 2022-01-01 RX ORDER — PROMETHAZINE HYDROCHLORIDE 25 MG/1
25 TABLET ORAL EVERY 8 HOURS PRN
Qty: 21 TABLET | Refills: 0 | Status: SHIPPED | OUTPATIENT
Start: 2022-01-01 | End: 2022-01-01

## 2022-01-01 RX ORDER — POTASSIUM CHLORIDE 20 MEQ/1
60 TABLET, EXTENDED RELEASE ORAL ONCE
Status: COMPLETED | OUTPATIENT
Start: 2022-01-01 | End: 2022-01-01

## 2022-01-01 RX ORDER — BUPROPION HYDROCHLORIDE 150 MG/1
300 TABLET ORAL EVERY MORNING
Status: DISCONTINUED | OUTPATIENT
Start: 2022-01-01 | End: 2022-01-01 | Stop reason: HOSPADM

## 2022-01-01 RX ORDER — LORAZEPAM 0.5 MG/1
0.5 TABLET ORAL EVERY 4 HOURS PRN
Status: DISCONTINUED | OUTPATIENT
Start: 2022-01-01 | End: 2022-01-01 | Stop reason: HOSPADM

## 2022-01-01 RX ORDER — PROMETHAZINE HYDROCHLORIDE 25 MG/ML
25 INJECTION, SOLUTION INTRAMUSCULAR; INTRAVENOUS ONCE
Status: COMPLETED | OUTPATIENT
Start: 2022-01-01 | End: 2022-01-01

## 2022-01-01 RX ORDER — FUROSEMIDE 10 MG/ML
40 INJECTION INTRAMUSCULAR; INTRAVENOUS DAILY
Status: DISCONTINUED | OUTPATIENT
Start: 2022-01-01 | End: 2022-01-01

## 2022-01-01 RX ORDER — POTASSIUM BICARBONATE 25 MEQ/1
25 TABLET, EFFERVESCENT ORAL ONCE
Status: DISCONTINUED | OUTPATIENT
Start: 2022-01-01 | End: 2022-01-01 | Stop reason: CLARIF

## 2022-01-01 RX ORDER — PROMETHAZINE HYDROCHLORIDE 25 MG/1
25 TABLET ORAL EVERY 6 HOURS PRN
Qty: 20 TABLET | Refills: 0 | Status: SHIPPED | OUTPATIENT
Start: 2022-01-01 | End: 2022-03-02

## 2022-01-01 RX ORDER — SODIUM CHLORIDE 0.9 % (FLUSH) 0.9 %
5-40 SYRINGE (ML) INJECTION EVERY 12 HOURS SCHEDULED
Status: DISCONTINUED | OUTPATIENT
Start: 2022-01-01 | End: 2022-01-01 | Stop reason: HOSPADM

## 2022-01-01 RX ORDER — HYDROCODONE BITARTRATE AND ACETAMINOPHEN 5; 325 MG/1; MG/1
1 TABLET ORAL EVERY 6 HOURS PRN
Qty: 28 TABLET | Refills: 0 | Status: ON HOLD | OUTPATIENT
Start: 2022-01-01 | End: 2022-01-01

## 2022-01-01 RX ORDER — MONTELUKAST SODIUM 10 MG/1
10 TABLET ORAL NIGHTLY
Status: DISCONTINUED | OUTPATIENT
Start: 2022-01-01 | End: 2022-01-01 | Stop reason: HOSPADM

## 2022-01-01 RX ORDER — POTASSIUM CHLORIDE 7.45 MG/ML
10 INJECTION INTRAVENOUS PRN
Status: DISCONTINUED | OUTPATIENT
Start: 2022-01-01 | End: 2022-01-01 | Stop reason: HOSPADM

## 2022-01-01 RX ADMIN — THEOPHYLLINE ANHYDROUS 300 MG: 300 CAPSULE, EXTENDED RELEASE ORAL at 20:35

## 2022-01-01 RX ADMIN — FUROSEMIDE 40 MG: 10 INJECTION, SOLUTION INTRAMUSCULAR; INTRAVENOUS at 09:13

## 2022-01-01 RX ADMIN — THEOPHYLLINE ANHYDROUS 300 MG: 300 CAPSULE, EXTENDED RELEASE ORAL at 20:45

## 2022-01-01 RX ADMIN — BUSPIRONE HYDROCHLORIDE 7.5 MG: 7.5 TABLET ORAL at 21:52

## 2022-01-01 RX ADMIN — SODIUM CHLORIDE, PRESERVATIVE FREE 10 ML: 5 INJECTION INTRAVENOUS at 20:39

## 2022-01-01 RX ADMIN — THEOPHYLLINE ANHYDROUS 300 MG: 300 CAPSULE, EXTENDED RELEASE ORAL at 21:52

## 2022-01-01 RX ADMIN — LEVOTHYROXINE SODIUM 125 MCG: 0.12 TABLET ORAL at 05:21

## 2022-01-01 RX ADMIN — DEXAMETHASONE SODIUM PHOSPHATE 6 MG: 10 INJECTION INTRAMUSCULAR; INTRAVENOUS at 14:48

## 2022-01-01 RX ADMIN — ALBUTEROL SULFATE 2 PUFF: 90 AEROSOL, METERED RESPIRATORY (INHALATION) at 15:37

## 2022-01-01 RX ADMIN — HYDROCODONE BITARTRATE AND ACETAMINOPHEN 1 TABLET: 5; 325 TABLET ORAL at 09:14

## 2022-01-01 RX ADMIN — SODIUM CHLORIDE, PRESERVATIVE FREE 10 ML: 5 INJECTION INTRAVENOUS at 20:49

## 2022-01-01 RX ADMIN — BUDESONIDE AND FORMOTEROL FUMARATE DIHYDRATE 2 PUFF: 160; 4.5 AEROSOL RESPIRATORY (INHALATION) at 08:03

## 2022-01-01 RX ADMIN — MONTELUKAST 10 MG: 10 TABLET, FILM COATED ORAL at 20:45

## 2022-01-01 RX ADMIN — DEXAMETHASONE SODIUM PHOSPHATE 6 MG: 4 INJECTION, SOLUTION INTRAMUSCULAR; INTRAVENOUS at 10:05

## 2022-01-01 RX ADMIN — CEFTRIAXONE SODIUM 1000 MG: 1 INJECTION, POWDER, FOR SOLUTION INTRAMUSCULAR; INTRAVENOUS at 14:48

## 2022-01-01 RX ADMIN — BUPROPION HYDROCHLORIDE 300 MG: 150 TABLET, FILM COATED, EXTENDED RELEASE ORAL at 08:28

## 2022-01-01 RX ADMIN — BARICITINIB 4 MG: 2 TABLET, FILM COATED ORAL at 08:28

## 2022-01-01 RX ADMIN — ALBUTEROL SULFATE 2 PUFF: 90 AEROSOL, METERED RESPIRATORY (INHALATION) at 15:47

## 2022-01-01 RX ADMIN — FUROSEMIDE 20 MG: 20 TABLET ORAL at 08:28

## 2022-01-01 RX ADMIN — HYDROCODONE BITARTRATE AND ACETAMINOPHEN 1 TABLET: 5; 325 TABLET ORAL at 22:18

## 2022-01-01 RX ADMIN — BUSPIRONE HYDROCHLORIDE 7.5 MG: 7.5 TABLET ORAL at 20:45

## 2022-01-01 RX ADMIN — THEOPHYLLINE ANHYDROUS 300 MG: 300 CAPSULE, EXTENDED RELEASE ORAL at 09:14

## 2022-01-01 RX ADMIN — SODIUM CHLORIDE 1000 ML: 9 INJECTION, SOLUTION INTRAVENOUS at 21:46

## 2022-01-01 RX ADMIN — GUAIFENESIN AND DEXTROMETHORPHAN 5 ML: 100; 10 SYRUP ORAL at 06:23

## 2022-01-01 RX ADMIN — SODIUM CHLORIDE, PRESERVATIVE FREE 10 ML: 5 INJECTION INTRAVENOUS at 08:35

## 2022-01-01 RX ADMIN — POTASSIUM CHLORIDE 60 MEQ: 1500 TABLET, EXTENDED RELEASE ORAL at 01:40

## 2022-01-01 RX ADMIN — FLUTICASONE PROPIONATE 1 SPRAY: 50 SPRAY, METERED NASAL at 08:31

## 2022-01-01 RX ADMIN — GUAIFENESIN AND DEXTROMETHORPHAN 5 ML: 100; 10 SYRUP ORAL at 13:47

## 2022-01-01 RX ADMIN — BUSPIRONE HYDROCHLORIDE 7.5 MG: 7.5 TABLET ORAL at 08:28

## 2022-01-01 RX ADMIN — FUROSEMIDE 40 MG: 10 INJECTION, SOLUTION INTRAMUSCULAR; INTRAVENOUS at 10:51

## 2022-01-01 RX ADMIN — BUSPIRONE HYDROCHLORIDE 7.5 MG: 7.5 TABLET ORAL at 20:35

## 2022-01-01 RX ADMIN — PROMETHAZINE HYDROCHLORIDE 25 MG: 25 INJECTION INTRAMUSCULAR; INTRAVENOUS at 22:40

## 2022-01-01 RX ADMIN — THEOPHYLLINE ANHYDROUS 300 MG: 300 CAPSULE, EXTENDED RELEASE ORAL at 08:29

## 2022-01-01 RX ADMIN — PROMETHAZINE HYDROCHLORIDE 12.5 MG: 12.5 TABLET ORAL at 14:07

## 2022-01-01 RX ADMIN — BUSPIRONE HYDROCHLORIDE 7.5 MG: 7.5 TABLET ORAL at 09:15

## 2022-01-01 RX ADMIN — SODIUM CHLORIDE, PRESERVATIVE FREE 10 ML: 5 INJECTION INTRAVENOUS at 09:16

## 2022-01-01 RX ADMIN — FLUTICASONE PROPIONATE 1 SPRAY: 50 SPRAY, METERED NASAL at 09:12

## 2022-01-01 RX ADMIN — ALBUTEROL SULFATE 2 PUFF: 90 AEROSOL, METERED RESPIRATORY (INHALATION) at 08:03

## 2022-01-01 RX ADMIN — DEXAMETHASONE SODIUM PHOSPHATE 6 MG: 4 INJECTION, SOLUTION INTRAMUSCULAR; INTRAVENOUS at 09:13

## 2022-01-01 RX ADMIN — AZITHROMYCIN MONOHYDRATE 500 MG: 500 INJECTION, POWDER, LYOPHILIZED, FOR SOLUTION INTRAVENOUS at 15:20

## 2022-01-01 RX ADMIN — MONTELUKAST 10 MG: 10 TABLET, FILM COATED ORAL at 21:52

## 2022-01-01 RX ADMIN — LORAZEPAM 0.5 MG: 0.5 TABLET ORAL at 09:16

## 2022-01-01 RX ADMIN — MONTELUKAST 10 MG: 10 TABLET, FILM COATED ORAL at 20:35

## 2022-01-01 RX ADMIN — LEVOTHYROXINE SODIUM 125 MCG: 0.12 TABLET ORAL at 06:30

## 2022-01-01 RX ADMIN — BUPROPION HYDROCHLORIDE 300 MG: 150 TABLET, FILM COATED, EXTENDED RELEASE ORAL at 09:14

## 2022-01-01 RX ADMIN — HYDROCODONE BITARTRATE AND ACETAMINOPHEN 1 TABLET: 5; 325 TABLET ORAL at 21:52

## 2022-01-01 RX ADMIN — POTASSIUM CHLORIDE 40 MEQ: 1500 TABLET, EXTENDED RELEASE ORAL at 09:15

## 2022-01-01 RX ADMIN — SODIUM CHLORIDE, PRESERVATIVE FREE 10 ML: 5 INJECTION INTRAVENOUS at 21:52

## 2022-01-01 RX ADMIN — ALBUTEROL SULFATE 2 PUFF: 90 AEROSOL, METERED RESPIRATORY (INHALATION) at 21:06

## 2022-01-01 ASSESSMENT — PAIN DESCRIPTION - ORIENTATION
ORIENTATION: POSTERIOR
ORIENTATION: RIGHT
ORIENTATION: POSTERIOR

## 2022-01-01 ASSESSMENT — PAIN SCALES - GENERAL
PAINLEVEL_OUTOF10: 8
PAINLEVEL_OUTOF10: 1
PAINLEVEL_OUTOF10: 0
PAINLEVEL_OUTOF10: 8
PAINLEVEL_OUTOF10: 0
PAINLEVEL_OUTOF10: 10
PAINLEVEL_OUTOF10: 0

## 2022-01-01 ASSESSMENT — PAIN DESCRIPTION - FREQUENCY
FREQUENCY: CONTINUOUS
FREQUENCY: INTERMITTENT
FREQUENCY: INTERMITTENT

## 2022-01-01 ASSESSMENT — PAIN DESCRIPTION - ONSET: ONSET: PROGRESSIVE

## 2022-01-01 ASSESSMENT — ENCOUNTER SYMPTOMS
CONSTIPATION: 0
DIARRHEA: 0
ABDOMINAL PAIN: 1
WHEEZING: 0
SHORTNESS OF BREATH: 0
NAUSEA: 0
COUGH: 1
VOMITING: 1

## 2022-01-01 ASSESSMENT — PAIN DESCRIPTION - LOCATION
LOCATION: ABDOMEN
LOCATION: HEAD
LOCATION: ABDOMEN
LOCATION: HIP

## 2022-01-01 ASSESSMENT — PAIN DESCRIPTION - PAIN TYPE
TYPE: CHRONIC PAIN
TYPE: ACUTE PAIN

## 2022-01-01 ASSESSMENT — PAIN - FUNCTIONAL ASSESSMENT
PAIN_FUNCTIONAL_ASSESSMENT: ACTIVITIES ARE NOT PREVENTED
PAIN_FUNCTIONAL_ASSESSMENT: 0-10

## 2022-01-01 ASSESSMENT — PAIN DESCRIPTION - DIRECTION: RADIATING_TOWARDS: FRONT

## 2022-01-01 ASSESSMENT — PAIN DESCRIPTION - DESCRIPTORS: DESCRIPTORS: HEADACHE

## 2022-01-01 ASSESSMENT — PAIN DESCRIPTION - PROGRESSION: CLINICAL_PROGRESSION: NOT CHANGED

## 2022-01-06 PROBLEM — R06.02 SHORTNESS OF BREATH: Status: ACTIVE | Noted: 2022-01-01

## 2022-01-06 NOTE — PROGRESS NOTES
[facility]  [address]  Phone: [phone]    Fax [phone]                  Enid Pan MD, Ivette Overton MD, [address] Donovan MD, MD Dane Man MD Burnette Opoka, MD Karma Valdes, DELORES Carlson, APRN Romayne Areola, APRZENOBIA Hanna, APRZENOBIA    CARDIOLOGY  NOTE      1/6/2022    RE: Claudeen Lunch  (1955)                               TO:  Dr. Juliocesar Hoskins MD  The primary cardiologist is Dr. Jan Walker     CC:   1. Morbid obesity ([address]    2. Nonrheumatic mitral valve regurgitation    3. Venous (peripheral) insufficiency    4. Shortness of breath        Patient denies all of the following:  Chest Pain  Palpitations  Edema  Dizziness  Syncope      HPI: Thank you for involving me in taking care of your patient Claudeen Lunch, who is a  77y.o. year old female with a history as listed above. Patient presents to the office for follow up on anxiety, venous insufficiency, and VHD. Patient reports have SOB and palpitations which is unchanged. Patient is  an active female who does not walk regularly. Patient is compliant with she medications. Patient denies any cardiac complaints or needs.      Vitals:    01/06/22 1540   BP: 110/62   Pulse: 83       Current Outpatient Medications   Medication Sig Dispense Refill    theophylline (THEODUR) 300 MG extended release tablet Take 1 tablet by mouth 2 times daily 60 tablet 5    loratadine (CLARITIN) 10 MG tablet Take 1 tablet by mouth daily 90 tablet 3    furosemide (LASIX) 20 MG tablet Take 1 tablet by mouth daily 60 tablet 5    albuterol sulfate HFA (VENTOLIN HFA) 108 (90 Base) MCG/ACT inhaler Inhale 2 puffs into the lungs every 6 hours as needed for Shortness of Breath 1 each 5    budesonide (RINOCORT AQUA) 32 MCG/ACT nasal spray 1 spray by Each Nostril route daily 1 each 3    fluticasone-salmeterol (ADVAIR) 250-50 MCG/DOSE AEPB inhale 1 puff by mouth and INTO THE LUNGS twice a day 1 each 0    buPROPion (WELLBUTRIN XL) 300 MG extended release tablet take 1 tablet by mouth every morning 30 tablet 0    montelukast (SINGULAIR) 10 MG tablet Take 1 tablet by mouth nightly 90 tablet 3    busPIRone (BUSPAR) 7.5 MG tablet Take 1 tablet by mouth 2 times daily 180 tablet 3    levothyroxine (SYNTHROID) 125 MCG tablet Take 1 tablet by mouth every morning 90 tablet 3    albuterol (PROVENTIL) (2.5 MG/3ML) 0.083% nebulizer solution Take 3 mLs by nebulization 2 times daily as needed for Wheezing or Shortness of Breath 60 each 2    famotidine (PEPCID) 20 MG tablet Take 1 tablet by mouth 2 times daily for 14 days (Patient not taking: Reported on 1/6/2022) 28 tablet 0    HYDROcodone-acetaminophen (NORCO) 5-325 MG per tablet Take 1 tablet by mouth every 6 hours as needed for Pain. (Patient not taking: Reported on 1/6/2022)       No current facility-administered medications for this visit. Allergies: Latex, Iodine, Levofloxacin, Pcn [penicillins], Sulfa antibiotics, Adhesive tape, Flexeril [cyclobenzaprine], Morphine, Zofran, Caffeine, Diazepam, Lidocaine, and Volumen [barium sulfate]  Past Medical History:   Diagnosis Date    Anxiety 01/28/2019    Arthritis     Asthma     CAD (coronary artery disease)     COPD (chronic obstructive pulmonary disease) ([address]     H/O cardiac catheterization 09/17/2019     normal coronaries    H/O cardiovascular stress test     H/O Doppler carotid ultrasound 08/07/2019    Mild (0-49%) disease of the Bilateral Internal carotid artery.  H/O Doppler lower venous ultrasound 08/07/2019    Significant reflux noted of the Right GSV. Right GSV too small currently for ablation procedure. Significant reflux noted in the Left GSV at mid thigh . Left GSV difficult to follow between mid thigh and mid calf due to multiple  splits/branches.  GSV knee tributary is very superficial.    H/O echocardiogram 08/07/2019    EF 55-60%, Mild MR & TR.    Hepatic steatosis 06/19/2015    History of nuclear stress test 09/05/2019     Mild anterior wall Ischemia of a medium sized territory. Possibility of breast artifact causing the abnormality cannot be excluded.  Hx of Venous Doppler ultrasound 08/23/2021    No DVT or SVT, Significant reflux in RGSV, LGSV    Hx of Venous Doppler ultrasound 11/10/2021    Left CFV is patent, Left GSV is non compressible with no evidence of flow, LGSV at the distal thigh and knee is partially compressible with no evidence of flow    Hyperlipidemia     Hypertension     Hypothyroid      Past Surgical History:   Procedure Laterality Date    CARPAL TUNNEL RELEASE Left     CHOLECYSTECTOMY      COLONOSCOPY  01/28/2019    Polyps x4, Internal grade 1 hemorrhoids    COLONOSCOPY N/A 1/28/2019    COLONOSCOPY POLYPECTOMY SNARE/COLD BIOPSY performed by Ginette Morgan MD at [address] Right 6/21/2021    CYSTOSCOPY RIGHT RETROGRADE PYLEOGRAMS RIGHT STONE MANIPULATION performed by Pradeep Nelson MD at [address], COLON, DIAGNOSTIC  09/03/2019    biopsy f/u with pcp    HYSTERECTOMY      due to bleeding.   complete    THYROIDECTOMY  2009    TONSILLECTOMY      TUBAL LIGATION      UPPER GASTROINTESTINAL ENDOSCOPY N/A 9/4/2019    EGD BIOPSY performed by Brice Craft MD at [address]  Left      Family History   Problem Relation Age of Onset    Diabetes Mother     Hypertension Mother     Depression Mother         suicide    Diabetes Father     Depression Father     Diabetes Brother     Birth Defects Maternal Grandmother     Hypertension Maternal Grandmother     Diabetes Sister     Depression Brother     Diabetes Sister     Diabetes Brother     Other Brother         black mold    Diabetes Brother     Other Daughter         GI issues    Asthma Daughter     Seizures Daughter      Social History     Tobacco Use    Smoking status: Never Smoker    Smokeless tobacco: Never Used   Substance Use Topics    Alcohol use: No        Review of Systems - History obtained from the patient  General: negative for - fatigue, malaise, night sweats, weight gain  Psychological: negative for - anxiety, depression, sleep disturbances  Ophthalmic: negative for - blurry vision, loss of vision  ENT: negative for - headaches, vertigo, visual changes  Hematological and Lymphatic: negative for - bleeding problems, blood clots, bruising, fatigue or pallor  Endocrine: negative for - malaise/lethargy, palpitations, unexpected weight changes  Respiratory: negative for - cough, orthopnea, shortness of breath or tachypnea  Cardiovascular: negative for - chest pain, dyspnea on exertion, edema or palpitations  Gastrointestinal: no abdominal pain, change in bowel habits, or black or bloody stools  Genito-Urinary: no dysuria, trouble voiding, or hematuria  Musculoskeletal: negative for - gait disturbance, pain, swelling    Neurological: negative for - confusion, dizziness, impaired coordination/balance or numbness/tingling    Objective:      Physical Exam:  /62 (Site: Right Upper Arm, Position: Sitting, Cuff Size: Large Adult)   Pulse 83   Ht 5' 1.5\" (1.562 m)   Wt 228 lb 12.8 oz (103.8 kg)   LMP  (LMP Unknown)   BMI 42.53 kg/m²   Wt Readings from Last 3 Encounters:   01/06/22 228 lb 12.8 oz (103.8 kg)   11/24/21 233 lb (105.7 kg)   11/09/21 235 lb 9.6 oz (106.9 kg)     Body mass index is 42.53 kg/m². GENERAL - Alert, oriented, pleasant, in no apparent distress. Head unremarkable  Eyes - pupils equal and reactive to light - bilateral conjunctiva are pink: sclera are white   ENT  external ears intact, nose is intact:  tongue is midline pink and moist  Neck - Supple. No jugular venous distention noted. No carotid bruits appreciated. Cardiovascular  Normal S1 and S2:  murmur appreciated No, No gallop. Regular rate- Yes,  rhythm regular-Yes.    Extremities - No cyanosis, clubbing, no edema to lower legs. Pulmonary  No respiratory distress. No wheezes or rales. Chest is clear  Pulses: Bilateral radial and pedal pulses normal  Abdomen   Soft no tenderness, non distended   Musculoskeletal  Normal movement of all extremities   Neurologic  alert and oriented: There are no gross focal neurologic abnormalities. Skin-  No rash: No echymosis   Affect- normal mood and pleasant       DATA:  Lab Results   Component Value Date    TROPONINI <0.006 03/16/2012     BNP:    Lab Results   Component Value Date    BNP 12 06/10/2013     PT/INR:  No results found for: PTINR  Lab Results   Component Value Date    LABA1C 5.4 09/16/2021    LABA1C 5.5 06/05/2019     Lab Results   Component Value Date    CHOL 182 09/17/2021    TRIG 95 09/17/2021    HDL 45 09/17/2021    LDLCALC 105 (H) 04/25/2017    LDLDIRECT 115 (H) 09/17/2021     Lab Results   Component Value Date    ALT 14 07/20/2021    AST 17 07/20/2021     TSH:    Lab Results   Component Value Date    TSH 1.24 06/19/2018       Echo:8/7/19  Left ventricular systolic function is normal with an ejection fraction of   55-60%. Grade I diastolic dysfunction. Mild septal wall asymmetrical left ventricular hypertrophy. Doppler evaluation reveals trace aortic, mild mitral, and mild tricuspid   regurgitation. No evidence of pericardial effusion. Cardiac: 9/17/19  No CAD       The 10-year ASCVD risk score (Yo Harris et al., 2013) is: 4.8%    Values used to calculate the score:      Age: 77 years      Sex: Female      Is Non- : No      Diabetic: No      Tobacco smoker: No      Systolic Blood Pressure: 614 mmHg      Is BP treated: No      HDL Cholesterol: 45 MG/DL      Total Cholesterol: 182 MG/DL    Assessment/ Plan:     Venous (peripheral) insufficiency   - Recent venous ablation done of LLE. Recommend compression stockings. Unable to ablate the RLE. Continue with lasix daily.      Morbid obesity ([address]   -Discussed the importance of diet and exercise and assisting with weight loss. Patient informed of ideal body weight and high risk mortality associated with obesity. Patient voices understanding. Mitral insufficiency   - Patient remains asymptomatic. Mild mitral, aortic, and tricuspid regurgitation noted. SOB  Is unchanged from baseline. Recommend weight loss. Patient seen, interviewed and examined. Testing was reviewed. Patient is encouraged to exercise even a brisk walk for 30 minutes at least 3 to 4 times a week. Lifestyle and risk factor modificatons discussed. Various goals are discussed and questions answered. Continue current medications. Appropriate prescriptions are addressed. Questions answered and patient verbalizes understanding. Call for any problems, questions, or concerns. Pt is to follow up in 6 months for Cardiac management    Electronically signed by Ajit Herbert.  DELORES Camrago CNP on 1/6/2022 at 3:56 PM

## 2022-01-06 NOTE — PATIENT INSTRUCTIONS
**It is YOUR responsibilty to bring medication bottles and/or updated medication list to [address] [place] [address]. This will allow us to better serve you and all your healthcare needs**  Please be informed that if you contact our office outside of normal business hours the physician on call cannot help with any scheduling or rescheduling issues, procedure instruction questions or any type of medication issue. We advise you for any urgent/emergency that you go to the nearest emergency room!     PLEASE CALL OUR OFFICE DURING NORMAL BUSINESS HOURS    Monday - Friday   8 am to 5 pm    [facility]: [phone]    [place]:  [phone]

## 2022-01-06 NOTE — ASSESSMENT & PLAN NOTE
-Discussed the importance of diet and exercise and assisting with weight loss. Patient informed of ideal body weight and high risk mortality associated with obesity. Patient voices understanding.

## 2022-01-14 NOTE — TELEPHONE ENCOUNTER
Stated that she has been throwing up, started on 1/13/22, has not been tested for Covid, Stated that she also has burningwith urination. State that this just started on 1/13/22. Stated that she was giving medication for bronchitis just this week and is unable to keep it down. Please advise.

## 2022-01-17 PROBLEM — N30.00 ACUTE CYSTITIS WITHOUT HEMATURIA: Status: ACTIVE | Noted: 2022-01-01

## 2022-01-17 NOTE — PROGRESS NOTES
2022    TELEHEALTH EVALUATION -- Audio/Visual [facility])    Chief Complaint   Patient presents with    Urinary Tract Infection     started on thursday, buring with urination, nausea,     Cough         HPI:    Odell Ramirez (:  1955) has requested an audio/video evaluation for the following concern(s):    Patient seen today c/o:     Urinary Frequency   This is a new problem. Episode onset: started 4 days ago. The problem has been gradually worsening. The pain is at a severity of 5/10. There has been no fever. Associated symptoms include chills, flank pain, frequency and vomiting. Pertinent negatives include no discharge, hematuria or nausea. She has tried antibiotics (went to  and macrobid was given) for the symptoms. The treatment provided no relief. Review of Systems   Constitutional: Positive for chills. Negative for activity change and fatigue. HENT: Negative for congestion. Respiratory: Positive for cough. Negative for shortness of breath and wheezing. Cardiovascular: Negative for chest pain and leg swelling. Gastrointestinal: Positive for abdominal pain (flank pain) and vomiting. Negative for constipation, diarrhea and nausea. Genitourinary: Positive for dysuria, flank pain and frequency. Negative for hematuria. Neurological: Negative for dizziness. Psychiatric/Behavioral: Negative for agitation. The patient is not nervous/anxious. Prior to Visit Medications    Medication Sig Taking?  Authorizing Provider   promethazine (PHENERGAN) 25 MG tablet Take 1 tablet by mouth every 8 hours as needed for Nausea Yes Pam Hutson MD   theophylline (THEODUR) 300 MG extended release tablet Take 1 tablet by mouth 2 times daily Yes Pam Hutson MD   loratadine (CLARITIN) 10 MG tablet Take 1 tablet by mouth daily Yes Pam Hutson MD   furosemide (LASIX) 20 MG tablet Take 1 tablet by mouth daily Yes Pam Hutson MD   albuterol sulfate HFA (VENTOLIN HFA) 108 (90 Base) MCG/ACT inhaler Inhale 2 puffs into the lungs every 6 hours as needed for Shortness of Breath Yes Willy Cuevas MD   budesonide (RINOCORT AQUA) 32 MCG/ACT nasal spray 1 spray by Each Nostril route daily Yes Willy Cuevas MD   fluticasone-salmeterol (ADVAIR) 250-50 MCG/DOSE AEPB inhale 1 puff by mouth and INTO THE LUNGS twice a day Yes DELORES Watt NP   buPROPion (WELLBUTRIN XL) 300 MG extended release tablet take 1 tablet by mouth every morning Yes DELORES Watt NP   HYDROcodone-acetaminophen (NORCO) 5-325 MG per tablet Take 1 tablet by mouth every 6 hours as needed for Pain.   Yes Historical Provider, MD   montelukast (SINGULAIR) 10 MG tablet Take 1 tablet by mouth nightly Yes Willy Cuevas MD   busPIRone (BUSPAR) 7.5 MG tablet Take 1 tablet by mouth 2 times daily Yes Willy Cuevas MD   levothyroxine (SYNTHROID) 125 MCG tablet Take 1 tablet by mouth every morning Yes Willy Cuevas MD   albuterol (PROVENTIL) (2.5 MG/3ML) 0.083% nebulizer solution Take 3 mLs by nebulization 2 times daily as needed for Wheezing or Shortness of Breath Yes DELORES Torrez CNP   famotidine (PEPCID) 20 MG tablet Take 1 tablet by mouth 2 times daily for 14 days  Patient not taking: Reported on 1/6/2022  DELORES Watt NP       Social History     Tobacco Use    Smoking status: Never Smoker    Smokeless tobacco: Never Used   Vaping Use    Vaping Use: Never used   Substance Use Topics    Alcohol use: No    Drug use: No        Allergies   Allergen Reactions    Latex Itching    Iodine Anaphylaxis    Levofloxacin Anaphylaxis    Pcn [Penicillins] Anaphylaxis    Sulfa Antibiotics Anaphylaxis    Adhesive Tape     Flexeril [Cyclobenzaprine]     Morphine Itching    Zofran Itching    Caffeine Palpitations     Doesn't sleep    Diazepam Nausea And Vomiting    Lidocaine Nausea And Vomiting    Volumen [Barium Sulfate] Itching, Nausea And Vomiting and Rash ,   Past Medical History:   Diagnosis Date    Anxiety 01/28/2019    Arthritis     Asthma     CAD (coronary artery disease)     COPD (chronic obstructive pulmonary disease) ([facility] [address]     H/O cardiac catheterization 09/17/2019     normal coronaries    H/O cardiovascular stress test     H/O Doppler carotid ultrasound 08/07/2019    Mild (0-49%) disease of the Bilateral Internal carotid artery.  H/O Doppler lower venous ultrasound 08/07/2019    Significant reflux noted of the Right GSV. Right GSV too small currently for ablation procedure. Significant reflux noted in the Left GSV at mid thigh . Left GSV difficult to follow between mid thigh and mid calf due to multiple  splits/branches. GSV knee tributary is very superficial.    H/O echocardiogram 08/07/2019    EF 55-60%, Mild MR & TR.    Hepatic steatosis 06/19/2015    History of nuclear stress test 09/05/2019     Mild anterior wall Ischemia of a medium sized territory. Possibility of breast artifact causing the abnormality cannot be excluded.  Hx of Venous Doppler ultrasound 08/23/2021    No DVT or SVT, Significant reflux in RGSV, LGSV    Hx of Venous Doppler ultrasound 11/10/2021    Left CFV is patent, Left GSV is non compressible with no evidence of flow, LGSV at the distal thigh and knee is partially compressible with no evidence of flow    Hyperlipidemia     Hypertension     Hypothyroid    ,   Past Surgical History:   Procedure Laterality Date    CARPAL TUNNEL RELEASE Left     CHOLECYSTECTOMY      COLONOSCOPY  01/28/2019    Polyps x4, Internal grade 1 hemorrhoids    COLONOSCOPY N/A 1/28/2019    COLONOSCOPY POLYPECTOMY SNARE/COLD BIOPSY performed by Frances Neal MD at [address] Right 6/21/2021    CYSTOSCOPY RIGHT RETROGRADE PYLEOGRAMS RIGHT STONE MANIPULATION performed by Crissy Jalloh MD at [address], COLON, DIAGNOSTIC  09/03/2019    biopsy f/u with pcp    HYSTERECTOMY      due to bleeding.   complete  THYROIDECTOMY  2009    TONSILLECTOMY      TUBAL LIGATION      UPPER GASTROINTESTINAL ENDOSCOPY N/A 9/4/2019    EGD BIOPSY performed by Linnea Paul MD at [address] [place]    ,   Social History     Tobacco Use    Smoking status: Never Smoker    Smokeless tobacco: Never Used   Vaping Use    Vaping Use: Never used   Substance Use Topics    Alcohol use: No    Drug use: No   ,   Family History   Problem Relation Age of Onset    Diabetes Mother     Hypertension Mother     Depression Mother         suicide    Diabetes Father     Depression Father     Diabetes Brother     Birth Defects Maternal Grandmother     Hypertension Maternal Grandmother     Diabetes Sister     Depression Brother     Diabetes Sister     Diabetes Brother     Other Brother         black mold    Diabetes Brother     Other Daughter         GI issues    Asthma Daughter     Seizures Daughter        PHYSICAL EXAMINATION:  [ INSTRUCTIONS:  \"[x]\" Indicates a positive item  \"[]\" Indicates a negative item  -- DELETE ALL ITEMS NOT EXAMINED]  Vital Signs: (As obtained by patient/caregiver or practitioner observation)    Blood pressure-  Heart rate-    Respiratory rate-    Temperature-  Pulse oximetry-     Constitutional: [] Appears well-developed and well-nourished [] No apparent distress      [] Abnormal-   Mental status  [] Alert and awake  [] Oriented to person/place/time []Able to follow commands      Eyes:  EOM    [x]  Normal  [] Abnormal-  Sclera  []  Normal  [] Abnormal -         Discharge []  None visible  [] Abnormal -    HENT:   [] Normocephalic, atraumatic.   [] Abnormal   [] Mouth/Throat: Mucous membranes are moist.     External Ears [] Normal  [] Abnormal-     Neck: [] No visualized mass     Pulmonary/Chest: [x] Respiratory effort normal.  [x] No visualized signs of difficulty breathing or respiratory distress        [] Abnormal-      Musculoskeletal:   [] Normal gait with no signs of ataxia [] Normal range of motion of neck        [] Abnormal-       Neurological:        [x] No Facial Asymmetry (Cranial nerve 7 motor function) (limited exam to video visit)          [] No gaze palsy        [] Abnormal-         Skin:        [] No significant exanthematous lesions or discoloration noted on facial skin         [] Abnormal-            Psychiatric:       [x] Normal Affect [] No Hallucinations        [] Abnormal-     Other pertinent observable physical exam findings-     ASSESSMENT/PLAN:  1. Urinary tract infection without hematuria, site unspecified  -already on macrobid from the UC    2. Nausea and vomiting, intractability of vomiting not specified, unspecified vomiting type  - try:  - promethazine (PHENERGAN) 25 MG tablet; Take 1 tablet by mouth every 8 hours as needed for Nausea  Dispense: 21 tablet; Refill: 0  - no diarrhea  - if getting worse needs to go to the ED    No follow-ups on file. Giacomo Tapia, was evaluated through a synchronous (real-time) audio-video encounter. The patient (or guardian if applicable) is aware that this is a billable service. Verbal consent to proceed has been obtained within the past 12 months. The visit was conducted pursuant to the emergency declaration under the [address] [place] [address], [address] [place] [address] authority and the [address] and [employer] General Act. Patient identification was verified, and a caregiver was present when appropriate. The patient was located in a state where the provider was credentialed to provide care.     Total time spent on this encounter: 20 minutes    --Stanley Jefferson MD on 1/17/2022 at 3:23 PM

## 2022-01-17 NOTE — TELEPHONE ENCOUNTER
Patient states that she has nausea and vomiting times 3-4 days and would like a medication for it. She thinks she also may have a UTI. Next appointment is 3/23/22 Please advise.   Thank you

## 2022-01-18 NOTE — ED NOTES
Patient returned to waiting room via wheelchair. CT completed. Tried to obtain urine sample on way back to waiting room, but patient missed the hat placed in the toilet.

## 2022-01-18 NOTE — ED PROVIDER NOTES
As physician assistant-in-triage, I performed a medical screening history and physical exam on this patient. HISTORY OF PRESENT ILLNESS  Nile All is a 79 y.o. female with right-sided abdominal pain. Onset 5 days ago. Patient has had burning with urination. Patient states she was diagnosed with urinary tract infection however symptoms continue. Patient has had associated vomiting. Patient denies chest pain, shortness of breath. PHYSICAL EXAM  /71   Pulse 74   Temp 98.6 °F (37 °C) (Oral)   Resp 16   LMP  (LMP Unknown)   SpO2 94%     On exam, the patient is elderly female, no acute distress. Mucous membranes are moist. Speech is clear. Breathing is unlabored. Skin is dry. Mental status is normal    See future provider note for medical decision making and disposition. Comment: Please note this report has been produced using speech recognition software and may contain errors related to that system including errors in grammar, punctuation, and spelling, as well as words and phrases that may be inappropriate. If there are any questions or concerns please feel free to contact the dictating provider for clarification.         Tony Ross PA-C  01/18/22 [id number]

## 2022-01-19 NOTE — TELEPHONE ENCOUNTER
Patient called and stated that she has kidney stones and would like to know if she can have something for pain. If you look at previous note patient refused testing at the ER. Does she really have kidney stones?  Please advise

## 2022-01-19 NOTE — TELEPHONE ENCOUNTER
----- Message from James Daniel RN sent at 1/19/2022  9:16 AM EST -----  Hello! FYI. Patient went to the ER yesterday for abdominal pain. Tested positive for COVID. Patient continues to have pain; and reports that she was not given anything for it in the ER. Patient did refuse recommended testing. Patient reports that she will call the office to schedule follow up and declined offer for support. I appreciate your time and attention to this. Have a good day.

## 2022-01-19 NOTE — ED PROVIDER NOTES
Triage Chief Complaint:   Abdominal Pain (right sided abdominal pain, reports currrently being treated for a UTI)    [place]:  Today in the ED I had the pleasure of caring for Hector Garcia who is a 79 y.o. female that presents to the emergency department complaining of right-sided lower abdominal pain. She states she is currently being treated for urinary tract infection. She also endorses associated nausea decreased appetite. She endorses cough x3 to 4 days. With no shortness of breath or chest pain she does have a history of asthma COPD and coronary artery disease. She is not vaccinated against COVID-19. Denies any extremity weakness no headache no changes in vision or confusion. No history of pulmonary embolism. ROS:  REVIEW OF SYSTEMS    At least 10 systems reviewed      All other review of systems are negative  See HPI and nursing notes for additional information       Past Medical History:   Diagnosis Date    Anxiety 01/28/2019    Arthritis     Asthma     CAD (coronary artery disease)     COPD (chronic obstructive pulmonary disease) ([facility] [address]     H/O cardiac catheterization 09/17/2019     normal coronaries    H/O cardiovascular stress test     H/O Doppler carotid ultrasound 08/07/2019    Mild (0-49%) disease of the Bilateral Internal carotid artery.  H/O Doppler lower venous ultrasound 08/07/2019    Significant reflux noted of the Right GSV. Right GSV too small currently for ablation procedure. Significant reflux noted in the Left GSV at mid thigh . Left GSV difficult to follow between mid thigh and mid calf due to multiple  splits/branches. GSV knee tributary is very superficial.    H/O echocardiogram 08/07/2019    EF 55-60%, Mild MR & TR.    Hepatic steatosis 06/19/2015    History of nuclear stress test 09/05/2019     Mild anterior wall Ischemia of a medium sized territory. Possibility of breast artifact causing the abnormality cannot be excluded.     Hx of Venous Doppler ultrasound 08/23/2021    No DVT or SVT, Significant reflux in RGSV, LGSV    Hx of Venous Doppler ultrasound 11/10/2021    Left CFV is patent, Left GSV is non compressible with no evidence of flow, LGSV at the distal thigh and knee is partially compressible with no evidence of flow    Hyperlipidemia     Hypertension     Hypothyroid      Past Surgical History:   Procedure Laterality Date    CARPAL TUNNEL RELEASE Left     CHOLECYSTECTOMY      COLONOSCOPY  01/28/2019    Polyps x4, Internal grade 1 hemorrhoids    COLONOSCOPY N/A 1/28/2019    COLONOSCOPY POLYPECTOMY SNARE/COLD BIOPSY performed by Suad Ty MD at [address] Right 6/21/2021    CYSTOSCOPY RIGHT RETROGRADE PYLEOGRAMS RIGHT STONE MANIPULATION performed by Manuel Miner MD at [place] [address], COLON, DIAGNOSTIC  09/03/2019    biopsy f/u with pcp    HYSTERECTOMY      due to bleeding.   complete    THYROIDECTOMY  2009    TONSILLECTOMY      TUBAL LIGATION      UPPER GASTROINTESTINAL ENDOSCOPY N/A 9/4/2019    EGD BIOPSY performed by Shawna Andre MD at [address] Left      Family History   Problem Relation Age of Onset    Diabetes Mother     Hypertension Mother     Depression Mother         suicide    Diabetes Father     Depression Father     Diabetes Brother     Birth Defects Maternal Grandmother     Hypertension Maternal Grandmother     Diabetes Sister     Depression Brother     Diabetes Sister     Diabetes Brother     Other Brother         black mold    Diabetes Brother     Other Daughter         GI issues    Asthma Daughter     Seizures Daughter      Social History     Socioeconomic History    Marital status:      Spouse name: Not on file    Number of children: 2    Years of education: 15    Highest education level: GED or equivalent   Occupational History    Occupation: unemployed   Tobacco Use    Smoking status: Never Smoker    Smokeless tobacco: Never Used   Vaping Use  Vaping Use: Never used   Substance and Sexual Activity    Alcohol use: No    Drug use: No    Sexual activity: Not Currently     Partners: Male     Birth control/protection: Post-menopausal   Other Topics Concern    Not on file   Social History Narrative    Not on file     Social Determinants of Health     Financial Resource Strain:     Difficulty of Paying Living Expenses: Not on file   Food Insecurity:     Worried About Running Out of Food in the Last Year: Not on file    Bryan of Food in the Last Year: Not on file   Transportation Needs:     Lack of Transportation (Medical): Not on file    Lack of Transportation (Non-Medical):  Not on file   Physical Activity:     Days of Exercise per Week: Not on file    Minutes of Exercise per Session: Not on file   Stress:     Feeling of Stress : Not on file   Social Connections:     Frequency of Communication with Friends and Family: Not on file    Frequency of Social Gatherings with Friends and Family: Not on file    Attends Muslim Services: Not on file    Active Member of Clubs or Organizations: Not on file    Attends Club or Organization Meetings: Not on file    Marital Status: Not on file   Intimate Partner Violence:     Fear of Current or Ex-Partner: Not on file    Emotionally Abused: Not on file    Physically Abused: Not on file    Sexually Abused: Not on file   Housing Stability:     Unable to Pay for Housing in the Last Year: Not on file    Number of [place] in the Last Year: Not on file    Unstable Housing in the Last Year: Not on file     Current Facility-Administered Medications   Medication Dose Route Frequency Provider Last Rate Last Admin    0.9 % sodium chloride bolus  1,000 mL IntraVENous Once Jeanette Eden PA-C        acetaminophen (TYLENOL) tablet 1,000 mg  1,000 mg Oral Once Jeanette Eden PA-C        ketorolac (TORADOL) injection 15 mg  15 mg IntraVENous Once Jeanette Eden PA-C         Current Outpatient Medications   Medication Sig Dispense Refill    promethazine (PHENERGAN) 25 MG tablet Take 1 tablet by mouth every 8 hours as needed for Nausea 21 tablet 0    theophylline (THEODUR) 300 MG extended release tablet Take 1 tablet by mouth 2 times daily 60 tablet 5    loratadine (CLARITIN) 10 MG tablet Take 1 tablet by mouth daily 90 tablet 3    furosemide (LASIX) 20 MG tablet Take 1 tablet by mouth daily 60 tablet 5    albuterol sulfate HFA (VENTOLIN HFA) 108 (90 Base) MCG/ACT inhaler Inhale 2 puffs into the lungs every 6 hours as needed for Shortness of Breath 1 each 5    budesonide (RINOCORT AQUA) 32 MCG/ACT nasal spray 1 spray by Each Nostril route daily 1 each 3    fluticasone-salmeterol (ADVAIR) 250-50 MCG/DOSE AEPB inhale 1 puff by mouth and INTO THE LUNGS twice a day 1 each 0    buPROPion (WELLBUTRIN XL) 300 MG extended release tablet take 1 tablet by mouth every morning 30 tablet 0    famotidine (PEPCID) 20 MG tablet Take 1 tablet by mouth 2 times daily for 14 days (Patient not taking: Reported on 1/6/2022) 28 tablet 0    HYDROcodone-acetaminophen (NORCO) 5-325 MG per tablet Take 1 tablet by mouth every 6 hours as needed for Pain.        montelukast (SINGULAIR) 10 MG tablet Take 1 tablet by mouth nightly 90 tablet 3    busPIRone (BUSPAR) 7.5 MG tablet Take 1 tablet by mouth 2 times daily 180 tablet 3    levothyroxine (SYNTHROID) 125 MCG tablet Take 1 tablet by mouth every morning 90 tablet 3    albuterol (PROVENTIL) (2.5 MG/3ML) 0.083% nebulizer solution Take 3 mLs by nebulization 2 times daily as needed for Wheezing or Shortness of Breath 60 each 2     Allergies   Allergen Reactions    Latex Itching    Iodine Anaphylaxis    Levofloxacin Anaphylaxis    Pcn [Penicillins] Anaphylaxis    Sulfa Antibiotics Anaphylaxis    Adhesive Tape     Flexeril [Cyclobenzaprine]     Morphine Itching    Zofran Itching    Caffeine Palpitations     Doesn't sleep    Diazepam Nausea And Vomiting    Lidocaine Nausea And Vomiting    Volumen [Barium Sulfate] Itching, Nausea And Vomiting and Rash       Nursing Notes Reviewed    Physical Exam:  ED Triage Vitals   Enc Vitals Group      BP 01/18/22 1207 115/71      Pulse 01/18/22 1207 74      Resp 01/18/22 1207 16      Temp 01/18/22 1207 98.6 °F (37 °C)      Temp Source 01/18/22 1207 Oral      SpO2 01/18/22 1207 94 %      Weight 01/18/22 1625 220 lb (99.8 kg)      Height 01/18/22 1625 5' 1.5\" (1.562 m)      Head Circumference --       Peak Flow --       Pain Score --       Pain Loc --       Pain Edu? --       Excl. in [address]? --      General :Patient is awake alert oriented person place and time no acute distress nontoxic appearing female coughing frequently. HEENT: Pupils are equally round and reactive to light extraocular motors are intact conjunctivae clear sclerae white there is no injection no icterus. Nose without any rhinorrhea or epistaxis. Oral mucosa is moist no exudate buccal mucosa shows no ulcerations. Uvula is midline    Neck: Neck is supple full range of motion trachea midline thyroid nonpalpable  Cardiac: Heart regular rate rhythm no murmurs rubs clicks or gallops  Lungs: Lungs are clear to auscultation there is no wheezing rhonchi or rales. There is no use of accessory muscles no nasal flaring identified. Chest wall: There is no tenderness to palpation over the chest wall or over ribs  Abdomen: Abdomen is soft nontender nondistended. There is no firm or pulsatile masses no rebound rigidity or guarding negative Cunningham's negative McBurney, no peritoneal signs  Suprapubic:  there is no tenderness to palpation over the external bladder   Musculoskeletal: 5 out of 5 strength in all 4 extremities full flexion extension abduction and adduction supination pronation of all extremities and all digits. No obvious muscle atrophy is noted.  No focal muscle deficits are appreciated  Dermatology: Skin is warm and dry there is no obvious abscesses lacerations or lesions noted  Psych: Mentation is grossly normal cognition is grossly normal. Affect is appropriate  Neuro: Motor intact sensory intact cranial nerves II through XII are intact level of consciousness is normal cerebellar function is normal reflexes are grossly normal. No evidence of incontinence or loss of bowel or bladder no saddle anesthesia noted Lymphatic: There is no submandibular or cervical adenopathy appreciated.         I have reviewed and interpreted all of the currently available lab results from this visit (if applicable):  Results for orders placed or performed during the hospital encounter of 01/18/22   COVID-19, Rapid    Specimen: Nasopharyngeal   Result Value Ref Range    Source THROAT     SARS-CoV-2, NAAT DETECTED (A) NOT DETECTED   CBC Auto Differential   Result Value Ref Range    WBC 8.0 4.0 - 10.5 K/CU MM    RBC 5.43 (H) 4.2 - 5.4 M/CU MM    Hemoglobin 16.3 (H) 12.5 - 16.0 GM/DL    Hematocrit 50.3 (H) 37 - 47 %    MCV 92.6 78 - 100 FL    MCH 30.0 27 - 31 PG    MCHC 32.4 32.0 - 36.0 %    RDW 13.6 11.7 - 14.9 %    Platelets 383 563 - 357 K/CU MM    MPV 11.0 7.5 - 11.1 FL    Differential Type AUTOMATED DIFFERENTIAL     Segs Relative 66.0 36 - 66 %    Lymphocytes % 25.9 24 - 44 %    Monocytes % 7.7 (H) 0 - 4 %    Eosinophils % 0.0 0 - 3 %    Basophils % 0.2 0 - 1 %    Segs Absolute 5.3 K/CU MM    Lymphocytes Absolute 2.1 K/CU MM    Monocytes Absolute 0.6 K/CU MM    Eosinophils Absolute 0.0 K/CU MM    Basophils Absolute 0.0 K/CU MM    Nucleated RBC % 0.0 %    Total Nucleated RBC 0.0 K/CU MM    Total Immature Neutrophil 0.02 K/CU MM    Immature Neutrophil % 0.2 0 - 0.43 %   Comprehensive Metabolic Panel   Result Value Ref Range    Sodium 133 (L) 135 - 145 MMOL/L    Potassium 3.3 (L) 3.5 - 5.1 MMOL/L    Chloride 95 (L) 99 - 110 mMol/L    CO2 19 (L) 21 - 32 MMOL/L    BUN 24 (H) 6 - 23 MG/DL    CREATININE 1.1 0.6 - 1.1 MG/DL    Glucose 86 70 - 99 MG/DL    Calcium 8.5 8.3 - 10.6 MG/DL    Albumin 3.4 3.4 - 5.0 GM/DL    Total Protein 7.0 6.4 - 8.2 GM/DL    Total Bilirubin 0.3 0.0 - 1.0 MG/DL    ALT 28 10 - 40 U/L    AST 32 15 - 37 IU/L    Alkaline Phosphatase 85 40 - 129 IU/L    GFR Non- 50 (L) >60 mL/min/1.73m2    GFR  60 (L) >60 mL/min/1.73m2    Anion Gap 19 (H) 4 - 16   Lipase   Result Value Ref Range    Lipase 17 13 - 60 IU/L   Urinalysis   Result Value Ref Range    Color, UA YELLOW YELLOW    Clarity, UA CLEAR CLEAR    Glucose, Urine NEGATIVE NEGATIVE MG/DL    Bilirubin Urine MODERATE (A) NEGATIVE MG/DL    Ketones, Urine LARGE (A) NEGATIVE MG/DL    Specific Gravity, UA 1.020 1.001 - 1.035    Blood, Urine NEGATIVE NEGATIVE    pH, Urine 6.0 5.0 - 8.0    Protein, UA 30 (A) NEGATIVE MG/DL    Urobilinogen, Urine NORMAL 0.2 - 1.0 MG/DL    Nitrite Urine, Quantitative NEGATIVE NEGATIVE    Leukocyte Esterase, Urine TRACE (A) NEGATIVE    RBC, UA NONE SEEN 0 - 6 /HPF    WBC, UA 3 0 - 5 /HPF    Bacteria, UA NEGATIVE NEGATIVE /HPF    Squam Epithel, UA 1 /HPF    Mucus, UA OCCASIONAL (A) NEGATIVE HPF    Trichomonas, UA NONE SEEN NONE SEEN /HPF    Hyaline Casts, UA >20 /LPF   Lactic Acid, Plasma   Result Value Ref Range    Lactate 1.6 0.4 - 2.0 mMOL/L   ICTOTEST, URINE   Result Value Ref Range    Ictotest NEGATIVE       Radiographs (if obtained):  [] The following radiograph was interpreted by myself in the absence of a radiologist:   [] Radiologist's Report Reviewed:  CT ABDOMEN PELVIS WO CONTRAST Additional Contrast? None   Final Result   Findings suggestive of multifocal COVID-19 pneumonia in the appropriate   clinical setting. Diffuse hepatic steatosis. Small non-obstructing stones in the right kidney. Normal appendix. XR CHEST (2 VW)    (Results Pending)       EKG (if obtained):   Please See Note of attending physician for EKG interpretation.      Chart review shows recent radiograph(s):  CT ABDOMEN PELVIS WO CONTRAST Additional Contrast? None    Result Date: 1/18/2022  EXAMINATION: CT OF THE ABDOMEN AND PELVIS WITHOUT CONTRAST 1/18/2022 12:31 pm TECHNIQUE: CT of the abdomen and pelvis was performed without the administration of intravenous contrast. Multiplanar reformatted images are provided for review. Dose modulation, iterative reconstruction, and/or weight based adjustment of the mA/kV was utilized to reduce the radiation dose to as low as reasonably achievable. COMPARISON: 06/21/2021 HISTORY: ORDERING SYSTEM PROVIDED HISTORY: right sided abd pain TECHNOLOGIST PROVIDED HISTORY: Reason for exam:->right sided abd pain Additional Contrast?->None Decision Support Exception - unselect if not a suspected or confirmed emergency medical condition->Emergency Medical Condition (MA) Reason for Exam: right sided abd pain Additional signs and symptoms: none Relevant Medical/Surgical History: alcides, hyster, tuibal, iv contrast allergy FINDINGS: Lower Chest: Peripheral ground-glass densities in the lower lungs, suggestive of multifocal COVID-19 pneumonia in the appropriate clinical setting. Organs: Diffuse hepatic steatosis. Multiple calcified splenic granulomas. Unremarkable pancreas and adrenals. Status post cholecystectomy. A few cortical hypodensities in the bilateral kidneys, unchanged and compatible with simple cysts. Nonobstructing stones in the right kidney measuring up to 0.3 cm. No radiopaque stone in the left kidney and bilateral ureters. GI/Bowel: Normal appendix. Bowel loops nonobstructed. Distal colonic diverticula. No acute diverticulitis. Pelvis: Status posthysterectomy. No adnexal mass. Incompletely distended urinary bladder. Peritoneum/Retroperitoneum: No free air or free fluid. No adenopathy. No abdominal aortic aneurysm. Bones/Soft Tissues: Diffuse osteopenia. Multilevel thoracolumbar spondylosis with mild levoscoliosis. .  Osteoarthritis of the bilateral hip joints.      Findings suggestive of multifocal COVID-19 pneumonia in the appropriate clinical setting. Diffuse hepatic steatosis. Small non-obstructing stones in the right kidney. Normal appendix. MDM:     Interventions given this visit:   Orders Placed This Encounter   Medications    0.9 % sodium chloride bolus    acetaminophen (TYLENOL) tablet 1,000 mg    ketorolac (TORADOL) injection 15 mg    DISCONTD: potassium bicarbonate (K-LYTE) disintegrating tablet 25 mEq       Well-appearing patient frequently coughing. Normal vital signs except for possible borderline fever presents today to the emergency department with abdominal pain. Abdominal exam is benign on initial and repeat examinations. Negative labs concerning for acute abdominal process also negative CT scan per radiologist interpretation. CT scan did  what appears to be COVID-pneumonia. COVID-19 swab is positive. Patient is made aware of these findings. She outright adamantly refuses any further treatment. Refuses IV. I did discuss monoclonal antibodies with patient. And she states that she is not interested. She is hypokalemic. Potassium is ordered. Patient is educated on return precautions. She does have multiple comorbidities including COPD CHF CAD. I did explain to her that she is very very very high risk for advanced COVID-19 development. Patient is adamant that she wishes to be discharged home. And wishes no further emergent evaluation or treatment at this time          I independently managed patient today in the ED    /66   Pulse 65   Temp 99.4 °F (37.4 °C) (Oral)   Resp 20   Ht 5' 1.5\" (1.562 m)   Wt 220 lb (99.8 kg)   LMP  (LMP Unknown)   SpO2 93%   BMI 40.90 kg/m²       Clinical Impression:  1. Abdominal pain, right lower quadrant    2.  COVID-19        Disposition referral (if applicable):  Samara Russell MD  [address]   [phone]    In 2 days      [facility] Emergency Department  [address] [facility] [address] [facility]  [phone]    If symptoms worsen or persist    Disposition medications (if applicable):  New Prescriptions    No medications on file         Comment: Please note this report has been produced using speech recognition software and may contain errors related to that system including errors in grammar, punctuation, and spelling, as well as words and phrases that may be inappropriate. If there are any questions or concerns please feel free to contact the dictating provider for clarification.       Rimma Rm, [address] [place] [address]  01/18/22 2100

## 2022-01-19 NOTE — CARE COORDINATION
Ambulatory Care Coordination ED COVID Follow up Call    Challenges to be reviewed by the provider   Additional needs identified to be addressed with provider: No  Continued pain. Encounter was not routed to provider for escalation. Method of communication with provider: note routing. Discussed COVID-19 related testing which was: available at this time. Test results were: positive. Patient informed of results, if available? Yes. Current Symptoms: fatigue and nausea. Encouraged rest/ hydration. Patient reports continued flank pain. Being treated for UTI. Encouraged patient to complete entire course of antibiotic. Reviewed New or Changed Meds: patient declined    Do you have what you need at home?  Durable Medical Equipment ordered at discharge: None   Home Health/Outpatient orders at discharge: none   Was patient discharged with a pulse oximeter? No Discussed and confirmed pulse oximeter discharge instructions and when to notify provider or seek emergency care. Patient education provided: Reviewed appropriate site of care based on symptoms and resources available to patient including: When to call 911. Follow up appointment recommended: yes. If no appointment scheduled, scheduling offered:  Yes;  Patient declined ACM support. Recently completed VV. Future Appointments   Date Time Provider Mayur D[place]   2/24/2022  2:00 PM Dillan Art [facility]   2/24/2022  3:15 PM Danna Lechuga MD [facility]   3/18/2022 11:00 AM SCHEDULE, [facility] VASCULAR [facility]   3/23/2022  1:00 PM Jose Tejeda MD [facility]   7/18/2022  3:30 PM Estrada Arellano MD [facility] Heart [facility]       Interventions: Education of patient/family/caregiver/guardian to support self-management-1   Copy of COVID zone tool sent via My Chart. Reviewed discharge instructions, medical action plan and red flags with patient who verbalized understanding.     Plan for follow-up call in 7-10 days based on severity of symptoms and risk factors. Plan for next call: symptom management-1  Provided contact information for future needs.     Svitlana Pace RN

## 2022-01-20 NOTE — TELEPHONE ENCOUNTER
I called the patiet and told her will sent pain medication take it bid as needed, lots of fluid recommended

## 2022-01-20 NOTE — TELEPHONE ENCOUNTER
Patient thinks she has kidney stones, patient went to ER but refused any testing. Patient did have a UA done. Patient states she is in a lot of pain and would like to know if something could be called in for her?  Please advise

## 2022-01-24 NOTE — TELEPHONE ENCOUNTER
Addended by: LON TINAJERO on: 4/21/2021 07:07 AM     Modules accepted: Orders     I called the patient stats the pain medication help the pain, but still has the diarrhea, she is able to eat, and drink, no abdominal pain, recommend to keep eating small meals and drinking lots of fluids, if worse call the clinic

## 2022-01-25 NOTE — TELEPHONE ENCOUNTER
Patient called and stated that she has thrush and would like to know if something could be called in for her? Patient is Covid positive so she is unable to come in the office.  Please advise

## 2022-01-26 PROBLEM — U07.1 PNEUMONIA DUE TO COVID-19 VIRUS: Status: ACTIVE | Noted: 2022-01-01

## 2022-01-26 PROBLEM — J12.82 PNEUMONIA DUE TO COVID-19 VIRUS: Status: ACTIVE | Noted: 2022-01-01

## 2022-01-26 NOTE — H&P
History and Physical      Name:  Giacomo Tapia /Age/Sex: 1955  (74 y.o. female)   MRN & CSN:  [id number] & [id number] Admission Date/Time: 2022  1:34 PM   Location:  ED09/ED-09 PCP: Stanley Jefferson MD       Hospital Day: 1    Assessment and Plan:   Giacomo Tapia is a 79 y.o.  female  with past medical history of CAD, COPD, anxiety, hypertension, hypothyroidism, hyperlipidemia who presents with shortness of breath. Acute hypoxic respiratory failure secondary to COVID-19 PNA  - symptom onset ~ 1 week ago, COVID + 22, not vaccinated   - admit CXR with bronchopneumonia  - requiring 6L O2 NC, no baseline O2 requirement   - admit to COVID stepdown unit  - start decadron 6 mg daily x10 days   -Inflammatory markers elevated, pharmacy to dose baricitinib  -received dose of Rocephin/azithromycin in ED, but Pro-Jason negative so will hold off on further antibiotics for now  - pulmonary hygiene, wean O2 as able     Chest pain  - reported occasional chest tightness, likely secondary to above  -Troponin negative x1  -EKG with no acute ischemic change   - D-dimer 1022  - unable to obtain CTA chest due anaphylactic allergy to contrast dye  -will prophylactically treat with therapeutic Lovenox twice daily and obtain bilateral lower extremity venous Doppler  - trend trop, monitor on tele     R Flank pain   Likely UTI   - CT A/P with right nephrolithiasis without obstruction   - reported dysuria   -Afebrile without leukocytosis  - UA pending   - received dose of rocephin in ED, would re-order pending UA   -Continue home PRN norco    COPD  - no wheezing to indicate acute exacerbation   - continue home scheduled duonebs    Chronic pain with opioid dependence   - continue home norco, verified on OARRS    Anxiety  - continue home meds     Hypothyroidism   - continue home synthroid     This patient was seen and examined in conjunction with Dr. Forrest Castleman.  He was agreeable with the plan and management as dictated above.     Diet ADULT DIET; Regular   DVT Prophylaxis [x] Lovenox, []  Heparin, []DOAC, [] SCDs, [] Ambulation    GI Prophylaxis [] PPI,  [] H2 Blocker,  [] Carafate,  [x] Diet/Tube Feeds   Code Status Full Code   Disposition Patient requires continued admission due to COVID-19 PNA     History of Present Illness:     Chief Complaint: Carmen Jha is a 79 y.o.  female  who presents with shortness of breath. Patient presented to the emergency department with complaints of increasing shortness of breath. She states over the last week she has been complaining of body aches, fatigue, productive cough, increasing shortness of breath and overall not feeling well. She was seen in the ER 1/18/22 for right lower quadrant abdominal pain and cough and was diagnosed with COVID-19. She states since that time her shortness of breath has continued to worsen. She continues to have right lower abdominal and flank discomfort. She states she has a little bit of chest discomfort that she describes as a pressure/tightness in the center of her chest that comes and goes. She denies any history of blood clots. Denies any lower extremity edema or pain. Not on any oxygen at home. She does have a history of COPD. She is not vaccinated against COVID-19. I discussed this patient with the ED provider and Dr. Amy Alonzo. I did a review of patient's medical records, lab results and imaging conducted today. I personally reviewed patient's vital signs including pulse ox and EKG.      Ten point ROS reviewed negative, unless as noted above    Objective:   No intake or output data in the 24 hours ending 01/26/22 1728     Labs:   Recent Labs     01/26/22  1242      K 3.5   CL 99   CO2 28   BUN 26*   CREATININE 0.9   WBC 9.7   HCT 48.0*          Imaging:  CT ABDOMEN PELVIS WO CONTRAST Additional Contrast? None  Narrative: EXAMINATION:  CT OF THE ABDOMEN AND PELVIS WITHOUT CONTRAST 1/26/2022 4:07 pm    TECHNIQUE:  CT of the abdomen and pelvis was performed without the administration of  intravenous contrast. Multiplanar reformatted images are provided for review. Dose modulation, iterative reconstruction, and/or weight based adjustment of  the mA/kV was utilized to reduce the radiation dose to as low as reasonably  achievable. COMPARISON:  CT of the abdomen and pelvis, 01/18/2022    HISTORY:  ORDERING SYSTEM PROVIDED HISTORY: left flank pain  TECHNOLOGIST PROVIDED HISTORY:  Reason for exam:->left flank pain  Additional Contrast?->None  Decision Support Exception - unselect if not a suspected or confirmed  emergency medical condition->Emergency Medical Condition (MA)  Reason for Exam: left flank pain    FINDINGS:  Lower Chest: Prominent pulmonary infiltrates in the lung bases. They have  worsened as of the previous CT from 01/18/2022. The heart is normal in size. No pleural or pericardial effusion. Organs:    Liver: Unremarkable. Gallbladder: Surgically absent. Pancreas: Moderately atrophied. Otherwise, unremarkable. Spleen:  Multiple calcified granuloma. Otherwise, unremarkable. Adrenals: Unremarkable. Kidneys: Normal in size and contour. Two 4 mm intrarenal calculi are seen in  the right kidney. Small, 6 mm fatty focus in the left renal parenchyma  consistent with an angiomyolipoma. No left nephrolithiasis. No  hydronephrosis. GI/Bowel: No bowel wall thickening or obstruction. Normal appendix. Pelvis: The urinary bladder is unremarkable. The uterus is surgically absent. Peritoneum/Retroperitoneum: No lymphadenopathy. No free air or free fluid is  seen. The abdominal aorta and pelvic arteries are unremarkable. Bones/Soft Tissues: The visualized bones are intact without fracture or focal  lesion. Impression: 1. Prominent pulmonary infiltrates in the lower lungs, worsened as of  01/18/2022.  2.  No acute abnormality is seen in the abdomen or the pelvis. 3. Right nephrolithiasis.   No hydronephrosis. RECOMMENDATIONS:  Unavailable  XR CHEST PORTABLE  Narrative: EXAMINATION:  ONE XRAY VIEW OF THE CHEST    1/26/2022 11:04 am    COMPARISON:  None. HISTORY:  ORDERING SYSTEM PROVIDED HISTORY: SOB  TECHNOLOGIST PROVIDED HISTORY:  Reason for exam:->SOB  Reason for Exam: sob    FINDINGS:  The heart was not enlarged. No interstitial edema was seen. [place] bronchial  thickening was noted with patchy alveolar pneumonia in the right upper, right  middle and both lower lobes. No pleural effusion was identified. Metallic  surgical clips were noted probably from prior cholecystectomy. Impression: Subtle peribronchial thickening was noted with patchy alveolar pneumonia in  the right upper, right middle and both lower lobes without pleural effusion  compatible bronchopneumonia. Vitals:   Vitals:    01/26/22 1633   BP: 115/69   Pulse: 74   Resp: 22   Temp:    SpO2: 92%     Physical Exam:   GEN Awake female, sitting upright in bed in no apparent distress. Appears given age. EYES Pupils are equally round. No scleral erythema, discharge, or conjunctivitis. HENT Mucous membranes are moist.  NECK Supple, no apparent thyromegaly or masses. RESP diffuse scattered rhonchi and rails, no wheezing, mild tachypnea on 6 L nasal cannula. CARDIO/VASC   S1/S2 auscultated. Regular rate without appreciable murmurs, rubs, or gallops. Peripheral pulses equal bilaterally and palpable. No peripheral edema. GI mild right lower quadrant and right flank tenderness without rebound or guarding. Abdomen soft.  No costovertebral angle tenderness. Rene catheter is not present. MSK No gross joint deformities. SKIN Normal coloration, warm, dry. NEURO Cranial nerves appear grossly intact, normal speech, no lateralizing weakness. PSYCH Awake, alert, oriented x 4. Affect appropriate.     Past Medical History:      Past Medical History:   Diagnosis Date    Anxiety 01/28/2019    Arthritis     Asthma     CAD (coronary artery disease)     COPD (chronic obstructive pulmonary disease) [address] [address]     H/O cardiac catheterization 09/17/2019     normal coronaries    H/O cardiovascular stress test     H/O Doppler carotid ultrasound 08/07/2019    Mild (0-49%) disease of the Bilateral Internal carotid artery.  H/O Doppler lower venous ultrasound 08/07/2019    Significant reflux noted of the Right GSV. Right GSV too small currently for ablation procedure. Significant reflux noted in the Left GSV at mid thigh . Left GSV difficult to follow between mid thigh and mid calf due to multiple  splits/branches. GSV knee tributary is very superficial.    H/O echocardiogram 08/07/2019    EF 55-60%, Mild MR & TR.    Hepatic steatosis 06/19/2015    History of nuclear stress test 09/05/2019     Mild anterior wall Ischemia of a medium sized territory. Possibility of breast artifact causing the abnormality cannot be excluded.  Hx of Venous Doppler ultrasound 08/23/2021    No DVT or SVT, Significant reflux in RGSV, LGSV    Hx of Venous Doppler ultrasound 11/10/2021    Left CFV is patent, Left GSV is non compressible with no evidence of flow, LGSV at the distal thigh and knee is partially compressible with no evidence of flow    Hyperlipidemia     Hypertension     Hypothyroid      PSHX:  has a past surgical history that includes Hysterectomy; Carpal tunnel release (Left); Tonsillectomy; Wrist surgery (Left); Thyroidectomy (2009); Cholecystectomy; Tubal ligation; Colonoscopy (01/28/2019); Colonoscopy (N/A, 1/28/2019); Endoscopy, colon, diagnostic (09/03/2019); Upper gastrointestinal endoscopy (N/A, 9/4/2019); and Cystoscopy (Right, 6/21/2021). Allergies:    Allergies   Allergen Reactions    Latex Itching    Iodine Anaphylaxis    Levofloxacin Anaphylaxis    Pcn [Penicillins] Anaphylaxis    Sulfa Antibiotics Anaphylaxis    Adhesive Tape     Flexeril [Cyclobenzaprine]     Morphine Itching    Zofran Itching    Caffeine Palpitations Doesn't sleep    Diazepam Nausea And Vomiting    Lidocaine Nausea And Vomiting    Volumen [Barium Sulfate] Itching, Nausea And Vomiting and Rash       FAM HX: family history includes Asthma in her daughter; Birth Defects in her maternal grandmother; Depression in her brother, father, and mother; Diabetes in her brother, brother, brother, father, mother, sister, and sister; Hypertension in her maternal grandmother and mother; Other in her brother and daughter; Seizures in her daughter. Soc HX:   Social History     Socioeconomic History    Marital status:      Spouse name: Not on file    Number of children: 2    Years of education: 15    Highest education level: GED or equivalent   Occupational History    Occupation: unemployed   Tobacco Use    Smoking status: Never Smoker    Smokeless tobacco: Never Used   Vaping Use    Vaping Use: Never used   Substance and Sexual Activity    Alcohol use: No    Drug use: No    Sexual activity: Not Currently     Partners: Male     Birth control/protection: Post-menopausal   Other Topics Concern    Not on file   Social History Narrative    Not on file     Social Determinants of Health     Financial Resource Strain:     Difficulty of Paying Living Expenses: Not on file   Food Insecurity:     Worried About Running Out of Food in the Last Year: Not on file    Bryan of Food in the Last Year: Not on file   Transportation Needs:     Lack of Transportation (Medical): Not on file    Lack of Transportation (Non-Medical):  Not on file   Physical Activity:     Days of Exercise per Week: Not on file    Minutes of Exercise per Session: Not on file   Stress:     Feeling of Stress : Not on file   Social Connections:     Frequency of Communication with Friends and Family: Not on file    Frequency of Social Gatherings with Friends and Family: Not on file    Attends Yarsani Services: Not on file    Active Member of Clubs or Organizations: Not on file    Attends Club or Organization Meetings: Not on file    Marital Status: Not on file   Intimate Partner Violence:     Fear of Current or Ex-Partner: Not on file    Emotionally Abused: Not on file    Physically Abused: Not on file    Sexually Abused: Not on file   Housing Stability:     Unable to Pay for Housing in the Last Year: Not on file    Number of [place] in the Last Year: Not on file    Unstable Housing in the Last Year: Not on file       Medications:   Medications:    Infusions:   PRN Meds:       Electronically signed by Akbar Corado PA-C on 1/26/2022 at 5:28 PM

## 2022-01-26 NOTE — PROGRESS NOTES
Pharmacy Consult for Baricitinib Initiation per Thais Hammonds PA-C    Criteria per [facility] P&T Committee  **All criteria need to be met to receive   Baricitinib for COVID-19 patients**   Age    >5years old     Yes   Laboratory Results    Confirmed positive COVID-19     PLUS    ANY elevation in biomarkers   (CRP, D-dimer, LDH, ferritin)       Yes  CRP elevated     Concomitant Therapy    Receiving systemic steroids for treatment of COVID 19     Yes  Dexamethasone   Oxygen Status        Requiring supplemental oxygen   (>1 L NC, HFNC, Heated Vapotherm, biPAP, mechanical ventilation)        Yes  6 L/m   Special Considerations    Pregnancy  Severe Hepatic Impairment  Chronic/Recurrent Infections   Hemoglobin <8         Clarify risk vs. benefit with provider if criteria met     Contraindications    1. Invasive active mycobacterial or fungal infection  2. Lymphocyte count <200  3. Neutrophil count, ANC <500   4. Significant immunosuppression    ?     None     Dosing Recommendations:    Baricitinib 4 mg PO daily x 14 days (or until hospital discharge)    Renal dose adjustment:  -eGFR > 60: no adjustment necessary       Thank you for the consult,  Sudhir Avila, [address]  1/26/2022  5:51 PM

## 2022-01-26 NOTE — ED PROVIDER NOTES
EMERGENCY DEPARTMENT ENCOUNTER    Patient: [place]  MRN: [id number]  : 10/21/1971  Date of Evaluation: 2022  ED Provider:  Polly Eason MD    CHIEF COMPLAINT  Chief Complaint   Patient presents with    Shortness of Breath     for \"weeks\", denies being around anyone sick        HPI  [place] is a 48 y.o. female who presents with complaints of generalized body aches, sore throat, and nonproductive cough and shortness of breath with burning with urination and right flank pain with onset several days ago. Symptoms moderate in severity and constant with no known triggering or modifying factors. Denies fever, chest pain or wheezing. Denies nausea or vomiting. Denies change in bowel movements. Paramedics reported they found the patient in moderation objects and improved after she was placed on 4 L of O2 via nasal cannula. Denies any other associated symptoms or complaints or concerns. She has not previously had COVID-19 and has not been vaccinated for. REVIEW OF SYSTEMS    Constitutional: negative for fever, chills  Neurological: negative for HA, focal weakness, loss of sensation  Ophthalmic: negative for vision change  ENT: negative for congestion, rhinorrhea  Cardiovascular: negative for chest pain, palpitations, edema  Respiratory: negative for wheezing  GI: negative for nausea, vomiting, diarrhea, constipation  : negative for hematuria  Musculoskeletal: negative for decreased ROM, joint swelling  Dermatological: negative for rash, wounds  Heme: Negative for bleeding, bruising      PAST MEDICAL HISTORY  No past medical history on file.     CURRENT MEDICATIONS  [unfilled]    ALLERGIES  Allergies   Allergen Reactions    Latex Rash    Asa [Aspirin]     Caffeine Other (See Comments)     tachy    Codeine Itching    Iodine     Pcn [Penicillins]     Flexeril [Cyclobenzaprine] Rash    Levofloxacin Rash    Morphine Rash    Sulfa Antibiotics Rash    Zofran [Ondansetron] Rash SURGICAL HISTORY  No past surgical history on file. FAMILY HISTORY  No family history on file. SOCIAL HISTORY  Social History     Socioeconomic History    Marital status:      Spouse name: Not on file    Number of children: Not on file    Years of education: Not on file    Highest education level: Not on file   Occupational History    Not on file   Tobacco Use    Smoking status: Current Every Day Smoker     Packs/day: 1.00    Smokeless tobacco: Never Used   Substance and Sexual Activity    Alcohol use: Yes     Comment: 3rd every day per patient report     Drug use: Not Currently    Sexual activity: Not Currently   Other Topics Concern    Not on file   Social History Narrative    Not on file     Social Determinants of Health     Financial Resource Strain:     Difficulty of Paying Living Expenses: Not on file   Food Insecurity:     Worried About Running Out of Food in the Last Year: Not on file    Bryan of Food in the Last Year: Not on file   Transportation Needs:     Lack of Transportation (Medical): Not on file    Lack of Transportation (Non-Medical):  Not on file   Physical Activity:     Days of Exercise per Week: Not on file    Minutes of Exercise per Session: Not on file   Stress:     Feeling of Stress : Not on file   Social Connections:     Frequency of Communication with Friends and Family: Not on file    Frequency of Social Gatherings with Friends and Family: Not on file    Attends Holiness Services: Not on file    Active Member of Clubs or Organizations: Not on file    Attends Club or Organization Meetings: Not on file    Marital Status: Not on file   Intimate Partner Violence:     Fear of Current or Ex-Partner: Not on file    Emotionally Abused: Not on file    Physically Abused: Not on file    Sexually Abused: Not on file   Housing Stability:     Unable to Pay for Housing in the Last Year: Not on file    Number of [place] in the Last Year: Not on file  Unstable Housing in the Last Year: Not on file         **Past medical, family and social histories, and nursing notes reviewed and verified by me**      PHYSICAL EXAM  VITAL SIGNS:   ED Triage Vitals [01/26/22 1146]   Enc Vitals Group      /62      Pulse 78      Resp 16      Temp 98.7 °F (37.1 °C)      Temp Source Oral      SpO2 94 %      Weight       Height       Head Circumference       Peak Flow       Pain Score       Pain Loc       Pain Edu? Excl. in [address]? Vitals during ED course were reviewed and are as charted. Constitutional: Minimal distress, Non-toxic appearance  Eyes: Conjunctiva normal, No discharge  HENT: Normocephalic, Atraumatic, posterior oropharynx is nonerythematous and without exudate, uvula is midline, oropharynx moist  Neck: Supple, no stridor, no grossly visible or palpable masses  Cardiovascular: Regular rate and rhythm, No murmurs, No rubs, No gallops, no JVD  Pulmonary/Chest: Diffuse bilateral crackles and rhonchi, otherwise no respiratory distress or accessory muscle use, No wheezing  Abdomen: Right lower quadrant abdominal tenderness palpation without peritoneal signs, otherwise abdomen is soft, nondistended and nonrigid, No tenderness or peritoneal signs elsewhere, No masses, normal bowel sounds  Back: No midline point tenderness, No paraspinous muscle tenderness.  No CVA tenderness  Extremities: No gross deformities, no edema, no tenderness  Neurologic: Normal motor function, Normal sensory function, No focal deficits  Skin: Warm, Dry, No erythema, No rash, No cyanosis, No mottling  Lymphatic: No lymphadenopathy in the following location(s): cervical  Psychiatric: Alert and oriented x3, Affect normal          EKG Interpretation    Interpreted by emergency department physician    Rhythm: normal sinus   Rate: normal  Axis: left  Ectopy: none  Conduction: normal  ST Segments: normal  T Waves: normal  Q Waves: III, aVF    Clinical Impression: Normal sinus rhythm with Q waves in the inferior leads        RADIOLOGY/PROCEDURES/LABS/MEDICATIONS ADMINISTERED:    I have reviewed and interpreted all of the currently available lab results from this visit (if applicable):  No results found for this visit on 01/26/22. ABNORMAL LABS:  Labs Reviewed   COVID-19, RAPID   CBC WITH AUTO DIFFERENTIAL   COMPREHENSIVE METABOLIC PANEL W/ REFLEX TO MG FOR LOW K   TROPONIN   BRAIN NATRIURETIC PEPTIDE   D-DIMER, QUANTITATIVE   BLOOD GAS, VENOUS         IMAGING STUDIES ORDERED:  XR CHEST PORTABLE  CT ABDOMEN PELVIS WO CONTRAST    I have personally viewed the imaging studies. The radiologist interpretation is:   XR CHEST PORTABLE    (Results Pending)         MEDICATIONS ADMINISTERED:  Medications - No data to display      [facility]  Last vitals: /62   Pulse 78   Temp 98.7 °F (37.1 °C) (Oral)   Resp 16   [id number]     51-year-old female with acute hypoxic respiratory failure secondary to COVID-19 that oxygen saturations improved on 4 L of O2 via nasal cannula    Differential diagnoses considered included, but were not limited to, acute bronchospasm, allergic reaction/anaphylaxis, acute respiratory infection, pneumonia, acute coronary syndrome, dysrhythmia, congestive heart failure, non-CHF-related pulmonary edema, pneumothorax, pulmonary embolism, and anemia. She is (sided abdominal pain. Unclear etiology. May be muscular. Differential diagnoses considered included, but were not limited to, acute appendicitis, acute cholecystitis/cholangitis, acute hepatitis, Kjxn-Npgc-Pxlszi Disease, Acute pancreatitis, acute coronary syndrome, acute intra-abdominal catastrophe, acute vascular emergency, bowel obstruction, perforated bowel, incarcerated or strangulated hernia, colitis, diverticulitis, ischemic bowel, cystitis, pyelonephritis, kidney stone. Additional workup and treatment in the ED as documented above. Pt will be admitted for further evaluation and treatment.  I discussed the case with the hospitalist, who agreed to admit the patient. Plan discussed with pt and/or family who expressed understanding and agreement with plan. Admitted in stable condition. My critical care involvement with this patient (excluding any separately billable procedures) took 33 minutes and included the following elements:    [x] Response to abnormal vitals  [ ] Review and response to abnormal lab tests  [x] Review of old records/labs  [x] Discussion with family/EMS/other pre-hospital personnel  [x] Discussion with consultants/hospitalist  [x] Rechecking patient/patient response to interventions  [x] Rapid intervention to prevent deterioration  [x] Discussing with patient/family treatment options  [x] Overseeing final disposition      Clinical Impression:  1. Acute respiratory failure with hypoxia ([address]    2. COVID-19    3. Pneumonia of both lungs due to infectious organism, unspecified part of lung    4. Right flank pain          Disposition referral (if applicable):  No follow-up provider specified. Disposition medications (if applicable):  New Prescriptions    No medications on file       ED Provider Disposition Time  DISPOSITION            Electronically signed by: David Rose M.D., 1/26/2022 12:04 PM      This dictation was created with voice recognition software. While attempts have been made to review the dictation as it is transcribed, on occasion the spoken word can be misinterpreted by the technology leading to omissions or inappropriate words, phrases or sentences.            Guadelupe Prader, MD  01/26/22 [id number]

## 2022-01-27 PROBLEM — R10.31 RIGHT LOWER QUADRANT ABDOMINAL PAIN: Status: ACTIVE | Noted: 2022-01-01

## 2022-01-27 PROBLEM — J96.01 ACUTE RESPIRATORY FAILURE WITH HYPOXIA (HCC): Status: ACTIVE | Noted: 2022-01-01

## 2022-01-27 NOTE — CARE COORDINATION
LSW spoke with pt over the phone. Pt lives alone in double. Pt has a walker at home Pt stated she is independent of her ADLS. Pt prepares her own meals. Pt dght transport her to her appt and or helps pr run errands. Pt has a PCp and can afford her meds. Pt stated she has family support to help her at home. Pt denies having HC. LSW offered [facility] [place]. services and pt declined. Pt plans home and denies needs.

## 2022-01-27 NOTE — PROGRESS NOTES
Hospitalist Progress Note      PCP: Theo Kirk MD    Date of Admission: 1/26/2022    LOS: 1    Chief Complaint:   Chief Complaint   Patient presents with    Shortness of Breath       Case Summary:   68-year-old lady with history of CAD, COPD, anxiety, hypertension, hypothyroidism, hyperlipidemia who presented with productive cough and shortness of breath, generalized body aches with fatigue, generalized malaise and weakness as well as right lower quadrant abdominal pain with recent diagnosis of COVID-19. She was found to have hypoxia requiring O2 supplementation up to [address] Problems    Diagnosis Date Noted    Morbid obesity ([address] [E66.01] 10/20/2015     Priority: High    Hypothyroid [E03.9]      Priority: High    COPD, moderate ([address] [J44.9] 10/17/2014     Priority: High    Acute respiratory failure with hypoxia (HCC) [J96.01] 01/27/2022    Right lower quadrant abdominal pain [R10.31] 01/27/2022    Pneumonia due to COVID-19 virus [U07.1, J12.82] 01/26/2022    Gastroesophageal reflux disease without esophagitis [K21.9]     History of claustrophobia [Z86.59] 01/28/2019         Principal Problem:    Pneumonia due to COVID-19 virus and Acute respiratory failure with hypoxia [facility]): Chest imaging noted for basal infiltrates. Patient refused baricitinib. She explored by lateral crackles in the mid zones to bases  -Continue dexamethasone  -If she continues to refuse baricitinib then will DC the medication  -Encourage self proning  -Encourage incentive spirometry  -We will give IV Lasix to keep net negative fluid balance  -Monitor renal function and electrolytes  -Continue breathing therapy    Active Problems:    COPD, moderate ([address]): Without wheezing. Continue on inhalers and steroid    Hypothyroid: On thyroid replacement therapy    Right lower quadrant abdominal pain: Unclear etiology. CT abdomen pelvis unrevealing of acute pathology. Continue as needed pain medication.   Check No jugular venous distention. Respiratory:  Normal respiratory effort. Clear to auscultation, no Rales/Wheezes/Rhonchi. Cardiovascular: S1/S2 without murmurs, rubs or gallops. RRR  Abdomen: Soft, non-tender, non-distended, bowel sounds present. Musculoskeletal: No clubbing, cyanosis or edema bilaterally. Skin: Skin color, texture, turgor normal.  No rashes or lesions. Neurologic:  Cranial nerves: II-XII intact, LURDES, No focal sensory/motor deficits  Psychiatric: Alert and oriented, thought content appropriate  Capillary Refill: Brisk,< 3 seconds   Peripheral Pulses: +2 palpable, equal bilaterally     No intake or output data in the 24 hours ending 01/27/22 0935    Labs:   Recent Labs     01/26/22  1242   WBC 9.7   HGB 15.4   HCT 48.0*         Recent Labs     01/26/22  1242      K 3.5   CL 99   CO2 28   BUN 26*   CREATININE 0.9   CALCIUM 8.7   AST 22   ALT 12   BILITOT 0.7   ALKPHOS 62     No results for input(s): Nathan Tafoya in the last 72 hours. Urinalysis:    Lab Results   Component Value Date    NITRU NEGATIVE 01/18/2022    WBCUA 3 01/18/2022    BACTERIA NEGATIVE 01/18/2022    RBCUA NONE SEEN 01/18/2022    BLOODU NEGATIVE 01/18/2022    SPECGRAV 1.020 01/18/2022    GLUCOSEU neg 04/03/2019       Radiology:  VL DUP LOWER EXTREMITY VENOUS BILATERAL   Final Result   No evidence of DVT in either lower extremity. CT ABDOMEN PELVIS WO CONTRAST Additional Contrast? None   Final Result   1. Prominent pulmonary infiltrates in the lower lungs, worsened as of   01/18/2022.   2.  No acute abnormality is seen in the abdomen or the pelvis. 3. Right nephrolithiasis. No hydronephrosis. RECOMMENDATIONS:   Unavailable         XR CHEST PORTABLE   Preliminary Result   Subtle peribronchial thickening was noted with patchy alveolar pneumonia in   the right upper, right middle and both lower lobes without pleural effusion   compatible bronchopneumonia.                  Pushpa Tobin, MD      Please excuse brevity and/or typos. This report was transcribed using voice recognition software. Every effort was made to ensure accuracy, however, inadvertent computerized transcription errors may be present.

## 2022-01-27 NOTE — PROGRESS NOTES
Patient yelling at staff and very upset she wants to keep her door open to the room. She didn't understand that she cannot have the door open as she is on the covid unit and the doors maintain the negative pressure. Charge nurse and house supervisor tried to talk her down, SOS was called. Manager, myself, went to talk to her and did settle her down but still didn't resolve the door issue. Patient still yelling at me regarding the door. I did a home o2 eval and she dropped to 88% instantly when off 6L oxygen, put her on 4L to trial and did not increase her Spo2 any. 90% on 6L O2 at this time. Another room on the unit did just open up and has a window in the door, patient is agreeable to moving to this room. Risks were explained about if she still choose to leave the need for oxygen demand would cause her to continue to be short of breath and she understood.

## 2022-01-27 NOTE — PROGRESS NOTES
RN reports that the patient refused lovenox and baricitinib. I spoke with the patient about the importance of the emperic treatment with lovenox for possible PE/DVT/Clots.  She verbalized understanding and stated that she was told by her cardiologist not to take blood thinners for a reason \"He just told me not to take blood thinners\"

## 2022-01-28 NOTE — PROGRESS NOTES
Hospitalist Progress Note      PCP: Nuvia Dumont MD    Date of Admission: 1/26/2022    LOS: 2    Subjective:   Overnight Events:   Uneventful overnight  Patient upset this morning. She reports that she feels she had been received because she had explained that she did not want any treatment for Covid especially baricitinib. She refused baricitinib on admission however she allegedly got a single dose yesterday and is very upset about that. She had expressed that she does not want dexamethasone as well. She remains on 9 L this morning though she is not in respiratory distress  A call was made to patient's daughter using patient's phone to discuss plan of care. RN was present as well as the patient. They had expressed the frustration and would like to transfer patient to a different [facility] [address]. Attempts were made for [facility] which was patient's preference, however they are not able to accept the patient at this time. An attempt was made to [facility] who accepted the patient          [address] Problems    Diagnosis Date Noted    Morbid [address] ([facility] [address] [E66.01] 10/20/2015     Priority: High    Hypothyroid [E03.9]      Priority: High    COPD, moderate ([address] [J44.9] 10/17/2014     Priority: High    Acute respiratory failure with hypoxia (HCC) [J96.01] 01/27/2022    Right lower quadrant abdominal pain [R10.31] 01/27/2022    Pneumonia due to COVID-19 virus [U07.1, J12.82] 01/26/2022    Gastroesophageal reflux disease without esophagitis [K21.9]     History of claustrophobia [Z86.59] 01/28/2019       Case Summary:   70-year-old lady with history of CAD, COPD, anxiety, hypertension, hypothyroidism, hyperlipidemia who presented with productive cough and shortness of breath, generalized body aches with fatigue, generalized malaise and weakness as well as right upper quadrant abdominal discomfort in the setting of COVID-19 infection diagnosed 1/18/2022.   He was found to have significant hypoxia requiring O2 supplementation which has increased to 9 L this morning. Patient expresses denial that she has Covid and believes this is only COPD. She had declined COVID-19 treatment including baricitinib and dexamethasone. Pulmonology was consulted who saw the patient this morning and switched dexamethasone to methylprednisolone. She is okay with continuing oxygen supplementation    Principal Problem:    Pneumonia due to COVID-19 virus and Acute respiratory failure with hypoxia: Continues to have increased FiO2 demands at 9 L this morning. Expresses frustration and continues to refuse baricitinib and now dexamethasone.  -Pulmonology consulted and switched dexamethasone to methylprednisone  -Continue O2 supplementation with target sats greater than 90% and titrate as needed  -We will stop Lasix as patient does not want any treatment to date anticoagulant  -Encourage self proning and incentive spirometry      Right lower quadrant abdominal pain: Unclear etiology. Patient believes she has a UTI. Urinalysis done not convincing for UTI. CT abdomen and pelvis was unrevealing of acute pathology. As needed pain management    Active Problems:    COPD, moderate ([address]): Without wheezing. Continue inhalers and steroid    Hypothyroidism: On levothyroxine    Gastroesophageal reflux disease without esophagitis: Stable. Will monitor. Morbid obesity ([address]    History of claustrophobia    Social: Patient and daughter expressed wish to transfer to another facility yes not happy with care here. Patient upset as noted above.   She was discussed with [facility]          Medications:  Reviewed  Infusion Medications    sodium chloride       Scheduled Medications    influenza virus vaccine  0.5 mL IntraMUSCular Prior to discharge    potassium bicarb-citric acid  50 mEq Oral BID    methylPREDNISolone  40 mg IntraVENous Q12H    azithromycin  500 mg IntraVENous Q24H    fluticasone  1 spray Each Nostril Daily    buPROPion  300 mg Oral QAM    busPIRone  7.5 mg Oral BID    budesonide-formoterol  2 puff Inhalation BID    levothyroxine  125 mcg Oral QAM AC    montelukast  10 mg Oral Nightly    theophylline  300 mg Oral BID    sodium chloride flush  5-40 mL IntraVENous 2 times per day    enoxaparin  100 mg SubCUTAneous BID    albuterol sulfate HFA  2 puff Inhalation 4x daily     PRN Meds: potassium chloride **OR** potassium alternative oral replacement **OR** potassium chloride, LORazepam, HYDROcodone-acetaminophen, sodium chloride flush, sodium chloride, polyethylene glycol, acetaminophen **OR** acetaminophen, promethazine, guaiFENesin-dextromethorphan      DVT Prophylaxis: Subcut enoxaparin  Diet: ADULT DIET; Regular  Code Status: Full Code    Dispo: Anticipate discharge to [facility]    ____________________________________________________________________________    Physical Exam:  BP (!) 105/58   Pulse 70   Temp 98 °F (36.7 °C) (Oral)   Resp 29   Ht 5' 1.5\" (1.562 m)   Wt 217 lb 6 oz (98.6 kg)   LMP  (LMP Unknown)   SpO2 (!) 85%   BMI 40.41 kg/m²   General appearance: No apparent distress, appears stated age and cooperative. HEENT: Normocephalic, atraumatic, MMM, No sclera icterus/conjuctival palor  Neck: Supple, no thyromegally. No jugular venous distention. Respiratory:  Normal respiratory effort. Clear to auscultation, no Rales/Wheezes/Rhonchi. Cardiovascular: S1/S2 without murmurs, rubs or gallops. RRR  Abdomen: Soft, non-tender, non-distended, bowel sounds present. Musculoskeletal: No clubbing, cyanosis or edema bilaterally. Skin: Skin color, texture, turgor normal.  No rashes or lesions.   Neurologic:  Cranial nerves: II-XII intact, LURDES, No focal sensory/motor deficits  Psychiatric: Alert and oriented, thought content appropriate  Capillary Refill: Brisk,< 3 seconds   Peripheral Pulses: +2 palpable, equal bilaterally     No intake or output data in the 24 hours ending 01/28/22 1307    Labs:   Recent Labs     01/26/22  1242 01/27/22  0833 01/28/22  0437   WBC 9.7 8.6 9.9   HGB 15.4 16.0 14.8   HCT 48.0* 49.2* 45.5    370 341      Recent Labs     01/26/22  1242 01/27/22  0833 01/28/22  0437    142 141   K 3.5 3.6 3.0*   CL 99 98* 97*   CO2 28 28 30   BUN 26* 26* 23   CREATININE 0.9 0.7 0.7   CALCIUM 8.7 9.6 9.3   AST 22 21  21 19   ALT 12 12  12 11   BILIDIR  --  0.2 0.2   BILITOT 0.7 0.7  0.7 0.4   ALKPHOS 62 66  66 55     No results for input(s): Lissette Farmer in the last 72 hours. Urinalysis:    Lab Results   Component Value Date    NITRU NEGATIVE 01/27/2022    WBCUA 2 01/27/2022    BACTERIA NEGATIVE 01/27/2022    RBCUA NONE SEEN 01/27/2022    BLOODU NEGATIVE 01/27/2022    SPECGRAV 1.014 01/27/2022    GLUCOSEU neg 04/03/2019       Radiology:  VL DUP LOWER EXTREMITY VENOUS BILATERAL   Final Result   No evidence of DVT in either lower extremity. CT ABDOMEN PELVIS WO CONTRAST Additional Contrast? None   Final Result   1. Prominent pulmonary infiltrates in the lower lungs, worsened as of   01/18/2022.   2.  No acute abnormality is seen in the abdomen or the pelvis. 3. Right nephrolithiasis. No hydronephrosis. RECOMMENDATIONS:   Unavailable         XR CHEST PORTABLE   Preliminary Result   Subtle peribronchial thickening was noted with patchy alveolar pneumonia in   the right upper, right middle and both lower lobes without pleural effusion   compatible bronchopneumonia. Pushpa Rivera MD      Please excuse brevity and/or typos. This report was transcribed using voice recognition software. Every effort was made to ensure accuracy, however, inadvertent computerized transcription errors may be present.

## 2022-01-28 NOTE — DISCHARGE SUMMARY
Discharge Summary    Name:  Adeel Bryant /Age/Sex: 1955  (4 y.o. female)   MRN & CSN:  [id number] & [id number] Admission Date/Time: 2022  1:34 PM   Attending: Blayne Slater MD Discharging Physician: Blayne Slater MD     Hospital Course:   Adeel Bryant is a 79 y.o.  female  who presents with Pneumonia due to COVID-19 virus     She has a with history of CAD, COPD, anxiety, hypertension, hypothyroidism, hyperlipidemia who presented with productive cough and shortness of breath, generalized body aches with fatigue, generalized malaise and weakness as well as right upper quadrant abdominal discomfort in the setting of COVID-19 infection diagnosed 2022. He was found to have significant hypoxia requiring O2 supplementation which has increased to 9 L this morning. Patient expresses denial that she has Covid and believes this is only COPD. She had declined COVID-19 treatment including baricitinib and dexamethasone. Pulmonology was consulted who saw the patient this morning and switched dexamethasone to methylprednisolone. She is okay with continuing oxygen supplementation     Principal Problem:    Pneumonia due to COVID-19 virus and Acute respiratory failure with hypoxia: Continues to have increased FiO2 demands at 9 L this morning. Expresses frustration and continues to refuse baricitinib and now dexamethasone. Patient upset this morning. She reports that she feels she had been received because she had explained that she did not want any treatment for Covid especially baricitinib. She refused baricitinib on admission however she allegedly got a single dose yesterday and is very upset about that. She had expressed that she does not want dexamethasone as well.   Medications were discussed with patient and daughter today and would like to transfer to another medical facility  Pulmonology consulted on patient this morning and switched dexamethasone to methylprednisone  Continue O2 supplementation with target sats greater than 90% and titrate as needed      Right lower quadrant abdominal pain: Unclear etiology. Patient believes she has a UTI. Urinalysis done not convincing for UTI. CT abdomen and pelvis was unrevealing of acute pathology. She has right renal calculus without hydronephrosis or evidence of obstruction. As needed pain management     Active Problems:    COPD, moderate ([address]): Without wheezing. Continue inhalers and steroid    Hypothyroidism: On levothyroxine    Gastroesophageal reflux disease without esophagitis: Stable. Will monitor. Morbid obesity ([address]    History of claustrophobia     Social: Patient and daughter expressed wish to transfer to another facility yes not happy with care here. Patient upset as noted above. She was discussed with [facility]               The patient expressed appropriate understanding of and agreement with the discharge recommendations, medications, and plan. Consults this admission:  IP CONSULT TO HOSPITALIST  IP CONSULT TO PHARMACY  IP CONSULT TO PULMONOLOGY    Discharge Instruction:   Follow up appointments:   Discharge to [place]    Diet:  regular diet   Activity: activity as tolerated  Disposition: Discharged to:   []Home, [][facility], []SNF, []Acute Rehab, [x][facility]  Condition on discharge: Stable    Discharge Medications:        Medication List      CHANGE how you take these medications    HYDROcodone-acetaminophen 5-325 MG per tablet  Commonly known as: Norco  Take 1 tablet by mouth every 6 hours as needed for Pain for up to 7 days. Intended supply: 7 days. Take lowest dose possible to manage pain  What changed: Another medication with the same name was removed. Continue taking this medication, and follow the directions you see here.         CONTINUE taking these medications    * albuterol (2.5 MG/3ML) 0.083% nebulizer solution  Commonly known as: PROVENTIL  Take 3 mLs by nebulization 2 times daily as needed for Wheezing or Shortness of Breath     * albuterol sulfate  (90 Base) MCG/ACT inhaler  Commonly known as: Ventolin HFA  Inhale 2 puffs into the lungs every 6 hours as needed for Shortness of Breath     budesonide 32 MCG/ACT nasal spray  Commonly known as: RINOCORT AQUA  1 spray by Each Nostril route daily     buPROPion 300 MG extended release tablet  Commonly known as: WELLBUTRIN XL  take 1 tablet by mouth every morning     busPIRone 7.5 MG tablet  Commonly known as: BUSPAR  Take 1 tablet by mouth 2 times daily     famotidine 20 MG tablet  Commonly known as: PEPCID  Take 1 tablet by mouth 2 times daily for 14 days     fluticasone-salmeterol 250-50 MCG/DOSE Aepb  Commonly known as: ADVAIR  inhale 1 puff by mouth and INTO THE LUNGS twice a day     furosemide 20 MG tablet  Commonly known as: LASIX  Take 1 tablet by mouth daily     levothyroxine 125 MCG tablet  Commonly known as: SYNTHROID  Take 1 tablet by mouth every morning     loratadine 10 MG tablet  Commonly known as: CLARITIN  Take 1 tablet by mouth daily     montelukast 10 MG tablet  Commonly known as: SINGULAIR  Take 1 tablet by mouth nightly     theophylline 300 MG extended release tablet  Commonly known as: THEODUR  Take 1 tablet by mouth 2 times daily         * This list has 2 medication(s) that are the same as other medications prescribed for you. Read the directions carefully, and ask your doctor or other care provider to review them with you.                Where to Get Your Medications      Information about where to get these medications is not yet available    Ask your nurse or doctor about these medications  HYDROcodone-acetaminophen 5-325 MG per tablet         Objective Findings at Discharge:   /67   Pulse 67   Temp 97.6 °F (36.4 °C)   Resp 18   Ht 5' 1.5\" (1.562 m)   Wt 217 lb 6 oz (98.6 kg)   LMP  (LMP Unknown)   SpO2 91%   BMI 40.41 kg/m²            PHYSICAL EXAM   GEN Awake female, sitting upright in bed in no apparent distress. Appears given age. EYES Pupils are equally round. No scleral erythema, discharge, or conjunctivitis. HENT Mucous membranes are moist. Oral pharynx without exudates, no evidence of thrush. NECK Supple, no apparent thyromegaly or masses. RESP Clear to auscultation, no wheezes, rales or rhonchi. Symmetric chest movement while on room air. CARDIO/VASC S1/S2 auscultated. Regular rate without appreciable murmurs, rubs, or gallops. No JVD or carotid bruits. Peripheral pulses equal bilaterally and palpable. No peripheral edema. GI Abdomen is soft with mild RUQ tenderness, masses, or guarding. Bowel sounds are normoactive. Rectal exam deferred.  No costovertebral angle tenderness. Normal appearing external genitalia. Rene catheter is not present. HEME/LYMPH No palpable cervical lymphadenopathy and no hepatosplenomegaly. No petechiae or ecchymoses. MSK No gross joint deformities. SKIN Normal coloration, warm, dry. NEURO Cranial nerves appear grossly intact, normal speech, no lateralizing weakness. PSYCH Awake, alert, oriented x 4. Affect appropriate.     BMP/CBC  Recent Labs     01/26/22  1242 01/27/22  0833 01/28/22  0437    142 141   K 3.5 3.6 3.0*   CL 99 98* 97*   CO2 28 28 30   BUN 26* 26* 23   CREATININE 0.9 0.7 0.7   WBC 9.7 8.6 9.9   HCT 48.0* 49.2* 45.5    370 341       Discharge Time of 35 minutes    Electronically signed by Je Miller MD on 1/28/2022 at 4:46 PM

## 2022-01-28 NOTE — PLAN OF CARE
Problem: Airway Clearance - Ineffective  Goal: Achieve or maintain patent airway  Outcome: Ongoing     Problem: Gas Exchange - Impaired  Goal: Absence of hypoxia  Outcome: Ongoing  Goal: Promote optimal lung function  Outcome: Ongoing     Problem: Breathing Pattern - Ineffective  Goal: Ability to achieve and maintain a regular respiratory rate  Outcome: Ongoing     Problem:  Body Temperature -  Risk of, Imbalanced  Goal: Ability to maintain a body temperature within defined limits  Outcome: Ongoing  Goal: Will regain or maintain usual level of consciousness  Outcome: Ongoing  Goal: Complications related to the disease process, condition or treatment will be avoided or minimized  Outcome: Ongoing     Problem: Isolation Precautions - Risk of Spread of Infection  Goal: Prevent transmission of infection  Outcome: Ongoing     Problem: Nutrition Deficits  Goal: Optimize nutritional status  Outcome: Ongoing     Problem: Risk for Fluid Volume Deficit  Goal: Maintain normal heart rhythm  Outcome: Ongoing  Goal: Maintain absence of muscle cramping  Outcome: Ongoing  Goal: Maintain normal serum potassium, sodium, calcium, phosphorus, and pH  Outcome: Ongoing     Problem: Loneliness or Risk for Loneliness  Goal: Demonstrate positive use of time alone when socialization is not possible  Outcome: Ongoing     Problem: Fatigue  Goal: Verbalize increase energy and improved vitality  Outcome: Ongoing     Problem: Patient Education: Go to Patient Education Activity  Goal: Patient/Family Education  Outcome: Ongoing     Problem: Pain:  Goal: Pain level will decrease  Description: Pain level will decrease  Outcome: Ongoing  Goal: Control of acute pain  Description: Control of acute pain  Outcome: Ongoing  Goal: Control of chronic pain  Description: Control of chronic pain  Outcome: Ongoing     Problem: Anxiety:  Goal: Level of anxiety will decrease  Description: Level of anxiety will decrease  Outcome: Ongoing

## 2022-01-28 NOTE — PROGRESS NOTES
During medication administration patient expresses that she does not want to take \"anything new\", reviewed the list of medications patient is ordered to receive and what they are given for. The only new medication this morning is potassium, educated patient that her potassium level was low this morning according to her labs and that the potassium I was giving her was only an electrolyte replacement, patient agreeable to take potassium at this time. Patient does not want to take Olumiant, explained to patient that this medication is to help with the symptoms of COVID and she says she has not been taking the medication. When reviewing her MAR, it does look like the patient has refused some doses of the olumiant but has received it on some occasions. Explained this to patient and she is upset, feels that she was given the medication against her will. Patient does voice that a list of medications has been read to her each medication administration but she was unaware of what olumiant was. Held olumiant at this time per patient request. Lovenox also refused by patient, all other scheduled medications administered at this time along with a PRN Norco for pain and PRN ativan for anxiety. Checked on patient after receiving call from telemetry technician about patient not having pulse oximetry probe on; upon entering room patient is tearful and has both pulse oximetry probe off and oxygen off. Explained to patient that it is very important for her to wear her oxygen and to wear the pulse ox so that we can monitor her and ensure her oxygen levels are adequate, patient states \"she does not care\" and that she is \"getting ready to go to another hospital\" and that she feels that we have \"violated her rights and lied\" to her.  Explained to patient that while she is here at [facility] that my job is to take care of her and help her reach the ultimate goal of returning home, also reminded patient of our thorough discussion about her medications this morning and reassured her that I have been transparent with her about her care and her medications. Patient is reassured at this time and voices comfort in me taking care of her today. Patient reapplies oxygen and pulse ox at this time. Will continue to assess and monitor. Hospitalist at bedside speaking with patient and daughter who is on the phone. Patient and daughter do not want any further treatment for COVID and wants all medications discontinued that are for such. Patient and daughter agreeable to patient staying on oxygen. Patient and daughter requesting transfer to [address] [place] [address] at this time. Patient/Caregiver provided printed discharge information.

## 2022-01-28 NOTE — PLAN OF CARE
Problem: Airway Clearance - Ineffective  Goal: Achieve or maintain patent airway  1/28/2022 1556 by Wilmer Buckner RN  Outcome: Completed  1/28/2022 1058 by Wilmer Buckner RN  Outcome: Ongoing     Problem: Gas Exchange - Impaired  Goal: Absence of hypoxia  1/28/2022 1556 by Wilmer Buckner RN  Outcome: Completed  1/28/2022 1058 by Wilmer Buckner RN  Outcome: Ongoing  Goal: Promote optimal lung function  1/28/2022 1556 by Wilmer Buckner RN  Outcome: Completed  1/28/2022 1058 by Wilmer Buckner RN  Outcome: Ongoing     Problem: Breathing Pattern - Ineffective  Goal: Ability to achieve and maintain a regular respiratory rate  1/28/2022 1556 by Wilmer Buckner RN  Outcome: Completed  1/28/2022 1058 by Wilmer Buckner RN  Outcome: Ongoing     Problem:  Body Temperature -  Risk of, Imbalanced  Goal: Ability to maintain a body temperature within defined limits  1/28/2022 1556 by Wilmer Buckner RN  Outcome: Completed  1/28/2022 1058 by Wilmer Buckner RN  Outcome: Ongoing  Goal: Will regain or maintain usual level of consciousness  1/28/2022 1556 by Wilmer Buckner RN  Outcome: Completed  1/28/2022 1058 by Wilmer Buckner RN  Outcome: Ongoing  Goal: Complications related to the disease process, condition or treatment will be avoided or minimized  1/28/2022 1556 by Wilmer Buckner RN  Outcome: Completed  1/28/2022 1058 by Wilmer Buckner RN  Outcome: Ongoing     Problem: Isolation Precautions - Risk of Spread of Infection  Goal: Prevent transmission of infection  1/28/2022 1556 by Wilmer Buckner RN  Outcome: Completed  1/28/2022 1058 by Wilmer Buckner RN  Outcome: Ongoing     Problem: Nutrition Deficits  Goal: Optimize nutritional status  1/28/2022 1556 by Wilmer Buckner RN  Outcome: Completed  1/28/2022 1058 by Wilmer Buckner RN  Outcome: Ongoing     Problem: Risk for Fluid Volume Deficit  Goal: Maintain normal heart rhythm  1/28/2022 1556 by Wilmer Buckner RN  Outcome: Completed  1/28/2022 1058 by Yaa Baugh CORDELIA Morrow  Outcome: Ongoing  Goal: Maintain absence of muscle cramping  1/28/2022 1556 by Yanique Ocampo RN  Outcome: Completed  1/28/2022 1058 by Yanique Ocampo RN  Outcome: Ongoing  Goal: Maintain normal serum potassium, sodium, calcium, phosphorus, and pH  1/28/2022 1556 by Yanique Ocampo RN  Outcome: Completed  1/28/2022 1058 by Yanique Ocampo RN  Outcome: Ongoing     Problem: Loneliness or Risk for Loneliness  Goal: Demonstrate positive use of time alone when socialization is not possible  1/28/2022 1556 by Yanique Ocampo RN  Outcome: Completed  1/28/2022 1058 by Yanique Ocampo RN  Outcome: Ongoing     Problem: Fatigue  Goal: Verbalize increase energy and improved vitality  1/28/2022 1556 by Yanique Ocampo RN  Outcome: Completed  1/28/2022 1058 by Yanique Ocampo RN  Outcome: Ongoing     Problem: Patient Education: Go to Patient Education Activity  Goal: Patient/Family Education  1/28/2022 1556 by Yanique Ocampo RN  Outcome: Completed  1/28/2022 1058 by Yanique Ocampo RN  Outcome: Ongoing     Problem: Pain:  Goal: Pain level will decrease  Description: Pain level will decrease  1/28/2022 1556 by Yanique Ocapmo RN  Outcome: Completed  1/28/2022 1058 by Yanique Ocampo RN  Outcome: Ongoing  Goal: Control of acute pain  Description: Control of acute pain  1/28/2022 1556 by Yanique Ocampo RN  Outcome: Completed  1/28/2022 1058 by Yanique Ocampo RN  Outcome: Ongoing  Goal: Control of chronic pain  Description: Control of chronic pain  1/28/2022 1556 by Yanique Ocampo RN  Outcome: Completed  1/28/2022 1058 by Yanique Ocampo RN  Outcome: Ongoing     Problem: Anxiety:  Goal: Level of anxiety will decrease  Description: Level of anxiety will decrease  1/28/2022 1556 by Yanique Ocampo RN  Outcome: Completed  1/28/2022 1058 by Yanique Ocampo RN  Outcome: Ongoing

## 2022-01-28 NOTE — PROGRESS NOTES
Patient excepted to [facility], room number . Nursing report can be called to [phone]. Dwayne Jose 761 ETA for transport is 0030 1/29. Patient and daughter Roxanne Hoskins updated at this time.

## 2022-01-28 NOTE — CONSULTS
[address] [place] [address], [address] [facility]                                  CONSULTATION    PATIENT NAME: Tesha Kelly                   :        1955  MED REC NO:   [id number]                          ROOM:       [id number]  ACCOUNT NO:   [de-identified]                           ADMIT DATE: 2022  PROVIDER:     Vinny Overton MD    CONSULT DATE:  2022    HISTORY OF PRESENT ILLNESS:  The patient is a 59-year-old lady with  multiple medical problems including bronchial asthma, COPD, coronary  artery disease, hypertension, and hyperlipidemia, who was admitted  through the emergency room with complaints of increasing shortness of  breath, generalized body aches, and cough productive of whitish to  yellow phlegm. She denied any hemoptysis. She denied any hematemesis. She tested positive for COVID-19. She was subsequently admitted to the  [facility] unit. PAST MEDICAL HISTORY:  Significant for bronchial asthma, coronary artery  disease, COPD, hyperlipidemia, and hypertension. PAST SURGICAL HISTORY:  Remarkable for cholecystectomy, colonoscopy,  hysterectomy, thyroidectomy, tonsillectomy, tubal ligation, and upper  endoscopy. FAMILY HISTORY:  Reveals that her mother had diabetes, hypertension. Father had diabetes. SOCIAL HISTORY:  Reveals that she is a nonsmoker. No history of alcohol  or drug abuse. MEDICATIONS:  Reviewed. ALLERGIES:  She has multiple allergies including IODINE, LEVOFLAXACIN,  PENICILLIN, SULFA ANTIBIOTICS, FLEXERIL, MORPHINE, ZOFRAN, CAFFEINE,  DIAZEPAM, LIDOCAINE, and BARIUM SULFATE. REVIEW OF SYSTEMS:  The 10-to 14-point review of systems were reviewed  and are negative except for what is mentioned in the history of present  illness. PHYSICAL EXAMINATION:  GENERAL:  The patient is alert, oriented x3, in no acute respiratory  distress.   VITAL SIGNS:  Her blood pressure is 105/58 mmHg, pulse of 70 per minute,  and respiratory rate of 29 per minute. She is afebrile. Her saturation  is 93% on high-flow nasal cannula. HEENT:  Exam is essentially unremarkable. There is no JVD, no  lymphadenopathy. NECK:  The neck is supple. LUNGS:  Exam reveals occasional rhonchi and basilar crackles. HEART:  Exam shows normal S1, S2. There is no S3, S4 noted. ABDOMEN:  Exam is benign. There is no evidence of any organomegaly. The bowel sounds are present. NEUROLOGIC:  Exam reveals that she is awake and responsive, answering  questions appropriately and moving her extremities. DIAGNOSTIC STUDIES:  Her chest x-ray showed peribronchial thickening and  patchy alveolar infiltrate in the right upper, middle, and lower lobes. Her COVID-19 came back positive. Her electrolytes showed a sodium of  141, potassium 3.0, chloride 97, carbon dioxide 30, BUN 23, creatinine  0.7. Her CBC showed a white count of 9.9, hemoglobin 14.8, and  hematocrit of 45.5. IMPRESSION:  1. Acute respiratory failure secondary to bilateral pneumonia secondary  to COVID-19 and acute exacerbation of COPD. 2.  Bilateral pneumonia secondary to COVID-19.  3.  Acute exacerbation of COPD. 4.  Possible underlying bacterial pneumonia. PLAN:  1. Continue present bronchodilator regimen. 2.  Zithromax 500 mg once a day. 3.  Solu-Medrol 40 every 12 hours. 4.  The patient is refusing COVID specific treatment. 5.  Discussed with the hospitalist planning to transfer the patient to  another hospital per patient request.  6.  Check mag and phos. 7.  Supplement potassium. 8.  As per orders.         Richelle Quintero MD    D: 01/28/2022 11:50:13       T: 01/28/2022 11:53:10     [email or web address]  Job#: [id number]     Doc#: [id number]    CC:

## 2022-01-29 NOTE — PROGRESS NOTES
Report called to [id number] [address] RAFAEL Walker kareen. 8, Nurse Antwon Floyd, all question answered, informed of current scheduled PU at [address].

## 2022-02-01 NOTE — TELEPHONE ENCOUNTER
Pt called and states that she is in [place] with a stone and is on oxygen. Pt is requesting to get a script from Dr. Nenita Giles to get oxygen to go home on so they can find a doctor to take care of the stone.

## 2022-02-16 NOTE — TELEPHONE ENCOUNTER
Pt called in complaining of severe thrush in her mouth and requesting an Rx be sent to the pharmacy for her.

## 2022-02-23 NOTE — ED PROVIDER NOTES
Triage Chief Complaint:   Emesis (2 days)    [place]:  Today in the ED I had the pleasure of caring for Chasidy Fuentes who is a 79 y.o. female that presents to the emergency department for nausea vomiting. Ongoing x2 days. Endorses decreased appetite over the last several days. Minimal epigastric abdominal pain. No associated chest pain back pain shortness of breath. No generalized weakness. No abnormal vaginal bleeding or discharge. No urinary symptoms. Pain is achy in nature. 2/10. ROS:  REVIEW OF SYSTEMS    At least 10 systems reviewed      All other review of systems are negative  See HPI and nursing notes for additional information       Past Medical History:   Diagnosis Date    Anxiety 01/28/2019    Arthritis     Asthma     CAD (coronary artery disease)     COPD (chronic obstructive pulmonary disease) ([facility] [address]     H/O cardiac catheterization 09/17/2019     normal coronaries    H/O cardiovascular stress test     H/O Doppler carotid ultrasound 08/07/2019    Mild (0-49%) disease of the Bilateral Internal carotid artery.  H/O Doppler lower venous ultrasound 08/07/2019    Significant reflux noted of the Right GSV. Right GSV too small currently for ablation procedure. Significant reflux noted in the Left GSV at mid thigh . Left GSV difficult to follow between mid thigh and mid calf due to multiple  splits/branches. GSV knee tributary is very superficial.    H/O echocardiogram 08/07/2019    EF 55-60%, Mild MR & TR.    Hepatic steatosis 06/19/2015    History of nuclear stress test 09/05/2019     Mild anterior wall Ischemia of a medium sized territory. Possibility of breast artifact causing the abnormality cannot be excluded.     Hx of Venous Doppler ultrasound 08/23/2021    No DVT or SVT, Significant reflux in RGSV, LGSV    Hx of Venous Doppler ultrasound 11/10/2021    Left CFV is patent, Left GSV is non compressible with no evidence of flow, LGSV at the distal thigh and knee is partially compressible with no evidence of flow    Hyperlipidemia     Hypertension     Hypothyroid      Past Surgical History:   Procedure Laterality Date    CARPAL TUNNEL RELEASE Left     CHOLECYSTECTOMY      COLONOSCOPY  01/28/2019    Polyps x4, Internal grade 1 hemorrhoids    COLONOSCOPY N/A 1/28/2019    COLONOSCOPY POLYPECTOMY SNARE/COLD BIOPSY performed by Alexei Gonzales MD at [address] Right 6/21/2021    CYSTOSCOPY RIGHT RETROGRADE PYLEOGRAMS RIGHT STONE MANIPULATION performed by Christopher Avelar MD at [facility], COLON, DIAGNOSTIC  09/03/2019    biopsy f/u with pcp    HYSTERECTOMY      due to bleeding.   complete    THYROIDECTOMY  2009    TONSILLECTOMY      TUBAL LIGATION      UPPER GASTROINTESTINAL ENDOSCOPY N/A 9/4/2019    EGD BIOPSY performed by Rebeca Schirmer, MD at [address] Left      Family History   Problem Relation Age of Onset    Diabetes Mother     Hypertension Mother     Depression Mother         suicide    Diabetes Father     Depression Father     Diabetes Brother     Birth Defects Maternal Grandmother     Hypertension Maternal Grandmother     Diabetes Sister     Depression Brother     Diabetes Sister     Diabetes Brother     Other Brother         black mold    Diabetes Brother     Other Daughter         GI issues    Asthma Daughter     Seizures Daughter      Social History     Socioeconomic History    Marital status:      Spouse name: Not on file    Number of children: 2    Years of education: 15    Highest education level: GED or equivalent   Occupational History    Occupation: unemployed   Tobacco Use    Smoking status: Never Smoker    Smokeless tobacco: Never Used   Vaping Use    Vaping Use: Never used   Substance and Sexual Activity    Alcohol use: No    Drug use: No    Sexual activity: Not Currently     Partners: Male     Birth control/protection: Post-menopausal   Other Topics Concern    Not on file   Social History Narrative    Not on file     Social Determinants of Health     Financial Resource Strain:     Difficulty of Paying Living Expenses: Not on file   Food Insecurity:     Worried About Running Out of Food in the Last Year: Not on file    Bryan of Food in the Last Year: Not on file   Transportation Needs:     Lack of Transportation (Medical): Not on file    Lack of Transportation (Non-Medical):  Not on file   Physical Activity:     Days of Exercise per Week: Not on file    Minutes of Exercise per Session: Not on file   Stress:     Feeling of Stress : Not on file   Social Connections:     Frequency of Communication with Friends and Family: Not on file    Frequency of Social Gatherings with Friends and Family: Not on file    Attends Catholic Services: Not on file    Active Member of [address] [place] [address] [employer] or Organizations: Not on file    Attends Club or Organization Meetings: Not on file    Marital Status: Not on file   Intimate Partner Violence:     Fear of Current or Ex-Partner: Not on file    Emotionally Abused: Not on file    Physically Abused: Not on file    Sexually Abused: Not on file   Housing Stability:     Unable to Pay for Housing in the Last Year: Not on file    Number of [place] in the Last Year: Not on file    Unstable Housing in the Last Year: Not on file     Current Facility-Administered Medications   Medication Dose Route Frequency Provider Last Rate Last Admin    potassium chloride (KLOR-CON M) extended release tablet 60 mEq  60 mEq Oral Once Chrissy Saavedra PA-C         Current Outpatient Medications   Medication Sig Dispense Refill    promethazine (PHENERGAN) 25 MG tablet Take 1 tablet by mouth every 6 hours as needed for Nausea 20 tablet 0    dicyclomine (BENTYL) 10 MG capsule Take 1 capsule by mouth 4 times daily (before meals and nightly) 15 capsule 3    nystatin (MYCOSTATIN) 769307 UNIT/ML suspension Take 5 mLs by mouth 4 times daily for 10 days 1 each 0  theophylline (THEODUR) 300 MG extended release tablet Take 1 tablet by mouth 2 times daily 60 tablet 5    loratadine (CLARITIN) 10 MG tablet Take 1 tablet by mouth daily 90 tablet 3    furosemide (LASIX) 20 MG tablet Take 1 tablet by mouth daily 60 tablet 5    albuterol sulfate HFA (VENTOLIN HFA) 108 (90 Base) MCG/ACT inhaler Inhale 2 puffs into the lungs every 6 hours as needed for Shortness of Breath 1 each 5    budesonide (RINOCORT AQUA) 32 MCG/ACT nasal spray 1 spray by Each Nostril route daily 1 each 3    fluticasone-salmeterol (ADVAIR) 250-50 MCG/DOSE AEPB inhale 1 puff by mouth and INTO THE LUNGS twice a day 1 each 0    buPROPion (WELLBUTRIN XL) 300 MG extended release tablet take 1 tablet by mouth every morning 30 tablet 0    famotidine (PEPCID) 20 MG tablet Take 1 tablet by mouth 2 times daily for 14 days (Patient not taking: Reported on 1/6/2022) 28 tablet 0    montelukast (SINGULAIR) 10 MG tablet Take 1 tablet by mouth nightly 90 tablet 3    busPIRone (BUSPAR) 7.5 MG tablet Take 1 tablet by mouth 2 times daily 180 tablet 3    levothyroxine (SYNTHROID) 125 MCG tablet Take 1 tablet by mouth every morning 90 tablet 3    albuterol (PROVENTIL) (2.5 MG/3ML) 0.083% nebulizer solution Take 3 mLs by nebulization 2 times daily as needed for Wheezing or Shortness of Breath 60 each 2     Allergies   Allergen Reactions    Latex Itching    Iodine Anaphylaxis    Levofloxacin Anaphylaxis    Pcn [Penicillins] Anaphylaxis    Sulfa Antibiotics Anaphylaxis    Adhesive Tape     Flexeril [Cyclobenzaprine]     Morphine Itching    Zofran Itching    Caffeine Palpitations     Doesn't sleep    Diazepam Nausea And Vomiting    Lidocaine Nausea And Vomiting    Volumen [Barium Sulfate] Itching, Nausea And Vomiting and Rash       Nursing Notes Reviewed    Physical Exam:  ED Triage Vitals [02/22/22 2042]   Enc Vitals Group      BP (!) 129/96      Pulse 121      Resp 18      Temp       Temp src       SpO2 95 % Weight 230 lb (104.3 kg)      Height 5' 1\" (1.549 m)      Head Circumference       Peak Flow       Pain Score       Pain Loc       Pain Edu? Excl. in [address]? General :Patient is awake alert oriented person place and time no acute distress nontoxic appearing  HEENT: Pupils are equally round and reactive to light extraocular motors are intact conjunctivae clear sclerae white there is no injection no icterus. Nose without any rhinorrhea or epistaxis. Oral mucosa is moist no exudate buccal mucosa shows no ulcerations. Uvula is midline    Neck: Neck is supple full range of motion trachea midline thyroid nonpalpable  Cardiac: Heart regular rate rhythm no murmurs rubs clicks or gallops  Lungs: Lungs are clear to auscultation there is no wheezing rhonchi or rales. There is no use of accessory muscles no nasal flaring identified. Chest wall: There is no tenderness to palpation over the chest wall or over ribs  Abdomen: Abdomen is soft nontender nondistended. There is no firm or pulsatile masses no rebound rigidity or guarding negative Cunningham's negative McBurney, no peritoneal signs  Suprapubic:  there is no tenderness to palpation over the external bladder   Musculoskeletal: 5 out of 5 strength in all 4 extremities full flexion extension abduction and adduction supination pronation of all extremities and all digits. No obvious muscle atrophy is noted. No focal muscle deficits are appreciated  Dermatology: Skin is warm and dry there is no obvious abscesses lacerations or lesions noted  Psych: Mentation is grossly normal cognition is grossly normal. Affect is appropriate  Neuro: Motor intact sensory intact cranial nerves II through XII are intact level of consciousness is normal cerebellar function is normal reflexes are grossly normal. No evidence of incontinence or loss of bowel or bladder no saddle anesthesia noted Lymphatic: There is no submandibular or cervical adenopathy appreciated.         I have reviewed and interpreted all of the currently available lab results from this visit (if applicable):  Results for orders placed or performed during the hospital encounter of 02/22/22   CBC with Auto Differential   Result Value Ref Range    WBC 15.4 (H) 4.0 - 10.5 K/CU MM    RBC 5.21 4.2 - 5.4 M/CU MM    Hemoglobin 15.2 12.5 - 16.0 GM/DL    Hematocrit 50.0 (H) 37 - 47 %    MCV 96.0 78 - 100 FL    MCH 29.2 27 - 31 PG    MCHC 30.4 (L) 32.0 - 36.0 %    RDW 15.1 (H) 11.7 - 14.9 %    Platelets 191 569 - 036 K/CU MM    MPV 11.2 (H) 7.5 - 11.1 FL    Differential Type AUTOMATED DIFFERENTIAL     Segs Relative 81.4 (H) 36 - 66 %    Lymphocytes % 11.0 (L) 24 - 44 %    Monocytes % 4.6 (H) 0 - 4 %    Eosinophils % 0.3 0 - 3 %    Basophils % 0.4 0 - 1 %    Segs Absolute 12.5 K/CU MM    Lymphocytes Absolute 1.7 K/CU MM    Monocytes Absolute 0.7 K/CU MM    Eosinophils Absolute 0.0 K/CU MM    Basophils Absolute 0.1 K/CU MM    Nucleated RBC % 0.0 %    Total Nucleated RBC 0.0 K/CU MM    Total Immature Neutrophil 0.36 K/CU MM    Immature Neutrophil % 2.3 (H) 0 - 0.43 %   SPECIMEN REJECTION   Result Value Ref Range    Rejected Test CHEM     Reason for Rejection UNABLE TO PERFORM TESTING:    HCG, Quantitative, Pregnancy   Result Value Ref Range    hCG Quant 0.5 UIU/ML   Comprehensive Metabolic Panel   Result Value Ref Range    Sodium 141 135 - 145 MMOL/L    Potassium 3.0 (LL) 3.5 - 5.1 MMOL/L    Chloride 101 99 - 110 mMol/L    CO2 25 21 - 32 MMOL/L    BUN 18 6 - 23 MG/DL    CREATININE 1.0 0.6 - 1.1 MG/DL    Glucose 190 (H) 70 - 99 MG/DL    Calcium 10.0 8.3 - 10.6 MG/DL    Albumin 3.3 (L) 3.4 - 5.0 GM/DL    Total Protein 6.4 6.4 - 8.2 GM/DL    Total Bilirubin 0.9 0.0 - 1.0 MG/DL    ALT 71 (H) 10 - 40 U/L    AST 28 15 - 37 IU/L    Alkaline Phosphatase 114 40 - 129 IU/L    GFR Non- 55 (L) >60 mL/min/1.73m2    GFR African American >60 >60 mL/min/1.73m2    Anion Gap 15 4 - 16   Lipase   Result Value Ref Range    Lipase 14 13 - 60 IU/L EKG 12 Lead   Result Value Ref Range    Ventricular Rate 114 BPM    Atrial Rate 114 BPM    P-R Interval 156 ms    QRS Duration 78 ms    Q-T Interval 340 ms    QTc Calculation (Bazett) 468 ms    P Axis 45 degrees    R Axis -76 degrees    T Axis 38 degrees    Diagnosis       Sinus tachycardia  Left axis deviation  Inferior infarct (cited on or before 29-JUN-2021)  Possible Anterolateral infarct (cited on or before 29-JUN-2021)  Abnormal ECG  When compared with ECG of 26-JAN-2022 11:54,  No significant change was found        Radiographs (if obtained):  [] The following radiograph was interpreted by myself in the absence of a radiologist:   [] Radiologist's Report Reviewed:  CT ABDOMEN PELVIS WO CONTRAST Additional Contrast? None   Final Result   No acute CT abnormality in the abdomen or pelvis. Interval improvement in peripheral predominant bibasilar airspace opacities,   likely representing improved multifocal pneumonia. EKG (if obtained):   Please See Note of attending physician for EKG interpretation. Chart review shows recent radiograph(s):  CT ABDOMEN PELVIS WO CONTRAST Additional Contrast? None    Result Date: 1/26/2022  EXAMINATION: CT OF THE ABDOMEN AND PELVIS WITHOUT CONTRAST 1/26/2022 4:07 pm TECHNIQUE: CT of the abdomen and pelvis was performed without the administration of intravenous contrast. Multiplanar reformatted images are provided for review. Dose modulation, iterative reconstruction, and/or weight based adjustment of the mA/kV was utilized to reduce the radiation dose to as low as reasonably achievable.  COMPARISON: CT of the abdomen and pelvis, 01/18/2022 HISTORY: ORDERING SYSTEM PROVIDED HISTORY: left flank pain TECHNOLOGIST PROVIDED HISTORY: Reason for exam:->left flank pain Additional Contrast?->None Decision Support Exception - unselect if not a suspected or confirmed emergency medical condition->Emergency Medical Condition (MA) Reason for Exam: left flank pain FINDINGS: Lower Chest: Prominent pulmonary infiltrates in the lung bases. They have worsened as of the previous CT from 01/18/2022. The heart is normal in size. No pleural or pericardial effusion. Organs: Liver: Unremarkable. Gallbladder: Surgically absent. Pancreas: Moderately atrophied. Otherwise, unremarkable. Spleen:  Multiple calcified granuloma. Otherwise, unremarkable. Adrenals: Unremarkable. Kidneys: Normal in size and contour. Two 4 mm intrarenal calculi are seen in the right kidney. 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 No left nephrolithiasis. No hydronephrosis. GI/Bowel: No bowel wall thickening or obstruction. Normal appendix. Pelvis: The urinary bladder is unremarkable. The uterus is surgically absent. Peritoneum/Retroperitoneum: No lymphadenopathy. No free air or free fluid is seen. The abdominal aorta and pelvic arteries are unremarkable. Bones/Soft Tissues: The visualized bones are intact without fracture or focal lesion. 1. Prominent pulmonary infiltrates in the lower lungs, worsened as of 01/18/2022. 2.  No acute abnormality is seen in the abdomen or the pelvis. 3. Right nephrolithiasis. No hydronephrosis. RECOMMENDATIONS: Unavailable     XR CHEST PORTABLE    Result Date: 1/26/2022  EXAMINATION: ONE XRAY VIEW OF THE CHEST 1/26/2022 11:04 am COMPARISON: None. HISTORY: ORDERING SYSTEM PROVIDED HISTORY: SOB TECHNOLOGIST PROVIDED HISTORY: Reason for exam:->SOB Reason for Exam: sob FINDINGS: The heart was not enlarged. No interstitial edema was seen. [place] bronchial thickening was noted with patchy alveolar pneumonia in the right upper, right middle and both lower lobes. No pleural effusion was identified. Metallic surgical clips were noted probably from prior cholecystectomy.      Subtle peribronchial thickening was noted with patchy alveolar pneumonia in the right upper, right middle and both lower lobes without pleural effusion compatible bronchopneumonia. VL DUP LOWER EXTREMITY VENOUS BILATERAL    Result Date: 1/26/2022  EXAMINATION: DUPLEX VENOUS ULTRASOUND OF THE BILATERAL LOWER EXTREMITIES1/26/2022 4:37 pm TECHNIQUE: Duplex ultrasound using B-mode/gray scaled imaging, Doppler spectral analysis and color flow Doppler was obtained of the deep venous structures of the lower bilateral extremities. COMPARISON: None. HISTORY: ORDERING SYSTEM PROVIDED HISTORY: elevated D-diimer TECHNOLOGIST PROVIDED HISTORY: Reason for exam:->elevated D-diimer Reason for Exam: elevated d dimer FINDINGS: The visualized veins of the bilateral lower extremities are patent and free of echogenic thrombus. The veins demonstrate good compressibility with normal color flow study and spectral analysis. No evidence of DVT in either lower extremity. MDM:     Interventions given this visit:   Orders Placed This Encounter   Medications    0.9 % sodium chloride bolus    promethazine (PHENERGAN) injection 25 mg    potassium chloride (KLOR-CON M) extended release tablet 60 mEq    promethazine (PHENERGAN) 25 MG tablet     Sig: Take 1 tablet by mouth every 6 hours as needed for Nausea     Dispense:  20 tablet     Refill:  0    dicyclomine (BENTYL) 10 MG capsule     Sig: Take 1 capsule by mouth 4 times daily (before meals and nightly)     Dispense:  15 capsule     Refill:  3       Patient presents today with abdominal pain. Abdominal exam is  /Nonsurgical/nonemergent/nonacute. Lab work including:    CBC shows no marked leukocytosis. No anemia, bandemia. Metabolic panel shows no abnormalities to account for patient's symptoms. Lipase is within normal limits. Urinalysis shows no sign of infection. Initial and repeat serial examinations are nonsurgical      CT abdomen pelvis showed no evidence of acute ureterolithiasis, bowel obstruction, bowel ischemia, deep tissue abscess, or other intra-abdominal or pelvic emergency.   In addition, other causes were considered including appendicitis, urinary tract infection, aortic dissection, mesenteric ischemia, as well as other surgical/malignant intra-abdominal processes, there is no evidence for these other possible processes at this time, based on patient's history, presentation, physical, and current state. I estimate there is LOW risk for BACTERIAL MENINGITIS, SUBARACHNOID HEMORRHAGE, ISCHEMIC OR HEMORRHAGE CVA, or ACUTE CORONARY SYNDROME, ACUTE APPENDICITIS, BOWEL OBSTRUCTION, CHOLECYSTITIS, RUPTURED DIVERTICULITIS, INCARCERATED HERNIA, HEMMORHAGIC PANCREATITIS, or PERFORATED BOWEL/ULCER, ECTOPIC PREGNANCY, OVARIAN TORSION or TUBO-OVARIAN ABSCESS    Vital signs (pulse) normalized with fluid resuscitation. Normal saline IV  Patient agrees to return emergency department if symptoms worsen or any new symptoms develop. I independently managed patient today in the ED    BP (!) 129/96   Pulse 121   Resp 18   Ht 5' 1\" (1.549 m)   Wt 230 lb (104.3 kg)   LMP  (LMP Unknown)   SpO2 95%   BMI 43.46 kg/m²       Clinical Impression:  1. Non-intractable vomiting with nausea, unspecified vomiting type        Disposition referral (if applicable):  No follow-up provider specified. Disposition medications (if applicable):  New Prescriptions    DICYCLOMINE (BENTYL) 10 MG CAPSULE    Take 1 capsule by mouth 4 times daily (before meals and nightly)    PROMETHAZINE (PHENERGAN) 25 MG TABLET    Take 1 tablet by mouth every 6 hours as needed for Nausea         Comment: Please note this report has been produced using speech recognition software and may contain errors related to that system including errors in grammar, punctuation, and spelling, as well as words and phrases that may be inappropriate. If there are any questions or concerns please feel free to contact the dictating provider for clarification.       Jame Linn, [address] [place] [address]  02/23/22 2297

## (undated) DEVICE — FORCEPS BX 240CM JAW 3.2MM L CAP NDL MIC MESH TTH M00513372

## (undated) DEVICE — LINER SUCT CANSTR 1500CC SEMI RIG W/ POR HYDROPHOBIC SHUT

## (undated) DEVICE — SYRINGE MED 20ML STD CLR PLAS LUERLOCK TIP N CTRL DISP

## (undated) DEVICE — DEFENDO AIR WATER SUCTION AND BIOPSY VALVE KIT FOR  OLYMPUS: Brand: DEFENDO AIR/WATER/SUCTION AND BIOPSY VALVE

## (undated) DEVICE — ACUSNARE POLYPECTOMY SNARE: Brand: ACUSNARE

## (undated) DEVICE — TUBING, SUCTION, 3/16" X 6', STRAIGHT: Brand: MEDLINE

## (undated) DEVICE — BW-412T DISP COMBO CLEANING BRUSH: Brand: SINGLE USE COMBINATION CLEANING BRUSH

## (undated) DEVICE — JELLY LUBRICATING 3 GM BACTERIOSTATIC

## (undated) DEVICE — FORCEPS BX L240CM JAW DIA2.8MM L CAP W/ NDL MIC MESH TOOTH

## (undated) DEVICE — KENDALL 500 SERIES DIAPHORETIC FOAM MONITORING ELECTRODE - TEAR DROP SHAPE ( 30/PK): Brand: KENDALL

## (undated) DEVICE — UROLOGIC DRAIN BAG: Brand: UNBRANDED

## (undated) DEVICE — LINE SAMP O2 6.5FT W/FEMALE CONN F/ADULT CAPNOLINE PLUS